# Patient Record
Sex: MALE | Race: OTHER | HISPANIC OR LATINO | URBAN - METROPOLITAN AREA
[De-identification: names, ages, dates, MRNs, and addresses within clinical notes are randomized per-mention and may not be internally consistent; named-entity substitution may affect disease eponyms.]

---

## 2018-08-15 ENCOUNTER — INPATIENT (INPATIENT)
Facility: HOSPITAL | Age: 78
LOS: 0 days | Discharge: ANOTHER HOSPITAL | End: 2018-08-16
Attending: INTERNAL MEDICINE | Admitting: INTERNAL MEDICINE
Payer: MEDICARE

## 2018-08-15 VITALS
SYSTOLIC BLOOD PRESSURE: 138 MMHG | HEIGHT: 65 IN | HEART RATE: 97 BPM | OXYGEN SATURATION: 97 % | DIASTOLIC BLOOD PRESSURE: 83 MMHG | TEMPERATURE: 98 F | WEIGHT: 184.09 LBS | RESPIRATION RATE: 19 BRPM

## 2018-08-15 DIAGNOSIS — E11.9 TYPE 2 DIABETES MELLITUS WITHOUT COMPLICATIONS: ICD-10-CM

## 2018-08-15 DIAGNOSIS — J44.9 CHRONIC OBSTRUCTIVE PULMONARY DISEASE, UNSPECIFIED: ICD-10-CM

## 2018-08-15 DIAGNOSIS — I21.4 NON-ST ELEVATION (NSTEMI) MYOCARDIAL INFARCTION: ICD-10-CM

## 2018-08-15 DIAGNOSIS — I10 ESSENTIAL (PRIMARY) HYPERTENSION: ICD-10-CM

## 2018-08-15 DIAGNOSIS — E78.2 MIXED HYPERLIPIDEMIA: ICD-10-CM

## 2018-08-15 LAB
ALBUMIN SERPL ELPH-MCNC: 3.3 G/DL — SIGNIFICANT CHANGE UP (ref 3.3–5)
ALP SERPL-CCNC: 94 U/L — SIGNIFICANT CHANGE UP (ref 40–120)
ALT FLD-CCNC: 19 U/L — SIGNIFICANT CHANGE UP (ref 12–78)
ANION GAP SERPL CALC-SCNC: 9 MMOL/L — SIGNIFICANT CHANGE UP (ref 5–17)
APTT BLD: 120.5 SEC — CRITICAL HIGH (ref 27.5–37.4)
APTT BLD: 30.5 SEC — SIGNIFICANT CHANGE UP (ref 27.5–37.4)
AST SERPL-CCNC: 16 U/L — SIGNIFICANT CHANGE UP (ref 15–37)
BASE EXCESS BLDA CALC-SCNC: 3.6 MMOL/L — HIGH (ref -2–2)
BASOPHILS # BLD AUTO: 0.08 K/UL — SIGNIFICANT CHANGE UP (ref 0–0.2)
BASOPHILS NFR BLD AUTO: 0.6 % — SIGNIFICANT CHANGE UP (ref 0–2)
BILIRUB SERPL-MCNC: 0.7 MG/DL — SIGNIFICANT CHANGE UP (ref 0.2–1.2)
BLOOD GAS COMMENTS: SIGNIFICANT CHANGE UP
BLOOD GAS COMMENTS: SIGNIFICANT CHANGE UP
BLOOD GAS SOURCE: SIGNIFICANT CHANGE UP
BUN SERPL-MCNC: 15 MG/DL — SIGNIFICANT CHANGE UP (ref 7–23)
CALCIUM SERPL-MCNC: 8.3 MG/DL — LOW (ref 8.5–10.1)
CHLORIDE SERPL-SCNC: 104 MMOL/L — SIGNIFICANT CHANGE UP (ref 96–108)
CHOLEST SERPL-MCNC: 156 MG/DL — SIGNIFICANT CHANGE UP (ref 10–199)
CK MB CFR SERPL CALC: 10.4 NG/ML — HIGH (ref 0.5–3.6)
CK MB CFR SERPL CALC: 4.7 NG/ML — HIGH (ref 0.5–3.6)
CO2 SERPL-SCNC: 27 MMOL/L — SIGNIFICANT CHANGE UP (ref 22–31)
CREAT SERPL-MCNC: 0.68 MG/DL — SIGNIFICANT CHANGE UP (ref 0.5–1.3)
EOSINOPHIL # BLD AUTO: 0.19 K/UL — SIGNIFICANT CHANGE UP (ref 0–0.5)
EOSINOPHIL NFR BLD AUTO: 1.3 % — SIGNIFICANT CHANGE UP (ref 0–6)
GLUCOSE SERPL-MCNC: 185 MG/DL — HIGH (ref 70–99)
HBA1C BLD-MCNC: 6.9 % — HIGH (ref 4–5.6)
HCO3 BLDA-SCNC: 28 MMOL/L — SIGNIFICANT CHANGE UP (ref 21–29)
HCT VFR BLD CALC: 47.7 % — SIGNIFICANT CHANGE UP (ref 39–50)
HCT VFR BLD CALC: 50.7 % — HIGH (ref 39–50)
HDLC SERPL-MCNC: 51 MG/DL — SIGNIFICANT CHANGE UP
HGB BLD-MCNC: 15.8 G/DL — SIGNIFICANT CHANGE UP (ref 13–17)
HGB BLD-MCNC: 16.6 G/DL — SIGNIFICANT CHANGE UP (ref 13–17)
HOROWITZ INDEX BLDA+IHG-RTO: 28 — SIGNIFICANT CHANGE UP
IMM GRANULOCYTES NFR BLD AUTO: 0.6 % — SIGNIFICANT CHANGE UP (ref 0–1.5)
INR BLD: 0.97 RATIO — SIGNIFICANT CHANGE UP (ref 0.88–1.16)
LACTATE SERPL-SCNC: 1.9 MMOL/L — SIGNIFICANT CHANGE UP (ref 0.7–2)
LIPID PNL WITH DIRECT LDL SERPL: 93 MG/DL — SIGNIFICANT CHANGE UP
LYMPHOCYTES # BLD AUTO: 1.15 K/UL — SIGNIFICANT CHANGE UP (ref 1–3.3)
LYMPHOCYTES # BLD AUTO: 8 % — LOW (ref 13–44)
MCHC RBC-ENTMCNC: 29 PG — SIGNIFICANT CHANGE UP (ref 27–34)
MCHC RBC-ENTMCNC: 29.1 PG — SIGNIFICANT CHANGE UP (ref 27–34)
MCHC RBC-ENTMCNC: 32.7 GM/DL — SIGNIFICANT CHANGE UP (ref 32–36)
MCHC RBC-ENTMCNC: 33.1 GM/DL — SIGNIFICANT CHANGE UP (ref 32–36)
MCV RBC AUTO: 87.5 FL — SIGNIFICANT CHANGE UP (ref 80–100)
MCV RBC AUTO: 88.8 FL — SIGNIFICANT CHANGE UP (ref 80–100)
MONOCYTES # BLD AUTO: 0.76 K/UL — SIGNIFICANT CHANGE UP (ref 0–0.9)
MONOCYTES NFR BLD AUTO: 5.3 % — SIGNIFICANT CHANGE UP (ref 2–14)
NEUTROPHILS # BLD AUTO: 12.03 K/UL — HIGH (ref 1.8–7.4)
NEUTROPHILS NFR BLD AUTO: 84.2 % — HIGH (ref 43–77)
NRBC # BLD: 0 /100 WBCS — SIGNIFICANT CHANGE UP (ref 0–0)
NT-PROBNP SERPL-SCNC: 365 PG/ML — SIGNIFICANT CHANGE UP (ref 0–450)
NT-PROBNP SERPL-SCNC: 737 PG/ML — HIGH (ref 0–450)
PCO2 BLDA: 45 MMHG — SIGNIFICANT CHANGE UP (ref 32–46)
PH BLD: 7.42 — SIGNIFICANT CHANGE UP (ref 7.35–7.45)
PLATELET # BLD AUTO: 241 K/UL — SIGNIFICANT CHANGE UP (ref 150–400)
PLATELET # BLD AUTO: 251 K/UL — SIGNIFICANT CHANGE UP (ref 150–400)
PO2 BLDA: 124 MMHG — HIGH (ref 74–108)
POTASSIUM SERPL-MCNC: 3.8 MMOL/L — SIGNIFICANT CHANGE UP (ref 3.5–5.3)
POTASSIUM SERPL-SCNC: 3.8 MMOL/L — SIGNIFICANT CHANGE UP (ref 3.5–5.3)
PROCALCITONIN SERPL-MCNC: <0.02 NG/ML — SIGNIFICANT CHANGE UP (ref 0.02–0.1)
PROT SERPL-MCNC: 7.7 GM/DL — SIGNIFICANT CHANGE UP (ref 6–8.3)
PROTHROM AB SERPL-ACNC: 10.6 SEC — SIGNIFICANT CHANGE UP (ref 9.8–12.7)
RBC # BLD: 5.45 M/UL — SIGNIFICANT CHANGE UP (ref 4.2–5.8)
RBC # BLD: 5.71 M/UL — SIGNIFICANT CHANGE UP (ref 4.2–5.8)
RBC # FLD: 14.6 % — HIGH (ref 10.3–14.5)
RBC # FLD: 14.6 % — HIGH (ref 10.3–14.5)
SAO2 % BLDA: 99 % — HIGH (ref 92–96)
SODIUM SERPL-SCNC: 140 MMOL/L — SIGNIFICANT CHANGE UP (ref 135–145)
TOTAL CHOLESTEROL/HDL RATIO MEASUREMENT: 3.1 RATIO — LOW (ref 3.4–9.6)
TRIGL SERPL-MCNC: 61 MG/DL — SIGNIFICANT CHANGE UP (ref 10–149)
TROPONIN I SERPL-MCNC: 0.63 NG/ML — HIGH (ref 0.01–0.04)
TROPONIN I SERPL-MCNC: 3.44 NG/ML — HIGH (ref 0.01–0.04)
WBC # BLD: 13.63 K/UL — HIGH (ref 3.8–10.5)
WBC # BLD: 14.3 K/UL — HIGH (ref 3.8–10.5)
WBC # FLD AUTO: 13.63 K/UL — HIGH (ref 3.8–10.5)
WBC # FLD AUTO: 14.3 K/UL — HIGH (ref 3.8–10.5)

## 2018-08-15 PROCEDURE — 99223 1ST HOSP IP/OBS HIGH 75: CPT

## 2018-08-15 PROCEDURE — 93306 TTE W/DOPPLER COMPLETE: CPT | Mod: 26

## 2018-08-15 PROCEDURE — 99285 EMERGENCY DEPT VISIT HI MDM: CPT

## 2018-08-15 PROCEDURE — 71045 X-RAY EXAM CHEST 1 VIEW: CPT | Mod: 26

## 2018-08-15 RX ORDER — FUROSEMIDE 40 MG
40 TABLET ORAL ONCE
Qty: 0 | Refills: 0 | Status: DISCONTINUED | OUTPATIENT
Start: 2018-08-15 | End: 2018-08-15

## 2018-08-15 RX ORDER — DEXTROSE 50 % IN WATER 50 %
12.5 SYRINGE (ML) INTRAVENOUS ONCE
Qty: 0 | Refills: 0 | Status: DISCONTINUED | OUTPATIENT
Start: 2018-08-15 | End: 2018-08-16

## 2018-08-15 RX ORDER — ATORVASTATIN CALCIUM 80 MG/1
10 TABLET, FILM COATED ORAL AT BEDTIME
Qty: 0 | Refills: 0 | Status: DISCONTINUED | OUTPATIENT
Start: 2018-08-15 | End: 2018-08-15

## 2018-08-15 RX ORDER — LOSARTAN POTASSIUM 100 MG/1
100 TABLET, FILM COATED ORAL DAILY
Qty: 0 | Refills: 0 | Status: DISCONTINUED | OUTPATIENT
Start: 2018-08-15 | End: 2018-08-16

## 2018-08-15 RX ORDER — DEXTROSE 50 % IN WATER 50 %
15 SYRINGE (ML) INTRAVENOUS ONCE
Qty: 0 | Refills: 0 | Status: DISCONTINUED | OUTPATIENT
Start: 2018-08-15 | End: 2018-08-16

## 2018-08-15 RX ORDER — HEPARIN SODIUM 5000 [USP'U]/ML
INJECTION INTRAVENOUS; SUBCUTANEOUS
Qty: 25000 | Refills: 0 | Status: DISCONTINUED | OUTPATIENT
Start: 2018-08-15 | End: 2018-08-16

## 2018-08-15 RX ORDER — GABAPENTIN 400 MG/1
300 CAPSULE ORAL DAILY
Qty: 0 | Refills: 0 | Status: DISCONTINUED | OUTPATIENT
Start: 2018-08-15 | End: 2018-08-16

## 2018-08-15 RX ORDER — TAMSULOSIN HYDROCHLORIDE 0.4 MG/1
0.4 CAPSULE ORAL AT BEDTIME
Qty: 0 | Refills: 0 | Status: DISCONTINUED | OUTPATIENT
Start: 2018-08-15 | End: 2018-08-16

## 2018-08-15 RX ORDER — ATORVASTATIN CALCIUM 80 MG/1
80 TABLET, FILM COATED ORAL AT BEDTIME
Qty: 0 | Refills: 0 | Status: DISCONTINUED | OUTPATIENT
Start: 2018-08-15 | End: 2018-08-16

## 2018-08-15 RX ORDER — IPRATROPIUM/ALBUTEROL SULFATE 18-103MCG
3 AEROSOL WITH ADAPTER (GRAM) INHALATION ONCE
Qty: 0 | Refills: 0 | Status: COMPLETED | OUTPATIENT
Start: 2018-08-15 | End: 2018-08-15

## 2018-08-15 RX ORDER — AMLODIPINE BESYLATE 2.5 MG/1
5 TABLET ORAL DAILY
Qty: 0 | Refills: 0 | Status: DISCONTINUED | OUTPATIENT
Start: 2018-08-15 | End: 2018-08-16

## 2018-08-15 RX ORDER — INSULIN LISPRO 100/ML
VIAL (ML) SUBCUTANEOUS
Qty: 0 | Refills: 0 | Status: DISCONTINUED | OUTPATIENT
Start: 2018-08-15 | End: 2018-08-16

## 2018-08-15 RX ORDER — SODIUM CHLORIDE 9 MG/ML
1000 INJECTION, SOLUTION INTRAVENOUS
Qty: 0 | Refills: 0 | Status: DISCONTINUED | OUTPATIENT
Start: 2018-08-15 | End: 2018-08-16

## 2018-08-15 RX ORDER — METOPROLOL TARTRATE 50 MG
25 TABLET ORAL DAILY
Qty: 0 | Refills: 0 | Status: DISCONTINUED | OUTPATIENT
Start: 2018-08-15 | End: 2018-08-16

## 2018-08-15 RX ORDER — ASPIRIN/CALCIUM CARB/MAGNESIUM 324 MG
81 TABLET ORAL DAILY
Qty: 0 | Refills: 0 | Status: DISCONTINUED | OUTPATIENT
Start: 2018-08-15 | End: 2018-08-16

## 2018-08-15 RX ORDER — ASPIRIN/CALCIUM CARB/MAGNESIUM 324 MG
325 TABLET ORAL ONCE
Qty: 0 | Refills: 0 | Status: COMPLETED | OUTPATIENT
Start: 2018-08-15 | End: 2018-08-15

## 2018-08-15 RX ORDER — DEXTROSE 50 % IN WATER 50 %
25 SYRINGE (ML) INTRAVENOUS ONCE
Qty: 0 | Refills: 0 | Status: DISCONTINUED | OUTPATIENT
Start: 2018-08-15 | End: 2018-08-16

## 2018-08-15 RX ORDER — FUROSEMIDE 40 MG
80 TABLET ORAL ONCE
Qty: 0 | Refills: 0 | Status: COMPLETED | OUTPATIENT
Start: 2018-08-15 | End: 2018-08-15

## 2018-08-15 RX ORDER — HEPARIN SODIUM 5000 [USP'U]/ML
3000 INJECTION INTRAVENOUS; SUBCUTANEOUS EVERY 6 HOURS
Qty: 0 | Refills: 0 | Status: DISCONTINUED | OUTPATIENT
Start: 2018-08-15 | End: 2018-08-16

## 2018-08-15 RX ORDER — BUDESONIDE AND FORMOTEROL FUMARATE DIHYDRATE 160; 4.5 UG/1; UG/1
2 AEROSOL RESPIRATORY (INHALATION)
Qty: 0 | Refills: 0 | Status: DISCONTINUED | OUTPATIENT
Start: 2018-08-15 | End: 2018-08-16

## 2018-08-15 RX ORDER — HYDROCHLOROTHIAZIDE 25 MG
25 TABLET ORAL DAILY
Qty: 0 | Refills: 0 | Status: DISCONTINUED | OUTPATIENT
Start: 2018-08-15 | End: 2018-08-16

## 2018-08-15 RX ORDER — HEPARIN SODIUM 5000 [USP'U]/ML
6500 INJECTION INTRAVENOUS; SUBCUTANEOUS ONCE
Qty: 0 | Refills: 0 | Status: COMPLETED | OUTPATIENT
Start: 2018-08-15 | End: 2018-08-15

## 2018-08-15 RX ORDER — HEPARIN SODIUM 5000 [USP'U]/ML
6500 INJECTION INTRAVENOUS; SUBCUTANEOUS EVERY 6 HOURS
Qty: 0 | Refills: 0 | Status: DISCONTINUED | OUTPATIENT
Start: 2018-08-15 | End: 2018-08-16

## 2018-08-15 RX ORDER — GLUCAGON INJECTION, SOLUTION 0.5 MG/.1ML
1 INJECTION, SOLUTION SUBCUTANEOUS ONCE
Qty: 0 | Refills: 0 | Status: DISCONTINUED | OUTPATIENT
Start: 2018-08-15 | End: 2018-08-16

## 2018-08-15 RX ORDER — FUROSEMIDE 40 MG
40 TABLET ORAL DAILY
Qty: 0 | Refills: 0 | Status: DISCONTINUED | OUTPATIENT
Start: 2018-08-15 | End: 2018-08-16

## 2018-08-15 RX ORDER — METOPROLOL TARTRATE 50 MG
25 TABLET ORAL ONCE
Qty: 0 | Refills: 0 | Status: COMPLETED | OUTPATIENT
Start: 2018-08-15 | End: 2018-08-15

## 2018-08-15 RX ADMIN — Medication 3 MILLILITER(S): at 12:35

## 2018-08-15 RX ADMIN — HEPARIN SODIUM 1500 UNIT(S)/HR: 5000 INJECTION INTRAVENOUS; SUBCUTANEOUS at 14:00

## 2018-08-15 RX ADMIN — TAMSULOSIN HYDROCHLORIDE 0.4 MILLIGRAM(S): 0.4 CAPSULE ORAL at 22:06

## 2018-08-15 RX ADMIN — ATORVASTATIN CALCIUM 80 MILLIGRAM(S): 80 TABLET, FILM COATED ORAL at 22:05

## 2018-08-15 RX ADMIN — HEPARIN SODIUM 1300 UNIT(S)/HR: 5000 INJECTION INTRAVENOUS; SUBCUTANEOUS at 22:40

## 2018-08-15 RX ADMIN — Medication 80 MILLIGRAM(S): at 12:34

## 2018-08-15 RX ADMIN — HEPARIN SODIUM 6500 UNIT(S): 5000 INJECTION INTRAVENOUS; SUBCUTANEOUS at 13:59

## 2018-08-15 RX ADMIN — Medication 325 MILLIGRAM(S): at 12:35

## 2018-08-15 RX ADMIN — Medication 25 MILLIGRAM(S): at 13:58

## 2018-08-15 NOTE — H&P ADULT - HISTORY OF PRESENT ILLNESS
76 y/o male hx htn, dm, hld life long smoker complains of difficulty breathing that began this morning, worse with laying down, with elevated bp at home. Pt called ems himself, on arrival, bp was 205/120, gradually improved en route with only oxygen. Pt appeared anxious and somewhat sweaty, with some improvement of symptoms on arrival.      In ED bp normalized, but was found to have elevated troponin. ED discussed case w cardiology who decided to start heparin drip and treat as nstemi.      No known hx of chf. Denies any chest pain, leg swelling, abd pain, n/v, f/c.

## 2018-08-15 NOTE — PATIENT PROFILE ADULT. - VISION (WITH CORRECTIVE LENSES IF THE PATIENT USUALLY WEARS THEM):
Partially impaired: cannot see medication labels or newsprint, but can see obstacles in path, and the surrounding layout; can count fingers at arm's length/bifocals

## 2018-08-15 NOTE — H&P ADULT - NSHPREVIEWOFSYSTEMS_GEN_ALL_CORE
CONSTITUTIONAL: No fever, weight loss, or fatigue  EYES: No eye pain, visual disturbances, or discharge  ENMT:  No difficulty hearing, tinnitus, vertigo; No sinus or throat pain  NECK: No pain or stiffness  BREASTS: No pain, masses, or nipple discharge  RESPIRATORY: As pe HPI  CARDIOVASCULAR: No chest pain, palpitations, dizziness, or leg swelling  GASTROINTESTINAL: No abdominal or epigastric pain. No nausea, vomiting, or hematemesis; No diarrhea or constipation. No melena or hematochezia.  GENITOURINARY: No dysuria, frequency, hematuria, or incontinence  NEUROLOGICAL: No headaches, memory loss, loss of strength, numbness, or tremors  SKIN: No itching, burning, rashes, or lesions   LYMPH NODES: No enlarged glands  ENDOCRINE: No heat or cold intolerance; No hair loss  MUSCULOSKELETAL: No joint pain or swelling; No muscle, back, or extremity pain  PSYCHIATRIC: No depression, anxiety, mood swings, or difficulty sleeping  HEME/LYMPH: No easy bruising, or bleeding gums  ALLERGY AND IMMUNOLOGIC: No hives or eczema

## 2018-08-15 NOTE — ED PROVIDER NOTE - PROGRESS NOTE DETAILS
Luisito: pt with elevated troponin, fluid overload on xr, ?possible underlying pna with cough and wbc. given aspirin, heparin, betablocker, empiric abx. spoke to Dr. Solano from cardiology who saw ekg, will admit for nstemi/fluid overload. Dr. Ramony aware. pt has no chest pain now or at any point.

## 2018-08-15 NOTE — ED PROVIDER NOTE - CARE PLAN
Principal Discharge DX:	NSTEMI (non-ST elevated myocardial infarction)  Secondary Diagnosis:	Fluid overload

## 2018-08-15 NOTE — ED PROVIDER NOTE - PHYSICAL EXAMINATION
Gen: mildly anxious/sweaty, awake, alert.   HEENT: Mucous membranes moist, pink conjunctivae, EOMI  CV: RRR, nl s1/s2.  Resp: speaking full sentences, breathing ~20-22, no significant retractions though slightly labored appearing. crackles at bases with some rhonchi diffusely.   GI: Abdomen soft, NT, ND. No rebound, no guarding  : No CVAT  Neuro: A&O x 3, moving all 4 extremities  MSK: No spine or joint tenderness to palpation. no edema b/l.   Skin: No rashes. intact and perfused.

## 2018-08-15 NOTE — ED PROVIDER NOTE - MEDICAL DECISION MAKING DETAILS
sob, with some basilar crackles and mild congestive findings on xray. given lasix. duoneb trial given rhonchi with smoking. pt breathing significantly improved as he calmed down, no bipap at this time. ekg nsr without ischemic change. labs, meds, reassess.

## 2018-08-15 NOTE — ED ADULT NURSE NOTE - NSIMPLEMENTINTERV_GEN_ALL_ED
Implemented All Universal Safety Interventions:  [place] to call system. Call bell, personal items and telephone within reach. Instruct patient to call for assistance. Room bathroom lighting operational. Non-slip footwear when patient is off stretcher. Physically safe environment: no spills, clutter or unnecessary equipment. Stretcher in lowest position, wheels locked, appropriate side rails in place.

## 2018-08-15 NOTE — CONSULT NOTE ADULT - ASSESSMENT
78 y/o male hx htn, dm, hld life long smoker complains of difficulty breathing that began this morning, worse with laying down, with elevated BP at home. Pt called ems himself, on arrival, bp was 205/120, gradually improved en route with only oxygen. Pt appeared anxious and somewhat sweaty, with some improvement of symptoms on arrival.    Denied chest pain or prior hx of CHF.  Trop up to 3.4; pt asymptomatic with a normal EKG.    -monitor on tele  -trend oTm  -2D echo  -daily EKG  -cont asa/hep gtt/metoprolol/statin  -cont diuresis  -for ischemic eval likely in AM  -smoking cessation 78 y/o male hx htn, dm, hld life long smoker complains of difficulty breathing that began this morning, worse with laying down, with elevated BP at home. Pt called ems himself, on arrival, bp was 205/120, gradually improved en route with only oxygen. Pt appeared anxious and somewhat sweaty, with some improvement of symptoms on arrival.    Denied chest pain or prior hx of CHF.  Trop up to 3.4; pt asymptomatic with a normal EKG.    -monitor on tele  -trend Tom  -2D echo  -daily EKG  -cont asa/hep gtt/metoprolol/statin  -cont diuresis  -for ischemic eval likely in AM  -smoking cessation  -monitor temps/WBC... ?infxn

## 2018-08-15 NOTE — H&P ADULT - ASSESSMENT
78 y/o male hx htn, dm, hld life long smoker complains of difficulty breathing that began this morning, worse with laying down, with elevated bp at home. Pt called ems himself, on arrival, bp was 205/120, gradually improved en route with only oxygen. Pt appeared anxious and somewhat sweaty, with some improvement of symptoms on arrival.      In ED bp normalized, but was found to have elevated troponin. ED discussed case w cardiology who decided to start heparin drip and treat as nstemi.

## 2018-08-15 NOTE — ED ADULT NURSE NOTE - CHPI ED NUR SYMPTOMS NEG
no headache/no chest pain/no fever/no edema/no diaphoresis/no chills/no body aches/no cough/no hemoptysis

## 2018-08-15 NOTE — ED PROVIDER NOTE - OBJECTIVE STATEMENT
78 y/o male hx htn, dm, smoker c/o some difficulty breathing beginning this morning, worse with laying down, with elevated bp at home. Pt called ems himself, on arrival, bp was 205/120, gradually improved en route with only oxygen. Pt appeared anxious and somewhat sweaty, with some improvement of symptoms on arrival. Pt with cough starting today.  No known hx of chf. Denies any chest pain, leg swelling, abd pain, n/v, f/c.     ROS: No fever/chills. No eye pain/changes in vision, No ear pain/sore throat/dysphagia, No chest pain/palpitations.  No abdominal pain, N/V/D, no black/bloody bm. No dysuria/frequency/discharge, No headache. No Dizziness.    No rashes or breaks in skin. No numbness/tingling/weakness.

## 2018-08-15 NOTE — ED ADULT TRIAGE NOTE - CHIEF COMPLAINT QUOTE
patient c/o of difficulty breathing started today , denied chest pain denied headache , denied N/V denied dizziness , as per EMS /122, the last BP took by EMS was 179/96

## 2018-08-15 NOTE — H&P ADULT - NSHPPHYSICALEXAM_GEN_ALL_CORE
GENERAL: NAD, well-groomed, well-developed  HEAD:  Atraumatic, Normocephalic  EYES: EOMI, PERRLA, conjunctiva and sclera clear  ENMT: No tonsillar erythema, exudates, or enlargement; Moist mucous membranes, Good dentition, No lesions  NECK: Supple, No JVD, Normal thyroid  NERVOUS SYSTEM:  Alert & Oriented X3, Good concentration; Motor Strength 5/5 B/L upper and lower extremities; DTRs 2+ intact and symmetric  CHEST/LUNG: Clear to percussion bilaterally; faint crackles b/l  HEART: Regular rate and rhythm; No murmurs, rubs, or gallops  ABDOMEN: Soft, Nontender, Nondistended; Bowel sounds present  EXTREMITIES:  2+ Peripheral Pulses, No clubbing, cyanosis, or edema  LYMPH: No lymphadenopathy   SKIN: No rashes or lesions

## 2018-08-15 NOTE — H&P ADULT - NSHPLABSRESULTS_GEN_ALL_CORE
16.6   14.30 )-----------( 241      ( 15 Aug 2018 12:45 )             50.7     08-15    140  |  104  |  15  ----------------------------<  185<H>  3.8   |  27  |  0.68    Ca    8.3<L>      15 Aug 2018 12:45    TPro  7.7  /  Alb  3.3  /  TBili  0.7  /  DBili  x   /  AST  16  /  ALT  19  /  AlkPhos  94  08-15    PT/INR - ( 15 Aug 2018 12:45 )   PT: 10.6 sec;   INR: 0.97 ratio         PTT - ( 15 Aug 2018 12:45 )  PTT:30.5 sec

## 2018-08-15 NOTE — CONSULT NOTE ADULT - SUBJECTIVE AND OBJECTIVE BOX
CARDIOLOGY CONSULT NOTE    Patient is a 76y Male with a known history of :  Mixed hyperlipidemia (E78.2)  Chronic obstructive pulmonary disease, unspecified COPD type (J44.9)  Type 2 diabetes mellitus without complication, without long-term current use of insulin (E11.9)  Essential hypertension (I10)  NSTEMI (non-ST elevated myocardial infarction) (I21.4)    HPI:  76 y/o male hx htn, dm, hld life long smoker complains of difficulty breathing that began this morning, worse with laying down, with elevated BP at home. Pt called ems himself, on arrival, bp was 205/120, gradually improved en route with only oxygen. Pt appeared anxious and somewhat sweaty, with some improvement of symptoms on arrival.    Denied chest pain or prior hx of CHF.  Trop up to 3.4; pt asymptomatic with a normal EKG.    REVIEW OF SYSTEMS:  CONSTITUTIONAL: No fever, weight loss, or fatigue  EYES: No eye pain, visual disturbances, or discharge  ENMT:  No difficulty hearing, tinnitus, vertigo; No sinus or throat pain  NECK: No pain or stiffness  BREASTS: No pain, masses, or nipple discharge  RESPIRATORY: No cough, wheezing, chills or hemoptysis; + shortness of breath  CARDIOVASCULAR: No chest pain, palpitations, dizziness, or leg swelling  GASTROINTESTINAL: No abdominal or epigastric pain. No nausea, vomiting, or hematemesis; No diarrhea or constipation. No melena or hematochezia.  GENITOURINARY: No dysuria, frequency, hematuria, or incontinence  NEUROLOGICAL: No headaches, memory loss, loss of strength, numbness, or tremors  SKIN: No itching, burning, rashes, or lesions   LYMPH NODES: No enlarged glands  ENDOCRINE: No heat or cold intolerance; No hair loss  MUSCULOSKELETAL: No joint pain or swelling; No muscle, back, or extremity pain  PSYCHIATRIC: No depression, anxiety, mood swings, or difficulty sleeping  HEME/LYMPH: No easy bruising, or bleeding gums  ALLERGY AND IMMUNOLOGIC: No hives or eczema    MEDICATIONS  (STANDING):  amLODIPine   Tablet 5 milliGRAM(s) Oral daily  aspirin enteric coated 81 milliGRAM(s) Oral daily  atorvastatin 80 milliGRAM(s) Oral at bedtime  buDESOnide  80 MICROgram(s)/formoterol 4.5 MICROgram(s) Inhaler 2 Puff(s) Inhalation two times a day  dextrose 5%. 1000 milliLiter(s) (50 mL/Hr) IV Continuous <Continuous>  gabapentin 300 milliGRAM(s) Oral daily  heparin  Infusion.  Unit(s)/Hr (15 mL/Hr) IV Continuous <Continuous>  hydrochlorothiazide 25 milliGRAM(s) Oral daily  insulin lispro (HumaLOG) corrective regimen sliding scale   SubCutaneous three times a day before meals  losartan 100 milliGRAM(s) Oral daily  metoprolol succinate ER 25 milliGRAM(s) Oral daily  tamsulosin 0.4 milliGRAM(s) Oral at bedtime    MEDICATIONS  (PRN):  dextrose 40% Gel 15 Gram(s) Oral once PRN Blood Glucose LESS THAN 70 milliGRAM(s)/deciliter  glucagon  Injectable 1 milliGRAM(s) IntraMuscular once PRN Glucose LESS THAN 70 milligrams/deciliter  heparin  Injectable 6500 Unit(s) IV Push every 6 hours PRN For aPTT less than 40  heparin  Injectable 3000 Unit(s) IV Push every 6 hours PRN For aPTT between 40 - 57      ALLERGIES: Advil (Hives)  penicillin (Hives)      FAMILY HISTORY:  No pertinent family history in first degree relatives      PHYSICAL EXAMINATION:  -----------------------------  T(C): 36.7 (08-15-18 @ 11:41), Max: 36.7 (08-15-18 @ 11:41)  HR: 69 (08-15-18 @ 15:16) (66 - 97)  BP: 114/70 (08-15-18 @ 15:16) (114/70 - 138/83)  RR: 22 (08-15-18 @ 15:16) (16 - 22)  SpO2: 95% (08-15-18 @ 15:16) (95% - 97%)  Wt(kg): --    08-15 @ 07:01  -  08-15 @ 16:03  --------------------------------------------------------  IN:    heparin  Infusion.: 30 mL    IV PiggyBack: 100 mL  Total IN: 130 mL    OUT:    Voided: 1000 mL  Total OUT: 1000 mL    Total NET: -870 mL        Height (cm): 165.1 (08-15 @ 11:41)  Weight (kg): 83.5 (08-15 @ 11:41)  BMI (kg/m2): 30.6 (08-15 @ 11:41)  BSA (m2): 1.91 (08-15 @ 11:41)    Constitutional: well developed, normal appearance, well groomed, well nourished, no deformities and no acute distress.   Eyes: the conjunctiva exhibited no abnormalities and the eyelids demonstrated no xanthelasmas.   HEENT: normal oral mucosa, no oral pallor and no oral cyanosis.   Neck: normal jugular venous A waves present, normal jugular venous V waves present and no jugular venous steiner A waves.   Pulmonary: no respiratory distress, normal respiratory rhythm and effort, no accessory muscle use and lungs were clear to auscultation bilaterally.   Cardiovascular: heart rate and rhythm were normal, normal S1 and S2 and no murmur, gallop, rub, heave or thrill are present.   Abdomen: soft, non-tender, no hepato-splenomegaly and no abdominal mass palpated.   Musculoskeletal: the gait could not be assessed..   Extremities: no clubbing of the fingernails, no localized cyanosis, no petechial hemorrhages and no ischemic changes.   Skin: normal skin color and pigmentation, no rash, no venous stasis, no skin lesions, no skin ulcer and no xanthoma was observed.   Psychiatric: oriented to person, place, and time, the affect was normal, the mood was normal and not feeling anxious.     LABS:   --------  08-15    140  |  104  |  15  ----------------------------<  185<H>  3.8   |  27  |  0.68    Ca    8.3<L>      15 Aug 2018 12:45    TPro  7.7  /  Alb  3.3  /  TBili  0.7  /  DBili  x   /  AST  16  /  ALT  19  /  AlkPhos  94  08-15                         16.6   14.30 )-----------( 241      ( 15 Aug 2018 12:45 )             50.7     PT/INR - ( 15 Aug 2018 12:45 )   PT: 10.6 sec;   INR: 0.97 ratio         PTT - ( 15 Aug 2018 12:45 )  PTT:30.5 sec  08-15 @ 15:15 BNP: 737 pg/mL  08-15 @ 12:45 BNP: 365 pg/mL    08-15 @ 15:15 CPK total:--, CKMB --, Troponin I - 3.440 ng/mL<H>  08-15 @ 12:45 CPK total:--, CKMB --, Troponin I - .633 ng/mL<H>          RADIOLOGY:  -----------------    ECG: NSR with no ST-T wave changes

## 2018-08-16 ENCOUNTER — INPATIENT (INPATIENT)
Facility: HOSPITAL | Age: 78
LOS: 4 days | Discharge: HOME | DRG: 281 | End: 2018-08-21
Attending: INTERNAL MEDICINE | Admitting: INTERNAL MEDICINE
Payer: COMMERCIAL

## 2018-08-16 VITALS
SYSTOLIC BLOOD PRESSURE: 107 MMHG | HEIGHT: 65 IN | HEART RATE: 53 BPM | RESPIRATION RATE: 20 BRPM | DIASTOLIC BLOOD PRESSURE: 56 MMHG | WEIGHT: 205.91 LBS | OXYGEN SATURATION: 97 %

## 2018-08-16 VITALS
SYSTOLIC BLOOD PRESSURE: 113 MMHG | DIASTOLIC BLOOD PRESSURE: 52 MMHG | RESPIRATION RATE: 18 BRPM | OXYGEN SATURATION: 92 % | HEART RATE: 63 BPM | TEMPERATURE: 97 F

## 2018-08-16 DIAGNOSIS — I21.4 NON-ST ELEVATION (NSTEMI) MYOCARDIAL INFARCTION: ICD-10-CM

## 2018-08-16 DIAGNOSIS — Z98.89 OTHER SPECIFIED POSTPROCEDURAL STATES: Chronic | ICD-10-CM

## 2018-08-16 DIAGNOSIS — D30.3 BENIGN NEOPLASM OF BLADDER: Chronic | ICD-10-CM

## 2018-08-16 DIAGNOSIS — Z98.49 CATARACT EXTRACTION STATUS, UNSPECIFIED EYE: Chronic | ICD-10-CM

## 2018-08-16 LAB
ANION GAP SERPL CALC-SCNC: 10 MMOL/L — SIGNIFICANT CHANGE UP (ref 5–17)
APTT BLD: 82.4 SEC — HIGH (ref 27.5–37.4)
BUN SERPL-MCNC: 19 MG/DL — SIGNIFICANT CHANGE UP (ref 7–23)
CALCIUM SERPL-MCNC: 8.2 MG/DL — LOW (ref 8.5–10.1)
CHLORIDE SERPL-SCNC: 98 MMOL/L — SIGNIFICANT CHANGE UP (ref 96–108)
CK MB CFR SERPL CALC: 17.2 NG/ML — HIGH (ref 0.5–3.6)
CO2 SERPL-SCNC: 31 MMOL/L — SIGNIFICANT CHANGE UP (ref 22–31)
CREAT SERPL-MCNC: 0.82 MG/DL — SIGNIFICANT CHANGE UP (ref 0.5–1.3)
GLUCOSE SERPL-MCNC: 142 MG/DL — HIGH (ref 70–99)
HBA1C BLD-MCNC: 6.9 % — HIGH (ref 4–5.6)
HCT VFR BLD CALC: 43.7 % — SIGNIFICANT CHANGE UP (ref 39–50)
HGB BLD-MCNC: 14.7 G/DL — SIGNIFICANT CHANGE UP (ref 13–17)
MCHC RBC-ENTMCNC: 29.6 PG — SIGNIFICANT CHANGE UP (ref 27–34)
MCHC RBC-ENTMCNC: 33.6 GM/DL — SIGNIFICANT CHANGE UP (ref 32–36)
MCV RBC AUTO: 87.9 FL — SIGNIFICANT CHANGE UP (ref 80–100)
NRBC # BLD: 0 /100 WBCS — SIGNIFICANT CHANGE UP (ref 0–0)
PLATELET # BLD AUTO: 223 K/UL — SIGNIFICANT CHANGE UP (ref 150–400)
POTASSIUM SERPL-MCNC: 3.3 MMOL/L — LOW (ref 3.5–5.3)
POTASSIUM SERPL-SCNC: 3.3 MMOL/L — LOW (ref 3.5–5.3)
RBC # BLD: 4.97 M/UL — SIGNIFICANT CHANGE UP (ref 4.2–5.8)
RBC # FLD: 14.7 % — HIGH (ref 10.3–14.5)
SODIUM SERPL-SCNC: 139 MMOL/L — SIGNIFICANT CHANGE UP (ref 135–145)
TROPONIN I SERPL-MCNC: 7.08 NG/ML — HIGH (ref 0.01–0.04)
TROPONIN I SERPL-MCNC: 9.18 NG/ML — HIGH (ref 0.01–0.04)
WBC # BLD: 12.45 K/UL — HIGH (ref 3.8–10.5)
WBC # FLD AUTO: 12.45 K/UL — HIGH (ref 3.8–10.5)

## 2018-08-16 PROCEDURE — 99232 SBSQ HOSP IP/OBS MODERATE 35: CPT

## 2018-08-16 PROCEDURE — 93010 ELECTROCARDIOGRAM REPORT: CPT

## 2018-08-16 PROCEDURE — 99239 HOSP IP/OBS DSCHRG MGMT >30: CPT

## 2018-08-16 PROCEDURE — 93010 ELECTROCARDIOGRAM REPORT: CPT | Mod: 77

## 2018-08-16 RX ORDER — ATORVASTATIN CALCIUM 80 MG/1
1 TABLET, FILM COATED ORAL
Qty: 0 | Refills: 0 | COMMUNITY
Start: 2018-08-16

## 2018-08-16 RX ORDER — INSULIN LISPRO 100/ML
VIAL (ML) SUBCUTANEOUS AT BEDTIME
Qty: 0 | Refills: 0 | Status: DISCONTINUED | OUTPATIENT
Start: 2018-08-16 | End: 2018-08-21

## 2018-08-16 RX ORDER — INSULIN LISPRO 100/ML
VIAL (ML) SUBCUTANEOUS
Qty: 0 | Refills: 0 | Status: DISCONTINUED | OUTPATIENT
Start: 2018-08-16 | End: 2018-08-21

## 2018-08-16 RX ORDER — DEXTROSE 50 % IN WATER 50 %
15 SYRINGE (ML) INTRAVENOUS ONCE
Qty: 0 | Refills: 0 | Status: DISCONTINUED | OUTPATIENT
Start: 2018-08-16 | End: 2018-08-21

## 2018-08-16 RX ORDER — LUBIPROSTONE 24 UG/1
24 CAPSULE, GELATIN COATED ORAL
Qty: 0 | Refills: 0 | Status: DISCONTINUED | OUTPATIENT
Start: 2018-08-16 | End: 2018-08-21

## 2018-08-16 RX ORDER — POTASSIUM CHLORIDE 20 MEQ
40 PACKET (EA) ORAL ONCE
Qty: 0 | Refills: 0 | Status: COMPLETED | OUTPATIENT
Start: 2018-08-16 | End: 2018-08-16

## 2018-08-16 RX ORDER — TAMSULOSIN HYDROCHLORIDE 0.4 MG/1
1 CAPSULE ORAL
Qty: 0 | Refills: 0 | DISCHARGE
Start: 2018-08-16

## 2018-08-16 RX ORDER — LOSARTAN POTASSIUM 100 MG/1
1 TABLET, FILM COATED ORAL
Qty: 0 | Refills: 0 | COMMUNITY
Start: 2018-08-16

## 2018-08-16 RX ORDER — GABAPENTIN 400 MG/1
300 CAPSULE ORAL DAILY
Qty: 0 | Refills: 0 | Status: DISCONTINUED | OUTPATIENT
Start: 2018-08-16 | End: 2018-08-21

## 2018-08-16 RX ORDER — HEPARIN SODIUM 5000 [USP'U]/ML
1300 INJECTION INTRAVENOUS; SUBCUTANEOUS
Qty: 25000 | Refills: 0 | Status: DISCONTINUED | OUTPATIENT
Start: 2018-08-16 | End: 2018-08-16

## 2018-08-16 RX ORDER — TAMSULOSIN HYDROCHLORIDE 0.4 MG/1
0.4 CAPSULE ORAL AT BEDTIME
Qty: 0 | Refills: 0 | Status: DISCONTINUED | OUTPATIENT
Start: 2018-08-16 | End: 2018-08-21

## 2018-08-16 RX ORDER — FUROSEMIDE 40 MG
40 TABLET ORAL
Qty: 0 | Refills: 0 | COMMUNITY
Start: 2018-08-16

## 2018-08-16 RX ORDER — HEPARIN SODIUM 5000 [USP'U]/ML
7500 INJECTION INTRAVENOUS; SUBCUTANEOUS EVERY 6 HOURS
Qty: 0 | Refills: 0 | Status: DISCONTINUED | OUTPATIENT
Start: 2018-08-16 | End: 2018-08-16

## 2018-08-16 RX ORDER — HEPARIN SODIUM 5000 [USP'U]/ML
4000 INJECTION INTRAVENOUS; SUBCUTANEOUS EVERY 6 HOURS
Qty: 0 | Refills: 0 | Status: DISCONTINUED | OUTPATIENT
Start: 2018-08-16 | End: 2018-08-17

## 2018-08-16 RX ORDER — GABAPENTIN 400 MG/1
1 CAPSULE ORAL
Qty: 0 | Refills: 0 | DISCHARGE
Start: 2018-08-16

## 2018-08-16 RX ORDER — HEPARIN SODIUM 5000 [USP'U]/ML
INJECTION INTRAVENOUS; SUBCUTANEOUS
Qty: 25000 | Refills: 0 | Status: DISCONTINUED | OUTPATIENT
Start: 2018-08-16 | End: 2018-08-16

## 2018-08-16 RX ORDER — AMLODIPINE BESYLATE 2.5 MG/1
1 TABLET ORAL
Qty: 0 | Refills: 0 | COMMUNITY
Start: 2018-08-16

## 2018-08-16 RX ORDER — BUDESONIDE AND FORMOTEROL FUMARATE DIHYDRATE 160; 4.5 UG/1; UG/1
1 AEROSOL RESPIRATORY (INHALATION)
Qty: 0 | Refills: 0 | DISCHARGE
Start: 2018-08-16

## 2018-08-16 RX ORDER — POTASSIUM CHLORIDE 20 MEQ
40 PACKET (EA) ORAL DAILY
Qty: 0 | Refills: 0 | Status: DISCONTINUED | OUTPATIENT
Start: 2018-08-16 | End: 2018-08-16

## 2018-08-16 RX ORDER — HYDROCHLOROTHIAZIDE 25 MG
25 TABLET ORAL DAILY
Qty: 0 | Refills: 0 | Status: DISCONTINUED | OUTPATIENT
Start: 2018-08-16 | End: 2018-08-16

## 2018-08-16 RX ORDER — METOPROLOL TARTRATE 50 MG
25 TABLET ORAL DAILY
Qty: 0 | Refills: 0 | Status: DISCONTINUED | OUTPATIENT
Start: 2018-08-16 | End: 2018-08-16

## 2018-08-16 RX ORDER — AMLODIPINE BESYLATE 2.5 MG/1
5 TABLET ORAL DAILY
Qty: 0 | Refills: 0 | Status: DISCONTINUED | OUTPATIENT
Start: 2018-08-16 | End: 2018-08-16

## 2018-08-16 RX ORDER — LOSARTAN POTASSIUM 100 MG/1
100 TABLET, FILM COATED ORAL DAILY
Qty: 0 | Refills: 0 | Status: DISCONTINUED | OUTPATIENT
Start: 2018-08-16 | End: 2018-08-16

## 2018-08-16 RX ORDER — HEPARIN SODIUM 5000 [USP'U]/ML
INJECTION INTRAVENOUS; SUBCUTANEOUS
Qty: 25000 | Refills: 0 | Status: DISCONTINUED | OUTPATIENT
Start: 2018-08-16 | End: 2018-08-17

## 2018-08-16 RX ORDER — METOPROLOL TARTRATE 50 MG
1 TABLET ORAL
Qty: 0 | Refills: 0 | DISCHARGE
Start: 2018-08-16

## 2018-08-16 RX ORDER — DEXTROSE 50 % IN WATER 50 %
12.5 SYRINGE (ML) INTRAVENOUS ONCE
Qty: 0 | Refills: 0 | Status: DISCONTINUED | OUTPATIENT
Start: 2018-08-16 | End: 2018-08-21

## 2018-08-16 RX ORDER — HEPARIN SODIUM 5000 [USP'U]/ML
13 INJECTION INTRAVENOUS; SUBCUTANEOUS
Qty: 0 | Refills: 0 | COMMUNITY
Start: 2018-08-16

## 2018-08-16 RX ORDER — HEPARIN SODIUM 5000 [USP'U]/ML
3500 INJECTION INTRAVENOUS; SUBCUTANEOUS EVERY 6 HOURS
Qty: 0 | Refills: 0 | Status: DISCONTINUED | OUTPATIENT
Start: 2018-08-16 | End: 2018-08-16

## 2018-08-16 RX ORDER — FUROSEMIDE 40 MG
40 TABLET ORAL DAILY
Qty: 0 | Refills: 0 | Status: DISCONTINUED | OUTPATIENT
Start: 2018-08-16 | End: 2018-08-16

## 2018-08-16 RX ORDER — DEXTROSE 50 % IN WATER 50 %
25 SYRINGE (ML) INTRAVENOUS ONCE
Qty: 0 | Refills: 0 | Status: DISCONTINUED | OUTPATIENT
Start: 2018-08-16 | End: 2018-08-21

## 2018-08-16 RX ORDER — ASPIRIN/CALCIUM CARB/MAGNESIUM 324 MG
1 TABLET ORAL
Qty: 0 | Refills: 0 | DISCHARGE
Start: 2018-08-16

## 2018-08-16 RX ORDER — BUDESONIDE AND FORMOTEROL FUMARATE DIHYDRATE 160; 4.5 UG/1; UG/1
2 AEROSOL RESPIRATORY (INHALATION)
Qty: 0 | Refills: 0 | Status: DISCONTINUED | OUTPATIENT
Start: 2018-08-16 | End: 2018-08-21

## 2018-08-16 RX ORDER — ASPIRIN/CALCIUM CARB/MAGNESIUM 324 MG
81 TABLET ORAL DAILY
Qty: 0 | Refills: 0 | Status: DISCONTINUED | OUTPATIENT
Start: 2018-08-16 | End: 2018-08-21

## 2018-08-16 RX ORDER — ATORVASTATIN CALCIUM 80 MG/1
80 TABLET, FILM COATED ORAL AT BEDTIME
Qty: 0 | Refills: 0 | Status: DISCONTINUED | OUTPATIENT
Start: 2018-08-16 | End: 2018-08-21

## 2018-08-16 RX ORDER — GLUCAGON INJECTION, SOLUTION 0.5 MG/.1ML
1 INJECTION, SOLUTION SUBCUTANEOUS ONCE
Qty: 0 | Refills: 0 | Status: DISCONTINUED | OUTPATIENT
Start: 2018-08-16 | End: 2018-08-21

## 2018-08-16 RX ORDER — SODIUM CHLORIDE 9 MG/ML
1000 INJECTION, SOLUTION INTRAVENOUS
Qty: 0 | Refills: 0 | Status: DISCONTINUED | OUTPATIENT
Start: 2018-08-16 | End: 2018-08-21

## 2018-08-16 RX ADMIN — Medication 81 MILLIGRAM(S): at 11:55

## 2018-08-16 RX ADMIN — Medication 2: at 08:15

## 2018-08-16 RX ADMIN — GABAPENTIN 300 MILLIGRAM(S): 400 CAPSULE ORAL at 21:41

## 2018-08-16 RX ADMIN — HEPARIN SODIUM 1000 UNIT(S)/HR: 5000 INJECTION INTRAVENOUS; SUBCUTANEOUS at 20:09

## 2018-08-16 RX ADMIN — TAMSULOSIN HYDROCHLORIDE 0.4 MILLIGRAM(S): 0.4 CAPSULE ORAL at 21:41

## 2018-08-16 RX ADMIN — Medication 40 MILLIEQUIVALENT(S): at 10:53

## 2018-08-16 RX ADMIN — Medication 25 MILLIGRAM(S): at 10:49

## 2018-08-16 RX ADMIN — LUBIPROSTONE 24 MICROGRAM(S): 24 CAPSULE, GELATIN COATED ORAL at 22:03

## 2018-08-16 RX ADMIN — HEPARIN SODIUM 1300 UNIT(S)/HR: 5000 INJECTION INTRAVENOUS; SUBCUTANEOUS at 08:16

## 2018-08-16 RX ADMIN — Medication 40 MILLIGRAM(S): at 10:49

## 2018-08-16 RX ADMIN — BUDESONIDE AND FORMOTEROL FUMARATE DIHYDRATE 2 PUFF(S): 160; 4.5 AEROSOL RESPIRATORY (INHALATION) at 06:45

## 2018-08-16 RX ADMIN — ATORVASTATIN CALCIUM 80 MILLIGRAM(S): 80 TABLET, FILM COATED ORAL at 21:41

## 2018-08-16 RX ADMIN — Medication 40 MILLIEQUIVALENT(S): at 21:41

## 2018-08-16 NOTE — PATIENT PROFILE ADULT. - VISION (WITH CORRECTIVE LENSES IF THE PATIENT USUALLY WEARS THEM):
bifocals/Partially impaired: cannot see medication labels or newsprint, but can see obstacles in path, and the surrounding layout; can count fingers at arm's length

## 2018-08-16 NOTE — DISCHARGE NOTE ADULT - PATIENT PORTAL LINK FT
You can access the [facility] Patient Portal, offered by [facility], by registering with the following website: [email or web address]

## 2018-08-16 NOTE — H&P CARDIOLOGY - PSH
No significant past surgical history Benign bladder tumor  removed  History of cataract extraction    History of colonoscopy

## 2018-08-16 NOTE — CHART NOTE - NSCHARTNOTEFT_GEN_A_CORE
Medicine Hospitalist PA    Informed of elevated troponin from 00:55 just now. Troponin elevated to from 0.636--3.440, and now 9.180.   Patient seen and examined at bedside.   Patient is a 78 y/o male with PMHx HTN, HLD, DM, admitted for NSTEMI. Patient denied chest pain during admission. Patient currently laying in bed comfortably again denying chest pain or experiencing chest pain earlier. Further denies neck, jaw, shoulder, arm, or epigastric pain. States he feels comfortable. Denies N/V, palpitations, sob, abdominal pain.     Vital Signs Last 24 Hrs  BP:   HR:  RR:  SPO2:    General: A+Ox3, NAD, PERRL, EOM intact.  Cardio: S1S2 regular rate/rhythm  Resp: Good air entry B/L. No rales, rhonchi, wheezes.  Abd: Soft NTND +BS  Ext: No lower extremity edema. 2+ pulses b/l  Neuro: 5/5 strength b/l upper and lower extremity. Good handgrip. No arm or leg drift.    A&P  78 y/o male with PMHx HTN, HLD, DM, admitted for NSTEMI now with increasing troponins  -EKG STAT  -continue heparin drip at current rate and follow nomogram  -repeat troponins with AM labs   -will discuss with cardiology Medicine Hospitalist PA    Informed of elevated troponin from 00:55 just now. Troponin elevated to from 0.636--3.440, and now 9.180.   Patient seen and examined at bedside.   Patient is a 78 y/o male with PMHx HTN, HLD, DM, admitted for NSTEMI. Patient denied chest pain during admission. Patient currently laying in bed comfortably again denying chest pain or experiencing chest pain earlier. Further denies neck, jaw, shoulder, arm, or epigastric pain. States he feels comfortable. Denies N/V, palpitations, sob, abdominal pain.     Vital Signs Last 24 Hrs  BP: 98/53  HR: 55  RR: 18  SPO2: 98% on room air    General: A+Ox3, NAD, PERRL, EOM intact.  Cardio: S1S2 regular rate/rhythm  Resp: Good air entry B/L. No rales, rhonchi, wheezes.  Abd: Soft NTND +BS  Ext: No lower extremity edema. 2+ pulses b/l  Neuro: 5/5 strength b/l upper and lower extremity. Good handgrip. No arm or leg drift.    A&P  78 y/o male with PMHx HTN, HLD, DM, admitted for NSTEMI now with increasing troponins  -EKG STAT  -continue heparin drip at current rate and follow nomogram  -repeat troponins with AM labs   -will discuss with cardiology    Addendum:  -EKG performed and compared to admission EKG  -no acute ST-T wave changes as compared to admission EKG   -sinus collette at 50 bpm Medicine Hospitalist PA    Informed of elevated troponin from 00:55 just now. Troponin elevated to from 0.636--3.440, and now 9.180.   Patient seen and examined at bedside.   Patient is a 76 y/o male with PMHx HTN, HLD, DM, admitted for NSTEMI. Patient denied chest pain during admission. Patient currently laying in bed comfortably again denying chest pain or experiencing chest pain earlier. Further denies neck, jaw, shoulder, arm, or epigastric pain. States he feels comfortable. Denies N/V, palpitations, sob, abdominal pain.     Vital Signs Last 24 Hrs  BP: 98/53  HR: 55  RR: 18  SPO2: 98% on room air    General: A+Ox3, NAD, PERRL, EOM intact.  Cardio: S1S2 regular rate/rhythm  Resp: Good air entry B/L. No rales, rhonchi, wheezes.  Abd: Soft NTND +BS  Ext: No lower extremity edema. 2+ pulses b/l  Neuro: 5/5 strength b/l upper and lower extremity. Good handgrip. No arm or leg drift.    A&P  76 y/o male with PMHx HTN, HLD, DM, admitted for NSTEMI now with increasing troponins  -EKG STAT  -continue heparin drip at current rate and follow nomogram  -repeat troponins with AM labs   -will discuss with cardiology    Addendum:  -EKG performed and compared to admission EKG  -no acute ST-T wave changes as compared to admission EKG   -sinus collette at 50 bpm    Addendum:  -discussed with Dr. Gallegos, will likely go to cardiac cath today  -metoprolol increased to 50mg BID  -continue imdur 30mg QD Medicine Hospitalist PA    Informed of elevated troponin from 00:55 just now. Troponin elevated to from 0.636--3.440, and now 9.180.   Patient seen and examined at bedside.   Patient is a 76 y/o male with PMHx HTN, HLD, DM, admitted for NSTEMI. Patient denied chest pain during admission. Patient currently laying in bed comfortably again denying chest pain or experiencing chest pain earlier. Further denies neck, jaw, shoulder, arm, or epigastric pain. States he feels comfortable. Denies N/V, palpitations, sob, abdominal pain.     Vital Signs Last 24 Hrs  BP: 98/53  HR: 55  RR: 18  SPO2: 98% on room air    General: A+Ox3, NAD, PERRL, EOM intact.  Cardio: S1S2 regular rate/rhythm  Resp: Good air entry B/L. No rales, rhonchi, wheezes.  Abd: Soft NTND +BS  Ext: No lower extremity edema. 2+ pulses b/l  Neuro: 5/5 strength b/l upper and lower extremity. Good handgrip. No arm or leg drift.    A&P  76 y/o male with PMHx HTN, HLD, DM, admitted for NSTEMI now with increasing troponins  -EKG STAT  -continue heparin drip at current rate and follow nomogram  -repeat troponins with AM labs   -will discuss with cardiology    Addendum:  -EKG performed and compared to admission EKG  -no acute ST-T wave changes as compared to admission EKG   -sinus collette at 50 bpm    Addendum:  -discussed with Dr. Gallegos, will likely go to cardiac cath today

## 2018-08-16 NOTE — H&P CARDIOLOGY - HISTORY OF PRESENT ILLNESS
76 y/o male hx htn, dm, hld life long smoker complains of difficulty breathing that began this morning, worse with laying down, with elevated bp at home. Pt called ems himself, on arrival, bp was 205/120, gradually improved en route with only oxygen. Pt appeared anxious and somewhat sweaty, with some improvement of symptoms on arrival.      In ED bp normalized, but was found to have elevated troponin. ED discussed case w cardiology who decided to start heparin drip and treat as nstemi.      No known hx of chf. Denies any chest pain, leg swelling, abd pain, n/v, f/c. 78 y/o male hx htn, dm, hld, BPH, current smoker complains of difficulty breathing that began this morning, worse with laying down, with elevated bp at home. Pt called ems himself, on arrival, bp was 205/120, gradually improved en route with only oxygen. Pt appeared anxious and somewhat sweaty, with some improvement of symptoms on arrival.  In ED bp normalized, but was found to have elevated troponin. peaked upto 9, , seen & evaluated by cardiologist & now transferred to [facility]  for cardiac cath 2/2 NSTEMI.

## 2018-08-16 NOTE — PROGRESS NOTE ADULT - ASSESSMENT
76 y/o male hx htn, dm, hld life long smoker complains of difficulty breathing that began this morning, worse with laying down, with elevated BP at home. Pt called ems himself, on arrival, bp was 205/120, gradually improved en route with only oxygen. Pt appeared anxious and somewhat sweaty, with some improvement of symptoms on arrival.    Denied chest pain or prior hx of CHF.  Trop up to 9.4; pt asymptomatic with a normal EKG.  From PMD:  Echo 1/2018: LVEF 55% with mild LVH; mild TR; moderate AS (AMIE 1.3cm2).  Stress test 2017: normal.    -monitor on tele  -2D echo  -cont asa/hep gtt/metoprolol/statin  -cont diuresis  -smoking cessation  -transfer for cath

## 2018-08-16 NOTE — DISCHARGE NOTE ADULT - MEDICATION SUMMARY - MEDICATIONS TO TAKE
I will START or STAY ON the medications listed below when I get home from the hospital:    aspirin 81 mg oral delayed release tablet  -- 1 tab(s) by mouth once a day  -- Indication: For NSTEMI (non-ST elevated myocardial infarction)    losartan 100 mg oral tablet  -- 1 tab(s) by mouth once a day  -- Indication: For hypertension    tamsulosin 0.4 mg oral capsule  -- 1 cap(s) by mouth once a day (at bedtime)  -- Indication: For BPH    heparin 100 units/mL-D5% intravenous solution  -- 13 milliliter(s) intravenous   -- Indication: For NSTEMI (non-ST elevated myocardial infarction)    gabapentin 300 mg oral capsule  -- 1 cap(s) by mouth once a day  -- Indication: For pain    atorvastatin 80 mg oral tablet  -- 1 tab(s) by mouth once a day (at bedtime)  -- Indication: For Cholesterol    metoprolol succinate 25 mg oral tablet, extended release  -- 1 tab(s) by mouth once a day  -- Indication: For hypertension    budesonide-formoterol 80 mcg-4.5 mcg/inh inhalation aerosol  -- 1 puff(s) inhaled 2 times a day  -- Indication: For Copd    amLODIPine 5 mg oral tablet  -- 1 tab(s) by mouth once a day  -- Indication: For Nhypertension    furosemide 100 mg/100 mL-0.9% intravenous solution  -- 40 milliliter(s) intravenous once a day  -- Indication: For diuretics    hydroCHLOROthiazide 25 mg oral tablet  -- 1 tab(s) by mouth once a day  -- Indication: For hypertension

## 2018-08-16 NOTE — PATIENT PROFILE ADULT. - NUMBER OF YRS
COLONOSCOPY    -Informed consent obtained from patient prior to exam.     INDICATION:  CHANGE IN BOWEL HABITS     ANESTHESIA provided by: DR GHOSH  PREP : POOR     RECTUM: INT HEMM    SIGMOID: poor prep    DESCENDING COLON:  poor prep    TRANSVERSE COLON:  poor prep    ASCENDING COLON: NOT SEEN Procedure halted in right colon     CECUM: NOT SEEN    TERMINAL ILEUM: NOT SEEN  NEOPLASTIC LESION CANNOT BE EXCLUDED  The patient tolerated the procedure well. 50

## 2018-08-16 NOTE — DISCHARGE NOTE ADULT - PLAN OF CARE
no further complications elevated troponin .6--> 3.4-->9.1--->7, Pain free   started on heparin ggt  transfer to [facility] for PCI moniotr blood glucose   sliding scale insulin continue on BP regime to be adjusted post cardiac intervention continue symbicort

## 2018-08-16 NOTE — DISCHARGE NOTE ADULT - SECONDARY DIAGNOSIS.
Type 2 diabetes mellitus without complication, without long-term current use of insulin Essential hypertension Chronic obstructive pulmonary disease, unspecified COPD type

## 2018-08-16 NOTE — DISCHARGE NOTE ADULT - CARE PLAN
Principal Discharge DX:	NSTEMI (non-ST elevated myocardial infarction)  Goal:	no further complications  Assessment and plan of treatment:	elevated troponin .6--> 3.4-->9.1--->7, Pain free   started on heparin ggt  transfer to [facility] for PCI  Secondary Diagnosis:	Type 2 diabetes mellitus without complication, without long-term current use of insulin  Assessment and plan of treatment:	moniotr blood glucose   sliding scale insulin  Secondary Diagnosis:	Essential hypertension  Assessment and plan of treatment:	continue on BP regime to be adjusted post cardiac intervention  Secondary Diagnosis:	Chronic obstructive pulmonary disease, unspecified COPD type  Assessment and plan of treatment:	continue symbicort

## 2018-08-16 NOTE — H&P CARDIOLOGY - PMH
Essential hypertension    Type 2 diabetes mellitus without complication, without long-term current use of insulin BPH (benign prostatic hyperplasia)    Essential hypertension    HLD (hyperlipidemia)    Type 2 diabetes mellitus without complication, without long-term current use of insulin

## 2018-08-16 NOTE — DISCHARGE NOTE ADULT - CARE PROVIDER_API CALL
Ute Carroll), Cardiology; Interventional Cardiology  [address], [place] [id number]  Phone: [phone]  Fax: [phone]

## 2018-08-16 NOTE — PROGRESS NOTE ADULT - SUBJECTIVE AND OBJECTIVE BOX
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 Pt called ems himself, on arrival, bp was 205/120, gradually improved en route with only oxygen. Pt appeared anxious and somewhat sweaty, with some improvement of symptoms on arrival.    Denied chest pain or prior hx of CHF.  Trop up to 9.4; pt asymptomatic with a normal EKG.  From PMD:  Echo 1/2018: LVEF 55% with mild LVH; mild TR; moderate AS (AMIE 1.3cm2).  Stress test 2017: normal.    REVIEW OF SYSTEMS:  CONSTITUTIONAL: No fever, weight loss, or fatigue  EYES: No eye pain, visual disturbances, or discharge  ENMT:  No difficulty hearing, tinnitus, vertigo; No sinus or throat pain  NECK: No pain or stiffness  BREASTS: No pain, masses, or nipple discharge  RESPIRATORY: No cough, wheezing, chills or hemoptysis; + shortness of breath  CARDIOVASCULAR: No chest pain, palpitations, dizziness, or leg swelling  GASTROINTESTINAL: No abdominal or epigastric pain. No nausea, vomiting, or hematemesis; No diarrhea or constipation. No melena or hematochezia.  GENITOURINARY: No dysuria, frequency, hematuria, or incontinence  NEUROLOGICAL: No headaches, memory loss, loss of strength, numbness, or tremors  SKIN: No itching, burning, rashes, or lesions   LYMPH NODES: No enlarged glands  ENDOCRINE: No heat or cold intolerance; No hair loss  MUSCULOSKELETAL: No joint pain or swelling; No muscle, back, or extremity pain  PSYCHIATRIC: No depression, anxiety, mood swings, or difficulty sleeping  HEME/LYMPH: No easy bruising, or bleeding gums  ALLERGY AND IMMUNOLOGIC: No hives or eczema    MEDICATIONS  (STANDING):  amLODIPine   Tablet 5 milliGRAM(s) Oral daily  aspirin enteric coated 81 milliGRAM(s) Oral daily  atorvastatin 80 milliGRAM(s) Oral at bedtime  buDESOnide  80 MICROgram(s)/formoterol 4.5 MICROgram(s) Inhaler 2 Puff(s) Inhalation two times a day  dextrose 5%. 1000 milliLiter(s) (50 mL/Hr) IV Continuous <Continuous>  furosemide   Injectable 40 milliGRAM(s) IV Push daily  gabapentin 300 milliGRAM(s) Oral daily  heparin  Infusion. 1300 Unit(s)/Hr (13 mL/Hr) IV Continuous <Continuous>  hydrochlorothiazide 25 milliGRAM(s) Oral daily  insulin lispro (HumaLOG) corrective regimen sliding scale   SubCutaneous three times a day before meals  losartan 100 milliGRAM(s) Oral daily  metoprolol succinate ER 25 milliGRAM(s) Oral daily  potassium chloride    Tablet ER 40 milliEquivalent(s) Oral once  tamsulosin 0.4 milliGRAM(s) Oral at bedtime    MEDICATIONS  (PRN):  dextrose 40% Gel 15 Gram(s) Oral once PRN Blood Glucose LESS THAN 70 milliGRAM(s)/deciliter  glucagon  Injectable 1 milliGRAM(s) IntraMuscular once PRN Glucose LESS THAN 70 milligrams/deciliter  heparin  Injectable 7500 Unit(s) IV Push every 6 hours PRN For aPTT less than 40  heparin  Injectable 3500 Unit(s) IV Push every 6 hours PRN For aPTT between 40 - 57      ALLERGIES: Advil (Hives)  penicillin (Hives)      FAMILY HISTORY:  No pertinent family history in first degree relatives      PHYSICAL EXAMINATION:  -----------------------------  Vital Signs Last 24 Hrs  T(C): 36.2 (16 Aug 2018 05:21), Max: 36.7 (15 Aug 2018 11:41)  T(F): 97.2 (16 Aug 2018 05:21), Max: 98 (15 Aug 2018 11:41)  HR: 55 (16 Aug 2018 05:21) (55 - 97)  BP: 98/53 (16 Aug 2018 05:21) (98/53 - 138/83)  RR: 18 (16 Aug 2018 05:21) (16 - 22)  SpO2: 98% (16 Aug 2018 05:21) (94% - 100%)    Constitutional: well developed, normal appearance, well groomed, well nourished, no deformities and no acute distress.   Eyes: the conjunctiva exhibited no abnormalities and the eyelids demonstrated no xanthelasmas.   HEENT: normal oral mucosa, no oral pallor and no oral cyanosis.   Neck: normal jugular venous A waves present, normal jugular venous V waves present and no jugular venous steiner A waves.   Pulmonary: no respiratory distress, normal respiratory rhythm and effort, no accessory muscle use and lungs were clear to auscultation bilaterally.   Cardiovascular: heart rate and rhythm were normal, normal S1 and S2 and no murmur, gallop, rub, heave or thrill are present.   Abdomen: soft, non-tender, no hepato-splenomegaly and no abdominal mass palpated.   Musculoskeletal: the gait could not be assessed; left facial droop (old)  Extremities: no clubbing of the fingernails, no localized cyanosis, no petechial hemorrhages and no ischemic changes.   Skin: normal skin color and pigmentation, no rash, no venous stasis, no skin lesions, no skin ulcer and no xanthoma was observed.   Psychiatric: oriented to person, place, and time, the affect was normal, the mood was normal and not feeling anxious.     LABS:   --------                        14.7   12.45 )-----------( 223      ( 16 Aug 2018 06:22 )             43.7   08-16    139  |  98  |  19  ----------------------------<  142<H>  3.3<L>   |  31  |  0.82    Ca    8.2<L>      16 Aug 2018 06:22    TPro  7.7  /  Alb  3.3  /  TBili  0.7  /  DBili  x   /  AST  16  /  ALT  19  /  AlkPhos  94  08-15    CARDIAC MARKERS ( 16 Aug 2018 06:22 )  7.080 ng/mL / x     / x     / x     / x      CARDIAC MARKERS ( 16 Aug 2018 00:55 )  9.180 ng/mL / x     / x     / x     / 17.2 ng/mL  CARDIAC MARKERS ( 15 Aug 2018 15:15 )  3.440 ng/mL / x     / x     / x     / 10.4 ng/mL  CARDIAC MARKERS ( 15 Aug 2018 12:45 )  .633 ng/mL / x     / x     / x     / 4.7 ng/mL          RADIOLOGY:  -----------------    ECG: NSR with no ST-T wave changes

## 2018-08-17 ENCOUNTER — TRANSCRIPTION ENCOUNTER (OUTPATIENT)
Age: 78
End: 2018-08-17

## 2018-08-17 DIAGNOSIS — E11.9 TYPE 2 DIABETES MELLITUS WITHOUT COMPLICATIONS: ICD-10-CM

## 2018-08-17 DIAGNOSIS — D72.828 OTHER ELEVATED WHITE BLOOD CELL COUNT: ICD-10-CM

## 2018-08-17 DIAGNOSIS — E78.5 HYPERLIPIDEMIA, UNSPECIFIED: ICD-10-CM

## 2018-08-17 DIAGNOSIS — R07.9 CHEST PAIN, UNSPECIFIED: ICD-10-CM

## 2018-08-17 DIAGNOSIS — I10 ESSENTIAL (PRIMARY) HYPERTENSION: ICD-10-CM

## 2018-08-17 LAB
ANION GAP SERPL CALC-SCNC: 8 MMOL/L — SIGNIFICANT CHANGE UP (ref 5–17)
APTT BLD: 45.6 SEC — HIGH (ref 27.5–37.4)
APTT BLD: 52.9 SEC — HIGH (ref 27.5–37.4)
BUN SERPL-MCNC: 25 MG/DL — HIGH (ref 7–23)
CALCIUM SERPL-MCNC: 8.5 MG/DL — SIGNIFICANT CHANGE UP (ref 8.4–10.5)
CHLORIDE SERPL-SCNC: 101 MMOL/L — SIGNIFICANT CHANGE UP (ref 96–108)
CK MB BLD-MCNC: 7.7 % — HIGH (ref 0–3.5)
CK MB CFR SERPL CALC: 5.2 NG/ML — SIGNIFICANT CHANGE UP (ref 0–6.7)
CK MB CFR SERPL CALC: 5.5 NG/ML — SIGNIFICANT CHANGE UP (ref 0–6.7)
CK SERPL-CCNC: 71 U/L — SIGNIFICANT CHANGE UP (ref 30–200)
CK SERPL-CCNC: 80 U/L — SIGNIFICANT CHANGE UP (ref 30–200)
CO2 SERPL-SCNC: 30 MMOL/L — SIGNIFICANT CHANGE UP (ref 22–31)
CREAT SERPL-MCNC: 0.97 MG/DL — SIGNIFICANT CHANGE UP (ref 0.5–1.3)
GLUCOSE BLDC GLUCOMTR-MCNC: 114 MG/DL — HIGH (ref 70–99)
GLUCOSE BLDC GLUCOMTR-MCNC: 172 MG/DL — HIGH (ref 70–99)
GLUCOSE SERPL-MCNC: 120 MG/DL — HIGH (ref 70–99)
HCT VFR BLD CALC: 44.9 % — SIGNIFICANT CHANGE UP (ref 39–50)
HGB BLD-MCNC: 14.9 G/DL — SIGNIFICANT CHANGE UP (ref 13–17)
INR BLD: 1.04 RATIO — SIGNIFICANT CHANGE UP (ref 0.88–1.16)
MCHC RBC-ENTMCNC: 30 PG — SIGNIFICANT CHANGE UP (ref 27–34)
MCHC RBC-ENTMCNC: 33.1 GM/DL — SIGNIFICANT CHANGE UP (ref 32–36)
MCV RBC AUTO: 90.8 FL — SIGNIFICANT CHANGE UP (ref 80–100)
PLATELET # BLD AUTO: 212 K/UL — SIGNIFICANT CHANGE UP (ref 150–400)
POTASSIUM SERPL-MCNC: 4.3 MMOL/L — SIGNIFICANT CHANGE UP (ref 3.5–5.3)
POTASSIUM SERPL-SCNC: 4.3 MMOL/L — SIGNIFICANT CHANGE UP (ref 3.5–5.3)
PROTHROM AB SERPL-ACNC: 11.4 SEC — SIGNIFICANT CHANGE UP (ref 9.8–12.7)
RBC # BLD: 4.95 M/UL — SIGNIFICANT CHANGE UP (ref 4.2–5.8)
RBC # FLD: 14 % — SIGNIFICANT CHANGE UP (ref 10.3–14.5)
SODIUM SERPL-SCNC: 139 MMOL/L — SIGNIFICANT CHANGE UP (ref 135–145)
TROPONIN T, HIGH SENSITIVITY RESULT: 114 NG/L — HIGH (ref 0–51)
TROPONIN T, HIGH SENSITIVITY RESULT: 125 NG/L — HIGH (ref 0–51)
WBC # BLD: 11.2 K/UL — HIGH (ref 3.8–10.5)
WBC # FLD AUTO: 11.2 K/UL — HIGH (ref 3.8–10.5)

## 2018-08-17 PROCEDURE — 93010 ELECTROCARDIOGRAM REPORT: CPT

## 2018-08-17 PROCEDURE — 93454 CORONARY ARTERY ANGIO S&I: CPT | Mod: 26

## 2018-08-17 PROCEDURE — 93306 TTE W/DOPPLER COMPLETE: CPT | Mod: 26

## 2018-08-17 PROCEDURE — 75574 CT ANGIO HRT W/3D IMAGE: CPT | Mod: 26

## 2018-08-17 RX ORDER — HEPARIN SODIUM 5000 [USP'U]/ML
5000 INJECTION INTRAVENOUS; SUBCUTANEOUS EVERY 12 HOURS
Qty: 0 | Refills: 0 | Status: DISCONTINUED | OUTPATIENT
Start: 2018-08-17 | End: 2018-08-21

## 2018-08-17 RX ADMIN — LUBIPROSTONE 24 MICROGRAM(S): 24 CAPSULE, GELATIN COATED ORAL at 05:53

## 2018-08-17 RX ADMIN — HEPARIN SODIUM 1200 UNIT(S)/HR: 5000 INJECTION INTRAVENOUS; SUBCUTANEOUS at 11:16

## 2018-08-17 RX ADMIN — Medication 81 MILLIGRAM(S): at 09:45

## 2018-08-17 RX ADMIN — GABAPENTIN 300 MILLIGRAM(S): 400 CAPSULE ORAL at 09:45

## 2018-08-17 RX ADMIN — Medication 2: at 11:58

## 2018-08-17 RX ADMIN — HEPARIN SODIUM 1200 UNIT(S)/HR: 5000 INJECTION INTRAVENOUS; SUBCUTANEOUS at 03:00

## 2018-08-17 RX ADMIN — Medication 2: at 19:01

## 2018-08-17 RX ADMIN — LUBIPROSTONE 24 MICROGRAM(S): 24 CAPSULE, GELATIN COATED ORAL at 19:01

## 2018-08-17 RX ADMIN — BUDESONIDE AND FORMOTEROL FUMARATE DIHYDRATE 2 PUFF(S): 160; 4.5 AEROSOL RESPIRATORY (INHALATION) at 09:45

## 2018-08-17 NOTE — PROGRESS NOTE ADULT - PROBLEM SELECTOR PLAN 5
although maybe reactive to NSTEMI, will check differentials  currently afebrile although maybe reactive to NSTEMI, will trend with differentials  currently afebrile, with chronic cough

## 2018-08-17 NOTE — CONSULT NOTE ADULT - SUBJECTIVE AND OBJECTIVE BOX
78 y/o male hx htn, dm, hld, BPH, current smoker complains of difficulty breathing , worse with laying down, with elevated bp at home  Pt called ems , ,elevated bp   diaphoretic .   found to have elevated troponin. peaked upto 9, , seen & evaluated by cardiologist &  transferred to [facility]  for cardiac cath 2/2 NSTEMI.  cath done without obstruction        INTERVAL HPI/OVERNIGHT EVENTS:    Medications:MEDICATIONS  (STANDING):  aspirin enteric coated 81 milliGRAM(s) Oral daily  atorvastatin 80 milliGRAM(s) Oral at bedtime  buDESOnide  80 MICROgram(s)/formoterol 4.5 MICROgram(s) Inhaler 2 Puff(s) Inhalation two times a day  dextrose 5%. 1000 milliLiter(s) (50 mL/Hr) IV Continuous <Continuous>  dextrose 50% Injectable 12.5 Gram(s) IV Push once  dextrose 50% Injectable 25 Gram(s) IV Push once  dextrose 50% Injectable 25 Gram(s) IV Push once  gabapentin 300 milliGRAM(s) Oral daily  insulin lispro (HumaLOG) corrective regimen sliding scale   SubCutaneous three times a day before meals  insulin lispro (HumaLOG) corrective regimen sliding scale   SubCutaneous at bedtime  lubiprostone 24 MICROGram(s) Oral two times a day  tamsulosin 0.4 milliGRAM(s) Oral at bedtime    MEDICATIONS  (PRN):  dextrose 40% Gel 15 Gram(s) Oral once PRN Blood Glucose LESS THAN 70 milliGRAM(s)/deciliter  glucagon  Injectable 1 milliGRAM(s) IntraMuscular once PRN Glucose LESS THAN 70 milligrams/deciliter      Allergies: Allergies    Advil (Hives)  penicillin (Hives)    Intolerances          FAMILY HISTORY:  No pertinent family history in first degree relatives        PAST MEDICAL & SURGICAL HISTORY:  BPH (benign prostatic hyperplasia)  HLD (hyperlipidemia)  Type 2 diabetes mellitus without complication, without long-term current use of insulin  Essential hypertension  History of cataract extraction  History of colonoscopy  Benign bladder tumor: removed      REVIEW OF SYSTEMS:  CONSTITUTIONAL: No fever, weight loss, or fatigue  EYES: No eye pain, visual disturbances, or discharge  ENMT:  No difficulty hearing, tinnitus, vertigo; No sinus or throat pain  NECK: No pain or stiffness  BREASTS: No pain, masses, or nipple discharge  RESPIRATORY:as above   CARDIOVASCULAR: as above  GASTROINTESTINAL: No abdominal or epigastric pain. No nausea, vomiting, or hematemesis; No diarrhea or constipation. No melena or hematochezia.  GENITOURINARY: No dysuria, frequency, hematuria, or incontinence  NEUROLOGICAL: No headaches, memory loss, loss of strength, numbness, or tremors  SKIN: No itching, burning, rashes, or lesions   LYMPH NODES: No enlarged glands  ENDOCRINE: No heat or cold intolerance; No hair loss  MUSCULOSKELETAL: No joint pain or swelling; No muscle, back, or extremity pain  PSYCHIATRIC: No depression, anxiety, mood swings, or difficulty sleeping  HEME/LYMPH: No easy bruising, or bleeding gums  ALLERY AND IMMUNOLOGIC: No hives or eczema    T(C): 36.5 (08-17-18 @ 15:00), Max: 36.6 (08-17-18 @ 04:00)  HR: 65 (08-17-18 @ 18:30) (54 - 68)  BP: 131/61 (08-17-18 @ 18:30) (115/49 - 146/59)  RR: 17 (08-17-18 @ 18:30) (16 - 17)  SpO2: 97% (08-17-18 @ 18:30) (96% - 100%)  Wt(kg): --Vital Signs Last 24 Hrs  T(C): 36.5 (17 Aug 2018 15:00), Max: 36.6 (17 Aug 2018 04:00)  T(F): 97.7 (17 Aug 2018 15:00), Max: 97.9 (17 Aug 2018 04:00)  HR: 65 (17 Aug 2018 18:30) (54 - 68)  BP: 131/61 (17 Aug 2018 18:30) (115/49 - 146/59)  BP(mean): --  RR: 17 (17 Aug 2018 18:30) (16 - 17)  SpO2: 97% (17 Aug 2018 18:30) (96% - 100%)  I&O's Summary    16 Aug 2018 07:01  -  17 Aug 2018 07:00  --------------------------------------------------------  IN: 420 mL / OUT: 0 mL / NET: 420 mL        PHYSICAL EXAM:  GENERAL: NAD, well-groomed, well-developed  HEAD:  Atraumatic, Normocephalic  EYES: EOMI, PERRLA, conjunctiva and sclera clear  ENMT: No tonsillar erythema, exudates, or enlargement; Moist mucous membranes, Good dentition, No lesions  NECK: Supple, No JVD, Normal thyroid  NERVOUS SYSTEM:  Alert & Oriented X3, Good concentration; Motor Strength 5/5 B/L upper and lower extremities; DTRs 2+ intact and symmetric  CHEST/LUNG: Clear to percussion bilaterally; No rales, rhonchi, wheezing, or rubs  HEART: Regular rate and rhythm; +m  ABDOMEN: Soft, Nontender, Nondistended; Bowel sounds present  EXTREMITIES:  2+ Peripheral Pulses, No clubbing, cyanosis, or edema  LYMPH: No lymphadenopathy noted  SKIN: No rashes or lesions    Consultant(s) Notes Reviewed:  [x ] YES  [ ] NO  Care Discussed with Consultants/Other Providerscpk [ x] YES  [ ] NO    LABS:      CPK Mass Assay %: 7.7 % (08-17 @ 02:29)                CBC Full  -  ( 17 Aug 2018 02:29 )  WBC Count : 11.2 K/uL  Hemoglobin : 14.9 g/dL  Hematocrit : 44.9 %  Platelet Count - Automated : 212 K/uL  Mean Cell Volume : 90.8 fl  Mean Cell Hemoglobin : 30.0 pg  Mean Cell Hemoglobin Concentration : 33.1 gm/dL  Auto Neutrophil # : x  Auto Lymphocyte # : x  Auto Monocyte # : x  Auto Eosinophil # : x  Auto Basophil # : x  Auto Neutrophil % : x  Auto Lymphocyte % : x  Auto Monocyte % : x  Auto Eosinophil % : x  Auto Basophil % : x      08-17    139  |  101  |  25<H>  ----------------------------<  120<H>  4.3   |  30  |  0.97    Ca    8.5      17 Aug 2018 02:29            PT/INR - ( 17 Aug 2018 02:29 )   PT: 11.4 sec;   INR: 1.04 ratio         PTT - ( 17 Aug 2018 10:07 )  PTT:52.9 sec  RADIOLOGY & ADDITIONAL TESTS:    Imaging Personally Reviewed:  [ ] YES  [ ] NO

## 2018-08-17 NOTE — DISCHARGE NOTE ADULT - PATIENT PORTAL LINK FT
You can access the [facility] Patient Portal, offered by [facility], by registering with the following website: [email or web address]

## 2018-08-17 NOTE — DISCHARGE NOTE ADULT - CARE PLAN
Principal Discharge DX:	Chest pain at rest  Goal:	Remain without chest pain  Assessment and plan of treatment:	HOME CARE INSTRUCTIONS  For the next few days, avoid physical activities that bring on chest pain. Continue physical activities as directed.  Do not smoke.  Avoid drinking alcohol.   Only take over-the-counter or prescription medicine for pain, discomfort, or fever as directed by your caregiver.  Follow your caregiver's suggestions for further testing if your chest pain does not go away.  Keep any follow-up appointments you made. If you do not go to an appointment, you could develop lasting (chronic) problems with pain. If there is any problem keeping an appointment, you must call to reschedule.   SEEK MEDICAL CARE IF:  You think you are having problems from the medicine you are taking. Read your medicine instructions carefully.  Your chest pain does not go away, even after treatment.  You develop a rash with blisters on your chest.  SEEK IMMEDIATE MEDICAL CARE IF:  You have increased chest pain or pain that spreads to your arm, neck, jaw, back, or abdomen.   You develop shortness of breath, an increasing cough, or you are coughing up blood.  You have severe back or abdominal pain, feel nauseous, or vomit.  You develop severe weakness, fainting, or chills.  You have a fever.  THIS IS AN EMERGENCY. Do not wait to see if the pain will go away. Get medical help at once. Call your local emergency services. Do not drive yourself to the hospital.  Secondary Diagnosis:	Essential hypertension  Assessment and plan of treatment:	Low salt diet  Activity as tolerated.  Take all medication as prescribed.  Follow up with your medical doctor for routine blood pressure monitoring at your next visit.  Notify your doctor if you have any of the following symptoms:   Dizziness, Lightheadedness, Blurry vision, Headache, Chest pain, Shortness of breath  Secondary Diagnosis:	Hyperlipidemia, unspecified hyperlipidemia type  Assessment and plan of treatment:	Low salt, low fat, low cholesterol, diabetic diet if appropriate  Continue medication as prescribed  Exercise, increase your activity level  Pt. verbalized an understanding of all instructions.

## 2018-08-17 NOTE — DISCHARGE NOTE ADULT - HOSPITAL COURSE
76 y/o male hx htn, dm, hld, BPH, current smoker complains of difficulty breathing that began this morning, worse with laying down, with elevated bp at home. Pt called ems himself, on arrival, bp was 205/120, gradually improved en route with only oxygen. Pt appeared anxious and somewhat sweaty, with some improvement of symptoms on arrival.  In ED bp normalized, but was found to have elevated troponin. peaked upto 9, , seen & evaluated by cardiologist & now transferred to [facility]  for cardiac cath 2/2 NSTEMI.  Pt s/p diagnostic cath via R radial. Pt tolerated procedure well and overnight remained uneventful. No c/o chest pain or SOB. Insertion/incision site benign, no bleeding or hematoma, and cath site dressing changed. 76 y/o male hx htn, dm, hld, BPH, current smoker complains of difficulty breathing that began this morning, worse with laying down, with elevated bp at home. Pt called ems himself, on arrival, bp was 205/120, gradually improved en route with only oxygen. Pt appeared anxious and somewhat sweaty, with some improvement of symptoms on arrival.  In ED bp normalized, but was found to have elevated troponin. peaked upto 9, , seen & evaluated by cardiologist & now transferred to [facility]  for cardiac cath 2/2 NSTEMI.  Pt s/p diagnostic cath via R radial. Pt tolerated procedure well and overnight remained uneventful. No c/o chest pain or SOB. Insertion/incision site benign, no bleeding or hematoma, and cath site dressing changed.  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  No RWM, no further work up, outpatient cardiology follow up

## 2018-08-17 NOTE — CONSULT NOTE ADULT - ASSESSMENT
pt w/ hx htn/dm/hld/.bph/with elevated trop/s/p cath  pt will need echo to reeval aortic valve  cta of coronaries  cont current meds  dvt proph  dm  fsg riss

## 2018-08-17 NOTE — DISCHARGE NOTE ADULT - MEDICATION SUMMARY - MEDICATIONS TO TAKE
I will START or STAY ON the medications listed below when I get home from the hospital:    aspirin 81 mg oral delayed release tablet  -- 1 tab(s) by mouth once a day home/ hospital  -- Indication: For Non-ST elevation (NSTEMI) myocardial infarction    tamsulosin 0.4 mg oral capsule  -- 1 cap(s) by mouth once a day (at bedtime)  home /hospital  -- Indication: For BPH (benign prostatic hyperplasia)    gabapentin 300 mg oral capsule  -- 1 cap(s) by mouth once a day home /hospital  -- Indication: For pain    Prandin 2 mg oral tablet  -- 1 tab(s) by mouth 2 times a day home  -- Indication: For Type 2 diabetes mellitus without complication, without long-term current use of insulin    lovastatin 20 mg oral tablet  -- 1 tab(s) by mouth once a day home  -- Indication: For Type 2 diabetes mellitus without complication, without long-term current use of insulin    atorvastatin 80 mg oral tablet  -- 1 tab(s) by mouth once a day (at bedtime)  -- Indication: For HLD (hyperlipidemia)    losartan-hydroCHLOROthiazide 100 mg-25 mg oral tablet  -- 1 tab(s) by mouth once a day home  -- Indication: For Essential hypertension    metoprolol succinate 25 mg oral tablet, extended release  -- 1 tab(s) by mouth once a day hosp  -- Indication: For Essential hypertension    budesonide-formoterol 80 mcg-4.5 mcg/inh inhalation aerosol  -- 1 puff(s) inhaled 2 times a day home /hospital  -- Indication: For COPD    amLODIPine 10 mg oral tablet  -- 1 tab(s) by mouth once a day  -- Indication: For Essential hypertension    Amitiza 24 mcg oral capsule  -- 1 cap(s) by mouth 2 times a day home  -- Indication: For Type 2 diabetes mellitus without complication, without long-term current use of insulin

## 2018-08-17 NOTE — PROGRESS NOTE ADULT - SUBJECTIVE AND OBJECTIVE BOX
Patient is a 77y old  Male who presents with a chief complaint of chest pain for cath (16 Aug 2018 19:32)    Follow up: seen and examined at bedside, pt with midsternal CP, non radiating, no acute distress.  Awaiting cath today.       Allergies     Advil (Hives)  penicillin (Hives)    Intolerances        Medications:  aspirin enteric coated 81 milliGRAM(s) Oral daily  atorvastatin 80 milliGRAM(s) Oral at bedtime  buDESOnide  80 MICROgram(s)/formoterol 4.5 MICROgram(s) Inhaler 2 Puff(s) Inhalation two times a day  dextrose 40% Gel 15 Gram(s) Oral once PRN  dextrose 5%. 1000 milliLiter(s) IV Continuous <Continuous>  dextrose 50% Injectable 12.5 Gram(s) IV Push once  dextrose 50% Injectable 25 Gram(s) IV Push once  dextrose 50% Injectable 25 Gram(s) IV Push once  gabapentin 300 milliGRAM(s) Oral daily  glucagon  Injectable 1 milliGRAM(s) IntraMuscular once PRN  heparin  Infusion.  Unit(s)/Hr IV Continuous <Continuous>  heparin  Injectable 4000 Unit(s) IV Push every 6 hours PRN  insulin lispro (HumaLOG) corrective regimen sliding scale   SubCutaneous three times a day before meals  insulin lispro (HumaLOG) corrective regimen sliding scale   SubCutaneous at bedtime  lubiprostone 24 MICROGram(s) Oral two times a day  tamsulosin 0.4 milliGRAM(s) Oral at bedtime      Vitals:  T(C): 36.6 (18 @ 04:00), Max: 36.6 (18 @ 04:00)  HR: 68 (18 @ 08:37) (47 - 68)  BP: 115/49 (18 @ 08:37) (103/44 - 128/64)  BP(mean): 73 (18 @ 13:07) (73 - 73)  RR: 16 (18 @ 08:37) (16 - 20)  SpO2: 99% (18 @ 08:37) (92% - 99%)  Wt(kg): --  Daily Height in cm: 165.1 (16 Aug 2018 13:07)    Daily Weight in k.4 (16 Aug 2018 13:07)  I&O's Summary    16 Aug 2018 07:01  -  17 Aug 2018 07:00  --------------------------------------------------------  IN: 420 mL / OUT: 0 mL / NET: 420 mL          Physical Exam:  Appearance: Normal  Eyes: PERRL, EOMI  HENT: Normal oral muscosa, NC/AT  Cardiovascular: S1S2, RRR, No M/R/G, no JVD, No Lower extremity edema  Procedural Access Site: No hematoma, Non-tender to palpation, 2+ pulse, No bruit, No Ecchymosis  Respiratory: Clear to auscultation bilaterally  Gastrointestinal: Soft, Non tender, Normal Bowel Sounds  Musculoskeletal: No clubbing, No joint deformity   Neurologic: Non-focal  Lymphatic: No lymphadenopathy  Psychiatry: AAOx3, Mood & affect appropriate  Skin: No rashes, No ecchymoses, No cyanosis        139  |  101  |  25<H>  ----------------------------<  120<H>  4.3   |  30  |  0.97    Ca    8.5      17 Aug 2018 02:29    TPro  7.7  /  Alb  3.3  /  TBili  0.7  /  DBili  x   /  AST  16  /  ALT  19  /  AlkPhos  94  08-15    PT/INR - ( 17 Aug 2018 02:29 )   PT: 11.4 sec;   INR: 1.04 ratio         PTT - ( 17 Aug 2018 02:29 )  PTT:45.6 sec  CARDIAC MARKERS ( 17 Aug 2018 02:29 )  x     / x     / 71 U/L / x     / 5.5 ng/mL  CARDIAC MARKERS ( 16 Aug 2018 06:22 )  7.080 ng/mL / x     / x     / x     / x      CARDIAC MARKERS ( 16 Aug 2018 00:55 )  9.180 ng/mL / x     / x     / x     / 17.2 ng/mL  CARDIAC MARKERS ( 15 Aug 2018 15:15 )  3.440 ng/mL / x     / x     / x     / 10.4 ng/mL  CARDIAC MARKERS ( 15 Aug 2018 12:45 )  .633 ng/mL / x     / x     / x     / 4.7 ng/mL      Serum Pro-Brain Natriuretic Peptide: 737 pg/mL (08-15 @ 15:15)  Serum Pro-Brain Natriuretic Peptide: 365 pg/mL (08-15 @ 12:45)    Lipid panel   Hgb A1c         ECG:     Echo: < from: TTE Echo Doppler w/o Cont (08.15.18 @ 16:21) >     EXAM:  TTE WO CON COMPLETE W DOPPL         PROCEDURE DATE:  08/15/2018        INTERPRETATION:  REPORT:    TRANSTHORACIC ECHOCARDIOGRAM REPORT         Patient Name:   SAWYER BELCHER Patient Location: [facility] Rec #:  [id number]       Accession #:      [id number]  Account #:                       Height:           65.0 in 165.0 cm  YOB: 1940        Weight:           184.1 lb 83.50 kg  Patient Age:    77 years         BSA:              1.91 m²  Patient Gender: M                BP:               122/74 mmHg       Date of Exam: 8/15/2018 4:21:38 PM  Sonographer:  CIPRIANO    Procedure:   2D Echo/Doppler/Color Doppler Complete.  Indications: Cardiac murmur, unspecified - R01.1  Diagnosis:   Dyspnea, unspecified - R06.00         2D AND M-MODE MEASUREMENTS (normal ranges within parentheses):  Left Ventricle:                  Normal   Aorta/Left Atrium:            Normal  IVSd (2D):              0.99 cm (0.7-1.1) Aortic Root (2D):  3.14 cm   (2.4-3.7)  LVPWd (2D):             0.91 cm (0.7-1.1) Left Atrium (2D):  4.30 cm   (1.9-4.0)  LVIDd (2D):             5.08 cm (3.4-5.7)  LVIDs (2D):             3.63 cm  LV FS (2D):             28.6 %   (>25%)  Relative Wall Thickness  0.36    (<0.42)    LV DIASTOLIC FUNCTION:  MV Peak E: 0.76 m/s Decel Time: 186 msec  MV Peak A: 0.80 m/s  E/A Ratio: 0.95    SPECTRAL DOPPLER ANALYSIS (where applicable):  Mitral Valve:  MV P1/2 Time: 54.08 msec  MV Area, PHT: 4.07 cm²    Aortic Valve: AoV Max Nicola: 2.85 m/s AoV Peak P.6 mmHg AoV Mean P.7 mmHg    LVOT Vmax: 0.84 m/s LVOT VTI: 0.207 m LVOT Diameter: 2.00 cm    AoV Area, Vmax: 0.93 cm² AoV Area, VTI: 1.18 cm² AoV Area, Vmn: 0.97 cm²    Tricuspid Valve and PA/RV Systolic Pressure: TR Max Velocity: 2.68 m/s RA   Pressure: 5 mmHg RVSP/PASP: 33.7 mmHg       PHYSICIAN INTERPRETATION:  Left Ventricle:  Global LV systolic function was moderately decreased. Left ventricular   ejection fraction, by visual estimation, is 40 to 45%. Spectral Doppler   shows impaired relaxation patternof left ventricular myocardial filling   (Grade I diastolic dysfunction).  Right Ventricle: Normal right ventricular size and function.  Left Atrium: The left atrium is normal in size.  Right Atrium: The right atrium is normal in size.  Mitral Valve:Thickening and calcification of the anterior and posterior   mitral valve leaflets. There is mild mitral annular calcification. Mild   mitral valve regurgitation is seen.  Tricuspid Valve: The tricuspid valve is degenerative in appearance. Mild   tricuspid regurgitation is visualized.  Aortic Valve: Sclerotic aortic valve with decreased opening. Peak Av   velocity is 2.85 m/s, mean transaortic gradient equals 12.7 mmHg, the   calculated aortic valve area equals 1.18 cm² by the continuity equation  consistent with moderate aortic stenosis. Trivial aortic valve   regurgitation is seen.  Pulmonic Valve: The pulmonic valve was not well visualized. Trace   pulmonic valve regurgitation.       Summary:   1. Left ventricular ejection fraction, by visual estimation, is 40 to   45%.   2. Moderately decreased global left ventricular systolic function.   3. Spectral Doppler shows impaired relaxation pattern of left   ventricular myocardial filling (Grade I diastolic dysfunction).   4. Mild mitral annular calcification.   5. Mild mitral valve regurgitation.   6. Thickening and calcification of the anterior and posterior mitral   valve leaflets.   7. Degenerative tricuspid valve.   8. Mild tricuspid regurgitation.   9. Sclerotic aortic valve with decreased opening; mod-severe AS with AMIE   1.1cm2).  10. Trace pulmonic valve regurgitation.    [id number] Simone Carroll MDMD  Electronically signed on 2018 at 1:07:48 PM              *** Final ***         SIMONE CARROLL   This document has been electronically signed. Aug 15 2018  4:21PM        < end of copied text >       Stress Testing: none     Cath: none    Imaging: < from: Xray Chest 1 View- PORTABLE-Urgent (08.15.18 @ 12:10) >    EXAM:  XR CHEST PORTABLE URGENT 1V                            PROCEDURE DATE:  08/15/2018      INTERPRETATION:  AP semierect chest on August 15, 2018 at 12 noon.   Patient has shortness of breath since this morning.    COMPARISON: None available.    Heart is magnified by technique.    There are mild roughly symmetric mid lower lung field infiltrates   suggesting congestion is etiology. Pneumonia is difficult to entirely   exclude.    IMPRESSION: Basilar infiltrates as above.    REYMUNDO GARVIN M.D., ATTENDING RADIOLOGIST  This document has been electronically signed. Aug 15 2018 12:30PM              < end of copied text >      Interpretation of Telemetry: Patient is a 77y old  Male who presents with a chief complaint of chest pain for cath (16 Aug 2018 19:32)    Follow up: seen and examined at bedside, pt with midsternal CP, non radiating, no acute distress.  Awaiting cath today.       Allergies     Advil (Hives)  penicillin (Hives)    Intolerances    Medications:  aspirin enteric coated 81 milliGRAM(s) Oral daily  atorvastatin 80 milliGRAM(s) Oral at bedtime  buDESOnide  80 MICROgram(s)/formoterol 4.5 MICROgram(s) Inhaler 2 Puff(s) Inhalation two times a day  dextrose 40% Gel 15 Gram(s) Oral once PRN  dextrose 5%. 1000 milliLiter(s) IV Continuous <Continuous>  dextrose 50% Injectable 12.5 Gram(s) IV Push once  dextrose 50% Injectable 25 Gram(s) IV Push once  dextrose 50% Injectable 25 Gram(s) IV Push once  gabapentin 300 milliGRAM(s) Oral daily  glucagon  Injectable 1 milliGRAM(s) IntraMuscular once PRN  heparin  Infusion.  Unit(s)/Hr IV Continuous <Continuous>  heparin  Injectable 4000 Unit(s) IV Push every 6 hours PRN  insulin lispro (HumaLOG) corrective regimen sliding scale   SubCutaneous three times a day before meals  insulin lispro (HumaLOG) corrective regimen sliding scale   SubCutaneous at bedtime  lubiprostone 24 MICROGram(s) Oral two times a day  tamsulosin 0.4 milliGRAM(s) Oral at bedtime      Vitals:  T(C): 36.6 (18 @ 04:00), Max: 36.6 (18 @ 04:00)  HR: 68 (18 @ 08:37) (47 - 68)  BP: 115/49 (18 @ 08:37) (103/44 - 128/64)  BP(mean): 73 (18 @ 13:07) (73 - 73)  RR: 16 (18 @ 08:37) (16 - 20)  SpO2: 99% (18 @ 08:37) (92% - 99%)  Wt(kg): --  Daily Height in cm: 165.1 (16 Aug 2018 13:07)    Daily Weight in k.4 (16 Aug 2018 13:07)  I&O's Summary    16 Aug 2018 07:01  -  17 Aug 2018 07:00  --------------------------------------------------------  IN: 420 mL / OUT: 0 mL / NET: 420 mL          Physical Exam:  Appearance: Normal  Eyes: PERRL, EOMI  HENT: Normal oral muscosa, NC/AT  Cardiovascular: S1S2, RRR, No M/R/G, no JVD, No Lower extremity edema  Procedural Access Site: No hematoma, Non-tender to palpation, 2+ pulse, No bruit, No Ecchymosis  Respiratory: LLL diminished,   Gastrointestinal: Soft, Non tender, Normal Bowel Sounds  Musculoskeletal: No clubbing, No joint deformity   Neurologic: Non-focal  Lymphatic: No lymphadenopathy  Psychiatry: AAOx3, Mood & affect appropriate  Skin: No rashes, No ecchymoses, No cyanosis        139  |  101  |  25<H>  ----------------------------<  120<H>  4.3   |  30  |  0.97    Ca    8.5      17 Aug 2018 02:29    TPro  7.7  /  Alb  3.3  /  TBili  0.7  /  DBili  x   /  AST  16  /  ALT  19  /  AlkPhos  94  0815    PT/INR - ( 17 Aug 2018 02:29 )   PT: 11.4 sec;   INR: 1.04 ratio         PTT - ( 17 Aug 2018 02:29 )  PTT:45.6 sec  CARDIAC MARKERS ( 17 Aug 2018 02:29 )  x     / x     / 71 U/L / x     / 5.5 ng/mL  CARDIAC MARKERS ( 16 Aug 2018 06:22 )  7.080 ng/mL / x     / x     / x     / x      CARDIAC MARKERS ( 16 Aug 2018 00:55 )  9.180 ng/mL / x     / x     / x     / 17.2 ng/mL  CARDIAC MARKERS ( 15 Aug 2018 15:15 )  3.440 ng/mL / x     / x     / x     / 10.4 ng/mL  CARDIAC MARKERS ( 15 Aug 2018 12:45 )  .633 ng/mL / x     / x     / x     / 4.7 ng/mL      Serum Pro-Brain Natriuretic Peptide: 737 pg/mL (08-15 @ 15:15)  Serum Pro-Brain Natriuretic Peptide: 365 pg/mL (08-15 @ 12:45)    Lipid panel   Hgb A1c     ECG:     Echo: < from: TTE Echo Doppler w/o Cont (08.15.18 @ 16:21) >     EXAM:  TTE WO CON COMPLETE W DOPPL         PROCEDURE DATE:  08/15/2018        INTERPRETATION:  REPORT:    TRANSTHORACIC ECHOCARDIOGRAM REPORT      Patient Name:   SAWYER BELCHER Patient Location: [facility] Rec #:  [id number]       Accession #:      [id number]  Account #:                       Height:           65.0 in 165.0 cm  YOB: 1940        Weight:           184.1 lb 83.50 kg  Patient Age:    77 years         BSA:              1.91 m²  Patient Gender: M                BP:               122/74 mmHg       Date of Exam: 8/15/2018 4:21:38 PM  Sonographer:  CIPRIANO    Procedure:   2D Echo/Doppler/Color Doppler Complete.  Indications: Cardiac murmur, unspecified - R01.1  Diagnosis:   Dyspnea, unspecified - R06.00         2D AND M-MODE MEASUREMENTS (normal ranges within parentheses):  Left Ventricle:                  Normal   Aorta/Left Atrium:            Normal  IVSd (2D):              0.99 cm (0.7-1.1) Aortic Root (2D):  3.14 cm   (2.4-3.7)  LVPWd (2D):             0.91 cm (0.7-1.1) Left Atrium (2D):  4.30 cm   (1.9-4.0)  LVIDd (2D):             5.08 cm (3.4-5.7)  LVIDs (2D):             3.63 cm  LV FS (2D):             28.6 %   (>25%)  Relative Wall Thickness  0.36    (<0.42)    LV DIASTOLIC FUNCTION:  MV Peak E: 0.76 m/s Decel Time: 186 msec  MV Peak A: 0.80 m/s  E/A Ratio: 0.95    SPECTRAL DOPPLER ANALYSIS (where applicable):  Mitral Valve:  MV P1/2 Time: 54.08 msec  MV Area, PHT: 4.07 cm²    Aortic Valve: AoV Max Nicola: 2.85 m/s AoV Peak P.6 mmHg AoV Mean P.7 mmHg    LVOT Vmax: 0.84 m/s LVOT VTI: 0.207 m LVOT Diameter: 2.00 cm    AoV Area, Vmax: 0.93 cm² AoV Area, VTI: 1.18 cm² AoV Area, Vmn: 0.97 cm²    Tricuspid Valve and PA/RV Systolic Pressure: TR Max Velocity: 2.68 m/s RA   Pressure: 5 mmHg RVSP/PASP: 33.7 mmHg       PHYSICIAN INTERPRETATION:  Left Ventricle:  Global LV systolic function was moderately decreased. Left ventricular   ejection fraction, by visual estimation, is 40 to 45%. Spectral Doppler   shows impaired relaxation pattern of left ventricular myocardial filling   (Grade I diastolic dysfunction).  Right Ventricle: Normal right ventricular size and function.  Left Atrium: The left atrium is normal in size.  Right Atrium: The right atrium is normal in size.  Mitral Valve:Thickening and calcification of the anterior and posterior   mitral valve leaflets. There is mild mitral annular calcification. Mild   mitral valve regurgitation is seen.  Tricuspid Valve: The tricuspid valve is degenerative in appearance. Mild   tricuspid regurgitation is visualized.  Aortic Valve: Sclerotic aortic valve with decreased opening. Peak Av   velocity is 2.85 m/s, mean transaortic gradient equals 12.7 mmHg, the   calculated aortic valve area equals 1.18 cm² by the continuity equation  consistent with moderate aortic stenosis. Trivial aortic valve   regurgitation is seen.  Pulmonic Valve: The pulmonic valve was not well visualized. Trace   pulmonic valve regurgitation.       Summary:   1. Left ventricular ejection fraction, by visual estimation, is 40 to   45%.   2. Moderately decreased global left ventricular systolic function.   3. Spectral Doppler shows impaired relaxation pattern of left   ventricular myocardial filling (Grade I diastolic dysfunction).   4. Mild mitral annular calcification.   5. Mild mitral valve regurgitation.   6. Thickening and calcification of the anterior and posterior mitral   valve leaflets.   7. Degenerative tricuspid valve.   8. Mild tricuspid regurgitation.   9. Sclerotic aortic valve with decreased opening; mod-severe AS with AMIE   1.1cm2).  10. Trace pulmonic valve regurgitation.    [id number] Simone Carroll MDMD  Electronically signed on 2018 at 1:07:48 PM         *** Final ***         SIMONE CARROLL   This document has been electronically signed. Aug 15 2018  4:21PM        < end of copied text >       Stress Testing: none     Cath: none    Imaging: < from: Xray Chest 1 View- PORTABLE-Urgent (08.15.18 @ 12:10) >    EXAM:  XR CHEST PORTABLE URGENT 1V                            PROCEDURE DATE:  08/15/2018      INTERPRETATION:  AP semierect chest on August 15, 2018 at 12 noon.   Patient has shortness of breath since this morning.    COMPARISON: None available.    Heart is magnified by technique.    There are mild roughly symmetric mid lower lung field infiltrates   suggesting congestion is etiology. Pneumonia is difficult to entirely   exclude.    IMPRESSION: Basilar infiltrates as above.    REYMUNDO GARVIN M.D., ATTENDING RADIOLOGIST  This document has been electronically signed. Aug 15 2018 12:30PM              < end of copied text >      Interpretation of Telemetry: SB

## 2018-08-17 NOTE — PROGRESS NOTE ADULT - PROBLEM SELECTOR PLAN 1
Cath today with Dr Carroll, if CP returns will discuss with Dr Tam and Prudencio for immediate LHC  Continue Heparin gtt for ACS  Check CE, sent  Continue Statin, will hold BB for bradycardia and antianginals due to soft BP  check TTE, pt with heart murmur on exam III/VI  monitor on tele Cath today with Dr Carroll, if CP returns will discuss with Dr Tam and Prudencio for immediate LHC  Continue Heparin gtt for ACS  Check CE, sent  Continue Statin, will hold BB for bradycardia and antianginals due to soft BP  monitor on tele

## 2018-08-17 NOTE — DISCHARGE NOTE ADULT - CARE PROVIDER_API CALL
Ute Carroll), Cardiology; Interventional Cardiology  [address], [place] [id number]  Phone: [phone]  Fax: [phone]

## 2018-08-17 NOTE — DISCHARGE NOTE ADULT - PLAN OF CARE
Remain without chest pain HOME CARE INSTRUCTIONS  For the next few days, avoid physical activities that bring on chest pain. Continue physical activities as directed.  Do not smoke.  Avoid drinking alcohol.   Only take over-the-counter or prescription medicine for pain, discomfort, or fever as directed by your caregiver.  Follow your caregiver's suggestions for further testing if your chest pain does not go away.  Keep any follow-up appointments you made. If you do not go to an appointment, you could develop lasting (chronic) problems with pain. If there is any problem keeping an appointment, you must call to reschedule.   SEEK MEDICAL CARE IF:  You think you are having problems from the medicine you are taking. Read your medicine instructions carefully.  Your chest pain does not go away, even after treatment.  You develop a rash with blisters on your chest.  SEEK IMMEDIATE MEDICAL CARE IF:  You have increased chest pain or pain that spreads to your arm, neck, jaw, back, or abdomen.   You develop shortness of breath, an increasing cough, or you are coughing up blood.  You have severe back or abdominal pain, feel nauseous, or vomit.  You develop severe weakness, fainting, or chills.  You have a fever.  THIS IS AN EMERGENCY. Do not wait to see if the pain will go away. Get medical help at once. Call your local emergency services. Do not drive yourself to the hospital. Low salt diet  Activity as tolerated.  Take all medication as prescribed.  Follow up with your medical doctor for routine blood pressure monitoring at your next visit.  Notify your doctor if you have any of the following symptoms:   Dizziness, Lightheadedness, Blurry vision, Headache, Chest pain, Shortness of breath Low salt, low fat, low cholesterol, diabetic diet if appropriate  Continue medication as prescribed  Exercise, increase your activity level  Pt. verbalized an understanding of all instructions.

## 2018-08-17 NOTE — DISCHARGE NOTE ADULT - MEDICATION SUMMARY - MEDICATIONS TO CHANGE
I will SWITCH the dose or number of times a day I take the medications listed below when I get home from the hospital:    amLODIPine 5 mg oral tablet  -- 1 tab(s) by mouth once a day home

## 2018-08-18 LAB
ANION GAP SERPL CALC-SCNC: 9 MMOL/L — SIGNIFICANT CHANGE UP (ref 5–17)
BUN SERPL-MCNC: 22 MG/DL — SIGNIFICANT CHANGE UP (ref 7–23)
CALCIUM SERPL-MCNC: 8.9 MG/DL — SIGNIFICANT CHANGE UP (ref 8.4–10.5)
CHLORIDE SERPL-SCNC: 102 MMOL/L — SIGNIFICANT CHANGE UP (ref 96–108)
CO2 SERPL-SCNC: 26 MMOL/L — SIGNIFICANT CHANGE UP (ref 22–31)
CREAT SERPL-MCNC: 0.66 MG/DL — SIGNIFICANT CHANGE UP (ref 0.5–1.3)
GLUCOSE BLDC GLUCOMTR-MCNC: 103 MG/DL — HIGH (ref 70–99)
GLUCOSE BLDC GLUCOMTR-MCNC: 103 MG/DL — HIGH (ref 70–99)
GLUCOSE BLDC GLUCOMTR-MCNC: 153 MG/DL — HIGH (ref 70–99)
GLUCOSE BLDC GLUCOMTR-MCNC: 166 MG/DL — HIGH (ref 70–99)
GLUCOSE SERPL-MCNC: 178 MG/DL — HIGH (ref 70–99)
HAV IGM SER-ACNC: SIGNIFICANT CHANGE UP
HBV CORE IGM SER-ACNC: SIGNIFICANT CHANGE UP
HBV SURFACE AG SER-ACNC: SIGNIFICANT CHANGE UP
HCT VFR BLD CALC: 45.1 % — SIGNIFICANT CHANGE UP (ref 39–50)
HCV AB S/CO SERPL IA: 0.32 S/CO — SIGNIFICANT CHANGE UP
HCV AB SERPL-IMP: SIGNIFICANT CHANGE UP
HGB BLD-MCNC: 14.7 G/DL — SIGNIFICANT CHANGE UP (ref 13–17)
MCHC RBC-ENTMCNC: 29.6 PG — SIGNIFICANT CHANGE UP (ref 27–34)
MCHC RBC-ENTMCNC: 32.6 GM/DL — SIGNIFICANT CHANGE UP (ref 32–36)
MCV RBC AUTO: 90.8 FL — SIGNIFICANT CHANGE UP (ref 80–100)
PLATELET # BLD AUTO: 211 K/UL — SIGNIFICANT CHANGE UP (ref 150–400)
POTASSIUM SERPL-MCNC: 4.2 MMOL/L — SIGNIFICANT CHANGE UP (ref 3.5–5.3)
POTASSIUM SERPL-SCNC: 4.2 MMOL/L — SIGNIFICANT CHANGE UP (ref 3.5–5.3)
RBC # BLD: 4.96 M/UL — SIGNIFICANT CHANGE UP (ref 4.2–5.8)
RBC # FLD: 13.7 % — SIGNIFICANT CHANGE UP (ref 10.3–14.5)
SODIUM SERPL-SCNC: 137 MMOL/L — SIGNIFICANT CHANGE UP (ref 135–145)
WBC # BLD: 12.7 K/UL — HIGH (ref 3.8–10.5)
WBC # FLD AUTO: 12.7 K/UL — HIGH (ref 3.8–10.5)

## 2018-08-18 RX ORDER — SODIUM CHLORIDE 9 MG/ML
1000 INJECTION INTRAMUSCULAR; INTRAVENOUS; SUBCUTANEOUS
Qty: 0 | Refills: 0 | Status: DISCONTINUED | OUTPATIENT
Start: 2018-08-18 | End: 2018-08-21

## 2018-08-18 RX ADMIN — Medication 81 MILLIGRAM(S): at 05:55

## 2018-08-18 RX ADMIN — LUBIPROSTONE 24 MICROGRAM(S): 24 CAPSULE, GELATIN COATED ORAL at 05:55

## 2018-08-18 RX ADMIN — LUBIPROSTONE 24 MICROGRAM(S): 24 CAPSULE, GELATIN COATED ORAL at 21:57

## 2018-08-18 RX ADMIN — BUDESONIDE AND FORMOTEROL FUMARATE DIHYDRATE 2 PUFF(S): 160; 4.5 AEROSOL RESPIRATORY (INHALATION) at 21:56

## 2018-08-18 RX ADMIN — ATORVASTATIN CALCIUM 80 MILLIGRAM(S): 80 TABLET, FILM COATED ORAL at 21:57

## 2018-08-18 RX ADMIN — BUDESONIDE AND FORMOTEROL FUMARATE DIHYDRATE 2 PUFF(S): 160; 4.5 AEROSOL RESPIRATORY (INHALATION) at 11:45

## 2018-08-18 RX ADMIN — Medication 2: at 08:06

## 2018-08-18 RX ADMIN — GABAPENTIN 300 MILLIGRAM(S): 400 CAPSULE ORAL at 17:45

## 2018-08-18 RX ADMIN — HEPARIN SODIUM 5000 UNIT(S): 5000 INJECTION INTRAVENOUS; SUBCUTANEOUS at 17:44

## 2018-08-18 RX ADMIN — TAMSULOSIN HYDROCHLORIDE 0.4 MILLIGRAM(S): 0.4 CAPSULE ORAL at 21:57

## 2018-08-18 RX ADMIN — SODIUM CHLORIDE 50 MILLILITER(S): 9 INJECTION INTRAMUSCULAR; INTRAVENOUS; SUBCUTANEOUS at 08:06

## 2018-08-18 NOTE — PROGRESS NOTE ADULT - SUBJECTIVE AND OBJECTIVE BOX
CHIEF COMPLAINT:Patient is a 77y old  Male who presents with a chief complaint of chest pain for cath (17 Aug 2018 20:34)    	        PAST MEDICAL & SURGICAL HISTORY:  BPH (benign prostatic hyperplasia)  HLD (hyperlipidemia)  Type 2 diabetes mellitus without complication, without long-term current use of insulin  Essential hypertension  History of cataract extraction  History of colonoscopy  Benign bladder tumor: removed          REVIEW OF SYSTEMS:  CONSTITUTIONAL: No fever, weight loss, or fatigue  EYES: No eye pain, visual disturbances, or discharge  NECK: No pain or stiffness  RESPIRATORY: No cough, wheezing, chills or hemoptysis; No Shortness of Breath  CARDIOVASCULAR: No chest pain, this am   GASTROINTESTINAL: No abdominal or epigastric pain. No nausea, vomiting, or hematemesis; No diarrhea or constipation. No melena or hematochezia.  GENITOURINARY: No dysuria, frequency, hematuria, or incontinence  NEUROLOGICAL: No headaches, memory loss, loss of strength, numbness, or tremors  MUSCULOSKELETAL: No joint pain or swelling; No muscle, back, or extremity pain    Medications:  MEDICATIONS  (STANDING):  aspirin enteric coated 81 milliGRAM(s) Oral daily  atorvastatin 80 milliGRAM(s) Oral at bedtime  buDESOnide  80 MICROgram(s)/formoterol 4.5 MICROgram(s) Inhaler 2 Puff(s) Inhalation two times a day  dextrose 5%. 1000 milliLiter(s) (50 mL/Hr) IV Continuous <Continuous>  dextrose 50% Injectable 12.5 Gram(s) IV Push once  dextrose 50% Injectable 25 Gram(s) IV Push once  dextrose 50% Injectable 25 Gram(s) IV Push once  gabapentin 300 milliGRAM(s) Oral daily  heparin  Injectable 5000 Unit(s) SubCutaneous every 12 hours  insulin lispro (HumaLOG) corrective regimen sliding scale   SubCutaneous three times a day before meals  insulin lispro (HumaLOG) corrective regimen sliding scale   SubCutaneous at bedtime  lubiprostone 24 MICROGram(s) Oral two times a day  sodium chloride 0.9%. 1000 milliLiter(s) (50 mL/Hr) IV Continuous <Continuous>  tamsulosin 0.4 milliGRAM(s) Oral at bedtime    MEDICATIONS  (PRN):  dextrose 40% Gel 15 Gram(s) Oral once PRN Blood Glucose LESS THAN 70 milliGRAM(s)/deciliter  glucagon  Injectable 1 milliGRAM(s) IntraMuscular once PRN Glucose LESS THAN 70 milligrams/deciliter    	    PHYSICAL EXAM:  T(C): 36.6 (08-18-18 @ 04:35), Max: 36.9 (08-17-18 @ 19:30)  HR: 53 (08-18-18 @ 04:35) (53 - 68)  BP: 131/67 (08-18-18 @ 04:35) (115/49 - 146/59)  RR: 17 (08-18-18 @ 04:35) (16 - 18)  SpO2: 97% (08-18-18 @ 04:35) (96% - 100%)  Wt(kg): --  I&O's Summary    17 Aug 2018 07:01  -  18 Aug 2018 07:00  --------------------------------------------------------  IN: 180 mL / OUT: 0 mL / NET: 180 mL        Appearance: Normal	  HEENT:   Normal oral mucosa, PERRL, EOMI	  Lymphatic: No lymphadenopathy  Cardiovascular: Normal S1 S2, No JVD, No murmurs, No edema  Respiratory: Lungs clear to auscultation	  Psychiatry: A & O x 3, Mood & affect appropriate  Gastrointestinal:  Soft, Non-tender, + BS	  Skin: No rashes, No ecchymoses, No cyanosis	  Neurologic: Non-focal  Extremities: Normal range of motion, No clubbing, cyanosis or edema  Vascular: Peripheral pulses palpable 2+ bilaterally    TELEMETRY: 	    ECG:  	  RADIOLOGY:  OTHER: 	  	  LABS:	 	    CARDIAC MARKERS:  CARDIAC MARKERS ( 17 Aug 2018 10:34 )  x     / x     / 80 U/L / x     / 5.2 ng/mL  CARDIAC MARKERS ( 17 Aug 2018 02:29 )  x     / x     / 71 U/L / x     / 5.5 ng/mL                                14.7   12.7  )-----------( 211      ( 18 Aug 2018 05:22 )             45.1     08-18    137  |  102  |  22  ----------------------------<  178<H>  4.2   |  26  |  0.66    Ca    8.9      18 Aug 2018 05:22      proBNP:   Lipid Profile:   HgA1c:   TSH:

## 2018-08-18 NOTE — PROGRESS NOTE ADULT - SUBJECTIVE AND OBJECTIVE BOX
Subjective/Objective:  Patient is a 77y old  Male who presents with a chief complaint of chest pain for cath (17 Aug 2018 20:34)    Allergies    Advil (Hives)  penicillin (Hives)    Intolerances      Medications:  aspirin enteric coated 81 milliGRAM(s) Oral daily  atorvastatin 80 milliGRAM(s) Oral at bedtime  buDESOnide  80 MICROgram(s)/formoterol 4.5 MICROgram(s) Inhaler 2 Puff(s) Inhalation two times a day  dextrose 40% Gel 15 Gram(s) Oral once PRN  dextrose 5%. 1000 milliLiter(s) IV Continuous <Continuous>  dextrose 50% Injectable 12.5 Gram(s) IV Push once  dextrose 50% Injectable 25 Gram(s) IV Push once  dextrose 50% Injectable 25 Gram(s) IV Push once  gabapentin 300 milliGRAM(s) Oral daily  glucagon  Injectable 1 milliGRAM(s) IntraMuscular once PRN  heparin  Injectable 5000 Unit(s) SubCutaneous every 12 hours  insulin lispro (HumaLOG) corrective regimen sliding scale   SubCutaneous three times a day before meals  insulin lispro (HumaLOG) corrective regimen sliding scale   SubCutaneous at bedtime  lubiprostone 24 MICROGram(s) Oral two times a day  tamsulosin 0.4 milliGRAM(s) Oral at bedtime      Review of Systems:   No c/o chest pain or SOB, and all others negative.    Vitals:  T(C): 36.6 (08-18-18 @ 04:35), Max: 36.9 (08-17-18 @ 19:30)  HR: 53 (08-18-18 @ 04:35) (53 - 68)  BP: 131/67 (08-18-18 @ 04:35) (115/49 - 146/59)  BP(mean): --  RR: 17 (08-18-18 @ 04:35) (16 - 18)  SpO2: 97% (08-18-18 @ 04:35) (96% - 100%)  Wt(kg): --  Daily     Daily   I&O's Summary    16 Aug 2018 07:01  -  17 Aug 2018 07:00  --------------------------------------------------------  IN: 420 mL / OUT: 0 mL / NET: 420 mL    17 Aug 2018 07:01  -  18 Aug 2018 05:59  --------------------------------------------------------  IN: 180 mL / OUT: 0 mL / NET: 180 mL      Physical Exam:  Appearance: Pt in NAD, non-toxic  Cardiovascular: S1 S2  Cath Site: No evidence of bleeding or hematoma, Non-tender to palpation, 2+ distal pulses  Respiratory: Clear to auscultation bilaterally  Gastrointestinal: Soft, NT/ND, Bowel Sounds +  Neurologic: Non-focal                          14.7   12.7  )-----------( 211      ( 18 Aug 2018 05:22 )             45.1     08-18    137  |  102  |  22  ----------------------------<  178<H>  4.2   |  26  |  0.66    Ca    8.9      18 Aug 2018 05:22      PT/INR - ( 17 Aug 2018 02:29 )   PT: 11.4 sec;   INR: 1.04 ratio         PTT - ( 17 Aug 2018 10:07 )  PTT:52.9 sec  CARDIAC MARKERS ( 17 Aug 2018 10:34 )  x     / x     / 80 U/L / x     / 5.2 ng/mL  CARDIAC MARKERS ( 17 Aug 2018 02:29 )  x     / x     / 71 U/L / x     / 5.5 ng/mL  CARDIAC MARKERS ( 16 Aug 2018 06:22 )  7.080 ng/mL / x     / x     / x     / x          Serum Pro-Brain Natriuretic Peptide: 737 pg/mL (08-15 @ 15:15)  Serum Pro-Brain Natriuretic Peptide: 365 pg/mL (08-15 @ 12:45)    Lipid panel   Hgb A1c       Interpretation of Telemetry: SB/SR at HR 50-70's.  No special event over night.    Assessment/Plan:   S/P diagnostic cath via R radial. Pt tolerated procedure well and overnight remained uneventful. No c/o chest pain or SOB. EKG and all lab results reviewed, unremarkable. Noted WBC up to 12.7 from 11 this morning, but no c/o fever or chills. Insertion/incision site benign, no bleeding or hematoma, and cath site dressing changed.   Plan to have CTA of heart today.   cont assess and monitor.

## 2018-08-19 LAB
ANION GAP SERPL CALC-SCNC: 11 MMOL/L — SIGNIFICANT CHANGE UP (ref 5–17)
BUN SERPL-MCNC: 14 MG/DL — SIGNIFICANT CHANGE UP (ref 7–23)
CALCIUM SERPL-MCNC: 8.9 MG/DL — SIGNIFICANT CHANGE UP (ref 8.4–10.5)
CHLORIDE SERPL-SCNC: 104 MMOL/L — SIGNIFICANT CHANGE UP (ref 96–108)
CO2 SERPL-SCNC: 26 MMOL/L — SIGNIFICANT CHANGE UP (ref 22–31)
CREAT SERPL-MCNC: 0.64 MG/DL — SIGNIFICANT CHANGE UP (ref 0.5–1.3)
GLUCOSE BLDC GLUCOMTR-MCNC: 121 MG/DL — HIGH (ref 70–99)
GLUCOSE BLDC GLUCOMTR-MCNC: 138 MG/DL — HIGH (ref 70–99)
GLUCOSE BLDC GLUCOMTR-MCNC: 140 MG/DL — HIGH (ref 70–99)
GLUCOSE BLDC GLUCOMTR-MCNC: 155 MG/DL — HIGH (ref 70–99)
GLUCOSE SERPL-MCNC: 127 MG/DL — HIGH (ref 70–99)
HCT VFR BLD CALC: 45.6 % — SIGNIFICANT CHANGE UP (ref 39–50)
HGB BLD-MCNC: 14.8 G/DL — SIGNIFICANT CHANGE UP (ref 13–17)
MCHC RBC-ENTMCNC: 29.4 PG — SIGNIFICANT CHANGE UP (ref 27–34)
MCHC RBC-ENTMCNC: 32.5 GM/DL — SIGNIFICANT CHANGE UP (ref 32–36)
MCV RBC AUTO: 90.5 FL — SIGNIFICANT CHANGE UP (ref 80–100)
PLATELET # BLD AUTO: 236 K/UL — SIGNIFICANT CHANGE UP (ref 150–400)
POTASSIUM SERPL-MCNC: 4.6 MMOL/L — SIGNIFICANT CHANGE UP (ref 3.5–5.3)
POTASSIUM SERPL-SCNC: 4.6 MMOL/L — SIGNIFICANT CHANGE UP (ref 3.5–5.3)
RBC # BLD: 5.04 M/UL — SIGNIFICANT CHANGE UP (ref 4.2–5.8)
RBC # FLD: 15 % — HIGH (ref 10.3–14.5)
SODIUM SERPL-SCNC: 141 MMOL/L — SIGNIFICANT CHANGE UP (ref 135–145)
WBC # BLD: 10.43 K/UL — SIGNIFICANT CHANGE UP (ref 3.8–10.5)
WBC # FLD AUTO: 10.43 K/UL — SIGNIFICANT CHANGE UP (ref 3.8–10.5)

## 2018-08-19 RX ORDER — AMLODIPINE BESYLATE 2.5 MG/1
5 TABLET ORAL DAILY
Qty: 0 | Refills: 0 | Status: DISCONTINUED | OUTPATIENT
Start: 2018-08-19 | End: 2018-08-21

## 2018-08-19 RX ADMIN — GABAPENTIN 300 MILLIGRAM(S): 400 CAPSULE ORAL at 11:39

## 2018-08-19 RX ADMIN — LUBIPROSTONE 24 MICROGRAM(S): 24 CAPSULE, GELATIN COATED ORAL at 17:25

## 2018-08-19 RX ADMIN — HEPARIN SODIUM 5000 UNIT(S): 5000 INJECTION INTRAVENOUS; SUBCUTANEOUS at 07:35

## 2018-08-19 RX ADMIN — ATORVASTATIN CALCIUM 80 MILLIGRAM(S): 80 TABLET, FILM COATED ORAL at 21:22

## 2018-08-19 RX ADMIN — AMLODIPINE BESYLATE 5 MILLIGRAM(S): 2.5 TABLET ORAL at 21:22

## 2018-08-19 RX ADMIN — TAMSULOSIN HYDROCHLORIDE 0.4 MILLIGRAM(S): 0.4 CAPSULE ORAL at 21:22

## 2018-08-19 RX ADMIN — BUDESONIDE AND FORMOTEROL FUMARATE DIHYDRATE 2 PUFF(S): 160; 4.5 AEROSOL RESPIRATORY (INHALATION) at 11:39

## 2018-08-19 RX ADMIN — LUBIPROSTONE 24 MICROGRAM(S): 24 CAPSULE, GELATIN COATED ORAL at 07:35

## 2018-08-19 RX ADMIN — BUDESONIDE AND FORMOTEROL FUMARATE DIHYDRATE 2 PUFF(S): 160; 4.5 AEROSOL RESPIRATORY (INHALATION) at 21:22

## 2018-08-19 RX ADMIN — HEPARIN SODIUM 5000 UNIT(S): 5000 INJECTION INTRAVENOUS; SUBCUTANEOUS at 17:25

## 2018-08-19 RX ADMIN — Medication 2: at 11:39

## 2018-08-19 RX ADMIN — Medication 81 MILLIGRAM(S): at 11:39

## 2018-08-19 NOTE — CHART NOTE - NSCHARTNOTEFT_GEN_A_CORE
Interval events    patient with /78 mm of hg    76 y/o male hx htn, dm, hld, BPH, current smoker complains of difficulty breathing that began this morning, worse with laying down, with elevated bp at home. Pt called ems himself, on arrival, bp was 205/120, gradually improved en route with only oxygen. Pt appeared anxious and somewhat sweaty, with some improvement of symptoms on arrival.  In ED bp normalized, but was found to have elevated troponin. 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    Patient has hx of HTN, was on amlodipine, metoprolol, and Losartan, d/cd on admission by cardiology likely due to Soft BP and bradycardia.  Patient now with Bp trending up, HR in 50's to 60's.  Started on Amlodipine  Consider adding Losartan if Bp remains elevated  Discussed with Attending  Will update with primary team    Scotty Engel [facility]  [id number]

## 2018-08-19 NOTE — PROGRESS NOTE ADULT - SUBJECTIVE AND OBJECTIVE BOX
CHIEF COMPLAINT:Patient is a 77y old  Male who presents with a chief complaint of chest pain for cath (17 Aug 2018 20:34)    	        PAST MEDICAL & SURGICAL HISTORY:  BPH (benign prostatic hyperplasia)  HLD (hyperlipidemia)  Type 2 diabetes mellitus without complication, without long-term current use of insulin  Essential hypertension  History of cataract extraction  History of colonoscopy  Benign bladder tumor: removed          REVIEW OF SYSTEMS:  CONSTITUTIONAL: No fever, weight loss, or fatigue  EYES: No eye pain, visual disturbances, or discharge  NECK: No pain or stiffness  RESPIRATORY: No cough, wheezing, chills or hemoptysis; No Shortness of Breath  CARDIOVASCULAR: No chest pain, palpitations, passing out, dizziness, or leg swelling  GASTROINTESTINAL: No abdominal or epigastric pain. No nausea, vomiting, or hematemesis; No diarrhea or constipation. No melena or hematochezia.  GENITOURINARY: No dysuria, frequency, hematuria, or incontinence  NEUROLOGICAL: No headaches, memory loss, loss of strength, numbness, or tremors  SKIN: No itching, burning, rashes, or lesions   LYMPH Nodes: No enlarged glands  ENDOCRINE: No heat or cold intolerance; No hair loss  MUSCULOSKELETAL: No joint pain or swelling; No muscle, back, or extremity pain    Medications:  MEDICATIONS  (STANDING):  aspirin enteric coated 81 milliGRAM(s) Oral daily  atorvastatin 80 milliGRAM(s) Oral at bedtime  buDESOnide  80 MICROgram(s)/formoterol 4.5 MICROgram(s) Inhaler 2 Puff(s) Inhalation two times a day  dextrose 5%. 1000 milliLiter(s) (50 mL/Hr) IV Continuous <Continuous>  dextrose 50% Injectable 12.5 Gram(s) IV Push once  dextrose 50% Injectable 25 Gram(s) IV Push once  dextrose 50% Injectable 25 Gram(s) IV Push once  gabapentin 300 milliGRAM(s) Oral daily  heparin  Injectable 5000 Unit(s) SubCutaneous every 12 hours  insulin lispro (HumaLOG) corrective regimen sliding scale   SubCutaneous three times a day before meals  insulin lispro (HumaLOG) corrective regimen sliding scale   SubCutaneous at bedtime  lubiprostone 24 MICROGram(s) Oral two times a day  sodium chloride 0.9%. 1000 milliLiter(s) (50 mL/Hr) IV Continuous <Continuous>  tamsulosin 0.4 milliGRAM(s) Oral at bedtime    MEDICATIONS  (PRN):  dextrose 40% Gel 15 Gram(s) Oral once PRN Blood Glucose LESS THAN 70 milliGRAM(s)/deciliter  glucagon  Injectable 1 milliGRAM(s) IntraMuscular once PRN Glucose LESS THAN 70 milligrams/deciliter    	    PHYSICAL EXAM:  T(C): 36.4 (08-19-18 @ 05:24), Max: 36.9 (08-18-18 @ 20:55)  HR: 54 (08-19-18 @ 05:24) (54 - 66)  BP: 160/67 (08-19-18 @ 05:24) (129/48 - 160/67)  RR: 18 (08-19-18 @ 05:24) (18 - 18)  SpO2: 97% (08-19-18 @ 05:24) (95% - 97%)  Wt(kg): --  I&O's Summary    18 Aug 2018 07:01  -  19 Aug 2018 07:00  --------------------------------------------------------  IN: 1320 mL / OUT: 0 mL / NET: 1320 mL        Appearance: Normal	  HEENT:   Normal oral mucosa, PERRL, EOMI	  Lymphatic: No lymphadenopathy  Cardiovascular: Normal S1 S2, No JVD, No murmurs, No edema  Respiratory: Lungs clear to auscultation	  Psychiatry: A & O x 3, Mood & affect appropriate  Gastrointestinal:  Soft, Non-tender, + BS	  Skin: No rashes, No ecchymoses, No cyanosis	  Neurologic: Non-focal  Extremities: Normal range of motion, No clubbing, cyanosis or edema  Vascular: Peripheral pulses palpable 2+ bilaterally    TELEMETRY: 	    ECG:  	  RADIOLOGY:  OTHER: 	  	  LABS:	 	    CARDIAC MARKERS:  CARDIAC MARKERS ( 17 Aug 2018 10:34 )  x     / x     / 80 U/L / x     / 5.2 ng/mL                                14.7   12.7  )-----------( 211      ( 18 Aug 2018 05:22 )             45.1     08-18    137  |  102  |  22  ----------------------------<  178<H>  4.2   |  26  |  0.66    Ca    8.9      18 Aug 2018 05:22      proBNP:   Lipid Profile:   HgA1c:   TSH:

## 2018-08-20 DIAGNOSIS — E11.9 TYPE 2 DIABETES MELLITUS WITHOUT COMPLICATIONS: ICD-10-CM

## 2018-08-20 DIAGNOSIS — Z88.0 ALLERGY STATUS TO PENICILLIN: ICD-10-CM

## 2018-08-20 DIAGNOSIS — J44.9 CHRONIC OBSTRUCTIVE PULMONARY DISEASE, UNSPECIFIED: ICD-10-CM

## 2018-08-20 DIAGNOSIS — N40.0 BENIGN PROSTATIC HYPERPLASIA WITHOUT LOWER URINARY TRACT SYMPTOMS: ICD-10-CM

## 2018-08-20 DIAGNOSIS — Z88.6 ALLERGY STATUS TO ANALGESIC AGENT: ICD-10-CM

## 2018-08-20 DIAGNOSIS — E78.2 MIXED HYPERLIPIDEMIA: ICD-10-CM

## 2018-08-20 DIAGNOSIS — R06.02 SHORTNESS OF BREATH: ICD-10-CM

## 2018-08-20 DIAGNOSIS — I10 ESSENTIAL (PRIMARY) HYPERTENSION: ICD-10-CM

## 2018-08-20 DIAGNOSIS — I21.4 NON-ST ELEVATION (NSTEMI) MYOCARDIAL INFARCTION: ICD-10-CM

## 2018-08-20 DIAGNOSIS — F17.210 NICOTINE DEPENDENCE, CIGARETTES, UNCOMPLICATED: ICD-10-CM

## 2018-08-20 LAB
ANION GAP SERPL CALC-SCNC: 9 MMOL/L — SIGNIFICANT CHANGE UP (ref 5–17)
BUN SERPL-MCNC: 11 MG/DL — SIGNIFICANT CHANGE UP (ref 7–23)
CALCIUM SERPL-MCNC: 9.1 MG/DL — SIGNIFICANT CHANGE UP (ref 8.4–10.5)
CHLORIDE SERPL-SCNC: 102 MMOL/L — SIGNIFICANT CHANGE UP (ref 96–108)
CO2 SERPL-SCNC: 26 MMOL/L — SIGNIFICANT CHANGE UP (ref 22–31)
CREAT SERPL-MCNC: 0.63 MG/DL — SIGNIFICANT CHANGE UP (ref 0.5–1.3)
CULTURE RESULTS: SIGNIFICANT CHANGE UP
CULTURE RESULTS: SIGNIFICANT CHANGE UP
GLUCOSE BLDC GLUCOMTR-MCNC: 109 MG/DL — HIGH (ref 70–99)
GLUCOSE BLDC GLUCOMTR-MCNC: 133 MG/DL — HIGH (ref 70–99)
GLUCOSE BLDC GLUCOMTR-MCNC: 144 MG/DL — HIGH (ref 70–99)
GLUCOSE BLDC GLUCOMTR-MCNC: 145 MG/DL — HIGH (ref 70–99)
GLUCOSE SERPL-MCNC: 136 MG/DL — HIGH (ref 70–99)
HCT VFR BLD CALC: 44.1 % — SIGNIFICANT CHANGE UP (ref 39–50)
HGB BLD-MCNC: 14.4 G/DL — SIGNIFICANT CHANGE UP (ref 13–17)
MCHC RBC-ENTMCNC: 29.2 PG — SIGNIFICANT CHANGE UP (ref 27–34)
MCHC RBC-ENTMCNC: 32.7 GM/DL — SIGNIFICANT CHANGE UP (ref 32–36)
MCV RBC AUTO: 89.5 FL — SIGNIFICANT CHANGE UP (ref 80–100)
PLATELET # BLD AUTO: 239 K/UL — SIGNIFICANT CHANGE UP (ref 150–400)
POTASSIUM SERPL-MCNC: 4 MMOL/L — SIGNIFICANT CHANGE UP (ref 3.5–5.3)
POTASSIUM SERPL-SCNC: 4 MMOL/L — SIGNIFICANT CHANGE UP (ref 3.5–5.3)
RBC # BLD: 4.93 M/UL — SIGNIFICANT CHANGE UP (ref 4.2–5.8)
RBC # FLD: 14.8 % — HIGH (ref 10.3–14.5)
SODIUM SERPL-SCNC: 137 MMOL/L — SIGNIFICANT CHANGE UP (ref 135–145)
SPECIMEN SOURCE: SIGNIFICANT CHANGE UP
SPECIMEN SOURCE: SIGNIFICANT CHANGE UP
WBC # BLD: 11.51 K/UL — HIGH (ref 3.8–10.5)
WBC # FLD AUTO: 11.51 K/UL — HIGH (ref 3.8–10.5)

## 2018-08-20 PROCEDURE — 75574 CT ANGIO HRT W/3D IMAGE: CPT | Mod: 26

## 2018-08-20 PROCEDURE — 93306 TTE W/DOPPLER COMPLETE: CPT | Mod: 26

## 2018-08-20 RX ADMIN — GABAPENTIN 300 MILLIGRAM(S): 400 CAPSULE ORAL at 11:07

## 2018-08-20 RX ADMIN — HEPARIN SODIUM 5000 UNIT(S): 5000 INJECTION INTRAVENOUS; SUBCUTANEOUS at 18:00

## 2018-08-20 RX ADMIN — Medication 81 MILLIGRAM(S): at 11:07

## 2018-08-20 RX ADMIN — HEPARIN SODIUM 5000 UNIT(S): 5000 INJECTION INTRAVENOUS; SUBCUTANEOUS at 05:36

## 2018-08-20 RX ADMIN — BUDESONIDE AND FORMOTEROL FUMARATE DIHYDRATE 2 PUFF(S): 160; 4.5 AEROSOL RESPIRATORY (INHALATION) at 21:35

## 2018-08-20 RX ADMIN — BUDESONIDE AND FORMOTEROL FUMARATE DIHYDRATE 2 PUFF(S): 160; 4.5 AEROSOL RESPIRATORY (INHALATION) at 09:20

## 2018-08-20 RX ADMIN — LUBIPROSTONE 24 MICROGRAM(S): 24 CAPSULE, GELATIN COATED ORAL at 05:37

## 2018-08-20 RX ADMIN — ATORVASTATIN CALCIUM 80 MILLIGRAM(S): 80 TABLET, FILM COATED ORAL at 21:35

## 2018-08-20 RX ADMIN — LUBIPROSTONE 24 MICROGRAM(S): 24 CAPSULE, GELATIN COATED ORAL at 18:00

## 2018-08-20 RX ADMIN — TAMSULOSIN HYDROCHLORIDE 0.4 MILLIGRAM(S): 0.4 CAPSULE ORAL at 21:35

## 2018-08-20 NOTE — PROGRESS NOTE ADULT - SUBJECTIVE AND OBJECTIVE BOX
CHIEF COMPLAINT:Patient is a 77y old  Male who presents with a chief complaint of chest pain for cath (17 Aug 2018 20:34)  no acute events    PAST MEDICAL & SURGICAL HISTORY:  BPH (benign prostatic hyperplasia)  HLD (hyperlipidemia)  Type 2 diabetes mellitus without complication, without long-term current use of insulin  Essential hypertension  History of cataract extraction  History of colonoscopy  Benign bladder tumor: removed          REVIEW OF SYSTEMS:  CONSTITUTIONAL: No fever, weight loss, or fatigue  EYES: No eye pain, visual disturbances, or discharge  NECK: No pain or stiffness  RESPIRATORY: No cough, wheezing, chills or hemoptysis; No Shortness of Breath  CARDIOVASCULAR: No chest pain, palpitations, passing out, dizziness, or leg swelling  GASTROINTESTINAL: No abdominal or epigastric pain. No nausea, vomiting, or hematemesis; No diarrhea or constipation. No melena or hematochezia.  GENITOURINARY: No dysuria, frequency, hematuria, or incontinence  NEUROLOGICAL: No headaches, memory loss, loss of strength, numbness, or tremors  SKIN: No itching, burning, rashes, or lesions   LYMPH Nodes: No enlarged glands  ENDOCRINE: No heat or cold intolerance; No hair loss  MUSCULOSKELETAL: No joint pain or swelling; No muscle, back, or extremity pain    Medications:  MEDICATIONS  (STANDING):  aspirin enteric coated 81 milliGRAM(s) Oral daily  atorvastatin 80 milliGRAM(s) Oral at bedtime  buDESOnide  80 MICROgram(s)/formoterol 4.5 MICROgram(s) Inhaler 2 Puff(s) Inhalation two times a day  dextrose 5%. 1000 milliLiter(s) (50 mL/Hr) IV Continuous <Continuous>  dextrose 50% Injectable 12.5 Gram(s) IV Push once  dextrose 50% Injectable 25 Gram(s) IV Push once  dextrose 50% Injectable 25 Gram(s) IV Push once  gabapentin 300 milliGRAM(s) Oral daily  heparin  Injectable 5000 Unit(s) SubCutaneous every 12 hours  insulin lispro (HumaLOG) corrective regimen sliding scale   SubCutaneous three times a day before meals  insulin lispro (HumaLOG) corrective regimen sliding scale   SubCutaneous at bedtime  lubiprostone 24 MICROGram(s) Oral two times a day  sodium chloride 0.9%. 1000 milliLiter(s) (50 mL/Hr) IV Continuous <Continuous>  tamsulosin 0.4 milliGRAM(s) Oral at bedtime    MEDICATIONS  (PRN):  dextrose 40% Gel 15 Gram(s) Oral once PRN Blood Glucose LESS THAN 70 milliGRAM(s)/deciliter  glucagon  Injectable 1 milliGRAM(s) IntraMuscular once PRN Glucose LESS THAN 70 milligrams/deciliter    	    PHYSICAL EXAM:  T(C): 36.4 (08-19-18 @ 05:24), Max: 36.9 (08-18-18 @ 20:55)  HR: 54 (08-19-18 @ 05:24) (54 - 66)  BP: 160/67 (08-19-18 @ 05:24) (129/48 - 160/67)  RR: 18 (08-19-18 @ 05:24) (18 - 18)  SpO2: 97% (08-19-18 @ 05:24) (95% - 97%)  Wt(kg): --  I&O's Summary    18 Aug 2018 07:01  -  19 Aug 2018 07:00  --------------------------------------------------------  IN: 1320 mL / OUT: 0 mL / NET: 1320 mL        Appearance: Normal	  HEENT:   Normal oral mucosa, PERRL, EOMI	  Lymphatic: No lymphadenopathy  Cardiovascular: Normal S1 S2, No JVD, No murmurs, No edema  Respiratory: Lungs clear to auscultation	  Psychiatry: A & O x 3, Mood & affect appropriate  Gastrointestinal:  Soft, Non-tender, + BS	  Skin: No rashes, No ecchymoses, No cyanosis	  Neurologic: Non-focal  Extremities: Normal range of motion, No clubbing, cyanosis or edema  Vascular: Peripheral pulses palpable 2+ bilaterally    TELEMETRY: 	    ECG:  	  RADIOLOGY:  OTHER: 	  	  LABS:	 	    CARDIAC MARKERS:  CARDIAC MARKERS ( 17 Aug 2018 10:34 )  x     / x     / 80 U/L / x     / 5.2 ng/mL                                14.7   12.7  )-----------( 211      ( 18 Aug 2018 05:22 )             45.1     08-18    137  |  102  |  22  ----------------------------<  178<H>  4.2   |  26  |  0.66    Ca    8.9      18 Aug 2018 05:22      proBNP:   Lipid Profile:   HgA1c:   TSH:

## 2018-08-21 VITALS
TEMPERATURE: 98 F | DIASTOLIC BLOOD PRESSURE: 70 MMHG | OXYGEN SATURATION: 98 % | RESPIRATION RATE: 18 BRPM | SYSTOLIC BLOOD PRESSURE: 120 MMHG | HEART RATE: 56 BPM

## 2018-08-21 LAB
ANION GAP SERPL CALC-SCNC: 11 MMOL/L — SIGNIFICANT CHANGE UP (ref 5–17)
BUN SERPL-MCNC: 15 MG/DL — SIGNIFICANT CHANGE UP (ref 7–23)
CALCIUM SERPL-MCNC: 8.9 MG/DL — SIGNIFICANT CHANGE UP (ref 8.4–10.5)
CHLORIDE SERPL-SCNC: 102 MMOL/L — SIGNIFICANT CHANGE UP (ref 96–108)
CO2 SERPL-SCNC: 25 MMOL/L — SIGNIFICANT CHANGE UP (ref 22–31)
CREAT SERPL-MCNC: 0.65 MG/DL — SIGNIFICANT CHANGE UP (ref 0.5–1.3)
GLUCOSE BLDC GLUCOMTR-MCNC: 136 MG/DL — HIGH (ref 70–99)
GLUCOSE BLDC GLUCOMTR-MCNC: 143 MG/DL — HIGH (ref 70–99)
GLUCOSE SERPL-MCNC: 112 MG/DL — HIGH (ref 70–99)
HCT VFR BLD CALC: 44.3 % — SIGNIFICANT CHANGE UP (ref 39–50)
HGB BLD-MCNC: 14.6 G/DL — SIGNIFICANT CHANGE UP (ref 13–17)
INR BLD: 1.04 RATIO — SIGNIFICANT CHANGE UP (ref 0.88–1.16)
MCHC RBC-ENTMCNC: 29.7 PG — SIGNIFICANT CHANGE UP (ref 27–34)
MCHC RBC-ENTMCNC: 33 GM/DL — SIGNIFICANT CHANGE UP (ref 32–36)
MCV RBC AUTO: 90.2 FL — SIGNIFICANT CHANGE UP (ref 80–100)
PLATELET # BLD AUTO: 235 K/UL — SIGNIFICANT CHANGE UP (ref 150–400)
POTASSIUM SERPL-MCNC: 4 MMOL/L — SIGNIFICANT CHANGE UP (ref 3.5–5.3)
POTASSIUM SERPL-SCNC: 4 MMOL/L — SIGNIFICANT CHANGE UP (ref 3.5–5.3)
PROTHROM AB SERPL-ACNC: 11.8 SEC — SIGNIFICANT CHANGE UP (ref 10–13.1)
RBC # BLD: 4.91 M/UL — SIGNIFICANT CHANGE UP (ref 4.2–5.8)
RBC # FLD: 14.9 % — HIGH (ref 10.3–14.5)
SODIUM SERPL-SCNC: 138 MMOL/L — SIGNIFICANT CHANGE UP (ref 135–145)
WBC # BLD: 10.89 K/UL — HIGH (ref 3.8–10.5)
WBC # FLD AUTO: 10.89 K/UL — HIGH (ref 3.8–10.5)

## 2018-08-21 PROCEDURE — 80048 BASIC METABOLIC PNL TOTAL CA: CPT

## 2018-08-21 PROCEDURE — 82553 CREATINE MB FRACTION: CPT

## 2018-08-21 PROCEDURE — 80074 ACUTE HEPATITIS PANEL: CPT

## 2018-08-21 PROCEDURE — 93454 CORONARY ARTERY ANGIO S&I: CPT

## 2018-08-21 PROCEDURE — 84484 ASSAY OF TROPONIN QUANT: CPT

## 2018-08-21 PROCEDURE — 82962 GLUCOSE BLOOD TEST: CPT

## 2018-08-21 PROCEDURE — C1894: CPT

## 2018-08-21 PROCEDURE — 85730 THROMBOPLASTIN TIME PARTIAL: CPT

## 2018-08-21 PROCEDURE — 99239 HOSP IP/OBS DSCHRG MGMT >30: CPT

## 2018-08-21 PROCEDURE — C1769: CPT

## 2018-08-21 PROCEDURE — 94640 AIRWAY INHALATION TREATMENT: CPT

## 2018-08-21 PROCEDURE — 93005 ELECTROCARDIOGRAM TRACING: CPT

## 2018-08-21 PROCEDURE — 85027 COMPLETE CBC AUTOMATED: CPT

## 2018-08-21 PROCEDURE — C8929: CPT

## 2018-08-21 PROCEDURE — C1887: CPT

## 2018-08-21 PROCEDURE — 75574 CT ANGIO HRT W/3D IMAGE: CPT

## 2018-08-21 PROCEDURE — 85610 PROTHROMBIN TIME: CPT

## 2018-08-21 PROCEDURE — 82550 ASSAY OF CK (CPK): CPT

## 2018-08-21 RX ORDER — AMLODIPINE BESYLATE 2.5 MG/1
5 TABLET ORAL ONCE
Qty: 0 | Refills: 0 | Status: COMPLETED | OUTPATIENT
Start: 2018-08-21 | End: 2018-08-21

## 2018-08-21 RX ORDER — AMLODIPINE BESYLATE 2.5 MG/1
10 TABLET ORAL DAILY
Qty: 0 | Refills: 0 | Status: DISCONTINUED | OUTPATIENT
Start: 2018-08-22 | End: 2018-08-21

## 2018-08-21 RX ORDER — METOPROLOL TARTRATE 50 MG
25 TABLET ORAL ONCE
Qty: 0 | Refills: 0 | Status: COMPLETED | OUTPATIENT
Start: 2018-08-21 | End: 2018-08-21

## 2018-08-21 RX ORDER — AMLODIPINE BESYLATE 2.5 MG/1
1 TABLET ORAL
Qty: 30 | Refills: 0
Start: 2018-08-21 | End: 2018-09-19

## 2018-08-21 RX ORDER — ATORVASTATIN CALCIUM 80 MG/1
1 TABLET, FILM COATED ORAL
Qty: 0 | Refills: 0 | DISCHARGE
Start: 2018-08-21

## 2018-08-21 RX ORDER — METOPROLOL TARTRATE 50 MG
25 TABLET ORAL DAILY
Qty: 0 | Refills: 0 | Status: DISCONTINUED | OUTPATIENT
Start: 2018-08-22 | End: 2018-08-21

## 2018-08-21 RX ORDER — METOPROLOL TARTRATE 50 MG
TABLET ORAL
Qty: 0 | Refills: 0 | Status: DISCONTINUED | OUTPATIENT
Start: 2018-08-21 | End: 2018-08-21

## 2018-08-21 RX ADMIN — GABAPENTIN 300 MILLIGRAM(S): 400 CAPSULE ORAL at 11:03

## 2018-08-21 RX ADMIN — Medication 25 MILLIGRAM(S): at 12:20

## 2018-08-21 RX ADMIN — AMLODIPINE BESYLATE 5 MILLIGRAM(S): 2.5 TABLET ORAL at 11:43

## 2018-08-21 RX ADMIN — HEPARIN SODIUM 5000 UNIT(S): 5000 INJECTION INTRAVENOUS; SUBCUTANEOUS at 05:41

## 2018-08-21 RX ADMIN — AMLODIPINE BESYLATE 5 MILLIGRAM(S): 2.5 TABLET ORAL at 05:41

## 2018-08-21 RX ADMIN — LUBIPROSTONE 24 MICROGRAM(S): 24 CAPSULE, GELATIN COATED ORAL at 05:41

## 2018-08-21 RX ADMIN — Medication 81 MILLIGRAM(S): at 11:03

## 2018-08-21 RX ADMIN — BUDESONIDE AND FORMOTEROL FUMARATE DIHYDRATE 2 PUFF(S): 160; 4.5 AEROSOL RESPIRATORY (INHALATION) at 09:55

## 2018-08-21 NOTE — PROGRESS NOTE ADULT - SUBJECTIVE AND OBJECTIVE BOX
CHIEF COMPLAINT:Patient is a 77y old  Male who presents with a chief complaint of chest pain for cath (17 Aug 2018 20:34)    	        PAST MEDICAL & SURGICAL HISTORY:  BPH (benign prostatic hyperplasia)  HLD (hyperlipidemia)  Type 2 diabetes mellitus without complication, without long-term current use of insulin  Essential hypertension  History of cataract extraction  History of colonoscopy  Benign bladder tumor: removed          REVIEW OF SYSTEMS:  CONSTITUTIONAL: No fever, weight loss, or fatigue  EYES: No eye pain, visual disturbances, or discharge  NECK: No pain or stiffness  RESPIRATORY: No cough, wheezing, chills or hemoptysis; No Shortness of Breath  CARDIOVASCULAR: No chest pain, palpitations, passing out, dizziness, or leg swelling  GASTROINTESTINAL: No abdominal or epigastric pain. No nausea, vomiting, or hematemesis; No diarrhea or constipation. No melena or hematochezia.  GENITOURINARY: No dysuria, frequency, hematuria, or incontinence  NEUROLOGICAL: No headaches, memory loss, loss of strength, numbness, or tremors  SKIN: No itching, burning, rashes, or lesions   LYMPH Nodes: No enlarged glands  ENDOCRINE: No heat or cold intolerance; No hair loss  MUSCULOSKELETAL: No joint pain or swelling; No muscle, back, or extremity pain    Medications:  MEDICATIONS  (STANDING):  amLODIPine   Tablet 5 milliGRAM(s) Oral daily  aspirin enteric coated 81 milliGRAM(s) Oral daily  atorvastatin 80 milliGRAM(s) Oral at bedtime  buDESOnide  80 MICROgram(s)/formoterol 4.5 MICROgram(s) Inhaler 2 Puff(s) Inhalation two times a day  dextrose 5%. 1000 milliLiter(s) (50 mL/Hr) IV Continuous <Continuous>  dextrose 50% Injectable 12.5 Gram(s) IV Push once  dextrose 50% Injectable 25 Gram(s) IV Push once  dextrose 50% Injectable 25 Gram(s) IV Push once  gabapentin 300 milliGRAM(s) Oral daily  heparin  Injectable 5000 Unit(s) SubCutaneous every 12 hours  insulin lispro (HumaLOG) corrective regimen sliding scale   SubCutaneous three times a day before meals  insulin lispro (HumaLOG) corrective regimen sliding scale   SubCutaneous at bedtime  lubiprostone 24 MICROGram(s) Oral two times a day  sodium chloride 0.9%. 1000 milliLiter(s) (50 mL/Hr) IV Continuous <Continuous>  tamsulosin 0.4 milliGRAM(s) Oral at bedtime    MEDICATIONS  (PRN):  dextrose 40% Gel 15 Gram(s) Oral once PRN Blood Glucose LESS THAN 70 milliGRAM(s)/deciliter  glucagon  Injectable 1 milliGRAM(s) IntraMuscular once PRN Glucose LESS THAN 70 milligrams/deciliter    	    PHYSICAL EXAM:  T(C): 36.8 (08-21-18 @ 04:50), Max: 37.1 (08-20-18 @ 20:47)  HR: 60 (08-21-18 @ 04:50) (53 - 64)  BP: 133/67 (08-21-18 @ 04:50) (119/66 - 171/77)  RR: 18 (08-21-18 @ 04:50) (18 - 18)  SpO2: 95% (08-21-18 @ 04:50) (95% - 96%)  Wt(kg): --  I&O's Summary    20 Aug 2018 07:01  -  21 Aug 2018 07:00  --------------------------------------------------------  IN: 960 mL / OUT: 0 mL / NET: 960 mL    21 Aug 2018 07:01  -  21 Aug 2018 10:10  --------------------------------------------------------  IN: 280 mL / OUT: 0 mL / NET: 280 mL        Appearance: Normal	  HEENT:   Normal oral mucosa, PERRL, EOMI	  Lymphatic: No lymphadenopathy  Cardiovascular: Normal S1 S2, No JVD, No murmurs, No edema  Respiratory: Lungs clear to auscultation	  Psychiatry: A & O x 3, Mood & affect appropriate  Gastrointestinal:  Soft, Non-tender, + BS	  Skin: No rashes, No ecchymoses, No cyanosis	  Neurologic: Non-focal  Extremities: Normal range of motion, No clubbing, cyanosis or edema  Vascular: Peripheral pulses palpable 2+ bilaterally    TELEMETRY: 	    ECG:  	  RADIOLOGY:  OTHER: 	  	  LABS:	 	    CARDIAC MARKERS:                                14.4   11.51 )-----------( 239      ( 20 Aug 2018 06:34 )             44.1     08-21    138  |  102  |  15  ----------------------------<  112<H>  4.0   |  25  |  0.65    Ca    8.9      21 Aug 2018 06:17      proBNP:   Lipid Profile:   HgA1c:   TSH:

## 2018-08-21 NOTE — CONSULT NOTE ADULT - SUBJECTIVE AND OBJECTIVE BOX
Patient seen and evaluated @ 10:00 AM  Chief Complaint: SOB    HPI:  76 y/o male with htn, dm, hld, BPH, current smoker complains of difficulty breathing that began morning of presentation, worse with laying down in setting of elevated bp at home. Pt called ems himself, on arrival, bp was 205/120, gradually improved en route with only oxygen. Pt appeared anxious and somewhat sweaty, with some improvement of symptoms on arrival.  In ED bp normalized, but was found to have elevated troponin I peaked up to 9 (hs trop 125 peak), , transferred to [facility]  for cardiac cath 2/2 NSTEMI.    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    Patient currently chest pain free. No SOB. Episode of HTN overnight to SBP 170s.    PMH:   BPH (benign prostatic hyperplasia)  HLD (hyperlipidemia)  Type 2 diabetes mellitus without complication, without long-term current use of insulin  Essential hypertension    PSH:   History of cataract extraction  History of colonoscopy  Benign bladder tumor  No significant past surgical history    Medications:   amLODIPine   Tablet 5 milliGRAM(s) Oral daily  aspirin enteric coated 81 milliGRAM(s) Oral daily  atorvastatin 80 milliGRAM(s) Oral at bedtime  buDESOnide  80 MICROgram(s)/formoterol 4.5 MICROgram(s) Inhaler 2 Puff(s) Inhalation two times a day  dextrose 40% Gel 15 Gram(s) Oral once PRN  dextrose 5%. 1000 milliLiter(s) IV Continuous <Continuous>  dextrose 50% Injectable 12.5 Gram(s) IV Push once  dextrose 50% Injectable 25 Gram(s) IV Push once  dextrose 50% Injectable 25 Gram(s) IV Push once  gabapentin 300 milliGRAM(s) Oral daily  glucagon  Injectable 1 milliGRAM(s) IntraMuscular once PRN  heparin  Injectable 5000 Unit(s) SubCutaneous every 12 hours  insulin lispro (HumaLOG) corrective regimen sliding scale   SubCutaneous three times a day before meals  insulin lispro (HumaLOG) corrective regimen sliding scale   SubCutaneous at bedtime  lubiprostone 24 MICROGram(s) Oral two times a day  sodium chloride 0.9%. 1000 milliLiter(s) IV Continuous <Continuous>  tamsulosin 0.4 milliGRAM(s) Oral at bedtime    Allergies:  Advil (Hives)  penicillin (Hives)    FAMILY HISTORY:  No pertinent family history in first degree relatives    Social History:  + tobacco    Review of Systems:  Constitutional: [ ] Fever [ ] Chills [ ] Fatigue [ ] Weight change   HEENT: [ ] Blurred vision [ ] Eye Pain [ ] Headache [ ] Runny nose [ ] Sore Throat   Respiratory: [ ] Cough [ ] Wheezing [ ] Shortness of breath  Cardiovascular: [ ] Chest Pain [ ] Palpitations [ ] VALERA [ ] PND [ ] Orthopnea  Gastrointestinal: [ ] Abdominal Pain [ ] Diarrhea [ ] Constipation [ ] Hemorrhoids [ ] Nausea [ ] Vomiting  Genitourinary: [ ] Nocturia [ ] Dysuria [ ] Incontinence  Extremities: [ ] Swelling [ ] Joint Pain  Neurologic: [ ] Focal deficit [ ] Paresthesias [ ] Syncope  Lymphatic: [ ] Swelling [ ] Lymphadenopathy   Skin: [ ] Rash [ ] Ecchymoses [ ] Wounds [ ] Lesions  Psychiatry: [ ] Depression [ ] Suicidal/Homicidal Ideation [ ] Anxiety [ ] Sleep Disturbances  [x] 10 point review of systems is otherwise negative except as mentioned above            [ ]Unable to obtain    Physical Exam:  T(C): 36.8 (08-21-18 @ 04:50), Max: 37.1 (08-20-18 @ 20:47)  HR: 60 (08-21-18 @ 04:50) (53 - 64)  BP: 133/67 (08-21-18 @ 04:50) (119/66 - 171/77)  RR: 18 (08-21-18 @ 04:50) (18 - 18)  SpO2: 95% (08-21-18 @ 04:50) (95% - 96%)  Wt(kg): --    08-20 @ 07:01  -  08-21 @ 07:00  --------------------------------------------------------  IN: 960 mL / OUT: 0 mL / NET: 960 mL    08-21 @ 07:01  -  08-21 @ 10:11  --------------------------------------------------------  IN: 280 mL / OUT: 0 mL / NET: 280 mL      Daily     Daily     Appearance: NAD  Eyes: PERRL, EOMI  HENT: Normal oral muscosa, NC/AT  Cardiovascular: normal S1 and S2, RRR, crescendo decrescendo murmur at base, no edema, normal JVP  Respiratory: Clear to auscultation bilaterally  Gastrointestinal: Soft, non-tender, non-distended, BS+  Musculoskeletal: No clubbing, no joint deformity   Neurologic: Non-focal  Lymphatic: No lymphadenopathy  Psychiatry: AAOx3, mood & affect appropriate  Skin: No rashes, no ecchymoses, no cyanosis    Cardiovascular Diagnostic Testing:  ECG: sinus bradycardia 55 bpm    Echo:  TTE 8/17  Conclusions:  1. Calcified trileaflet aortic valve with decreased  opening. Peak transaortic valve gradient equals 38 mm Hg,  mean transaortic valve gradient equals 19 mm Hg, estimated  aortic valve area equals1 sqcm (by continuity equation),  aortic valve velocity time integral equals 70 cm,  consistent with moderate aortic stenosis. DVI=0.32. Mild  aortic regurgitation.  2. Mildly dilated left atrium.  LA volume index = 38 cc/m2.  3. Moderate concentric left ventricular hypertrophy.  4. Endocardial visualization enhanced with intravenous  injection of Ultrasonic Enhancing Agent (Definity).  Left ventricular ejection fraction, by visual estimation,  is 55-60%.  No segmental wall motion abnormality.  5. Moderate diastolic dysfunction (Stage II).  6. Normal right ventricular size and function.    Stress Testing:  none    Cath:  Cath 8/17/18  CORONARY VESSELS: The coronary circulation is right dominant.  LM:   --  LM: Normal.  LAD:   --  Proximal LAD: There was a discrete 20 % stenosis.  --  Mid LAD: Therewas a discrete 30 % stenosis.  --  Distal LAD: Angiography showed minor luminal irregularities with no  flow limiting lesions.  CX:   --  Circumflex: Angiography showed minor luminal irregularities with  no flow limiting lesions.  RCA:   --  RCA: Angiography showed minor luminal irregularities with no  flow limiting lesions. The vessel arose anomalously from the left sinus of  Valsalva.  COMPLICATIONS: There were no complications.  DIAGNOSTIC RECOMMENDATIONS: -Continue aggressive medical therapy and risk  factor modifications.  -2D echo to evaluate aortic valve; appears heavily calcified with AMIE  approximately1.1-1.2 on prior echo.  -CTA to evaluate coronary artery anomaly; RCA off left cusp. Consider  compression of RCA as source of NSTEMI.    Imaging:  CT Heart  IMPRESSION:    1.  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  2.  Coronary artery disease.  Refer to invasive coronary angiography   (8.17.18) for assessment of luminal stenosis severity.  Severe coronary   artery calcium (Agatston score 663) as delineated above.    3.  Moderately calcified aortic valve.  4.  Severe atherosclerotic plaque in the visualized thoracic aorta.    Atherosclerotic plaque protrudes into the lumen of the descending   thoracic aorta.  5.  Dilated main pulmonary artery measuring approximately 32 mm, a   finding compatible with pulmonary hypertension.  6.  Left ventricular hypertrophy.  7.  Leftatrial dilation.    Labs:                        14.4   11.51 )-----------( 239      ( 20 Aug 2018 06:34 )             44.1     08-21    138  |  102  |  15  ----------------------------<  112<H>  4.0   |  25  |  0.65    Ca    8.9      21 Aug 2018 06:17      PT/INR - ( 21 Aug 2018 07:49 )   PT: 11.8 sec;   INR: 1.04 ratio               Serum Pro-Brain Natriuretic Peptide: 737 pg/mL (08-15 @ 15:15)  Serum Pro-Brain Natriuretic Peptide: 365 pg/mL (08-15 @ 12:45)      Hemoglobin A1C, Whole Blood: 6.9 % (08-16 @ 08:25)  Hemoglobin A1C, Whole Blood: 6.9 % (08-15 @ 18:16) Patient seen and evaluated @ 10:00 AM  Chief Complaint: SOB    HPI:  76 y/o male with htn, dm, hld, BPH, current smoker complains of difficulty breathing that began morning of presentation, worse with laying down in setting of elevated bp at home. Pt called ems himself, on arrival, bp was 205/120, gradually improved en route with only oxygen. Pt appeared anxious and somewhat sweaty, with some improvement of symptoms on arrival.  In ED bp normalized, but was found to have elevated troponin I peaked up to 9 (hs trop 125 peak), , transferred to [facility]  for cardiac cath 2/2 NSTEMI.    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    Patient currently chest pain free. No SOB. Episode of HTN overnight to SBP 170s.    PMH:   BPH (benign prostatic hyperplasia)  HLD (hyperlipidemia)  Type 2 diabetes mellitus without complication, without long-term current use of insulin  Essential hypertension    PSH:   History of cataract extraction  History of colonoscopy  Benign bladder tumor  No significant past surgical history    Medications:   amLODIPine   Tablet 5 milliGRAM(s) Oral daily  aspirin enteric coated 81 milliGRAM(s) Oral daily  atorvastatin 80 milliGRAM(s) Oral at bedtime  buDESOnide  80 MICROgram(s)/formoterol 4.5 MICROgram(s) Inhaler 2 Puff(s) Inhalation two times a day  dextrose 40% Gel 15 Gram(s) Oral once PRN  dextrose 5%. 1000 milliLiter(s) IV Continuous <Continuous>  dextrose 50% Injectable 12.5 Gram(s) IV Push once  dextrose 50% Injectable 25 Gram(s) IV Push once  dextrose 50% Injectable 25 Gram(s) IV Push once  gabapentin 300 milliGRAM(s) Oral daily  glucagon  Injectable 1 milliGRAM(s) IntraMuscular once PRN  heparin  Injectable 5000 Unit(s) SubCutaneous every 12 hours  insulin lispro (HumaLOG) corrective regimen sliding scale   SubCutaneous three times a day before meals  insulin lispro (HumaLOG) corrective regimen sliding scale   SubCutaneous at bedtime  lubiprostone 24 MICROGram(s) Oral two times a day  sodium chloride 0.9%. 1000 milliLiter(s) IV Continuous <Continuous>  tamsulosin 0.4 milliGRAM(s) Oral at bedtime    Allergies:  Advil (Hives)  penicillin (Hives)    FAMILY HISTORY:  No pertinent family history in first degree relatives    Social History:  + tobacco    Review of Systems:  Constitutional: [ ] Fever [ ] Chills [ ] Fatigue [ ] Weight change   HEENT: [ ] Blurred vision [ ] Eye Pain [ ] Headache [ ] Runny nose [ ] Sore Throat   Respiratory: [ ] Cough [ ] Wheezing [ ] Shortness of breath  Cardiovascular: [ ] Chest Pain [ ] Palpitations [ ] VALERA [ ] PND [ ] Orthopnea  Gastrointestinal: [ ] Abdominal Pain [ ] Diarrhea [ ] Constipation [ ] Hemorrhoids [ ] Nausea [ ] Vomiting  Genitourinary: [ ] Nocturia [ ] Dysuria [ ] Incontinence  Extremities: [ ] Swelling [ ] Joint Pain  Neurologic: [ ] Focal deficit [ ] Paresthesias [ ] Syncope  Lymphatic: [ ] Swelling [ ] Lymphadenopathy   Skin: [ ] Rash [ ] Ecchymoses [ ] Wounds [ ] Lesions  Psychiatry: [ ] Depression [ ] Suicidal/Homicidal Ideation [ ] Anxiety [ ] Sleep Disturbances  [x] 10 point review of systems is otherwise negative except as mentioned above            [ ]Unable to obtain    Physical Exam:  T(C): 36.8 (08-21-18 @ 04:50), Max: 37.1 (08-20-18 @ 20:47)  HR: 60 (08-21-18 @ 04:50) (53 - 64)  BP: 133/67 (08-21-18 @ 04:50) (119/66 - 171/77)  RR: 18 (08-21-18 @ 04:50) (18 - 18)  SpO2: 95% (08-21-18 @ 04:50) (95% - 96%)  Wt(kg): --    08-20 @ 07:01  -  08-21 @ 07:00  --------------------------------------------------------  IN: 960 mL / OUT: 0 mL / NET: 960 mL    08-21 @ 07:01  -  08-21 @ 10:11  --------------------------------------------------------  IN: 280 mL / OUT: 0 mL / NET: 280 mL      Daily     Daily     Appearance: NAD  Eyes: PERRL, EOMI  HENT: Normal oral muscosa, NC/AT  Cardiovascular: normal S1. S2 slightly diminished.  , RRR, II/VI crescendo decrescendo murmur at base, II/VI harsh apical decrescendo murmur. no edema, normal JVP  Respiratory: Clear to auscultation bilaterally  Gastrointestinal: Soft, non-tender, non-distended, BS+  Musculoskeletal: No clubbing, no joint deformity   Neurologic: Non-focal  Lymphatic: No lymphadenopathy  Psychiatry: AAOx3, mood & affect appropriate  Skin: No rashes, no ecchymoses, no cyanosis    Cardiovascular Diagnostic Testing:  ECG: sinus bradycardia 55 bpm    Echo:  TTE 8/17  Conclusions:  1. Calcified trileaflet aortic valve with decreased  opening. Peak transaortic valve gradient equals 38 mm Hg,  mean transaortic valve gradient equals 19 mm Hg, estimated  aortic valve area equals1 sqcm (by continuity equation),  aortic valve velocity time integral equals 70 cm,  consistent with moderate aortic stenosis. DVI=0.32. Mild  aortic regurgitation.  2. Mildly dilated left atrium.  LA volume index = 38 cc/m2.  3. Moderate concentric left ventricular hypertrophy.  4. Endocardial visualization enhanced with intravenous  injection of Ultrasonic Enhancing Agent (Definity).  Left ventricular ejection fraction, by visual estimation,  is 55-60%.  No segmental wall motion abnormality.  5. Moderate diastolic dysfunction (Stage II).  6. Normal right ventricular size and function.    Stress Testing:  none    Cath:  Cath 8/17/18  CORONARY VESSELS: The coronary circulation is right dominant.  LM:   --  LM: Normal.  LAD:   --  Proximal LAD: There was a discrete 20 % stenosis.  --  Mid LAD: Therewas a discrete 30 % stenosis.  --  Distal LAD: Angiography showed minor luminal irregularities with no  flow limiting lesions.  CX:   --  Circumflex: Angiography showed minor luminal irregularities with  no flow limiting lesions.  RCA:   --  RCA: Angiography showed minor luminal irregularities with no  flow limiting lesions. The vessel arose anomalously from the left sinus of  Valsalva.  COMPLICATIONS: There were no complications.  DIAGNOSTIC RECOMMENDATIONS: -Continue aggressive medical therapy and risk  factor modifications.  -2D echo to evaluate aortic valve; appears heavily calcified with AMIE  approximately1.1-1.2 on prior echo.  -CTA to evaluate coronary artery anomaly; RCA off left cusp. Consider  compression of RCA as source of NSTEMI.    Imaging:  CT Heart  IMPRESSION:    1.  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  2.  Coronary artery disease.  Refer to invasive coronary angiography   (8.17.18) for assessment of luminal stenosis severity.  Severe coronary   artery calcium (Agatston score 663) as delineated above.    3.  Moderately calcified aortic valve.  4.  Severe atherosclerotic plaque in the visualized thoracic aorta.    Atherosclerotic plaque protrudes into the lumen of the descending   thoracic aorta.  5.  Dilated main pulmonary artery measuring approximately 32 mm, a   finding compatible with pulmonary hypertension.  6.  Left ventricular hypertrophy.  7.  Leftatrial dilation.    Labs:                        14.4   11.51 )-----------( 239      ( 20 Aug 2018 06:34 )             44.1     08-21    138  |  102  |  15  ----------------------------<  112<H>  4.0   |  25  |  0.65    Ca    8.9      21 Aug 2018 06:17      PT/INR - ( 21 Aug 2018 07:49 )   PT: 11.8 sec;   INR: 1.04 ratio               Serum Pro-Brain Natriuretic Peptide: 737 pg/mL (08-15 @ 15:15)  Serum Pro-Brain Natriuretic Peptide: 365 pg/mL (08-15 @ 12:45)      Hemoglobin A1C, Whole Blood: 6.9 % (08-16 @ 08:25)  Hemoglobin A1C, Whole Blood: 6.9 % (08-15 @ 18:16)

## 2018-08-21 NOTE — CONSULT NOTE ADULT - ATTENDING COMMENTS
Patient interviewed and examined.  Chart reviewed and note edited where appropriate.  Case discussed with fellow.  Agree w/ Assessment and Plan as outlined.    Waylon Yoo MD [facility]  Spectra:  [phone]  Office: [phone]

## 2018-08-21 NOTE — CONSULT NOTE ADULT - ASSESSMENT
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  Likely Hypertensive emergency in the context of mod AS.    #HTN  -- uptitrate amlodipine  -- resume losartan-hctz at home dose  -- resume home BP monitoring as reported BPs often WNL and Diabetic target is more liberal(130/80)    #Anomalous RCA - unlikely cause of presentation in a man his age.  No RWM  -- no further work up  -- outpatient cardiology follow up    #Non-Obs CAD  -- continue asa  -- continue statin  -- Toprol 25 mg, resume    #AS - moderate    Andrea Solis MD  [id number]

## 2018-08-21 NOTE — PROGRESS NOTE ADULT - ASSESSMENT
pt w/ hx htn/dm/hld/.bph/with elevated trop/s/p cath  echo to reeval aortic valve  cta of coronaries as per cards  cont current meds  dvt proph  dm  fsg riss  f/u labs   oob  iv fluids prn
HPI:  76 y/o male hx htn, dm, hld, BPH, current smoker complains of difficulty breathing that began this morning, worse with laying down, with elevated bp at home. Pt called ems himself, on arrival, bp was 205/120, gradually improved en route with only oxygen. Pt appeared anxious and somewhat sweaty, with some improvement of symptoms on arrival.  In ED bp normalized, but was found to have elevated troponin. peaked upto 9, , seen & evaluated by cardiologist & now transferred to [facility]  for cardiac cath 2/2 NSTEMI. (16 Aug 2018 13:07)  Patient became bradycardic and hypotensive with sedation at start of LHC, cath cancelled last night, monitored on CSSu with overnight.  This am pt complained of CP, midsternal with EKG changes V2 and V3 biphasic elevations, reviewed EKG with Dr Carroll.  Patient currently CP free, on heparin gtt.
pt w/ hx htn/dm/hld/.bph/with elevated trop/s/p cath  echo to reeval aortic valve  cta of coronaries as per cards  cont current meds  dvt proph  dm  fsg riss  f/u labs   oob
pt w/ hx htn/dm/hld/.bph/with elevated trop/s/p cath  echo to reeval aortic valve  cta of coronaries as per cards  cont current meds  dvt proph  dm  fsg riss  monitor vitals / wbc   oob  mild iv fluids today
pt w/ hx htn/dm/hld/.bph/with elevated trop/s/p cath  echo to reeval aortic valve noted  cta of coronaries  noted  cont current meds  dvt proph  dm  fsg riss  f/u labs stable  d/c if cleared by cards  oob

## 2018-08-22 ENCOUNTER — INBOUND DOCUMENT (OUTPATIENT)
Age: 78
End: 2018-08-22

## 2020-06-08 NOTE — PATIENT PROFILE ADULT. - FUNCTIONAL SCREEN CURRENT LEVEL: TOILETING, MLM
What Type Of Note Output Would You Prefer (Optional)?: Standard Output Hpi Title: Evaluation of Skin Lesions How Severe Are Your Spot(S)?: moderate Have Your Spot(S) Been Treated In The Past?: has not been treated Family Member: Aunt,great uncle, uncle and cousins (2) assistive person

## 2022-01-01 ENCOUNTER — RESULT REVIEW (OUTPATIENT)
Age: 82
End: 2022-01-01

## 2022-01-01 ENCOUNTER — INPATIENT (INPATIENT)
Facility: HOSPITAL | Age: 82
LOS: 10 days | DRG: 871 | End: 2022-08-19
Attending: STUDENT IN AN ORGANIZED HEALTH CARE EDUCATION/TRAINING PROGRAM | Admitting: EMERGENCY MEDICINE
Payer: MEDICARE

## 2022-01-01 ENCOUNTER — INPATIENT (INPATIENT)
Facility: HOSPITAL | Age: 82
LOS: 12 days | Discharge: NURSING, REHAB OR LONG-TERM CARE | DRG: 871 | End: 2022-08-04
Attending: INTERNAL MEDICINE | Admitting: INTERNAL MEDICINE
Payer: MEDICARE

## 2022-01-01 ENCOUNTER — TRANSCRIPTION ENCOUNTER (OUTPATIENT)
Age: 82
End: 2022-01-01

## 2022-01-01 VITALS
RESPIRATION RATE: 14 BRPM | DIASTOLIC BLOOD PRESSURE: 83 MMHG | HEART RATE: 79 BPM | SYSTOLIC BLOOD PRESSURE: 105 MMHG | OXYGEN SATURATION: 96 % | HEIGHT: 65 IN

## 2022-01-01 VITALS
WEIGHT: 125 LBS | DIASTOLIC BLOOD PRESSURE: 61 MMHG | TEMPERATURE: 97 F | OXYGEN SATURATION: 99 % | RESPIRATION RATE: 18 BRPM | HEIGHT: 65 IN | HEART RATE: 95 BPM | SYSTOLIC BLOOD PRESSURE: 101 MMHG

## 2022-01-01 VITALS
TEMPERATURE: 98 F | SYSTOLIC BLOOD PRESSURE: 120 MMHG | HEART RATE: 99 BPM | DIASTOLIC BLOOD PRESSURE: 59 MMHG | RESPIRATION RATE: 18 BRPM | OXYGEN SATURATION: 97 %

## 2022-01-01 VITALS
TEMPERATURE: 98 F | HEART RATE: 107 BPM | SYSTOLIC BLOOD PRESSURE: 56 MMHG | OXYGEN SATURATION: 95 % | RESPIRATION RATE: 18 BRPM | DIASTOLIC BLOOD PRESSURE: 41 MMHG

## 2022-01-01 DIAGNOSIS — N40.0 BENIGN PROSTATIC HYPERPLASIA WITHOUT LOWER URINARY TRACT SYMPTOMS: ICD-10-CM

## 2022-01-01 DIAGNOSIS — K59.00 CONSTIPATION, UNSPECIFIED: ICD-10-CM

## 2022-01-01 DIAGNOSIS — R06.00 DYSPNEA, UNSPECIFIED: ICD-10-CM

## 2022-01-01 DIAGNOSIS — D72.829 ELEVATED WHITE BLOOD CELL COUNT, UNSPECIFIED: ICD-10-CM

## 2022-01-01 DIAGNOSIS — Z98.89 OTHER SPECIFIED POSTPROCEDURAL STATES: Chronic | ICD-10-CM

## 2022-01-01 DIAGNOSIS — J69.0 PNEUMONITIS DUE TO INHALATION OF FOOD AND VOMIT: ICD-10-CM

## 2022-01-01 DIAGNOSIS — I95.9 HYPOTENSION, UNSPECIFIED: ICD-10-CM

## 2022-01-01 DIAGNOSIS — I81 PORTAL VEIN THROMBOSIS: ICD-10-CM

## 2022-01-01 DIAGNOSIS — Z71.89 OTHER SPECIFIED COUNSELING: ICD-10-CM

## 2022-01-01 DIAGNOSIS — E87.1 HYPO-OSMOLALITY AND HYPONATREMIA: ICD-10-CM

## 2022-01-01 DIAGNOSIS — E11.9 TYPE 2 DIABETES MELLITUS WITHOUT COMPLICATIONS: ICD-10-CM

## 2022-01-01 DIAGNOSIS — A41.9 SEPSIS, UNSPECIFIED ORGANISM: ICD-10-CM

## 2022-01-01 DIAGNOSIS — D30.3 BENIGN NEOPLASM OF BLADDER: Chronic | ICD-10-CM

## 2022-01-01 DIAGNOSIS — R40.1 STUPOR: ICD-10-CM

## 2022-01-01 DIAGNOSIS — Z51.5 ENCOUNTER FOR PALLIATIVE CARE: ICD-10-CM

## 2022-01-01 DIAGNOSIS — Z98.49 CATARACT EXTRACTION STATUS, UNSPECIFIED EYE: Chronic | ICD-10-CM

## 2022-01-01 DIAGNOSIS — I50.32 CHRONIC DIASTOLIC (CONGESTIVE) HEART FAILURE: ICD-10-CM

## 2022-01-01 DIAGNOSIS — R52 PAIN, UNSPECIFIED: ICD-10-CM

## 2022-01-01 DIAGNOSIS — N17.9 ACUTE KIDNEY FAILURE, UNSPECIFIED: ICD-10-CM

## 2022-01-01 DIAGNOSIS — R53.1 WEAKNESS: ICD-10-CM

## 2022-01-01 DIAGNOSIS — N39.0 URINARY TRACT INFECTION, SITE NOT SPECIFIED: ICD-10-CM

## 2022-01-01 DIAGNOSIS — I10 ESSENTIAL (PRIMARY) HYPERTENSION: ICD-10-CM

## 2022-01-01 DIAGNOSIS — I25.10 ATHEROSCLEROTIC HEART DISEASE OF NATIVE CORONARY ARTERY WITHOUT ANGINA PECTORIS: ICD-10-CM

## 2022-01-01 DIAGNOSIS — F17.200 NICOTINE DEPENDENCE, UNSPECIFIED, UNCOMPLICATED: ICD-10-CM

## 2022-01-01 DIAGNOSIS — R09.89 OTHER SPECIFIED SYMPTOMS AND SIGNS INVOLVING THE CIRCULATORY AND RESPIRATORY SYSTEMS: ICD-10-CM

## 2022-01-01 DIAGNOSIS — C22.1 INTRAHEPATIC BILE DUCT CARCINOMA: ICD-10-CM

## 2022-01-01 DIAGNOSIS — R53.2 FUNCTIONAL QUADRIPLEGIA: ICD-10-CM

## 2022-01-01 DIAGNOSIS — F32.9 MAJOR DEPRESSIVE DISORDER, SINGLE EPISODE, UNSPECIFIED: ICD-10-CM

## 2022-01-01 DIAGNOSIS — C79.9 SECONDARY MALIGNANT NEOPLASM OF UNSPECIFIED SITE: ICD-10-CM

## 2022-01-01 LAB
A1C WITH ESTIMATED AVERAGE GLUCOSE RESULT: 6 % — HIGH (ref 4–5.6)
ALBUMIN SERPL ELPH-MCNC: 1.5 G/DL — LOW (ref 3.3–5)
ALBUMIN SERPL ELPH-MCNC: 1.6 G/DL — LOW (ref 3.3–5)
ALBUMIN SERPL ELPH-MCNC: 1.7 G/DL — LOW (ref 3.3–5)
ALBUMIN SERPL ELPH-MCNC: 1.8 G/DL — LOW (ref 3.3–5)
ALBUMIN SERPL ELPH-MCNC: 1.8 G/DL — LOW (ref 3.3–5)
ALBUMIN SERPL ELPH-MCNC: 1.9 G/DL — LOW (ref 3.3–5)
ALBUMIN SERPL ELPH-MCNC: 2 G/DL — LOW (ref 3.3–5)
ALBUMIN SERPL ELPH-MCNC: 2.2 G/DL — LOW (ref 3.3–5)
ALBUMIN SERPL ELPH-MCNC: 2.6 G/DL — LOW (ref 3.3–5)
ALBUMIN SERPL ELPH-MCNC: 2.7 G/DL — LOW (ref 3.3–5)
ALP SERPL-CCNC: 126 U/L — HIGH (ref 40–120)
ALP SERPL-CCNC: 129 U/L — HIGH (ref 40–120)
ALP SERPL-CCNC: 171 U/L — HIGH (ref 40–120)
ALP SERPL-CCNC: 185 U/L — HIGH (ref 40–120)
ALP SERPL-CCNC: 214 U/L — HIGH (ref 40–120)
ALP SERPL-CCNC: 221 U/L — HIGH (ref 40–120)
ALP SERPL-CCNC: 221 U/L — HIGH (ref 40–120)
ALP SERPL-CCNC: 225 U/L — HIGH (ref 40–120)
ALP SERPL-CCNC: 236 U/L — HIGH (ref 40–120)
ALP SERPL-CCNC: 238 U/L — HIGH (ref 40–120)
ALT FLD-CCNC: 10 U/L — SIGNIFICANT CHANGE UP (ref 10–45)
ALT FLD-CCNC: 11 U/L — SIGNIFICANT CHANGE UP (ref 10–45)
ALT FLD-CCNC: 11 U/L — SIGNIFICANT CHANGE UP (ref 10–45)
ALT FLD-CCNC: 12 U/L — SIGNIFICANT CHANGE UP (ref 10–45)
ALT FLD-CCNC: 12 U/L — SIGNIFICANT CHANGE UP (ref 10–45)
ALT FLD-CCNC: 13 U/L — SIGNIFICANT CHANGE UP (ref 10–45)
ALT FLD-CCNC: 13 U/L — SIGNIFICANT CHANGE UP (ref 10–45)
ALT FLD-CCNC: 15 U/L — SIGNIFICANT CHANGE UP (ref 10–45)
ALT FLD-CCNC: 17 U/L — SIGNIFICANT CHANGE UP (ref 10–45)
ALT FLD-CCNC: 18 U/L — SIGNIFICANT CHANGE UP (ref 10–45)
AMMONIA BLD-MCNC: 41 UMOL/L — SIGNIFICANT CHANGE UP (ref 11–55)
ANION GAP SERPL CALC-SCNC: 10 MMOL/L — SIGNIFICANT CHANGE UP (ref 5–17)
ANION GAP SERPL CALC-SCNC: 11 MMOL/L — SIGNIFICANT CHANGE UP (ref 5–17)
ANION GAP SERPL CALC-SCNC: 12 MMOL/L — SIGNIFICANT CHANGE UP (ref 5–17)
ANION GAP SERPL CALC-SCNC: 13 MMOL/L — SIGNIFICANT CHANGE UP (ref 5–17)
ANION GAP SERPL CALC-SCNC: 16 MMOL/L — SIGNIFICANT CHANGE UP (ref 5–17)
ANION GAP SERPL CALC-SCNC: 9 MMOL/L — SIGNIFICANT CHANGE UP (ref 5–17)
ANISOCYTOSIS BLD QL: SLIGHT — SIGNIFICANT CHANGE UP
APCR PPP: 2.39 RATIO — LOW
APPEARANCE UR: ABNORMAL
APPEARANCE UR: ABNORMAL
APTT BLD: 103.2 SEC — HIGH (ref 27.5–35.5)
APTT BLD: 28.3 SEC — SIGNIFICANT CHANGE UP (ref 27.5–35.5)
APTT BLD: 29.1 SEC — SIGNIFICANT CHANGE UP (ref 27.5–35.5)
APTT BLD: 29.3 SEC — SIGNIFICANT CHANGE UP (ref 27.5–35.5)
APTT BLD: 52 SEC — HIGH (ref 27.5–35.5)
APTT BLD: 55.3 SEC — HIGH (ref 27.5–35.5)
APTT BLD: 58.3 SEC — HIGH (ref 27.5–35.5)
APTT BLD: 61.9 SEC — HIGH (ref 27.5–35.5)
APTT BLD: 67.2 SEC — HIGH (ref 27.5–35.5)
APTT BLD: 68.7 SEC — HIGH (ref 27.5–35.5)
APTT BLD: 72.6 SEC — HIGH (ref 27.5–35.5)
APTT BLD: 74.4 SEC — HIGH (ref 27.5–35.5)
APTT BLD: 79.1 SEC — HIGH (ref 27.5–35.5)
AST SERPL-CCNC: 22 U/L — SIGNIFICANT CHANGE UP (ref 10–40)
AST SERPL-CCNC: 23 U/L — SIGNIFICANT CHANGE UP (ref 10–40)
AST SERPL-CCNC: 23 U/L — SIGNIFICANT CHANGE UP (ref 10–40)
AST SERPL-CCNC: 24 U/L — SIGNIFICANT CHANGE UP (ref 10–40)
AST SERPL-CCNC: 29 U/L — SIGNIFICANT CHANGE UP (ref 10–40)
AST SERPL-CCNC: 35 U/L — SIGNIFICANT CHANGE UP (ref 10–40)
AST SERPL-CCNC: 39 U/L — SIGNIFICANT CHANGE UP (ref 10–40)
AST SERPL-CCNC: 41 U/L — HIGH (ref 10–40)
AST SERPL-CCNC: 46 U/L — HIGH (ref 10–40)
AST SERPL-CCNC: 58 U/L — HIGH (ref 10–40)
AT III ACT/NOR PPP CHRO: 43 % — LOW (ref 85–135)
AT III AG PPP IA-MCNC: 12 MG/DL — LOW (ref 19–31)
B2 GLYCOPROT1 AB SER QL: POSITIVE
B2 GLYCOPROT1 IGA SER QL: 46.2 SAU — HIGH
B2 GLYCOPROT1 IGG SER-ACNC: <5 SGU — SIGNIFICANT CHANGE UP
B2 GLYCOPROT1 IGM SER-ACNC: 15.6 SMU — SIGNIFICANT CHANGE UP
BACTERIA # UR AUTO: NEGATIVE — SIGNIFICANT CHANGE UP
BACTERIA # UR AUTO: NEGATIVE — SIGNIFICANT CHANGE UP
BASE EXCESS BLDA CALC-SCNC: 7.8 MMOL/L — HIGH (ref -2–3)
BASE EXCESS BLDV CALC-SCNC: -1.3 MMOL/L — SIGNIFICANT CHANGE UP (ref -2–2)
BASE EXCESS BLDV CALC-SCNC: -6.6 MMOL/L — LOW (ref -2–2)
BASE EXCESS BLDV CALC-SCNC: -6.6 MMOL/L — LOW (ref -2–2)
BASE EXCESS BLDV CALC-SCNC: -7.9 MMOL/L — LOW (ref -2–2)
BASE EXCESS BLDV CALC-SCNC: -8.7 MMOL/L — LOW (ref -2–2)
BASE EXCESS BLDV CALC-SCNC: 7.3 MMOL/L — HIGH (ref -2–2)
BASOPHILS # BLD AUTO: 0 K/UL — SIGNIFICANT CHANGE UP (ref 0–0.2)
BASOPHILS # BLD AUTO: 0.02 K/UL — SIGNIFICANT CHANGE UP (ref 0–0.2)
BASOPHILS # BLD AUTO: 0.05 K/UL — SIGNIFICANT CHANGE UP (ref 0–0.2)
BASOPHILS # BLD AUTO: 0.07 K/UL — SIGNIFICANT CHANGE UP (ref 0–0.2)
BASOPHILS # BLD AUTO: 0.08 K/UL — SIGNIFICANT CHANGE UP (ref 0–0.2)
BASOPHILS NFR BLD AUTO: 0 % — SIGNIFICANT CHANGE UP (ref 0–2)
BASOPHILS NFR BLD AUTO: 0.1 % — SIGNIFICANT CHANGE UP (ref 0–2)
BASOPHILS NFR BLD AUTO: 0.2 % — SIGNIFICANT CHANGE UP (ref 0–2)
BASOPHILS NFR BLD AUTO: 0.3 % — SIGNIFICANT CHANGE UP (ref 0–2)
BASOPHILS NFR BLD AUTO: 0.3 % — SIGNIFICANT CHANGE UP (ref 0–2)
BILIRUB SERPL-MCNC: 0.7 MG/DL — SIGNIFICANT CHANGE UP (ref 0.2–1.2)
BILIRUB SERPL-MCNC: 0.9 MG/DL — SIGNIFICANT CHANGE UP (ref 0.2–1.2)
BILIRUB SERPL-MCNC: 1 MG/DL — SIGNIFICANT CHANGE UP (ref 0.2–1.2)
BILIRUB SERPL-MCNC: 1.2 MG/DL — SIGNIFICANT CHANGE UP (ref 0.2–1.2)
BILIRUB UR-MCNC: ABNORMAL
BILIRUB UR-MCNC: NEGATIVE — SIGNIFICANT CHANGE UP
BLD GP AB SCN SERPL QL: NEGATIVE — SIGNIFICANT CHANGE UP
BUN SERPL-MCNC: 17 MG/DL — SIGNIFICANT CHANGE UP (ref 7–23)
BUN SERPL-MCNC: 17 MG/DL — SIGNIFICANT CHANGE UP (ref 7–23)
BUN SERPL-MCNC: 20 MG/DL — SIGNIFICANT CHANGE UP (ref 7–23)
BUN SERPL-MCNC: 20 MG/DL — SIGNIFICANT CHANGE UP (ref 7–23)
BUN SERPL-MCNC: 22 MG/DL — SIGNIFICANT CHANGE UP (ref 7–23)
BUN SERPL-MCNC: 22 MG/DL — SIGNIFICANT CHANGE UP (ref 7–23)
BUN SERPL-MCNC: 24 MG/DL — HIGH (ref 7–23)
BUN SERPL-MCNC: 25 MG/DL — HIGH (ref 7–23)
BUN SERPL-MCNC: 26 MG/DL — HIGH (ref 7–23)
BUN SERPL-MCNC: 26 MG/DL — HIGH (ref 7–23)
BUN SERPL-MCNC: 29 MG/DL — HIGH (ref 7–23)
BUN SERPL-MCNC: 31 MG/DL — HIGH (ref 7–23)
BUN SERPL-MCNC: 31 MG/DL — HIGH (ref 7–23)
BUN SERPL-MCNC: 45 MG/DL — HIGH (ref 7–23)
BUN SERPL-MCNC: 49 MG/DL — HIGH (ref 7–23)
BURR CELLS BLD QL SMEAR: PRESENT — SIGNIFICANT CHANGE UP
CA-I SERPL-SCNC: 0.9 MMOL/L — LOW (ref 1.15–1.33)
CA-I SERPL-SCNC: 0.97 MMOL/L — LOW (ref 1.15–1.33)
CA-I SERPL-SCNC: 1.06 MMOL/L — LOW (ref 1.15–1.33)
CA-I SERPL-SCNC: 1.08 MMOL/L — LOW (ref 1.15–1.33)
CA-I SERPL-SCNC: 1.22 MMOL/L — SIGNIFICANT CHANGE UP (ref 1.15–1.33)
CA-I SERPL-SCNC: 1.29 MMOL/L — SIGNIFICANT CHANGE UP (ref 1.15–1.33)
CALCIUM SERPL-MCNC: 7.4 MG/DL — LOW (ref 8.4–10.5)
CALCIUM SERPL-MCNC: 7.4 MG/DL — LOW (ref 8.4–10.5)
CALCIUM SERPL-MCNC: 7.5 MG/DL — LOW (ref 8.4–10.5)
CALCIUM SERPL-MCNC: 7.5 MG/DL — LOW (ref 8.4–10.5)
CALCIUM SERPL-MCNC: 8.1 MG/DL — LOW (ref 8.4–10.5)
CALCIUM SERPL-MCNC: 8.3 MG/DL — LOW (ref 8.4–10.5)
CALCIUM SERPL-MCNC: 8.3 MG/DL — LOW (ref 8.4–10.5)
CALCIUM SERPL-MCNC: 8.4 MG/DL — SIGNIFICANT CHANGE UP (ref 8.4–10.5)
CALCIUM SERPL-MCNC: 8.5 MG/DL — SIGNIFICANT CHANGE UP (ref 8.4–10.5)
CALCIUM SERPL-MCNC: 8.8 MG/DL — SIGNIFICANT CHANGE UP (ref 8.4–10.5)
CALCIUM SERPL-MCNC: 8.9 MG/DL — SIGNIFICANT CHANGE UP (ref 8.4–10.5)
CALCIUM SERPL-MCNC: 9.1 MG/DL — SIGNIFICANT CHANGE UP (ref 8.4–10.5)
CALCIUM SERPL-MCNC: 9.4 MG/DL — SIGNIFICANT CHANGE UP (ref 8.4–10.5)
CARDIOLIPIN AB SER-ACNC: POSITIVE
CARDIOLIPIN IGM SER-MCNC: 21.6 MPL — HIGH (ref 0–12.5)
CARDIOLIPIN IGM SER-MCNC: 8.1 GPL — SIGNIFICANT CHANGE UP (ref 0–12.5)
CHLORIDE BLDV-SCNC: 101 MMOL/L — SIGNIFICANT CHANGE UP (ref 96–108)
CHLORIDE BLDV-SCNC: 105 MMOL/L — SIGNIFICANT CHANGE UP (ref 96–108)
CHLORIDE BLDV-SCNC: 107 MMOL/L — SIGNIFICANT CHANGE UP (ref 96–108)
CHLORIDE BLDV-SCNC: 108 MMOL/L — SIGNIFICANT CHANGE UP (ref 96–108)
CHLORIDE BLDV-SCNC: 113 MMOL/L — HIGH (ref 96–108)
CHLORIDE BLDV-SCNC: 91 MMOL/L — LOW (ref 96–108)
CHLORIDE SERPL-SCNC: 100 MMOL/L — SIGNIFICANT CHANGE UP (ref 96–108)
CHLORIDE SERPL-SCNC: 100 MMOL/L — SIGNIFICANT CHANGE UP (ref 96–108)
CHLORIDE SERPL-SCNC: 105 MMOL/L — SIGNIFICANT CHANGE UP (ref 96–108)
CHLORIDE SERPL-SCNC: 109 MMOL/L — HIGH (ref 96–108)
CHLORIDE SERPL-SCNC: 112 MMOL/L — HIGH (ref 96–108)
CHLORIDE SERPL-SCNC: 112 MMOL/L — HIGH (ref 96–108)
CHLORIDE SERPL-SCNC: 89 MMOL/L — LOW (ref 96–108)
CHLORIDE SERPL-SCNC: 89 MMOL/L — LOW (ref 96–108)
CHLORIDE SERPL-SCNC: 90 MMOL/L — LOW (ref 96–108)
CHLORIDE SERPL-SCNC: 90 MMOL/L — LOW (ref 96–108)
CHLORIDE SERPL-SCNC: 91 MMOL/L — LOW (ref 96–108)
CHLORIDE SERPL-SCNC: 91 MMOL/L — LOW (ref 96–108)
CHLORIDE SERPL-SCNC: 92 MMOL/L — LOW (ref 96–108)
CHLORIDE SERPL-SCNC: 98 MMOL/L — SIGNIFICANT CHANGE UP (ref 96–108)
CHLORIDE SERPL-SCNC: 98 MMOL/L — SIGNIFICANT CHANGE UP (ref 96–108)
CK MB CFR SERPL CALC: 1 NG/ML — SIGNIFICANT CHANGE UP (ref 0–6.7)
CK SERPL-CCNC: 19 U/L — LOW (ref 30–200)
CO2 BLDA-SCNC: 33 MMOL/L — HIGH (ref 19–24)
CO2 BLDV-SCNC: 19 MMOL/L — LOW (ref 22–26)
CO2 BLDV-SCNC: 20 MMOL/L — LOW (ref 22–26)
CO2 BLDV-SCNC: 21 MMOL/L — LOW (ref 22–26)
CO2 BLDV-SCNC: 22 MMOL/L — SIGNIFICANT CHANGE UP (ref 22–26)
CO2 BLDV-SCNC: 26 MMOL/L — SIGNIFICANT CHANGE UP (ref 22–26)
CO2 BLDV-SCNC: 34 MMOL/L — HIGH (ref 22–26)
CO2 SERPL-SCNC: 14 MMOL/L — LOW (ref 22–31)
CO2 SERPL-SCNC: 16 MMOL/L — LOW (ref 22–31)
CO2 SERPL-SCNC: 16 MMOL/L — LOW (ref 22–31)
CO2 SERPL-SCNC: 17 MMOL/L — LOW (ref 22–31)
CO2 SERPL-SCNC: 20 MMOL/L — LOW (ref 22–31)
CO2 SERPL-SCNC: 21 MMOL/L — LOW (ref 22–31)
CO2 SERPL-SCNC: 24 MMOL/L — SIGNIFICANT CHANGE UP (ref 22–31)
CO2 SERPL-SCNC: 25 MMOL/L — SIGNIFICANT CHANGE UP (ref 22–31)
CO2 SERPL-SCNC: 26 MMOL/L — SIGNIFICANT CHANGE UP (ref 22–31)
CO2 SERPL-SCNC: 26 MMOL/L — SIGNIFICANT CHANGE UP (ref 22–31)
CO2 SERPL-SCNC: 28 MMOL/L — SIGNIFICANT CHANGE UP (ref 22–31)
CO2 SERPL-SCNC: 29 MMOL/L — SIGNIFICANT CHANGE UP (ref 22–31)
CO2 SERPL-SCNC: 29 MMOL/L — SIGNIFICANT CHANGE UP (ref 22–31)
CO2 SERPL-SCNC: 30 MMOL/L — SIGNIFICANT CHANGE UP (ref 22–31)
CO2 SERPL-SCNC: 31 MMOL/L — SIGNIFICANT CHANGE UP (ref 22–31)
COLOR SPEC: YELLOW — SIGNIFICANT CHANGE UP
COLOR SPEC: YELLOW — SIGNIFICANT CHANGE UP
CORTIS AM PEAK SERPL-MCNC: 12.3 UG/DL — SIGNIFICANT CHANGE UP (ref 6–18.4)
CREAT ?TM UR-MCNC: 49 MG/DL — SIGNIFICANT CHANGE UP
CREAT SERPL-MCNC: 0.76 MG/DL — SIGNIFICANT CHANGE UP (ref 0.5–1.3)
CREAT SERPL-MCNC: 0.77 MG/DL — SIGNIFICANT CHANGE UP (ref 0.5–1.3)
CREAT SERPL-MCNC: 0.78 MG/DL — SIGNIFICANT CHANGE UP (ref 0.5–1.3)
CREAT SERPL-MCNC: 0.92 MG/DL — SIGNIFICANT CHANGE UP (ref 0.5–1.3)
CREAT SERPL-MCNC: 0.94 MG/DL — SIGNIFICANT CHANGE UP (ref 0.5–1.3)
CREAT SERPL-MCNC: 0.95 MG/DL — SIGNIFICANT CHANGE UP (ref 0.5–1.3)
CREAT SERPL-MCNC: 0.96 MG/DL — SIGNIFICANT CHANGE UP (ref 0.5–1.3)
CREAT SERPL-MCNC: 0.98 MG/DL — SIGNIFICANT CHANGE UP (ref 0.5–1.3)
CREAT SERPL-MCNC: 1.05 MG/DL — SIGNIFICANT CHANGE UP (ref 0.5–1.3)
CREAT SERPL-MCNC: 1.08 MG/DL — SIGNIFICANT CHANGE UP (ref 0.5–1.3)
CREAT SERPL-MCNC: 1.09 MG/DL — SIGNIFICANT CHANGE UP (ref 0.5–1.3)
CREAT SERPL-MCNC: 1.26 MG/DL — SIGNIFICANT CHANGE UP (ref 0.5–1.3)
CREAT SERPL-MCNC: 1.32 MG/DL — HIGH (ref 0.5–1.3)
CREAT SERPL-MCNC: 1.51 MG/DL — HIGH (ref 0.5–1.3)
CREAT SERPL-MCNC: 1.89 MG/DL — HIGH (ref 0.5–1.3)
CULTURE RESULTS: NO GROWTH — SIGNIFICANT CHANGE UP
CULTURE RESULTS: SIGNIFICANT CHANGE UP
DEPRECATED CARDIOLIPIN IGA SER: 7.6 APL — SIGNIFICANT CHANGE UP (ref 0–12.5)
DIFF PNL FLD: ABNORMAL
DIFF PNL FLD: ABNORMAL
DNA PLOIDY SPEC FC-IMP: SIGNIFICANT CHANGE UP
DRVVT SCREEN TO CONFIRM RATIO: SIGNIFICANT CHANGE UP
EGFR: 36 ML/MIN/1.73M2 — LOW
EGFR: 47 ML/MIN/1.73M2 — LOW
EGFR: 55 ML/MIN/1.73M2 — LOW
EGFR: 58 ML/MIN/1.73M2 — LOW
EGFR: 69 ML/MIN/1.73M2 — SIGNIFICANT CHANGE UP
EGFR: 70 ML/MIN/1.73M2 — SIGNIFICANT CHANGE UP
EGFR: 72 ML/MIN/1.73M2 — SIGNIFICANT CHANGE UP
EGFR: 78 ML/MIN/1.73M2 — SIGNIFICANT CHANGE UP
EGFR: 80 ML/MIN/1.73M2 — SIGNIFICANT CHANGE UP
EGFR: 81 ML/MIN/1.73M2 — SIGNIFICANT CHANGE UP
EGFR: 82 ML/MIN/1.73M2 — SIGNIFICANT CHANGE UP
EGFR: 85 ML/MIN/1.73M2 — SIGNIFICANT CHANGE UP
EGFR: 91 ML/MIN/1.73M2 — SIGNIFICANT CHANGE UP
EOSINOPHIL # BLD AUTO: 0 K/UL — SIGNIFICANT CHANGE UP (ref 0–0.5)
EOSINOPHIL # BLD AUTO: 0.01 K/UL — SIGNIFICANT CHANGE UP (ref 0–0.5)
EOSINOPHIL # BLD AUTO: 0.25 K/UL — SIGNIFICANT CHANGE UP (ref 0–0.5)
EOSINOPHIL # BLD AUTO: 0.39 K/UL — SIGNIFICANT CHANGE UP (ref 0–0.5)
EOSINOPHIL # BLD AUTO: 0.6 K/UL — HIGH (ref 0–0.5)
EOSINOPHIL NFR BLD AUTO: 0 % — SIGNIFICANT CHANGE UP (ref 0–6)
EOSINOPHIL NFR BLD AUTO: 0.1 % — SIGNIFICANT CHANGE UP (ref 0–6)
EOSINOPHIL NFR BLD AUTO: 0.9 % — SIGNIFICANT CHANGE UP (ref 0–6)
EOSINOPHIL NFR BLD AUTO: 1.4 % — SIGNIFICANT CHANGE UP (ref 0–6)
EOSINOPHIL NFR BLD AUTO: 2 % — SIGNIFICANT CHANGE UP (ref 0–6)
EPI CELLS # UR: 24 /HPF — HIGH
EPI CELLS # UR: 5 /HPF — SIGNIFICANT CHANGE UP
ESTIMATED AVERAGE GLUCOSE: 126 MG/DL — HIGH (ref 68–114)
FERRITIN SERPL-MCNC: 1190 NG/ML — HIGH (ref 30–400)
FLUAV AG NPH QL: SIGNIFICANT CHANGE UP
FLUBV AG NPH QL: SIGNIFICANT CHANGE UP
FOLATE SERPL-MCNC: <2 NG/ML — LOW
GAS PNL BLDA: SIGNIFICANT CHANGE UP
GAS PNL BLDV: 127 MMOL/L — LOW (ref 136–145)
GAS PNL BLDV: 129 MMOL/L — LOW (ref 136–145)
GAS PNL BLDV: 130 MMOL/L — LOW (ref 136–145)
GAS PNL BLDV: 133 MMOL/L — LOW (ref 136–145)
GAS PNL BLDV: 134 MMOL/L — LOW (ref 136–145)
GAS PNL BLDV: 136 MMOL/L — SIGNIFICANT CHANGE UP (ref 136–145)
GAS PNL BLDV: SIGNIFICANT CHANGE UP
GLUCOSE BLDC GLUCOMTR-MCNC: 102 MG/DL — HIGH (ref 70–99)
GLUCOSE BLDC GLUCOMTR-MCNC: 105 MG/DL — HIGH (ref 70–99)
GLUCOSE BLDC GLUCOMTR-MCNC: 106 MG/DL — HIGH (ref 70–99)
GLUCOSE BLDC GLUCOMTR-MCNC: 108 MG/DL — HIGH (ref 70–99)
GLUCOSE BLDC GLUCOMTR-MCNC: 115 MG/DL — HIGH (ref 70–99)
GLUCOSE BLDC GLUCOMTR-MCNC: 116 MG/DL — HIGH (ref 70–99)
GLUCOSE BLDC GLUCOMTR-MCNC: 117 MG/DL — HIGH (ref 70–99)
GLUCOSE BLDC GLUCOMTR-MCNC: 123 MG/DL — HIGH (ref 70–99)
GLUCOSE BLDC GLUCOMTR-MCNC: 124 MG/DL — HIGH (ref 70–99)
GLUCOSE BLDC GLUCOMTR-MCNC: 125 MG/DL — HIGH (ref 70–99)
GLUCOSE BLDC GLUCOMTR-MCNC: 127 MG/DL — HIGH (ref 70–99)
GLUCOSE BLDC GLUCOMTR-MCNC: 129 MG/DL — HIGH (ref 70–99)
GLUCOSE BLDC GLUCOMTR-MCNC: 132 MG/DL — HIGH (ref 70–99)
GLUCOSE BLDC GLUCOMTR-MCNC: 133 MG/DL — HIGH (ref 70–99)
GLUCOSE BLDC GLUCOMTR-MCNC: 133 MG/DL — HIGH (ref 70–99)
GLUCOSE BLDC GLUCOMTR-MCNC: 136 MG/DL — HIGH (ref 70–99)
GLUCOSE BLDC GLUCOMTR-MCNC: 136 MG/DL — HIGH (ref 70–99)
GLUCOSE BLDC GLUCOMTR-MCNC: 138 MG/DL — HIGH (ref 70–99)
GLUCOSE BLDC GLUCOMTR-MCNC: 139 MG/DL — HIGH (ref 70–99)
GLUCOSE BLDC GLUCOMTR-MCNC: 139 MG/DL — HIGH (ref 70–99)
GLUCOSE BLDC GLUCOMTR-MCNC: 140 MG/DL — HIGH (ref 70–99)
GLUCOSE BLDC GLUCOMTR-MCNC: 140 MG/DL — HIGH (ref 70–99)
GLUCOSE BLDC GLUCOMTR-MCNC: 141 MG/DL — HIGH (ref 70–99)
GLUCOSE BLDC GLUCOMTR-MCNC: 142 MG/DL — HIGH (ref 70–99)
GLUCOSE BLDC GLUCOMTR-MCNC: 143 MG/DL — HIGH (ref 70–99)
GLUCOSE BLDC GLUCOMTR-MCNC: 144 MG/DL — HIGH (ref 70–99)
GLUCOSE BLDC GLUCOMTR-MCNC: 144 MG/DL — HIGH (ref 70–99)
GLUCOSE BLDC GLUCOMTR-MCNC: 147 MG/DL — HIGH (ref 70–99)
GLUCOSE BLDC GLUCOMTR-MCNC: 148 MG/DL — HIGH (ref 70–99)
GLUCOSE BLDC GLUCOMTR-MCNC: 150 MG/DL — HIGH (ref 70–99)
GLUCOSE BLDC GLUCOMTR-MCNC: 151 MG/DL — HIGH (ref 70–99)
GLUCOSE BLDC GLUCOMTR-MCNC: 152 MG/DL — HIGH (ref 70–99)
GLUCOSE BLDC GLUCOMTR-MCNC: 152 MG/DL — HIGH (ref 70–99)
GLUCOSE BLDC GLUCOMTR-MCNC: 155 MG/DL — HIGH (ref 70–99)
GLUCOSE BLDC GLUCOMTR-MCNC: 156 MG/DL — HIGH (ref 70–99)
GLUCOSE BLDC GLUCOMTR-MCNC: 160 MG/DL — HIGH (ref 70–99)
GLUCOSE BLDC GLUCOMTR-MCNC: 164 MG/DL — HIGH (ref 70–99)
GLUCOSE BLDC GLUCOMTR-MCNC: 166 MG/DL — HIGH (ref 70–99)
GLUCOSE BLDC GLUCOMTR-MCNC: 167 MG/DL — HIGH (ref 70–99)
GLUCOSE BLDC GLUCOMTR-MCNC: 168 MG/DL — HIGH (ref 70–99)
GLUCOSE BLDC GLUCOMTR-MCNC: 169 MG/DL — HIGH (ref 70–99)
GLUCOSE BLDC GLUCOMTR-MCNC: 170 MG/DL — HIGH (ref 70–99)
GLUCOSE BLDC GLUCOMTR-MCNC: 172 MG/DL — HIGH (ref 70–99)
GLUCOSE BLDC GLUCOMTR-MCNC: 173 MG/DL — HIGH (ref 70–99)
GLUCOSE BLDC GLUCOMTR-MCNC: 174 MG/DL — HIGH (ref 70–99)
GLUCOSE BLDC GLUCOMTR-MCNC: 177 MG/DL — HIGH (ref 70–99)
GLUCOSE BLDC GLUCOMTR-MCNC: 180 MG/DL — HIGH (ref 70–99)
GLUCOSE BLDC GLUCOMTR-MCNC: 181 MG/DL — HIGH (ref 70–99)
GLUCOSE BLDC GLUCOMTR-MCNC: 182 MG/DL — HIGH (ref 70–99)
GLUCOSE BLDC GLUCOMTR-MCNC: 182 MG/DL — HIGH (ref 70–99)
GLUCOSE BLDC GLUCOMTR-MCNC: 183 MG/DL — HIGH (ref 70–99)
GLUCOSE BLDC GLUCOMTR-MCNC: 204 MG/DL — HIGH (ref 70–99)
GLUCOSE BLDC GLUCOMTR-MCNC: 205 MG/DL — HIGH (ref 70–99)
GLUCOSE BLDC GLUCOMTR-MCNC: 212 MG/DL — HIGH (ref 70–99)
GLUCOSE BLDC GLUCOMTR-MCNC: 228 MG/DL — HIGH (ref 70–99)
GLUCOSE BLDC GLUCOMTR-MCNC: 97 MG/DL — SIGNIFICANT CHANGE UP (ref 70–99)
GLUCOSE BLDV-MCNC: 131 MG/DL — HIGH (ref 70–99)
GLUCOSE BLDV-MCNC: 144 MG/DL — HIGH (ref 70–99)
GLUCOSE BLDV-MCNC: 144 MG/DL — HIGH (ref 70–99)
GLUCOSE BLDV-MCNC: 148 MG/DL — HIGH (ref 70–99)
GLUCOSE BLDV-MCNC: 175 MG/DL — HIGH (ref 70–99)
GLUCOSE BLDV-MCNC: 182 MG/DL — HIGH (ref 70–99)
GLUCOSE SERPL-MCNC: 107 MG/DL — HIGH (ref 70–99)
GLUCOSE SERPL-MCNC: 110 MG/DL — HIGH (ref 70–99)
GLUCOSE SERPL-MCNC: 110 MG/DL — HIGH (ref 70–99)
GLUCOSE SERPL-MCNC: 131 MG/DL — HIGH (ref 70–99)
GLUCOSE SERPL-MCNC: 139 MG/DL — HIGH (ref 70–99)
GLUCOSE SERPL-MCNC: 141 MG/DL — HIGH (ref 70–99)
GLUCOSE SERPL-MCNC: 144 MG/DL — HIGH (ref 70–99)
GLUCOSE SERPL-MCNC: 149 MG/DL — HIGH (ref 70–99)
GLUCOSE SERPL-MCNC: 151 MG/DL — HIGH (ref 70–99)
GLUCOSE SERPL-MCNC: 152 MG/DL — HIGH (ref 70–99)
GLUCOSE SERPL-MCNC: 156 MG/DL — HIGH (ref 70–99)
GLUCOSE SERPL-MCNC: 163 MG/DL — HIGH (ref 70–99)
GLUCOSE SERPL-MCNC: 169 MG/DL — HIGH (ref 70–99)
GLUCOSE SERPL-MCNC: 239 MG/DL — HIGH (ref 70–99)
GLUCOSE SERPL-MCNC: 80 MG/DL — SIGNIFICANT CHANGE UP (ref 70–99)
GLUCOSE UR QL: NEGATIVE — SIGNIFICANT CHANGE UP
GLUCOSE UR QL: NEGATIVE — SIGNIFICANT CHANGE UP
GRAN CASTS # UR COMP ASSIST: 1 /LPF — SIGNIFICANT CHANGE UP
GRAN CASTS # UR COMP ASSIST: 1 /LPF — SIGNIFICANT CHANGE UP
HCO3 BLDA-SCNC: 32 MMOL/L — HIGH (ref 21–28)
HCO3 BLDV-SCNC: 18 MMOL/L — LOW (ref 22–29)
HCO3 BLDV-SCNC: 18 MMOL/L — LOW (ref 22–29)
HCO3 BLDV-SCNC: 20 MMOL/L — LOW (ref 22–29)
HCO3 BLDV-SCNC: 20 MMOL/L — LOW (ref 22–29)
HCO3 BLDV-SCNC: 25 MMOL/L — SIGNIFICANT CHANGE UP (ref 22–29)
HCO3 BLDV-SCNC: 33 MMOL/L — HIGH (ref 22–29)
HCT VFR BLD CALC: 29 % — LOW (ref 39–50)
HCT VFR BLD CALC: 30.2 % — LOW (ref 39–50)
HCT VFR BLD CALC: 30.5 % — LOW (ref 39–50)
HCT VFR BLD CALC: 31.2 % — LOW (ref 39–50)
HCT VFR BLD CALC: 33.4 % — LOW (ref 39–50)
HCT VFR BLD CALC: 34.4 % — LOW (ref 39–50)
HCT VFR BLD CALC: 35.2 % — LOW (ref 39–50)
HCT VFR BLD CALC: 35.9 % — LOW (ref 39–50)
HCT VFR BLD CALC: 37.8 % — LOW (ref 39–50)
HCT VFR BLD CALC: 38.1 % — LOW (ref 39–50)
HCT VFR BLD CALC: 38.5 % — LOW (ref 39–50)
HCT VFR BLD CALC: 38.6 % — LOW (ref 39–50)
HCT VFR BLD CALC: 39.7 % — SIGNIFICANT CHANGE UP (ref 39–50)
HCT VFR BLD CALC: 39.7 % — SIGNIFICANT CHANGE UP (ref 39–50)
HCT VFR BLDA CALC: 28 % — LOW (ref 39–51)
HCT VFR BLDA CALC: 29 % — LOW (ref 39–51)
HCT VFR BLDA CALC: 29 % — LOW (ref 39–51)
HCT VFR BLDA CALC: 30 % — LOW (ref 39–51)
HCT VFR BLDA CALC: 30 % — LOW (ref 39–51)
HCT VFR BLDA CALC: 35 % — LOW (ref 39–51)
HCYS SERPL-MCNC: 17.8 UMOL/L — HIGH
HGB BLD CALC-MCNC: 10.1 G/DL — LOW (ref 12.6–17.4)
HGB BLD CALC-MCNC: 10.1 G/DL — LOW (ref 12.6–17.4)
HGB BLD CALC-MCNC: 11.8 G/DL — LOW (ref 12.6–17.4)
HGB BLD CALC-MCNC: 9.3 G/DL — LOW (ref 12.6–17.4)
HGB BLD CALC-MCNC: 9.5 G/DL — LOW (ref 12.6–17.4)
HGB BLD CALC-MCNC: 9.8 G/DL — LOW (ref 12.6–17.4)
HGB BLD-MCNC: 10 G/DL — LOW (ref 13–17)
HGB BLD-MCNC: 11 G/DL — LOW (ref 13–17)
HGB BLD-MCNC: 11.2 G/DL — LOW (ref 13–17)
HGB BLD-MCNC: 11.5 G/DL — LOW (ref 13–17)
HGB BLD-MCNC: 11.6 G/DL — LOW (ref 13–17)
HGB BLD-MCNC: 12.2 G/DL — LOW (ref 13–17)
HGB BLD-MCNC: 12.2 G/DL — LOW (ref 13–17)
HGB BLD-MCNC: 12.3 G/DL — LOW (ref 13–17)
HGB BLD-MCNC: 12.5 G/DL — LOW (ref 13–17)
HGB BLD-MCNC: 12.5 G/DL — LOW (ref 13–17)
HGB BLD-MCNC: 12.8 G/DL — LOW (ref 13–17)
HGB BLD-MCNC: 9.1 G/DL — LOW (ref 13–17)
HGB BLD-MCNC: 9.4 G/DL — LOW (ref 13–17)
HGB BLD-MCNC: 9.7 G/DL — LOW (ref 13–17)
HOROWITZ INDEX BLDV+IHG-RTO: 30 — SIGNIFICANT CHANGE UP
HYALINE CASTS # UR AUTO: 167 /LPF — HIGH (ref 0–2)
HYALINE CASTS # UR AUTO: 4 /LPF — SIGNIFICANT CHANGE UP (ref 0–7)
IMM GRANULOCYTES NFR BLD AUTO: 1 % — SIGNIFICANT CHANGE UP (ref 0–1.5)
IMM GRANULOCYTES NFR BLD AUTO: 1.4 % — SIGNIFICANT CHANGE UP (ref 0–1.5)
IMM GRANULOCYTES NFR BLD AUTO: 1.5 % — SIGNIFICANT CHANGE UP (ref 0–1.5)
IMM GRANULOCYTES NFR BLD AUTO: 1.6 % — HIGH (ref 0–1.5)
INR BLD: 1.18 RATIO — HIGH (ref 0.88–1.16)
INR BLD: 1.28 RATIO — HIGH (ref 0.88–1.16)
INR BLD: 1.65 RATIO — HIGH (ref 0.88–1.16)
INR BLD: 1.68 RATIO — HIGH (ref 0.88–1.16)
INR BLD: 1.76 RATIO — HIGH (ref 0.88–1.16)
INR BLD: 1.86 RATIO — HIGH (ref 0.88–1.16)
INR BLD: 2.32 RATIO — HIGH (ref 0.88–1.16)
IRON SATN MFR SERPL: 22 % — SIGNIFICANT CHANGE UP (ref 16–55)
IRON SATN MFR SERPL: 39 UG/DL — LOW (ref 45–165)
KETONES UR-MCNC: NEGATIVE — SIGNIFICANT CHANGE UP
KETONES UR-MCNC: SIGNIFICANT CHANGE UP
LA NT DPL PPP QL: 36.7 SEC — SIGNIFICANT CHANGE UP
LACTATE BLDV-MCNC: 1.7 MMOL/L — SIGNIFICANT CHANGE UP (ref 0.7–2)
LACTATE BLDV-MCNC: 2.3 MMOL/L — HIGH (ref 0.7–2)
LACTATE BLDV-MCNC: 2.3 MMOL/L — HIGH (ref 0.7–2)
LACTATE BLDV-MCNC: 2.4 MMOL/L — HIGH (ref 0.7–2)
LACTATE BLDV-MCNC: 2.4 MMOL/L — HIGH (ref 0.7–2)
LACTATE BLDV-MCNC: 2.7 MMOL/L — HIGH (ref 0.7–2)
LEUKOCYTE ESTERASE UR-ACNC: ABNORMAL
LEUKOCYTE ESTERASE UR-ACNC: ABNORMAL
LYMPHOCYTES # BLD AUTO: 0 % — LOW (ref 13–44)
LYMPHOCYTES # BLD AUTO: 0 K/UL — LOW (ref 1–3.3)
LYMPHOCYTES # BLD AUTO: 1.03 K/UL — SIGNIFICANT CHANGE UP (ref 1–3.3)
LYMPHOCYTES # BLD AUTO: 1.28 K/UL — SIGNIFICANT CHANGE UP (ref 1–3.3)
LYMPHOCYTES # BLD AUTO: 1.77 K/UL — SIGNIFICANT CHANGE UP (ref 1–3.3)
LYMPHOCYTES # BLD AUTO: 1.94 K/UL — SIGNIFICANT CHANGE UP (ref 1–3.3)
LYMPHOCYTES # BLD AUTO: 4.8 % — LOW (ref 13–44)
LYMPHOCYTES # BLD AUTO: 5.4 % — LOW (ref 13–44)
LYMPHOCYTES # BLD AUTO: 5.8 % — LOW (ref 13–44)
LYMPHOCYTES # BLD AUTO: 6.9 % — LOW (ref 13–44)
MACROCYTES BLD QL: SLIGHT — SIGNIFICANT CHANGE UP
MAGNESIUM SERPL-MCNC: 1.9 MG/DL — SIGNIFICANT CHANGE UP (ref 1.6–2.6)
MAGNESIUM SERPL-MCNC: 2 MG/DL — SIGNIFICANT CHANGE UP (ref 1.6–2.6)
MAGNESIUM SERPL-MCNC: 2.1 MG/DL — SIGNIFICANT CHANGE UP (ref 1.6–2.6)
MAGNESIUM SERPL-MCNC: 2.2 MG/DL — SIGNIFICANT CHANGE UP (ref 1.6–2.6)
MAGNESIUM SERPL-MCNC: 2.2 MG/DL — SIGNIFICANT CHANGE UP (ref 1.6–2.6)
MAGNESIUM SERPL-MCNC: 2.3 MG/DL — SIGNIFICANT CHANGE UP (ref 1.6–2.6)
MANUAL SMEAR VERIFICATION: SIGNIFICANT CHANGE UP
MCHC RBC-ENTMCNC: 25.9 PG — LOW (ref 27–34)
MCHC RBC-ENTMCNC: 26.3 PG — LOW (ref 27–34)
MCHC RBC-ENTMCNC: 26.3 PG — LOW (ref 27–34)
MCHC RBC-ENTMCNC: 26.4 PG — LOW (ref 27–34)
MCHC RBC-ENTMCNC: 26.5 PG — LOW (ref 27–34)
MCHC RBC-ENTMCNC: 26.5 PG — LOW (ref 27–34)
MCHC RBC-ENTMCNC: 26.6 PG — LOW (ref 27–34)
MCHC RBC-ENTMCNC: 26.6 PG — LOW (ref 27–34)
MCHC RBC-ENTMCNC: 26.7 PG — LOW (ref 27–34)
MCHC RBC-ENTMCNC: 26.8 PG — LOW (ref 27–34)
MCHC RBC-ENTMCNC: 26.8 PG — LOW (ref 27–34)
MCHC RBC-ENTMCNC: 27 PG — SIGNIFICANT CHANGE UP (ref 27–34)
MCHC RBC-ENTMCNC: 30.1 GM/DL — LOW (ref 32–36)
MCHC RBC-ENTMCNC: 31.5 GM/DL — LOW (ref 32–36)
MCHC RBC-ENTMCNC: 31.6 GM/DL — LOW (ref 32–36)
MCHC RBC-ENTMCNC: 31.8 GM/DL — LOW (ref 32–36)
MCHC RBC-ENTMCNC: 32 GM/DL — SIGNIFICANT CHANGE UP (ref 32–36)
MCHC RBC-ENTMCNC: 32.1 GM/DL — SIGNIFICANT CHANGE UP (ref 32–36)
MCHC RBC-ENTMCNC: 32.2 GM/DL — SIGNIFICANT CHANGE UP (ref 32–36)
MCHC RBC-ENTMCNC: 32.3 GM/DL — SIGNIFICANT CHANGE UP (ref 32–36)
MCHC RBC-ENTMCNC: 32.3 GM/DL — SIGNIFICANT CHANGE UP (ref 32–36)
MCHC RBC-ENTMCNC: 32.4 GM/DL — SIGNIFICANT CHANGE UP (ref 32–36)
MCHC RBC-ENTMCNC: 32.5 GM/DL — SIGNIFICANT CHANGE UP (ref 32–36)
MCHC RBC-ENTMCNC: 32.6 GM/DL — SIGNIFICANT CHANGE UP (ref 32–36)
MCHC RBC-ENTMCNC: 32.9 GM/DL — SIGNIFICANT CHANGE UP (ref 32–36)
MCHC RBC-ENTMCNC: 33 GM/DL — SIGNIFICANT CHANGE UP (ref 32–36)
MCV RBC AUTO: 80.9 FL — SIGNIFICANT CHANGE UP (ref 80–100)
MCV RBC AUTO: 81.3 FL — SIGNIFICANT CHANGE UP (ref 80–100)
MCV RBC AUTO: 81.6 FL — SIGNIFICANT CHANGE UP (ref 80–100)
MCV RBC AUTO: 82.1 FL — SIGNIFICANT CHANGE UP (ref 80–100)
MCV RBC AUTO: 82.2 FL — SIGNIFICANT CHANGE UP (ref 80–100)
MCV RBC AUTO: 82.7 FL — SIGNIFICANT CHANGE UP (ref 80–100)
MCV RBC AUTO: 82.9 FL — SIGNIFICANT CHANGE UP (ref 80–100)
MCV RBC AUTO: 83.2 FL — SIGNIFICANT CHANGE UP (ref 80–100)
MCV RBC AUTO: 83.2 FL — SIGNIFICANT CHANGE UP (ref 80–100)
MCV RBC AUTO: 83.4 FL — SIGNIFICANT CHANGE UP (ref 80–100)
MCV RBC AUTO: 83.5 FL — SIGNIFICANT CHANGE UP (ref 80–100)
MCV RBC AUTO: 88 FL — SIGNIFICANT CHANGE UP (ref 80–100)
MONOCYTES # BLD AUTO: 0 K/UL — SIGNIFICANT CHANGE UP (ref 0–0.9)
MONOCYTES # BLD AUTO: 0.91 K/UL — HIGH (ref 0–0.9)
MONOCYTES # BLD AUTO: 1.2 K/UL — HIGH (ref 0–0.9)
MONOCYTES # BLD AUTO: 1.5 K/UL — HIGH (ref 0–0.9)
MONOCYTES # BLD AUTO: 1.6 K/UL — HIGH (ref 0–0.9)
MONOCYTES NFR BLD AUTO: 0 % — LOW (ref 2–14)
MONOCYTES NFR BLD AUTO: 4.5 % — SIGNIFICANT CHANGE UP (ref 2–14)
MONOCYTES NFR BLD AUTO: 4.8 % — SIGNIFICANT CHANGE UP (ref 2–14)
MONOCYTES NFR BLD AUTO: 4.9 % — SIGNIFICANT CHANGE UP (ref 2–14)
MONOCYTES NFR BLD AUTO: 5.7 % — SIGNIFICANT CHANGE UP (ref 2–14)
MRSA PCR RESULT.: SIGNIFICANT CHANGE UP
NEUTROPHILS # BLD AUTO: 16.84 K/UL — HIGH (ref 1.8–7.4)
NEUTROPHILS # BLD AUTO: 23.47 K/UL — HIGH (ref 1.8–7.4)
NEUTROPHILS # BLD AUTO: 23.63 K/UL — HIGH (ref 1.8–7.4)
NEUTROPHILS # BLD AUTO: 26.08 K/UL — HIGH (ref 1.8–7.4)
NEUTROPHILS # BLD AUTO: 30.72 K/UL — HIGH (ref 1.8–7.4)
NEUTROPHILS NFR BLD AUTO: 100 % — HIGH (ref 43–77)
NEUTROPHILS NFR BLD AUTO: 84.2 % — HIGH (ref 43–77)
NEUTROPHILS NFR BLD AUTO: 85.6 % — HIGH (ref 43–77)
NEUTROPHILS NFR BLD AUTO: 88 % — HIGH (ref 43–77)
NEUTROPHILS NFR BLD AUTO: 88.6 % — HIGH (ref 43–77)
NITRITE UR-MCNC: NEGATIVE — SIGNIFICANT CHANGE UP
NITRITE UR-MCNC: NEGATIVE — SIGNIFICANT CHANGE UP
NON-GYNECOLOGICAL CYTOLOGY STUDY: SIGNIFICANT CHANGE UP
NRBC # BLD: 0 /100 WBCS — SIGNIFICANT CHANGE UP (ref 0–0)
NT-PROBNP SERPL-SCNC: 4797 PG/ML — HIGH (ref 0–300)
OSMOLALITY UR: 420 MOS/KG — SIGNIFICANT CHANGE UP (ref 300–900)
OTHER CELLS CSF MANUAL: 6 ML/DL — LOW (ref 18–22)
PCO2 BLDA: 42 MMHG — SIGNIFICANT CHANGE UP (ref 35–48)
PCO2 BLDV: 35 MMHG — LOW (ref 42–55)
PCO2 BLDV: 42 MMHG — SIGNIFICANT CHANGE UP (ref 42–55)
PCO2 BLDV: 44 MMHG — SIGNIFICANT CHANGE UP (ref 42–55)
PCO2 BLDV: 45 MMHG — SIGNIFICANT CHANGE UP (ref 42–55)
PCO2 BLDV: 47 MMHG — SIGNIFICANT CHANGE UP (ref 42–55)
PCO2 BLDV: 48 MMHG — SIGNIFICANT CHANGE UP (ref 42–55)
PH BLDA: 7.49 — HIGH (ref 7.35–7.45)
PH BLDV: 7.23 — LOW (ref 7.32–7.43)
PH BLDV: 7.26 — LOW (ref 7.32–7.43)
PH BLDV: 7.28 — LOW (ref 7.32–7.43)
PH BLDV: 7.31 — LOW (ref 7.32–7.43)
PH BLDV: 7.33 — SIGNIFICANT CHANGE UP (ref 7.32–7.43)
PH BLDV: 7.44 — HIGH (ref 7.32–7.43)
PH UR: 6 — SIGNIFICANT CHANGE UP (ref 5–8)
PH UR: 6 — SIGNIFICANT CHANGE UP (ref 5–8)
PHOSPHATE SERPL-MCNC: 2.7 MG/DL — SIGNIFICANT CHANGE UP (ref 2.5–4.5)
PHOSPHATE SERPL-MCNC: 3.5 MG/DL — SIGNIFICANT CHANGE UP (ref 2.5–4.5)
PHOSPHATE SERPL-MCNC: 3.6 MG/DL — SIGNIFICANT CHANGE UP (ref 2.5–4.5)
PHOSPHATE SERPL-MCNC: 4 MG/DL — SIGNIFICANT CHANGE UP (ref 2.5–4.5)
PHOSPHATE SERPL-MCNC: 5.3 MG/DL — HIGH (ref 2.5–4.5)
PHOSPHATE SERPL-MCNC: 5.5 MG/DL — HIGH (ref 2.5–4.5)
PLAT MORPH BLD: NORMAL — SIGNIFICANT CHANGE UP
PLATELET # BLD AUTO: 148 K/UL — LOW (ref 150–400)
PLATELET # BLD AUTO: 149 K/UL — LOW (ref 150–400)
PLATELET # BLD AUTO: 158 K/UL — SIGNIFICANT CHANGE UP (ref 150–400)
PLATELET # BLD AUTO: 168 K/UL — SIGNIFICANT CHANGE UP (ref 150–400)
PLATELET # BLD AUTO: 174 K/UL — SIGNIFICANT CHANGE UP (ref 150–400)
PLATELET # BLD AUTO: 189 K/UL — SIGNIFICANT CHANGE UP (ref 150–400)
PLATELET # BLD AUTO: 193 K/UL — SIGNIFICANT CHANGE UP (ref 150–400)
PLATELET # BLD AUTO: 201 K/UL — SIGNIFICANT CHANGE UP (ref 150–400)
PLATELET # BLD AUTO: 202 K/UL — SIGNIFICANT CHANGE UP (ref 150–400)
PLATELET # BLD AUTO: 208 K/UL — SIGNIFICANT CHANGE UP (ref 150–400)
PLATELET # BLD AUTO: 214 K/UL — SIGNIFICANT CHANGE UP (ref 150–400)
PLATELET # BLD AUTO: 219 K/UL — SIGNIFICANT CHANGE UP (ref 150–400)
PLATELET # BLD AUTO: 220 K/UL — SIGNIFICANT CHANGE UP (ref 150–400)
PLATELET # BLD AUTO: 228 K/UL — SIGNIFICANT CHANGE UP (ref 150–400)
PO2 BLDA: 101 MMHG — SIGNIFICANT CHANGE UP (ref 83–108)
PO2 BLDV: 32 MMHG — SIGNIFICANT CHANGE UP (ref 25–45)
PO2 BLDV: 35 MMHG — SIGNIFICANT CHANGE UP (ref 25–45)
PO2 BLDV: 41 MMHG — SIGNIFICANT CHANGE UP (ref 25–45)
PO2 BLDV: 42 MMHG — SIGNIFICANT CHANGE UP (ref 25–45)
PO2 BLDV: 53 MMHG — HIGH (ref 25–45)
PO2 BLDV: 57 MMHG — HIGH (ref 25–45)
POIKILOCYTOSIS BLD QL AUTO: SIGNIFICANT CHANGE UP
POTASSIUM BLDV-SCNC: 3.4 MMOL/L — LOW (ref 3.5–5.1)
POTASSIUM BLDV-SCNC: 3.5 MMOL/L — SIGNIFICANT CHANGE UP (ref 3.5–5.1)
POTASSIUM BLDV-SCNC: 3.7 MMOL/L — SIGNIFICANT CHANGE UP (ref 3.5–5.1)
POTASSIUM BLDV-SCNC: 3.8 MMOL/L — SIGNIFICANT CHANGE UP (ref 3.5–5.1)
POTASSIUM BLDV-SCNC: 4.5 MMOL/L — SIGNIFICANT CHANGE UP (ref 3.5–5.1)
POTASSIUM BLDV-SCNC: 4.6 MMOL/L — SIGNIFICANT CHANGE UP (ref 3.5–5.1)
POTASSIUM SERPL-MCNC: 3.1 MMOL/L — LOW (ref 3.5–5.3)
POTASSIUM SERPL-MCNC: 3.2 MMOL/L — LOW (ref 3.5–5.3)
POTASSIUM SERPL-MCNC: 3.3 MMOL/L — LOW (ref 3.5–5.3)
POTASSIUM SERPL-MCNC: 3.4 MMOL/L — LOW (ref 3.5–5.3)
POTASSIUM SERPL-MCNC: 3.5 MMOL/L — SIGNIFICANT CHANGE UP (ref 3.5–5.3)
POTASSIUM SERPL-MCNC: 3.6 MMOL/L — SIGNIFICANT CHANGE UP (ref 3.5–5.3)
POTASSIUM SERPL-MCNC: 3.7 MMOL/L — SIGNIFICANT CHANGE UP (ref 3.5–5.3)
POTASSIUM SERPL-MCNC: 3.9 MMOL/L — SIGNIFICANT CHANGE UP (ref 3.5–5.3)
POTASSIUM SERPL-MCNC: 3.9 MMOL/L — SIGNIFICANT CHANGE UP (ref 3.5–5.3)
POTASSIUM SERPL-MCNC: 4 MMOL/L — SIGNIFICANT CHANGE UP (ref 3.5–5.3)
POTASSIUM SERPL-MCNC: 4 MMOL/L — SIGNIFICANT CHANGE UP (ref 3.5–5.3)
POTASSIUM SERPL-MCNC: 4.1 MMOL/L — SIGNIFICANT CHANGE UP (ref 3.5–5.3)
POTASSIUM SERPL-MCNC: 4.4 MMOL/L — SIGNIFICANT CHANGE UP (ref 3.5–5.3)
POTASSIUM SERPL-MCNC: 4.5 MMOL/L — SIGNIFICANT CHANGE UP (ref 3.5–5.3)
POTASSIUM SERPL-MCNC: 5 MMOL/L — SIGNIFICANT CHANGE UP (ref 3.5–5.3)
POTASSIUM SERPL-SCNC: 3.1 MMOL/L — LOW (ref 3.5–5.3)
POTASSIUM SERPL-SCNC: 3.2 MMOL/L — LOW (ref 3.5–5.3)
POTASSIUM SERPL-SCNC: 3.3 MMOL/L — LOW (ref 3.5–5.3)
POTASSIUM SERPL-SCNC: 3.4 MMOL/L — LOW (ref 3.5–5.3)
POTASSIUM SERPL-SCNC: 3.5 MMOL/L — SIGNIFICANT CHANGE UP (ref 3.5–5.3)
POTASSIUM SERPL-SCNC: 3.6 MMOL/L — SIGNIFICANT CHANGE UP (ref 3.5–5.3)
POTASSIUM SERPL-SCNC: 3.7 MMOL/L — SIGNIFICANT CHANGE UP (ref 3.5–5.3)
POTASSIUM SERPL-SCNC: 3.9 MMOL/L — SIGNIFICANT CHANGE UP (ref 3.5–5.3)
POTASSIUM SERPL-SCNC: 3.9 MMOL/L — SIGNIFICANT CHANGE UP (ref 3.5–5.3)
POTASSIUM SERPL-SCNC: 4 MMOL/L — SIGNIFICANT CHANGE UP (ref 3.5–5.3)
POTASSIUM SERPL-SCNC: 4 MMOL/L — SIGNIFICANT CHANGE UP (ref 3.5–5.3)
POTASSIUM SERPL-SCNC: 4.1 MMOL/L — SIGNIFICANT CHANGE UP (ref 3.5–5.3)
POTASSIUM SERPL-SCNC: 4.4 MMOL/L — SIGNIFICANT CHANGE UP (ref 3.5–5.3)
POTASSIUM SERPL-SCNC: 4.5 MMOL/L — SIGNIFICANT CHANGE UP (ref 3.5–5.3)
POTASSIUM SERPL-SCNC: 5 MMOL/L — SIGNIFICANT CHANGE UP (ref 3.5–5.3)
POTASSIUM UR-SCNC: 30 MMOL/L — SIGNIFICANT CHANGE UP
PROT ?TM UR-MCNC: 65 MG/DL — HIGH (ref 0–12)
PROT C ACT/NOR PPP: 62 % — LOW (ref 74–150)
PROT C AG PPP-MCNC: 41 % — LOW (ref 80–184)
PROT S FREE PPP-ACNC: 57 % — LOW (ref 63–140)
PROT SERPL-MCNC: 5.7 G/DL — LOW (ref 6–8.3)
PROT SERPL-MCNC: 5.7 G/DL — LOW (ref 6–8.3)
PROT SERPL-MCNC: 5.8 G/DL — LOW (ref 6–8.3)
PROT SERPL-MCNC: 6.1 G/DL — SIGNIFICANT CHANGE UP (ref 6–8.3)
PROT SERPL-MCNC: 6.2 G/DL — SIGNIFICANT CHANGE UP (ref 6–8.3)
PROT SERPL-MCNC: 6.6 G/DL — SIGNIFICANT CHANGE UP (ref 6–8.3)
PROT UR-MCNC: ABNORMAL
PROT UR-MCNC: ABNORMAL
PROT/CREAT UR-RTO: 1.3 RATIO — HIGH (ref 0–0.2)
PROTHROM AB SERPL-ACNC: 13.7 SEC — HIGH (ref 10.5–13.4)
PROTHROM AB SERPL-ACNC: 14.8 SEC — HIGH (ref 10.5–13.4)
PROTHROM AB SERPL-ACNC: 19.2 SEC — HIGH (ref 10.5–13.4)
PROTHROM AB SERPL-ACNC: 19.4 SEC — HIGH (ref 10.5–13.4)
PROTHROM AB SERPL-ACNC: 20.3 SEC — HIGH (ref 10.5–13.4)
PROTHROM AB SERPL-ACNC: 21.7 SEC — HIGH (ref 10.5–13.4)
PROTHROM AB SERPL-ACNC: 26.9 SEC — HIGH (ref 10.5–13.4)
PTR INTERPRETATION: SIGNIFICANT CHANGE UP
RBC # BLD: 3.43 M/UL — LOW (ref 4.2–5.8)
RBC # BLD: 3.53 M/UL — LOW (ref 4.2–5.8)
RBC # BLD: 3.69 M/UL — LOW (ref 4.2–5.8)
RBC # BLD: 3.74 M/UL — LOW (ref 4.2–5.8)
RBC # BLD: 4.11 M/UL — LOW (ref 4.2–5.8)
RBC # BLD: 4.19 M/UL — LOW (ref 4.2–5.8)
RBC # BLD: 4.33 M/UL — SIGNIFICANT CHANGE UP (ref 4.2–5.8)
RBC # BLD: 4.35 M/UL — SIGNIFICANT CHANGE UP (ref 4.2–5.8)
RBC # BLD: 4.6 M/UL — SIGNIFICANT CHANGE UP (ref 4.2–5.8)
RBC # BLD: 4.62 M/UL — SIGNIFICANT CHANGE UP (ref 4.2–5.8)
RBC # BLD: 4.63 M/UL — SIGNIFICANT CHANGE UP (ref 4.2–5.8)
RBC # BLD: 4.67 M/UL — SIGNIFICANT CHANGE UP (ref 4.2–5.8)
RBC # BLD: 4.77 M/UL — SIGNIFICANT CHANGE UP (ref 4.2–5.8)
RBC # BLD: 4.83 M/UL — SIGNIFICANT CHANGE UP (ref 4.2–5.8)
RBC # FLD: 15.9 % — HIGH (ref 10.3–14.5)
RBC # FLD: 16.2 % — HIGH (ref 10.3–14.5)
RBC # FLD: 16.3 % — HIGH (ref 10.3–14.5)
RBC # FLD: 16.5 % — HIGH (ref 10.3–14.5)
RBC # FLD: 16.5 % — HIGH (ref 10.3–14.5)
RBC # FLD: 16.6 % — HIGH (ref 10.3–14.5)
RBC # FLD: 16.7 % — HIGH (ref 10.3–14.5)
RBC # FLD: 16.7 % — HIGH (ref 10.3–14.5)
RBC # FLD: 17.2 % — HIGH (ref 10.3–14.5)
RBC # FLD: 17.2 % — HIGH (ref 10.3–14.5)
RBC # FLD: 17.4 % — HIGH (ref 10.3–14.5)
RBC # FLD: 17.5 % — HIGH (ref 10.3–14.5)
RBC # FLD: 17.7 % — HIGH (ref 10.3–14.5)
RBC # FLD: 18.9 % — HIGH (ref 10.3–14.5)
RBC BLD AUTO: ABNORMAL
RBC CASTS # UR COMP ASSIST: 3 /HPF — SIGNIFICANT CHANGE UP (ref 0–4)
RBC CASTS # UR COMP ASSIST: 55 /HPF — HIGH (ref 0–4)
RH IG SCN BLD-IMP: POSITIVE — SIGNIFICANT CHANGE UP
RSV RNA NPH QL NAA+NON-PROBE: SIGNIFICANT CHANGE UP
S AUREUS DNA NOSE QL NAA+PROBE: SIGNIFICANT CHANGE UP
SAO2 % BLDA: 98.7 % — HIGH (ref 94–98)
SAO2 % BLDV: 43.1 % — LOW (ref 67–88)
SAO2 % BLDV: 53.3 % — LOW (ref 67–88)
SAO2 % BLDV: 61.7 % — LOW (ref 67–88)
SAO2 % BLDV: 70.3 % — SIGNIFICANT CHANGE UP (ref 67–88)
SAO2 % BLDV: 81.5 % — SIGNIFICANT CHANGE UP (ref 67–88)
SAO2 % BLDV: 87.4 % — SIGNIFICANT CHANGE UP (ref 67–88)
SARS-COV-2 RNA SPEC QL NAA+PROBE: SIGNIFICANT CHANGE UP
SODIUM SERPL-SCNC: 127 MMOL/L — LOW (ref 135–145)
SODIUM SERPL-SCNC: 130 MMOL/L — LOW (ref 135–145)
SODIUM SERPL-SCNC: 131 MMOL/L — LOW (ref 135–145)
SODIUM SERPL-SCNC: 132 MMOL/L — LOW (ref 135–145)
SODIUM SERPL-SCNC: 132 MMOL/L — LOW (ref 135–145)
SODIUM SERPL-SCNC: 133 MMOL/L — LOW (ref 135–145)
SODIUM SERPL-SCNC: 135 MMOL/L — SIGNIFICANT CHANGE UP (ref 135–145)
SODIUM SERPL-SCNC: 138 MMOL/L — SIGNIFICANT CHANGE UP (ref 135–145)
SODIUM SERPL-SCNC: 139 MMOL/L — SIGNIFICANT CHANGE UP (ref 135–145)
SODIUM SERPL-SCNC: 140 MMOL/L — SIGNIFICANT CHANGE UP (ref 135–145)
SODIUM UR-SCNC: 23 MMOL/L — SIGNIFICANT CHANGE UP
SP GR SPEC: 1.02 — SIGNIFICANT CHANGE UP (ref 1.01–1.02)
SP GR SPEC: 1.02 — SIGNIFICANT CHANGE UP (ref 1.01–1.02)
SPECIMEN SOURCE: SIGNIFICANT CHANGE UP
TIBC SERPL-MCNC: 179 UG/DL — LOW (ref 220–430)
TROPONIN T, HIGH SENSITIVITY RESULT: 25 NG/L — SIGNIFICANT CHANGE UP (ref 0–51)
TROPONIN T, HIGH SENSITIVITY RESULT: 36 NG/L — SIGNIFICANT CHANGE UP (ref 0–51)
TROPONIN T, HIGH SENSITIVITY RESULT: 70 NG/L — HIGH (ref 0–51)
TROPONIN T, HIGH SENSITIVITY RESULT: 75 NG/L — HIGH (ref 0–51)
TSH SERPL-MCNC: 3.01 UIU/ML — SIGNIFICANT CHANGE UP (ref 0.27–4.2)
TSH SERPL-MCNC: 3.23 UIU/ML — SIGNIFICANT CHANGE UP (ref 0.27–4.2)
UIBC SERPL-MCNC: 140 UG/DL — SIGNIFICANT CHANGE UP (ref 110–370)
URATE CRY FLD QL MICRO: ABNORMAL
UROBILINOGEN FLD QL: ABNORMAL
UROBILINOGEN FLD QL: NEGATIVE — SIGNIFICANT CHANGE UP
UUN UR-MCNC: 675 MG/DL — SIGNIFICANT CHANGE UP
VIT B12 SERPL-MCNC: >2000 PG/ML — HIGH (ref 232–1245)
WBC # BLD: 19 K/UL — HIGH (ref 3.8–10.5)
WBC # BLD: 21.92 K/UL — HIGH (ref 3.8–10.5)
WBC # BLD: 22.76 K/UL — HIGH (ref 3.8–10.5)
WBC # BLD: 24.63 K/UL — HIGH (ref 3.8–10.5)
WBC # BLD: 24.71 K/UL — HIGH (ref 3.8–10.5)
WBC # BLD: 26.38 K/UL — HIGH (ref 3.8–10.5)
WBC # BLD: 26.55 K/UL — HIGH (ref 3.8–10.5)
WBC # BLD: 26.68 K/UL — HIGH (ref 3.8–10.5)
WBC # BLD: 26.9 K/UL — HIGH (ref 3.8–10.5)
WBC # BLD: 27.38 K/UL — HIGH (ref 3.8–10.5)
WBC # BLD: 28.06 K/UL — HIGH (ref 3.8–10.5)
WBC # BLD: 30.46 K/UL — HIGH (ref 3.8–10.5)
WBC # BLD: 30.72 K/UL — HIGH (ref 3.8–10.5)
WBC # BLD: 35.2 K/UL — HIGH (ref 3.8–10.5)
WBC # FLD AUTO: 19 K/UL — HIGH (ref 3.8–10.5)
WBC # FLD AUTO: 21.92 K/UL — HIGH (ref 3.8–10.5)
WBC # FLD AUTO: 22.76 K/UL — HIGH (ref 3.8–10.5)
WBC # FLD AUTO: 24.63 K/UL — HIGH (ref 3.8–10.5)
WBC # FLD AUTO: 24.71 K/UL — HIGH (ref 3.8–10.5)
WBC # FLD AUTO: 26.38 K/UL — HIGH (ref 3.8–10.5)
WBC # FLD AUTO: 26.55 K/UL — HIGH (ref 3.8–10.5)
WBC # FLD AUTO: 26.68 K/UL — HIGH (ref 3.8–10.5)
WBC # FLD AUTO: 26.9 K/UL — HIGH (ref 3.8–10.5)
WBC # FLD AUTO: 27.38 K/UL — HIGH (ref 3.8–10.5)
WBC # FLD AUTO: 28.06 K/UL — HIGH (ref 3.8–10.5)
WBC # FLD AUTO: 30.46 K/UL — HIGH (ref 3.8–10.5)
WBC # FLD AUTO: 30.72 K/UL — HIGH (ref 3.8–10.5)
WBC # FLD AUTO: 35.2 K/UL — HIGH (ref 3.8–10.5)
WBC UR QL: 56 /HPF — HIGH (ref 0–5)
WBC UR QL: 9 /HPF — HIGH (ref 0–5)

## 2022-01-01 PROCEDURE — 99291 CRITICAL CARE FIRST HOUR: CPT | Mod: 25

## 2022-01-01 PROCEDURE — 85018 HEMOGLOBIN: CPT

## 2022-01-01 PROCEDURE — 87637 SARSCOV2&INF A&B&RSV AMP PRB: CPT

## 2022-01-01 PROCEDURE — 88341 IMHCHEM/IMCYTCHM EA ADD ANTB: CPT | Mod: 26

## 2022-01-01 PROCEDURE — 70498 CT ANGIOGRAPHY NECK: CPT | Mod: MA

## 2022-01-01 PROCEDURE — 99291 CRITICAL CARE FIRST HOUR: CPT | Mod: GC

## 2022-01-01 PROCEDURE — 84295 ASSAY OF SERUM SODIUM: CPT

## 2022-01-01 PROCEDURE — 84484 ASSAY OF TROPONIN QUANT: CPT

## 2022-01-01 PROCEDURE — 70450 CT HEAD/BRAIN W/O DYE: CPT | Mod: 26,MA

## 2022-01-01 PROCEDURE — 85610 PROTHROMBIN TIME: CPT

## 2022-01-01 PROCEDURE — 84133 ASSAY OF URINE POTASSIUM: CPT

## 2022-01-01 PROCEDURE — 88342 IMHCHEM/IMCYTCHM 1ST ANTB: CPT | Mod: 26

## 2022-01-01 PROCEDURE — 76942 ECHO GUIDE FOR BIOPSY: CPT | Mod: 26

## 2022-01-01 PROCEDURE — 0042T: CPT | Mod: MA

## 2022-01-01 PROCEDURE — 82947 ASSAY GLUCOSE BLOOD QUANT: CPT

## 2022-01-01 PROCEDURE — 47000 NEEDLE BIOPSY OF LIVER PERQ: CPT

## 2022-01-01 PROCEDURE — 70450 CT HEAD/BRAIN W/O DYE: CPT

## 2022-01-01 PROCEDURE — 99285 EMERGENCY DEPT VISIT HI MDM: CPT

## 2022-01-01 PROCEDURE — 36415 COLL VENOUS BLD VENIPUNCTURE: CPT

## 2022-01-01 PROCEDURE — 71045 X-RAY EXAM CHEST 1 VIEW: CPT | Mod: 26

## 2022-01-01 PROCEDURE — 85025 COMPLETE CBC W/AUTO DIFF WBC: CPT

## 2022-01-01 PROCEDURE — 88341 IMHCHEM/IMCYTCHM EA ADD ANTB: CPT

## 2022-01-01 PROCEDURE — 99291 CRITICAL CARE FIRST HOUR: CPT | Mod: GC,25

## 2022-01-01 PROCEDURE — 82435 ASSAY OF BLOOD CHLORIDE: CPT

## 2022-01-01 PROCEDURE — U0003: CPT

## 2022-01-01 PROCEDURE — 83735 ASSAY OF MAGNESIUM: CPT

## 2022-01-01 PROCEDURE — 88173 CYTOPATH EVAL FNA REPORT: CPT | Mod: 26

## 2022-01-01 PROCEDURE — 93005 ELECTROCARDIOGRAM TRACING: CPT

## 2022-01-01 PROCEDURE — 87086 URINE CULTURE/COLONY COUNT: CPT

## 2022-01-01 PROCEDURE — 70498 CT ANGIOGRAPHY NECK: CPT | Mod: 26,MA

## 2022-01-01 PROCEDURE — 83036 HEMOGLOBIN GLYCOSYLATED A1C: CPT

## 2022-01-01 PROCEDURE — 82746 ASSAY OF FOLIC ACID SERUM: CPT

## 2022-01-01 PROCEDURE — 70544 MR ANGIOGRAPHY HEAD W/O DYE: CPT

## 2022-01-01 PROCEDURE — 86901 BLOOD TYPING SEROLOGIC RH(D): CPT

## 2022-01-01 PROCEDURE — G0452: CPT | Mod: 26

## 2022-01-01 PROCEDURE — P9045: CPT

## 2022-01-01 PROCEDURE — U0005: CPT

## 2022-01-01 PROCEDURE — 70496 CT ANGIOGRAPHY HEAD: CPT | Mod: MA

## 2022-01-01 PROCEDURE — 96374 THER/PROPH/DIAG INJ IV PUSH: CPT | Mod: XU

## 2022-01-01 PROCEDURE — 82962 GLUCOSE BLOOD TEST: CPT

## 2022-01-01 PROCEDURE — 82140 ASSAY OF AMMONIA: CPT

## 2022-01-01 PROCEDURE — 99223 1ST HOSP IP/OBS HIGH 75: CPT | Mod: GC

## 2022-01-01 PROCEDURE — 82607 VITAMIN B-12: CPT

## 2022-01-01 PROCEDURE — 86146 BETA-2 GLYCOPROTEIN ANTIBODY: CPT

## 2022-01-01 PROCEDURE — 85307 ASSAY ACTIVATED PROTEIN C: CPT

## 2022-01-01 PROCEDURE — 70450 CT HEAD/BRAIN W/O DYE: CPT | Mod: 26,MA,59

## 2022-01-01 PROCEDURE — 94640 AIRWAY INHALATION TREATMENT: CPT

## 2022-01-01 PROCEDURE — 84540 ASSAY OF URINE/UREA-N: CPT

## 2022-01-01 PROCEDURE — 99285 EMERGENCY DEPT VISIT HI MDM: CPT | Mod: GC

## 2022-01-01 PROCEDURE — 84443 ASSAY THYROID STIM HORMONE: CPT

## 2022-01-01 PROCEDURE — 82533 TOTAL CORTISOL: CPT

## 2022-01-01 PROCEDURE — 85300 ANTITHROMBIN III ACTIVITY: CPT

## 2022-01-01 PROCEDURE — 93306 TTE W/DOPPLER COMPLETE: CPT

## 2022-01-01 PROCEDURE — 82330 ASSAY OF CALCIUM: CPT

## 2022-01-01 PROCEDURE — 85303 CLOT INHIBIT PROT C ACTIVITY: CPT

## 2022-01-01 PROCEDURE — 99231 SBSQ HOSP IP/OBS SF/LOW 25: CPT

## 2022-01-01 PROCEDURE — 71260 CT THORAX DX C+: CPT | Mod: 26,MA

## 2022-01-01 PROCEDURE — 85014 HEMATOCRIT: CPT

## 2022-01-01 PROCEDURE — 85301 ANTITHROMBIN III ANTIGEN: CPT

## 2022-01-01 PROCEDURE — 71260 CT THORAX DX C+: CPT | Mod: MA

## 2022-01-01 PROCEDURE — 84300 ASSAY OF URINE SODIUM: CPT

## 2022-01-01 PROCEDURE — 86850 RBC ANTIBODY SCREEN: CPT

## 2022-01-01 PROCEDURE — 85306 CLOT INHIBIT PROT S FREE: CPT

## 2022-01-01 PROCEDURE — 97110 THERAPEUTIC EXERCISES: CPT

## 2022-01-01 PROCEDURE — 82553 CREATINE MB FRACTION: CPT

## 2022-01-01 PROCEDURE — 99232 SBSQ HOSP IP/OBS MODERATE 35: CPT

## 2022-01-01 PROCEDURE — 96374 THER/PROPH/DIAG INJ IV PUSH: CPT

## 2022-01-01 PROCEDURE — 83090 ASSAY OF HOMOCYSTEINE: CPT

## 2022-01-01 PROCEDURE — 36600 WITHDRAWAL OF ARTERIAL BLOOD: CPT

## 2022-01-01 PROCEDURE — 99292 CRITICAL CARE ADDL 30 MIN: CPT

## 2022-01-01 PROCEDURE — 88307 TISSUE EXAM BY PATHOLOGIST: CPT | Mod: 26

## 2022-01-01 PROCEDURE — 70551 MRI BRAIN STEM W/O DYE: CPT

## 2022-01-01 PROCEDURE — 87640 STAPH A DNA AMP PROBE: CPT

## 2022-01-01 PROCEDURE — 71045 X-RAY EXAM CHEST 1 VIEW: CPT

## 2022-01-01 PROCEDURE — 76942 ECHO GUIDE FOR BIOPSY: CPT

## 2022-01-01 PROCEDURE — 99497 ADVNCD CARE PLAN 30 MIN: CPT | Mod: 25

## 2022-01-01 PROCEDURE — 76604 US EXAM CHEST: CPT | Mod: 26,GC

## 2022-01-01 PROCEDURE — 99232 SBSQ HOSP IP/OBS MODERATE 35: CPT | Mod: GC

## 2022-01-01 PROCEDURE — 85302 CLOT INHIBIT PROT C ANTIGEN: CPT

## 2022-01-01 PROCEDURE — 71250 CT THORAX DX C-: CPT

## 2022-01-01 PROCEDURE — 70547 MR ANGIOGRAPHY NECK W/O DYE: CPT | Mod: 26

## 2022-01-01 PROCEDURE — 83540 ASSAY OF IRON: CPT

## 2022-01-01 PROCEDURE — 85730 THROMBOPLASTIN TIME PARTIAL: CPT

## 2022-01-01 PROCEDURE — 99233 SBSQ HOSP IP/OBS HIGH 50: CPT | Mod: GC

## 2022-01-01 PROCEDURE — 82803 BLOOD GASES ANY COMBINATION: CPT

## 2022-01-01 PROCEDURE — 84100 ASSAY OF PHOSPHORUS: CPT

## 2022-01-01 PROCEDURE — 74177 CT ABD & PELVIS W/CONTRAST: CPT | Mod: MA

## 2022-01-01 PROCEDURE — 87040 BLOOD CULTURE FOR BACTERIA: CPT

## 2022-01-01 PROCEDURE — 82728 ASSAY OF FERRITIN: CPT

## 2022-01-01 PROCEDURE — 94003 VENT MGMT INPAT SUBQ DAY: CPT

## 2022-01-01 PROCEDURE — 70450 CT HEAD/BRAIN W/O DYE: CPT | Mod: 26

## 2022-01-01 PROCEDURE — 70551 MRI BRAIN STEM W/O DYE: CPT | Mod: 26

## 2022-01-01 PROCEDURE — 80048 BASIC METABOLIC PNL TOTAL CA: CPT

## 2022-01-01 PROCEDURE — 84156 ASSAY OF PROTEIN URINE: CPT

## 2022-01-01 PROCEDURE — 80053 COMPREHEN METABOLIC PANEL: CPT

## 2022-01-01 PROCEDURE — 83605 ASSAY OF LACTIC ACID: CPT

## 2022-01-01 PROCEDURE — 70544 MR ANGIOGRAPHY HEAD W/O DYE: CPT | Mod: 26

## 2022-01-01 PROCEDURE — 86900 BLOOD TYPING SEROLOGIC ABO: CPT

## 2022-01-01 PROCEDURE — 70547 MR ANGIOGRAPHY NECK W/O DYE: CPT

## 2022-01-01 PROCEDURE — 83935 ASSAY OF URINE OSMOLALITY: CPT

## 2022-01-01 PROCEDURE — 97116 GAIT TRAINING THERAPY: CPT

## 2022-01-01 PROCEDURE — 70450 CT HEAD/BRAIN W/O DYE: CPT | Mod: MA

## 2022-01-01 PROCEDURE — 86147 CARDIOLIPIN ANTIBODY EA IG: CPT

## 2022-01-01 PROCEDURE — 93306 TTE W/DOPPLER COMPLETE: CPT | Mod: 26

## 2022-01-01 PROCEDURE — 74177 CT ABD & PELVIS W/CONTRAST: CPT

## 2022-01-01 PROCEDURE — 96375 TX/PRO/DX INJ NEW DRUG ADDON: CPT

## 2022-01-01 PROCEDURE — 85613 RUSSELL VIPER VENOM DILUTED: CPT

## 2022-01-01 PROCEDURE — 93308 TTE F-UP OR LMTD: CPT | Mod: 26,GC

## 2022-01-01 PROCEDURE — 93010 ELECTROCARDIOGRAM REPORT: CPT

## 2022-01-01 PROCEDURE — 74177 CT ABD & PELVIS W/CONTRAST: CPT | Mod: 26

## 2022-01-01 PROCEDURE — 88307 TISSUE EXAM BY PATHOLOGIST: CPT

## 2022-01-01 PROCEDURE — 82565 ASSAY OF CREATININE: CPT

## 2022-01-01 PROCEDURE — 83550 IRON BINDING TEST: CPT

## 2022-01-01 PROCEDURE — 71250 CT THORAX DX C-: CPT | Mod: 26

## 2022-01-01 PROCEDURE — 94002 VENT MGMT INPAT INIT DAY: CPT

## 2022-01-01 PROCEDURE — 84132 ASSAY OF SERUM POTASSIUM: CPT

## 2022-01-01 PROCEDURE — 85027 COMPLETE CBC AUTOMATED: CPT

## 2022-01-01 PROCEDURE — 83880 ASSAY OF NATRIURETIC PEPTIDE: CPT

## 2022-01-01 PROCEDURE — 74177 CT ABD & PELVIS W/CONTRAST: CPT | Mod: 26,MA

## 2022-01-01 PROCEDURE — 99233 SBSQ HOSP IP/OBS HIGH 50: CPT

## 2022-01-01 PROCEDURE — 97161 PT EVAL LOW COMPLEX 20 MIN: CPT

## 2022-01-01 PROCEDURE — 88173 CYTOPATH EVAL FNA REPORT: CPT

## 2022-01-01 PROCEDURE — 81001 URINALYSIS AUTO W/SCOPE: CPT

## 2022-01-01 PROCEDURE — 70496 CT ANGIOGRAPHY HEAD: CPT | Mod: 26,MA

## 2022-01-01 PROCEDURE — 99291 CRITICAL CARE FIRST HOUR: CPT

## 2022-01-01 PROCEDURE — 81241 F5 GENE: CPT

## 2022-01-01 PROCEDURE — 97530 THERAPEUTIC ACTIVITIES: CPT

## 2022-01-01 PROCEDURE — 88342 IMHCHEM/IMCYTCHM 1ST ANTB: CPT

## 2022-01-01 PROCEDURE — 87641 MR-STAPH DNA AMP PROBE: CPT

## 2022-01-01 PROCEDURE — 81240 F2 GENE: CPT

## 2022-01-01 PROCEDURE — 99223 1ST HOSP IP/OBS HIGH 75: CPT

## 2022-01-01 PROCEDURE — 82550 ASSAY OF CK (CPK): CPT

## 2022-01-01 PROCEDURE — 82570 ASSAY OF URINE CREATININE: CPT

## 2022-01-01 RX ORDER — DEXTROSE 50 % IN WATER 50 %
25 SYRINGE (ML) INTRAVENOUS ONCE
Refills: 0 | Status: DISCONTINUED | OUTPATIENT
Start: 2022-01-01 | End: 2022-01-01

## 2022-01-01 RX ORDER — POTASSIUM CHLORIDE 20 MEQ
40 PACKET (EA) ORAL ONCE
Refills: 0 | Status: COMPLETED | OUTPATIENT
Start: 2022-01-01 | End: 2022-01-01

## 2022-01-01 RX ORDER — HEPARIN SODIUM 5000 [USP'U]/ML
11 INJECTION INTRAVENOUS; SUBCUTANEOUS
Qty: 25000 | Refills: 0 | Status: DISCONTINUED | OUTPATIENT
Start: 2022-01-01 | End: 2022-01-01

## 2022-01-01 RX ORDER — CEFEPIME 1 G/1
2000 INJECTION, POWDER, FOR SOLUTION INTRAMUSCULAR; INTRAVENOUS ONCE
Refills: 0 | Status: COMPLETED | OUTPATIENT
Start: 2022-01-01 | End: 2022-01-01

## 2022-01-01 RX ORDER — APIXABAN 2.5 MG/1
1 TABLET, FILM COATED ORAL
Qty: 0 | Refills: 0 | DISCHARGE
Start: 2022-01-01

## 2022-01-01 RX ORDER — PIPERACILLIN AND TAZOBACTAM 4; .5 G/20ML; G/20ML
3.38 INJECTION, POWDER, LYOPHILIZED, FOR SOLUTION INTRAVENOUS EVERY 8 HOURS
Refills: 0 | Status: DISCONTINUED | OUTPATIENT
Start: 2022-01-01 | End: 2022-01-01

## 2022-01-01 RX ORDER — LOSARTAN POTASSIUM 100 MG/1
0.5 TABLET, FILM COATED ORAL
Qty: 0 | Refills: 0 | DISCHARGE

## 2022-01-01 RX ORDER — VANCOMYCIN HCL 1 G
1000 VIAL (EA) INTRAVENOUS ONCE
Refills: 0 | Status: COMPLETED | OUTPATIENT
Start: 2022-01-01 | End: 2022-01-01

## 2022-01-01 RX ORDER — SODIUM CHLORIDE 9 MG/ML
1000 INJECTION, SOLUTION INTRAVENOUS
Refills: 0 | Status: DISCONTINUED | OUTPATIENT
Start: 2022-01-01 | End: 2022-01-01

## 2022-01-01 RX ORDER — ACETAMINOPHEN 500 MG
0 TABLET ORAL
Qty: 0 | Refills: 0 | DISCHARGE

## 2022-01-01 RX ORDER — HYDROMORPHONE HYDROCHLORIDE 2 MG/ML
0.5 INJECTION INTRAMUSCULAR; INTRAVENOUS; SUBCUTANEOUS
Refills: 0 | Status: DISCONTINUED | OUTPATIENT
Start: 2022-01-01 | End: 2022-01-01

## 2022-01-01 RX ORDER — ASPIRIN/CALCIUM CARB/MAGNESIUM 324 MG
1 TABLET ORAL
Qty: 0 | Refills: 0 | DISCHARGE
Start: 2022-01-01

## 2022-01-01 RX ORDER — CEFEPIME 1 G/1
2000 INJECTION, POWDER, FOR SOLUTION INTRAMUSCULAR; INTRAVENOUS EVERY 12 HOURS
Refills: 0 | Status: COMPLETED | OUTPATIENT
Start: 2022-01-01 | End: 2022-01-01

## 2022-01-01 RX ORDER — SODIUM CHLORIDE 9 MG/ML
1000 INJECTION INTRAMUSCULAR; INTRAVENOUS; SUBCUTANEOUS
Refills: 0 | Status: DISCONTINUED | OUTPATIENT
Start: 2022-01-01 | End: 2022-01-01

## 2022-01-01 RX ORDER — CEFEPIME 1 G/1
1000 INJECTION, POWDER, FOR SOLUTION INTRAMUSCULAR; INTRAVENOUS EVERY 24 HOURS
Refills: 0 | Status: DISCONTINUED | OUTPATIENT
Start: 2022-01-01 | End: 2022-01-01

## 2022-01-01 RX ORDER — ALBUMIN HUMAN 25 %
250 VIAL (ML) INTRAVENOUS ONCE
Refills: 0 | Status: COMPLETED | OUTPATIENT
Start: 2022-01-01 | End: 2022-01-01

## 2022-01-01 RX ORDER — POTASSIUM PHOSPHATE, MONOBASIC POTASSIUM PHOSPHATE, DIBASIC 236; 224 MG/ML; MG/ML
15 INJECTION, SOLUTION INTRAVENOUS ONCE
Refills: 0 | Status: COMPLETED | OUTPATIENT
Start: 2022-01-01 | End: 2022-01-01

## 2022-01-01 RX ORDER — TAMSULOSIN HYDROCHLORIDE 0.4 MG/1
0.8 CAPSULE ORAL AT BEDTIME
Refills: 0 | Status: DISCONTINUED | OUTPATIENT
Start: 2022-01-01 | End: 2022-01-01

## 2022-01-01 RX ORDER — DEXTROSE MONOHYDRATE, SODIUM CHLORIDE, AND POTASSIUM CHLORIDE 50; .745; 4.5 G/1000ML; G/1000ML; G/1000ML
1000 INJECTION, SOLUTION INTRAVENOUS
Refills: 0 | Status: DISCONTINUED | OUTPATIENT
Start: 2022-01-01 | End: 2022-01-01

## 2022-01-01 RX ORDER — INSULIN LISPRO 100/ML
VIAL (ML) SUBCUTANEOUS EVERY 6 HOURS
Refills: 0 | Status: DISCONTINUED | OUTPATIENT
Start: 2022-01-01 | End: 2022-01-01

## 2022-01-01 RX ORDER — ALBUMIN HUMAN 25 %
250 VIAL (ML) INTRAVENOUS EVERY 6 HOURS
Refills: 0 | Status: DISCONTINUED | OUTPATIENT
Start: 2022-01-01 | End: 2022-01-01

## 2022-01-01 RX ORDER — HEPARIN SODIUM 5000 [USP'U]/ML
5000 INJECTION INTRAVENOUS; SUBCUTANEOUS EVERY 12 HOURS
Refills: 0 | Status: DISCONTINUED | OUTPATIENT
Start: 2022-01-01 | End: 2022-01-01

## 2022-01-01 RX ORDER — ACETAMINOPHEN 500 MG
650 TABLET ORAL EVERY 6 HOURS
Refills: 0 | Status: DISCONTINUED | OUTPATIENT
Start: 2022-01-01 | End: 2022-01-01

## 2022-01-01 RX ORDER — ALBUMIN HUMAN 25 %
250 VIAL (ML) INTRAVENOUS EVERY 8 HOURS
Refills: 0 | Status: COMPLETED | OUTPATIENT
Start: 2022-01-01 | End: 2022-01-01

## 2022-01-01 RX ORDER — INSULIN LISPRO 100/ML
VIAL (ML) SUBCUTANEOUS AT BEDTIME
Refills: 0 | Status: DISCONTINUED | OUTPATIENT
Start: 2022-01-01 | End: 2022-01-01

## 2022-01-01 RX ORDER — POLYETHYLENE GLYCOL 3350 17 G/17G
17 POWDER, FOR SOLUTION ORAL DAILY
Refills: 0 | Status: DISCONTINUED | OUTPATIENT
Start: 2022-01-01 | End: 2022-01-01

## 2022-01-01 RX ORDER — ROBINUL 0.2 MG/ML
0.4 INJECTION INTRAMUSCULAR; INTRAVENOUS EVERY 4 HOURS
Refills: 0 | Status: DISCONTINUED | OUTPATIENT
Start: 2022-01-01 | End: 2022-01-01

## 2022-01-01 RX ORDER — LOSARTAN/HYDROCHLOROTHIAZIDE 100MG-25MG
1 TABLET ORAL
Qty: 0 | Refills: 0 | DISCHARGE

## 2022-01-01 RX ORDER — MIDODRINE HYDROCHLORIDE 2.5 MG/1
10 TABLET ORAL EVERY 8 HOURS
Refills: 0 | Status: DISCONTINUED | OUTPATIENT
Start: 2022-01-01 | End: 2022-01-01

## 2022-01-01 RX ORDER — CHLORHEXIDINE GLUCONATE 213 G/1000ML
1 SOLUTION TOPICAL DAILY
Refills: 0 | Status: DISCONTINUED | OUTPATIENT
Start: 2022-01-01 | End: 2022-01-01

## 2022-01-01 RX ORDER — SODIUM CHLORIDE 9 MG/ML
4 INJECTION INTRAMUSCULAR; INTRAVENOUS; SUBCUTANEOUS EVERY 12 HOURS
Refills: 0 | Status: DISCONTINUED | OUTPATIENT
Start: 2022-01-01 | End: 2022-01-01

## 2022-01-01 RX ORDER — IPRATROPIUM/ALBUTEROL SULFATE 18-103MCG
3 AEROSOL WITH ADAPTER (GRAM) INHALATION EVERY 6 HOURS
Refills: 0 | Status: DISCONTINUED | OUTPATIENT
Start: 2022-01-01 | End: 2022-01-01

## 2022-01-01 RX ORDER — LOSARTAN POTASSIUM 100 MG/1
25 TABLET, FILM COATED ORAL DAILY
Refills: 0 | Status: DISCONTINUED | OUTPATIENT
Start: 2022-01-01 | End: 2022-01-01

## 2022-01-01 RX ORDER — DEXTROSE 50 % IN WATER 50 %
12.5 SYRINGE (ML) INTRAVENOUS ONCE
Refills: 0 | Status: DISCONTINUED | OUTPATIENT
Start: 2022-01-01 | End: 2022-01-01

## 2022-01-01 RX ORDER — MIRTAZAPINE 45 MG/1
7.5 TABLET, ORALLY DISINTEGRATING ORAL AT BEDTIME
Refills: 0 | Status: DISCONTINUED | OUTPATIENT
Start: 2022-01-01 | End: 2022-01-01

## 2022-01-01 RX ORDER — GLUCAGON INJECTION, SOLUTION 0.5 MG/.1ML
1 INJECTION, SOLUTION SUBCUTANEOUS ONCE
Refills: 0 | Status: DISCONTINUED | OUTPATIENT
Start: 2022-01-01 | End: 2022-01-01

## 2022-01-01 RX ORDER — DEXTROSE 50 % IN WATER 50 %
15 SYRINGE (ML) INTRAVENOUS ONCE
Refills: 0 | Status: DISCONTINUED | OUTPATIENT
Start: 2022-01-01 | End: 2022-01-01

## 2022-01-01 RX ORDER — APIXABAN 2.5 MG/1
5 TABLET, FILM COATED ORAL EVERY 12 HOURS
Refills: 0 | Status: DISCONTINUED | OUTPATIENT
Start: 2022-01-01 | End: 2022-01-01

## 2022-01-01 RX ORDER — HEPARIN SODIUM 5000 [USP'U]/ML
1200 INJECTION INTRAVENOUS; SUBCUTANEOUS
Qty: 25000 | Refills: 0 | Status: DISCONTINUED | OUTPATIENT
Start: 2022-01-01 | End: 2022-01-01

## 2022-01-01 RX ORDER — ASPIRIN/CALCIUM CARB/MAGNESIUM 324 MG
81 TABLET ORAL DAILY
Refills: 0 | Status: DISCONTINUED | OUTPATIENT
Start: 2022-01-01 | End: 2022-01-01

## 2022-01-01 RX ORDER — FINERENONE 20 MG/1
1 TABLET, FILM COATED ORAL
Qty: 0 | Refills: 0 | DISCHARGE

## 2022-01-01 RX ORDER — POTASSIUM CHLORIDE 20 MEQ
40 PACKET (EA) ORAL ONCE
Refills: 0 | Status: DISCONTINUED | OUTPATIENT
Start: 2022-01-01 | End: 2022-01-01

## 2022-01-01 RX ORDER — SODIUM CHLORIDE 9 MG/ML
500 INJECTION, SOLUTION INTRAVENOUS ONCE
Refills: 0 | Status: COMPLETED | OUTPATIENT
Start: 2022-01-01 | End: 2022-01-01

## 2022-01-01 RX ORDER — CHLORHEXIDINE GLUCONATE 213 G/1000ML
15 SOLUTION TOPICAL EVERY 12 HOURS
Refills: 0 | Status: DISCONTINUED | OUTPATIENT
Start: 2022-01-01 | End: 2022-01-01

## 2022-01-01 RX ORDER — POTASSIUM CHLORIDE 20 MEQ
10 PACKET (EA) ORAL
Refills: 0 | Status: COMPLETED | OUTPATIENT
Start: 2022-01-01 | End: 2022-01-01

## 2022-01-01 RX ORDER — PREGABALIN 225 MG/1
1 CAPSULE ORAL
Qty: 0 | Refills: 0 | DISCHARGE

## 2022-01-01 RX ORDER — PREGABALIN 225 MG/1
1000 CAPSULE ORAL DAILY
Refills: 0 | Status: DISCONTINUED | OUTPATIENT
Start: 2022-01-01 | End: 2022-01-01

## 2022-01-01 RX ORDER — DEXMEDETOMIDINE HYDROCHLORIDE IN 0.9% SODIUM CHLORIDE 4 UG/ML
0.5 INJECTION INTRAVENOUS
Qty: 200 | Refills: 0 | Status: DISCONTINUED | OUTPATIENT
Start: 2022-01-01 | End: 2022-01-01

## 2022-01-01 RX ORDER — BNT162B2 0.23 MG/2.25ML
0.3 INJECTION, SUSPENSION INTRAMUSCULAR ONCE
Refills: 0 | Status: COMPLETED | OUTPATIENT
Start: 2022-01-01 | End: 2022-01-01

## 2022-01-01 RX ORDER — INSULIN LISPRO 100/ML
VIAL (ML) SUBCUTANEOUS
Refills: 0 | Status: DISCONTINUED | OUTPATIENT
Start: 2022-01-01 | End: 2022-01-01

## 2022-01-01 RX ORDER — MIDAZOLAM HYDROCHLORIDE 1 MG/ML
0.5 INJECTION, SOLUTION INTRAMUSCULAR; INTRAVENOUS
Refills: 0 | Status: DISCONTINUED | OUTPATIENT
Start: 2022-01-01 | End: 2022-01-01

## 2022-01-01 RX ORDER — LUBIPROSTONE 24 UG/1
1 CAPSULE, GELATIN COATED ORAL
Qty: 0 | Refills: 0 | DISCHARGE

## 2022-01-01 RX ORDER — FAMOTIDINE 10 MG/ML
20 INJECTION INTRAVENOUS EVERY 12 HOURS
Refills: 0 | Status: DISCONTINUED | OUTPATIENT
Start: 2022-01-01 | End: 2022-01-01

## 2022-01-01 RX ORDER — POLYETHYLENE GLYCOL 3350 17 G/17G
17 POWDER, FOR SOLUTION ORAL
Refills: 0 | Status: DISCONTINUED | OUTPATIENT
Start: 2022-01-01 | End: 2022-01-01

## 2022-01-01 RX ORDER — NOREPINEPHRINE BITARTRATE/D5W 8 MG/250ML
0.6 PLASTIC BAG, INJECTION (ML) INTRAVENOUS
Qty: 8 | Refills: 0 | Status: DISCONTINUED | OUTPATIENT
Start: 2022-01-01 | End: 2022-01-01

## 2022-01-01 RX ORDER — ATORVASTATIN CALCIUM 80 MG/1
80 TABLET, FILM COATED ORAL AT BEDTIME
Refills: 0 | Status: DISCONTINUED | OUTPATIENT
Start: 2022-01-01 | End: 2022-01-01

## 2022-01-01 RX ORDER — HYDROMORPHONE HYDROCHLORIDE 2 MG/ML
0.2 INJECTION INTRAMUSCULAR; INTRAVENOUS; SUBCUTANEOUS
Refills: 0 | Status: DISCONTINUED | OUTPATIENT
Start: 2022-01-01 | End: 2022-01-01

## 2022-01-01 RX ORDER — HEPARIN SODIUM 5000 [USP'U]/ML
INJECTION INTRAVENOUS; SUBCUTANEOUS
Qty: 25000 | Refills: 0 | Status: DISCONTINUED | OUTPATIENT
Start: 2022-01-01 | End: 2022-01-01

## 2022-01-01 RX ORDER — HYDROMORPHONE HYDROCHLORIDE 2 MG/ML
0.2 INJECTION INTRAMUSCULAR; INTRAVENOUS; SUBCUTANEOUS EVERY 6 HOURS
Refills: 0 | Status: DISCONTINUED | OUTPATIENT
Start: 2022-01-01 | End: 2022-01-01

## 2022-01-01 RX ORDER — SCOPALAMINE 1 MG/3D
1 PATCH, EXTENDED RELEASE TRANSDERMAL
Refills: 0 | Status: DISCONTINUED | OUTPATIENT
Start: 2022-01-01 | End: 2022-01-01

## 2022-01-01 RX ORDER — ROBINUL 0.2 MG/ML
0.4 INJECTION INTRAMUSCULAR; INTRAVENOUS EVERY 6 HOURS
Refills: 0 | Status: DISCONTINUED | OUTPATIENT
Start: 2022-01-01 | End: 2022-01-01

## 2022-01-01 RX ORDER — CIPROFLOXACIN LACTATE 400MG/40ML
400 VIAL (ML) INTRAVENOUS ONCE
Refills: 0 | Status: COMPLETED | OUTPATIENT
Start: 2022-01-01 | End: 2022-01-01

## 2022-01-01 RX ORDER — FENTANYL CITRATE 50 UG/ML
50 INJECTION INTRAVENOUS
Refills: 0 | Status: DISCONTINUED | OUTPATIENT
Start: 2022-01-01 | End: 2022-01-01

## 2022-01-01 RX ORDER — CEFTRIAXONE 500 MG/1
1000 INJECTION, POWDER, FOR SOLUTION INTRAMUSCULAR; INTRAVENOUS EVERY 24 HOURS
Refills: 0 | Status: COMPLETED | OUTPATIENT
Start: 2022-01-01 | End: 2022-01-01

## 2022-01-01 RX ORDER — CHLORHEXIDINE GLUCONATE 213 G/1000ML
1 SOLUTION TOPICAL
Refills: 0 | Status: DISCONTINUED | OUTPATIENT
Start: 2022-01-01 | End: 2022-01-01

## 2022-01-01 RX ORDER — MAGNESIUM SULFATE 500 MG/ML
2 VIAL (ML) INJECTION ONCE
Refills: 0 | Status: COMPLETED | OUTPATIENT
Start: 2022-01-01 | End: 2022-01-01

## 2022-01-01 RX ORDER — FUROSEMIDE 40 MG
20 TABLET ORAL ONCE
Refills: 0 | Status: COMPLETED | OUTPATIENT
Start: 2022-01-01 | End: 2022-01-01

## 2022-01-01 RX ORDER — REPAGLINIDE 1 MG/1
1 TABLET ORAL
Qty: 0 | Refills: 0 | DISCHARGE

## 2022-01-01 RX ORDER — GABAPENTIN 400 MG/1
300 CAPSULE ORAL DAILY
Refills: 0 | Status: DISCONTINUED | OUTPATIENT
Start: 2022-01-01 | End: 2022-01-01

## 2022-01-01 RX ORDER — PROPOFOL 10 MG/ML
20 INJECTION, EMULSION INTRAVENOUS
Qty: 1000 | Refills: 0 | Status: DISCONTINUED | OUTPATIENT
Start: 2022-01-01 | End: 2022-01-01

## 2022-01-01 RX ORDER — LOVASTATIN 20 MG
1 TABLET ORAL
Qty: 0 | Refills: 0 | DISCHARGE

## 2022-01-01 RX ORDER — POTASSIUM CHLORIDE 20 MEQ
40 PACKET (EA) ORAL EVERY 4 HOURS
Refills: 0 | Status: COMPLETED | OUTPATIENT
Start: 2022-01-01 | End: 2022-01-01

## 2022-01-01 RX ORDER — MIRTAZAPINE 45 MG/1
1 TABLET, ORALLY DISINTEGRATING ORAL
Qty: 0 | Refills: 0 | DISCHARGE
Start: 2022-01-01

## 2022-01-01 RX ORDER — BACITRACIN ZINC 500 UNIT/G
1 OINTMENT IN PACKET (EA) TOPICAL
Refills: 0 | Status: DISCONTINUED | OUTPATIENT
Start: 2022-01-01 | End: 2022-01-01

## 2022-01-01 RX ADMIN — TAMSULOSIN HYDROCHLORIDE 0.8 MILLIGRAM(S): 0.4 CAPSULE ORAL at 21:51

## 2022-01-01 RX ADMIN — Medication 81 MILLIGRAM(S): at 12:03

## 2022-01-01 RX ADMIN — CHLORHEXIDINE GLUCONATE 15 MILLILITER(S): 213 SOLUTION TOPICAL at 17:26

## 2022-01-01 RX ADMIN — GABAPENTIN 300 MILLIGRAM(S): 400 CAPSULE ORAL at 11:22

## 2022-01-01 RX ADMIN — CEFTRIAXONE 100 MILLIGRAM(S): 500 INJECTION, POWDER, FOR SOLUTION INTRAMUSCULAR; INTRAVENOUS at 23:07

## 2022-01-01 RX ADMIN — Medication 1: at 17:18

## 2022-01-01 RX ADMIN — SCOPALAMINE 1 PATCH: 1 PATCH, EXTENDED RELEASE TRANSDERMAL at 20:06

## 2022-01-01 RX ADMIN — Medication 1: at 17:26

## 2022-01-01 RX ADMIN — TAMSULOSIN HYDROCHLORIDE 0.8 MILLIGRAM(S): 0.4 CAPSULE ORAL at 21:25

## 2022-01-01 RX ADMIN — CEFEPIME 100 MILLIGRAM(S): 1 INJECTION, POWDER, FOR SOLUTION INTRAMUSCULAR; INTRAVENOUS at 11:38

## 2022-01-01 RX ADMIN — PREGABALIN 1000 MICROGRAM(S): 225 CAPSULE ORAL at 11:23

## 2022-01-01 RX ADMIN — Medication 1: at 12:39

## 2022-01-01 RX ADMIN — HEPARIN SODIUM 14 UNIT(S)/HR: 5000 INJECTION INTRAVENOUS; SUBCUTANEOUS at 01:35

## 2022-01-01 RX ADMIN — CHLORHEXIDINE GLUCONATE 1 APPLICATION(S): 213 SOLUTION TOPICAL at 06:17

## 2022-01-01 RX ADMIN — Medication 125 MILLILITER(S): at 21:43

## 2022-01-01 RX ADMIN — HEPARIN SODIUM 11 UNIT(S)/HR: 5000 INJECTION INTRAVENOUS; SUBCUTANEOUS at 19:59

## 2022-01-01 RX ADMIN — PREGABALIN 1000 MICROGRAM(S): 225 CAPSULE ORAL at 13:15

## 2022-01-01 RX ADMIN — CEFEPIME 100 MILLIGRAM(S): 1 INJECTION, POWDER, FOR SOLUTION INTRAMUSCULAR; INTRAVENOUS at 00:03

## 2022-01-01 RX ADMIN — HYDROMORPHONE HYDROCHLORIDE 0.2 MILLIGRAM(S): 2 INJECTION INTRAMUSCULAR; INTRAVENOUS; SUBCUTANEOUS at 23:16

## 2022-01-01 RX ADMIN — HEPARIN SODIUM 5000 UNIT(S): 5000 INJECTION INTRAVENOUS; SUBCUTANEOUS at 17:18

## 2022-01-01 RX ADMIN — TAMSULOSIN HYDROCHLORIDE 0.8 MILLIGRAM(S): 0.4 CAPSULE ORAL at 21:23

## 2022-01-01 RX ADMIN — ATORVASTATIN CALCIUM 80 MILLIGRAM(S): 80 TABLET, FILM COATED ORAL at 21:37

## 2022-01-01 RX ADMIN — DEXTROSE MONOHYDRATE, SODIUM CHLORIDE, AND POTASSIUM CHLORIDE 50 MILLILITER(S): 50; .745; 4.5 INJECTION, SOLUTION INTRAVENOUS at 05:19

## 2022-01-01 RX ADMIN — Medication 90 MICROGRAM(S)/KG/MIN: at 06:00

## 2022-01-01 RX ADMIN — ATORVASTATIN CALCIUM 80 MILLIGRAM(S): 80 TABLET, FILM COATED ORAL at 22:40

## 2022-01-01 RX ADMIN — SODIUM CHLORIDE 4 MILLILITER(S): 9 INJECTION INTRAMUSCULAR; INTRAVENOUS; SUBCUTANEOUS at 05:22

## 2022-01-01 RX ADMIN — ROBINUL 0.4 MILLIGRAM(S): 0.2 INJECTION INTRAMUSCULAR; INTRAVENOUS at 17:22

## 2022-01-01 RX ADMIN — CHLORHEXIDINE GLUCONATE 15 MILLILITER(S): 213 SOLUTION TOPICAL at 05:05

## 2022-01-01 RX ADMIN — HEPARIN SODIUM 5000 UNIT(S): 5000 INJECTION INTRAVENOUS; SUBCUTANEOUS at 06:04

## 2022-01-01 RX ADMIN — Medication 100 MILLIGRAM(S): at 17:01

## 2022-01-01 RX ADMIN — HEPARIN SODIUM 5000 UNIT(S): 5000 INJECTION INTRAVENOUS; SUBCUTANEOUS at 16:48

## 2022-01-01 RX ADMIN — HEPARIN SODIUM 5000 UNIT(S): 5000 INJECTION INTRAVENOUS; SUBCUTANEOUS at 17:22

## 2022-01-01 RX ADMIN — Medication 81 MILLIGRAM(S): at 11:58

## 2022-01-01 RX ADMIN — CEFEPIME 100 MILLIGRAM(S): 1 INJECTION, POWDER, FOR SOLUTION INTRAMUSCULAR; INTRAVENOUS at 23:05

## 2022-01-01 RX ADMIN — PREGABALIN 1000 MICROGRAM(S): 225 CAPSULE ORAL at 11:11

## 2022-01-01 RX ADMIN — GABAPENTIN 300 MILLIGRAM(S): 400 CAPSULE ORAL at 11:11

## 2022-01-01 RX ADMIN — HYDROMORPHONE HYDROCHLORIDE 0.2 MILLIGRAM(S): 2 INJECTION INTRAMUSCULAR; INTRAVENOUS; SUBCUTANEOUS at 13:10

## 2022-01-01 RX ADMIN — SODIUM CHLORIDE 50 MILLILITER(S): 9 INJECTION INTRAMUSCULAR; INTRAVENOUS; SUBCUTANEOUS at 15:46

## 2022-01-01 RX ADMIN — Medication 3 MILLILITER(S): at 23:02

## 2022-01-01 RX ADMIN — ROBINUL 0.4 MILLIGRAM(S): 0.2 INJECTION INTRAMUSCULAR; INTRAVENOUS at 23:16

## 2022-01-01 RX ADMIN — HYDROMORPHONE HYDROCHLORIDE 0.2 MILLIGRAM(S): 2 INJECTION INTRAMUSCULAR; INTRAVENOUS; SUBCUTANEOUS at 11:37

## 2022-01-01 RX ADMIN — Medication 200 MILLIGRAM(S): at 12:30

## 2022-01-01 RX ADMIN — SCOPALAMINE 1 PATCH: 1 PATCH, EXTENDED RELEASE TRANSDERMAL at 07:35

## 2022-01-01 RX ADMIN — MIDODRINE HYDROCHLORIDE 10 MILLIGRAM(S): 2.5 TABLET ORAL at 21:20

## 2022-01-01 RX ADMIN — HYDROMORPHONE HYDROCHLORIDE 0.2 MILLIGRAM(S): 2 INJECTION INTRAMUSCULAR; INTRAVENOUS; SUBCUTANEOUS at 17:32

## 2022-01-01 RX ADMIN — Medication 1 APPLICATION(S): at 17:34

## 2022-01-01 RX ADMIN — GABAPENTIN 300 MILLIGRAM(S): 400 CAPSULE ORAL at 11:23

## 2022-01-01 RX ADMIN — PREGABALIN 1000 MICROGRAM(S): 225 CAPSULE ORAL at 12:05

## 2022-01-01 RX ADMIN — HEPARIN SODIUM 5000 UNIT(S): 5000 INJECTION INTRAVENOUS; SUBCUTANEOUS at 17:26

## 2022-01-01 RX ADMIN — SODIUM CHLORIDE 500 MILLILITER(S): 9 INJECTION, SOLUTION INTRAVENOUS at 12:23

## 2022-01-01 RX ADMIN — SCOPALAMINE 1 PATCH: 1 PATCH, EXTENDED RELEASE TRANSDERMAL at 08:59

## 2022-01-01 RX ADMIN — CEFEPIME 100 MILLIGRAM(S): 1 INJECTION, POWDER, FOR SOLUTION INTRAMUSCULAR; INTRAVENOUS at 12:18

## 2022-01-01 RX ADMIN — Medication 81 MILLIGRAM(S): at 12:16

## 2022-01-01 RX ADMIN — Medication 250 MILLIGRAM(S): at 06:00

## 2022-01-01 RX ADMIN — Medication 1: at 11:56

## 2022-01-01 RX ADMIN — MIRTAZAPINE 7.5 MILLIGRAM(S): 45 TABLET, ORALLY DISINTEGRATING ORAL at 21:16

## 2022-01-01 RX ADMIN — HYDROMORPHONE HYDROCHLORIDE 0.2 MILLIGRAM(S): 2 INJECTION INTRAMUSCULAR; INTRAVENOUS; SUBCUTANEOUS at 06:16

## 2022-01-01 RX ADMIN — Medication 100 MILLIEQUIVALENT(S): at 17:08

## 2022-01-01 RX ADMIN — Medication 1: at 00:57

## 2022-01-01 RX ADMIN — Medication 90 MICROGRAM(S)/KG/MIN: at 03:45

## 2022-01-01 RX ADMIN — Medication 100 MILLIEQUIVALENT(S): at 18:56

## 2022-01-01 RX ADMIN — Medication 3 MILLILITER(S): at 05:22

## 2022-01-01 RX ADMIN — Medication 25 GRAM(S): at 01:45

## 2022-01-01 RX ADMIN — Medication 3 MILLILITER(S): at 18:06

## 2022-01-01 RX ADMIN — Medication 2: at 17:09

## 2022-01-01 RX ADMIN — ATORVASTATIN CALCIUM 80 MILLIGRAM(S): 80 TABLET, FILM COATED ORAL at 21:24

## 2022-01-01 RX ADMIN — HEPARIN SODIUM 5000 UNIT(S): 5000 INJECTION INTRAVENOUS; SUBCUTANEOUS at 05:04

## 2022-01-01 RX ADMIN — Medication 81 MILLIGRAM(S): at 11:31

## 2022-01-01 RX ADMIN — DEXMEDETOMIDINE HYDROCHLORIDE IN 0.9% SODIUM CHLORIDE 9.41 MICROGRAM(S)/KG/HR: 4 INJECTION INTRAVENOUS at 19:58

## 2022-01-01 RX ADMIN — ROBINUL 0.4 MILLIGRAM(S): 0.2 INJECTION INTRAMUSCULAR; INTRAVENOUS at 17:31

## 2022-01-01 RX ADMIN — ROBINUL 0.4 MILLIGRAM(S): 0.2 INJECTION INTRAMUSCULAR; INTRAVENOUS at 11:23

## 2022-01-01 RX ADMIN — HEPARIN SODIUM 5000 UNIT(S): 5000 INJECTION INTRAVENOUS; SUBCUTANEOUS at 05:29

## 2022-01-01 RX ADMIN — CHLORHEXIDINE GLUCONATE 15 MILLILITER(S): 213 SOLUTION TOPICAL at 05:16

## 2022-01-01 RX ADMIN — Medication 1: at 17:44

## 2022-01-01 RX ADMIN — Medication 1: at 12:26

## 2022-01-01 RX ADMIN — CEFEPIME 100 MILLIGRAM(S): 1 INJECTION, POWDER, FOR SOLUTION INTRAMUSCULAR; INTRAVENOUS at 16:47

## 2022-01-01 RX ADMIN — SODIUM CHLORIDE 100 MILLILITER(S): 9 INJECTION, SOLUTION INTRAVENOUS at 15:07

## 2022-01-01 RX ADMIN — SCOPALAMINE 1 PATCH: 1 PATCH, EXTENDED RELEASE TRANSDERMAL at 19:29

## 2022-01-01 RX ADMIN — Medication 1: at 18:43

## 2022-01-01 RX ADMIN — CEFEPIME 100 MILLIGRAM(S): 1 INJECTION, POWDER, FOR SOLUTION INTRAMUSCULAR; INTRAVENOUS at 00:57

## 2022-01-01 RX ADMIN — Medication 90 MICROGRAM(S)/KG/MIN: at 19:59

## 2022-01-01 RX ADMIN — Medication 1 APPLICATION(S): at 17:30

## 2022-01-01 RX ADMIN — PREGABALIN 1000 MICROGRAM(S): 225 CAPSULE ORAL at 11:22

## 2022-01-01 RX ADMIN — ATORVASTATIN CALCIUM 80 MILLIGRAM(S): 80 TABLET, FILM COATED ORAL at 21:51

## 2022-01-01 RX ADMIN — CHLORHEXIDINE GLUCONATE 1 APPLICATION(S): 213 SOLUTION TOPICAL at 05:16

## 2022-01-01 RX ADMIN — SCOPALAMINE 1 PATCH: 1 PATCH, EXTENDED RELEASE TRANSDERMAL at 08:37

## 2022-01-01 RX ADMIN — Medication 3 MILLILITER(S): at 05:21

## 2022-01-01 RX ADMIN — CEFEPIME 100 MILLIGRAM(S): 1 INJECTION, POWDER, FOR SOLUTION INTRAMUSCULAR; INTRAVENOUS at 12:16

## 2022-01-01 RX ADMIN — HYDROMORPHONE HYDROCHLORIDE 0.2 MILLIGRAM(S): 2 INJECTION INTRAMUSCULAR; INTRAVENOUS; SUBCUTANEOUS at 05:33

## 2022-01-01 RX ADMIN — Medication 1: at 11:50

## 2022-01-01 RX ADMIN — CHLORHEXIDINE GLUCONATE 1 APPLICATION(S): 213 SOLUTION TOPICAL at 05:36

## 2022-01-01 RX ADMIN — CEFEPIME 100 MILLIGRAM(S): 1 INJECTION, POWDER, FOR SOLUTION INTRAMUSCULAR; INTRAVENOUS at 00:00

## 2022-01-01 RX ADMIN — HYDROMORPHONE HYDROCHLORIDE 0.2 MILLIGRAM(S): 2 INJECTION INTRAMUSCULAR; INTRAVENOUS; SUBCUTANEOUS at 17:16

## 2022-01-01 RX ADMIN — Medication 2: at 05:35

## 2022-01-01 RX ADMIN — ATORVASTATIN CALCIUM 80 MILLIGRAM(S): 80 TABLET, FILM COATED ORAL at 21:48

## 2022-01-01 RX ADMIN — DEXTROSE MONOHYDRATE, SODIUM CHLORIDE, AND POTASSIUM CHLORIDE 50 MILLILITER(S): 50; .745; 4.5 INJECTION, SOLUTION INTRAVENOUS at 21:24

## 2022-01-01 RX ADMIN — GABAPENTIN 300 MILLIGRAM(S): 400 CAPSULE ORAL at 12:39

## 2022-01-01 RX ADMIN — ROBINUL 0.4 MILLIGRAM(S): 0.2 INJECTION INTRAMUSCULAR; INTRAVENOUS at 17:16

## 2022-01-01 RX ADMIN — HEPARIN SODIUM 5000 UNIT(S): 5000 INJECTION INTRAVENOUS; SUBCUTANEOUS at 17:04

## 2022-01-01 RX ADMIN — Medication 90 MICROGRAM(S)/KG/MIN: at 19:15

## 2022-01-01 RX ADMIN — HYDROMORPHONE HYDROCHLORIDE 0.2 MILLIGRAM(S): 2 INJECTION INTRAMUSCULAR; INTRAVENOUS; SUBCUTANEOUS at 05:21

## 2022-01-01 RX ADMIN — SODIUM CHLORIDE 10 MILLILITER(S): 9 INJECTION INTRAMUSCULAR; INTRAVENOUS; SUBCUTANEOUS at 17:22

## 2022-01-01 RX ADMIN — HYDROMORPHONE HYDROCHLORIDE 0.2 MILLIGRAM(S): 2 INJECTION INTRAMUSCULAR; INTRAVENOUS; SUBCUTANEOUS at 07:24

## 2022-01-01 RX ADMIN — CEFTRIAXONE 100 MILLIGRAM(S): 500 INJECTION, POWDER, FOR SOLUTION INTRAMUSCULAR; INTRAVENOUS at 22:09

## 2022-01-01 RX ADMIN — HYDROMORPHONE HYDROCHLORIDE 0.2 MILLIGRAM(S): 2 INJECTION INTRAMUSCULAR; INTRAVENOUS; SUBCUTANEOUS at 11:17

## 2022-01-01 RX ADMIN — CHLORHEXIDINE GLUCONATE 1 APPLICATION(S): 213 SOLUTION TOPICAL at 06:16

## 2022-01-01 RX ADMIN — CEFEPIME 100 MILLIGRAM(S): 1 INJECTION, POWDER, FOR SOLUTION INTRAMUSCULAR; INTRAVENOUS at 23:09

## 2022-01-01 RX ADMIN — Medication 1 APPLICATION(S): at 05:40

## 2022-01-01 RX ADMIN — HYDROMORPHONE HYDROCHLORIDE 0.2 MILLIGRAM(S): 2 INJECTION INTRAMUSCULAR; INTRAVENOUS; SUBCUTANEOUS at 17:31

## 2022-01-01 RX ADMIN — CHLORHEXIDINE GLUCONATE 1 APPLICATION(S): 213 SOLUTION TOPICAL at 09:36

## 2022-01-01 RX ADMIN — ATORVASTATIN CALCIUM 80 MILLIGRAM(S): 80 TABLET, FILM COATED ORAL at 21:55

## 2022-01-01 RX ADMIN — Medication 3 MILLILITER(S): at 23:22

## 2022-01-01 RX ADMIN — Medication 1 APPLICATION(S): at 18:10

## 2022-01-01 RX ADMIN — Medication 1: at 06:16

## 2022-01-01 RX ADMIN — SCOPALAMINE 1 PATCH: 1 PATCH, EXTENDED RELEASE TRANSDERMAL at 11:51

## 2022-01-01 RX ADMIN — SODIUM CHLORIDE 50 MILLILITER(S): 9 INJECTION INTRAMUSCULAR; INTRAVENOUS; SUBCUTANEOUS at 21:54

## 2022-01-01 RX ADMIN — Medication 3 MILLILITER(S): at 11:21

## 2022-01-01 RX ADMIN — Medication 90 MICROGRAM(S)/KG/MIN: at 11:59

## 2022-01-01 RX ADMIN — POLYETHYLENE GLYCOL 3350 17 GRAM(S): 17 POWDER, FOR SOLUTION ORAL at 06:15

## 2022-01-01 RX ADMIN — Medication 125 MILLILITER(S): at 05:56

## 2022-01-01 RX ADMIN — Medication 1: at 17:14

## 2022-01-01 RX ADMIN — HEPARIN SODIUM 5000 UNIT(S): 5000 INJECTION INTRAVENOUS; SUBCUTANEOUS at 17:14

## 2022-01-01 RX ADMIN — CHLORHEXIDINE GLUCONATE 1 APPLICATION(S): 213 SOLUTION TOPICAL at 12:16

## 2022-01-01 RX ADMIN — TAMSULOSIN HYDROCHLORIDE 0.8 MILLIGRAM(S): 0.4 CAPSULE ORAL at 21:55

## 2022-01-01 RX ADMIN — CEFEPIME 100 MILLIGRAM(S): 1 INJECTION, POWDER, FOR SOLUTION INTRAMUSCULAR; INTRAVENOUS at 00:17

## 2022-01-01 RX ADMIN — Medication 1: at 12:29

## 2022-01-01 RX ADMIN — CHLORHEXIDINE GLUCONATE 1 APPLICATION(S): 213 SOLUTION TOPICAL at 20:30

## 2022-01-01 RX ADMIN — Medication 90 MICROGRAM(S)/KG/MIN: at 01:28

## 2022-01-01 RX ADMIN — Medication 1: at 18:53

## 2022-01-01 RX ADMIN — HEPARIN SODIUM 5000 UNIT(S): 5000 INJECTION INTRAVENOUS; SUBCUTANEOUS at 05:15

## 2022-01-01 RX ADMIN — HYDROMORPHONE HYDROCHLORIDE 0.2 MILLIGRAM(S): 2 INJECTION INTRAMUSCULAR; INTRAVENOUS; SUBCUTANEOUS at 07:55

## 2022-01-01 RX ADMIN — TAMSULOSIN HYDROCHLORIDE 0.8 MILLIGRAM(S): 0.4 CAPSULE ORAL at 21:16

## 2022-01-01 RX ADMIN — ROBINUL 0.4 MILLIGRAM(S): 0.2 INJECTION INTRAMUSCULAR; INTRAVENOUS at 08:48

## 2022-01-01 RX ADMIN — Medication 1: at 11:31

## 2022-01-01 RX ADMIN — Medication 1: at 17:02

## 2022-01-01 RX ADMIN — POTASSIUM PHOSPHATE, MONOBASIC POTASSIUM PHOSPHATE, DIBASIC 62.5 MILLIMOLE(S): 236; 224 INJECTION, SOLUTION INTRAVENOUS at 11:58

## 2022-01-01 RX ADMIN — ROBINUL 0.4 MILLIGRAM(S): 0.2 INJECTION INTRAMUSCULAR; INTRAVENOUS at 00:17

## 2022-01-01 RX ADMIN — Medication 1: at 16:48

## 2022-01-01 RX ADMIN — Medication 650 MILLIGRAM(S): at 17:09

## 2022-01-01 RX ADMIN — HEPARIN SODIUM 5000 UNIT(S): 5000 INJECTION INTRAVENOUS; SUBCUTANEOUS at 06:42

## 2022-01-01 RX ADMIN — SODIUM CHLORIDE 4 MILLILITER(S): 9 INJECTION INTRAMUSCULAR; INTRAVENOUS; SUBCUTANEOUS at 17:08

## 2022-01-01 RX ADMIN — PREGABALIN 1000 MICROGRAM(S): 225 CAPSULE ORAL at 11:58

## 2022-01-01 RX ADMIN — HEPARIN SODIUM 5000 UNIT(S): 5000 INJECTION INTRAVENOUS; SUBCUTANEOUS at 05:19

## 2022-01-01 RX ADMIN — Medication 250 MILLIGRAM(S): at 18:01

## 2022-01-01 RX ADMIN — Medication 1: at 12:10

## 2022-01-01 RX ADMIN — ATORVASTATIN CALCIUM 80 MILLIGRAM(S): 80 TABLET, FILM COATED ORAL at 21:23

## 2022-01-01 RX ADMIN — ROBINUL 0.4 MILLIGRAM(S): 0.2 INJECTION INTRAMUSCULAR; INTRAVENOUS at 05:48

## 2022-01-01 RX ADMIN — HYDROMORPHONE HYDROCHLORIDE 0.2 MILLIGRAM(S): 2 INJECTION INTRAMUSCULAR; INTRAVENOUS; SUBCUTANEOUS at 11:23

## 2022-01-01 RX ADMIN — Medication 1 APPLICATION(S): at 06:00

## 2022-01-01 RX ADMIN — CHLORHEXIDINE GLUCONATE 1 APPLICATION(S): 213 SOLUTION TOPICAL at 06:22

## 2022-01-01 RX ADMIN — Medication 650 MILLIGRAM(S): at 16:05

## 2022-01-01 RX ADMIN — HYDROMORPHONE HYDROCHLORIDE 0.2 MILLIGRAM(S): 2 INJECTION INTRAMUSCULAR; INTRAVENOUS; SUBCUTANEOUS at 23:27

## 2022-01-01 RX ADMIN — HYDROMORPHONE HYDROCHLORIDE 0.2 MILLIGRAM(S): 2 INJECTION INTRAMUSCULAR; INTRAVENOUS; SUBCUTANEOUS at 08:54

## 2022-01-01 RX ADMIN — PREGABALIN 1000 MICROGRAM(S): 225 CAPSULE ORAL at 12:26

## 2022-01-01 RX ADMIN — HEPARIN SODIUM 12 UNIT(S)/HR: 5000 INJECTION INTRAVENOUS; SUBCUTANEOUS at 03:07

## 2022-01-01 RX ADMIN — HYDROMORPHONE HYDROCHLORIDE 0.2 MILLIGRAM(S): 2 INJECTION INTRAMUSCULAR; INTRAVENOUS; SUBCUTANEOUS at 05:19

## 2022-01-01 RX ADMIN — Medication 1: at 08:10

## 2022-01-01 RX ADMIN — TAMSULOSIN HYDROCHLORIDE 0.8 MILLIGRAM(S): 0.4 CAPSULE ORAL at 21:47

## 2022-01-01 RX ADMIN — Medication 90 MICROGRAM(S)/KG/MIN: at 19:01

## 2022-01-01 RX ADMIN — Medication 90 MICROGRAM(S)/KG/MIN: at 02:00

## 2022-01-01 RX ADMIN — HYDROMORPHONE HYDROCHLORIDE 0.2 MILLIGRAM(S): 2 INJECTION INTRAMUSCULAR; INTRAVENOUS; SUBCUTANEOUS at 17:07

## 2022-01-01 RX ADMIN — DEXTROSE MONOHYDRATE, SODIUM CHLORIDE, AND POTASSIUM CHLORIDE 50 MILLILITER(S): 50; .745; 4.5 INJECTION, SOLUTION INTRAVENOUS at 16:17

## 2022-01-01 RX ADMIN — TAMSULOSIN HYDROCHLORIDE 0.8 MILLIGRAM(S): 0.4 CAPSULE ORAL at 21:37

## 2022-01-01 RX ADMIN — Medication 81 MILLIGRAM(S): at 13:10

## 2022-01-01 RX ADMIN — HYDROMORPHONE HYDROCHLORIDE 0.5 MILLIGRAM(S): 2 INJECTION INTRAMUSCULAR; INTRAVENOUS; SUBCUTANEOUS at 08:26

## 2022-01-01 RX ADMIN — HYDROMORPHONE HYDROCHLORIDE 0.2 MILLIGRAM(S): 2 INJECTION INTRAMUSCULAR; INTRAVENOUS; SUBCUTANEOUS at 21:51

## 2022-01-01 RX ADMIN — Medication 1: at 13:55

## 2022-01-01 RX ADMIN — Medication 81 MILLIGRAM(S): at 17:12

## 2022-01-01 RX ADMIN — FAMOTIDINE 20 MILLIGRAM(S): 10 INJECTION INTRAVENOUS at 22:15

## 2022-01-01 RX ADMIN — Medication 40 MILLIEQUIVALENT(S): at 13:30

## 2022-01-01 RX ADMIN — ROBINUL 0.4 MILLIGRAM(S): 0.2 INJECTION INTRAMUSCULAR; INTRAVENOUS at 12:18

## 2022-01-01 RX ADMIN — HEPARIN SODIUM 5000 UNIT(S): 5000 INJECTION INTRAVENOUS; SUBCUTANEOUS at 17:44

## 2022-01-01 RX ADMIN — ROBINUL 0.4 MILLIGRAM(S): 0.2 INJECTION INTRAMUSCULAR; INTRAVENOUS at 05:20

## 2022-01-01 RX ADMIN — SODIUM CHLORIDE 10 MILLILITER(S): 9 INJECTION INTRAMUSCULAR; INTRAVENOUS; SUBCUTANEOUS at 08:07

## 2022-01-01 RX ADMIN — PREGABALIN 1000 MICROGRAM(S): 225 CAPSULE ORAL at 11:50

## 2022-01-01 RX ADMIN — CEFEPIME 100 MILLIGRAM(S): 1 INJECTION, POWDER, FOR SOLUTION INTRAMUSCULAR; INTRAVENOUS at 23:16

## 2022-01-01 RX ADMIN — Medication 3 MILLILITER(S): at 17:07

## 2022-01-01 RX ADMIN — CEFEPIME 100 MILLIGRAM(S): 1 INJECTION, POWDER, FOR SOLUTION INTRAMUSCULAR; INTRAVENOUS at 11:58

## 2022-01-01 RX ADMIN — DEXTROSE MONOHYDRATE, SODIUM CHLORIDE, AND POTASSIUM CHLORIDE 50 MILLILITER(S): 50; .745; 4.5 INJECTION, SOLUTION INTRAVENOUS at 11:51

## 2022-01-01 RX ADMIN — SODIUM CHLORIDE 500 MILLILITER(S): 9 INJECTION, SOLUTION INTRAVENOUS at 21:00

## 2022-01-01 RX ADMIN — HYDROMORPHONE HYDROCHLORIDE 0.2 MILLIGRAM(S): 2 INJECTION INTRAMUSCULAR; INTRAVENOUS; SUBCUTANEOUS at 00:17

## 2022-01-01 RX ADMIN — PREGABALIN 1000 MICROGRAM(S): 225 CAPSULE ORAL at 13:10

## 2022-01-01 RX ADMIN — ROBINUL 0.4 MILLIGRAM(S): 0.2 INJECTION INTRAMUSCULAR; INTRAVENOUS at 12:15

## 2022-01-01 RX ADMIN — Medication 100 MILLIEQUIVALENT(S): at 13:55

## 2022-01-01 RX ADMIN — Medication 2: at 23:40

## 2022-01-01 RX ADMIN — TAMSULOSIN HYDROCHLORIDE 0.8 MILLIGRAM(S): 0.4 CAPSULE ORAL at 22:40

## 2022-01-01 RX ADMIN — ATORVASTATIN CALCIUM 80 MILLIGRAM(S): 80 TABLET, FILM COATED ORAL at 21:16

## 2022-01-01 RX ADMIN — ATORVASTATIN CALCIUM 80 MILLIGRAM(S): 80 TABLET, FILM COATED ORAL at 21:36

## 2022-01-01 RX ADMIN — PROPOFOL 9.04 MICROGRAM(S)/KG/MIN: 10 INJECTION, EMULSION INTRAVENOUS at 05:15

## 2022-01-01 RX ADMIN — Medication 125 MILLILITER(S): at 02:30

## 2022-01-01 RX ADMIN — ROBINUL 0.4 MILLIGRAM(S): 0.2 INJECTION INTRAMUSCULAR; INTRAVENOUS at 23:36

## 2022-01-01 RX ADMIN — CEFEPIME 100 MILLIGRAM(S): 1 INJECTION, POWDER, FOR SOLUTION INTRAMUSCULAR; INTRAVENOUS at 11:17

## 2022-01-01 RX ADMIN — MIRTAZAPINE 7.5 MILLIGRAM(S): 45 TABLET, ORALLY DISINTEGRATING ORAL at 21:15

## 2022-01-01 RX ADMIN — CHLORHEXIDINE GLUCONATE 1 APPLICATION(S): 213 SOLUTION TOPICAL at 05:05

## 2022-01-01 RX ADMIN — Medication 90 MICROGRAM(S)/KG/MIN: at 17:33

## 2022-01-01 RX ADMIN — ROBINUL 0.4 MILLIGRAM(S): 0.2 INJECTION INTRAMUSCULAR; INTRAVENOUS at 05:21

## 2022-01-01 RX ADMIN — Medication 1: at 00:47

## 2022-01-01 RX ADMIN — HEPARIN SODIUM 5000 UNIT(S): 5000 INJECTION INTRAVENOUS; SUBCUTANEOUS at 04:59

## 2022-01-01 RX ADMIN — BNT162B2 0.3 MILLILITER(S): 0.23 INJECTION, SUSPENSION INTRAMUSCULAR at 17:53

## 2022-01-01 RX ADMIN — TAMSULOSIN HYDROCHLORIDE 0.8 MILLIGRAM(S): 0.4 CAPSULE ORAL at 21:24

## 2022-01-01 RX ADMIN — Medication 40 MILLIEQUIVALENT(S): at 13:15

## 2022-01-01 RX ADMIN — CEFEPIME 100 MILLIGRAM(S): 1 INJECTION, POWDER, FOR SOLUTION INTRAMUSCULAR; INTRAVENOUS at 11:59

## 2022-01-01 RX ADMIN — PROPOFOL 9.04 MICROGRAM(S)/KG/MIN: 10 INJECTION, EMULSION INTRAVENOUS at 20:30

## 2022-01-01 RX ADMIN — HEPARIN SODIUM 5000 UNIT(S): 5000 INJECTION INTRAVENOUS; SUBCUTANEOUS at 05:24

## 2022-01-01 RX ADMIN — Medication 40 MILLIEQUIVALENT(S): at 01:28

## 2022-01-01 RX ADMIN — MIDODRINE HYDROCHLORIDE 10 MILLIGRAM(S): 2.5 TABLET ORAL at 15:06

## 2022-01-01 RX ADMIN — Medication 650 MILLIGRAM(S): at 16:35

## 2022-01-01 RX ADMIN — Medication 81 MILLIGRAM(S): at 12:05

## 2022-01-01 RX ADMIN — HEPARIN SODIUM 1300 UNIT(S)/HR: 5000 INJECTION INTRAVENOUS; SUBCUTANEOUS at 22:30

## 2022-01-01 RX ADMIN — Medication 81 MILLIGRAM(S): at 11:59

## 2022-01-01 RX ADMIN — CEFEPIME 100 MILLIGRAM(S): 1 INJECTION, POWDER, FOR SOLUTION INTRAMUSCULAR; INTRAVENOUS at 17:34

## 2022-01-01 RX ADMIN — Medication 81 MILLIGRAM(S): at 11:23

## 2022-01-01 RX ADMIN — MIRTAZAPINE 7.5 MILLIGRAM(S): 45 TABLET, ORALLY DISINTEGRATING ORAL at 21:25

## 2022-01-01 RX ADMIN — SODIUM CHLORIDE 4 MILLILITER(S): 9 INJECTION INTRAMUSCULAR; INTRAVENOUS; SUBCUTANEOUS at 18:07

## 2022-01-01 RX ADMIN — Medication 90 MICROGRAM(S)/KG/MIN: at 23:00

## 2022-01-01 RX ADMIN — SCOPALAMINE 1 PATCH: 1 PATCH, EXTENDED RELEASE TRANSDERMAL at 19:52

## 2022-01-01 RX ADMIN — ROBINUL 0.4 MILLIGRAM(S): 0.2 INJECTION INTRAMUSCULAR; INTRAVENOUS at 06:16

## 2022-01-01 RX ADMIN — HEPARIN SODIUM 5000 UNIT(S): 5000 INJECTION INTRAVENOUS; SUBCUTANEOUS at 05:10

## 2022-01-01 RX ADMIN — CHLORHEXIDINE GLUCONATE 15 MILLILITER(S): 213 SOLUTION TOPICAL at 05:39

## 2022-01-01 RX ADMIN — HEPARIN SODIUM 14 UNIT(S)/HR: 5000 INJECTION INTRAVENOUS; SUBCUTANEOUS at 11:40

## 2022-01-01 RX ADMIN — GABAPENTIN 300 MILLIGRAM(S): 400 CAPSULE ORAL at 12:05

## 2022-01-01 RX ADMIN — ROBINUL 0.4 MILLIGRAM(S): 0.2 INJECTION INTRAMUSCULAR; INTRAVENOUS at 16:07

## 2022-01-01 RX ADMIN — CHLORHEXIDINE GLUCONATE 1 APPLICATION(S): 213 SOLUTION TOPICAL at 18:38

## 2022-01-01 RX ADMIN — TAMSULOSIN HYDROCHLORIDE 0.8 MILLIGRAM(S): 0.4 CAPSULE ORAL at 21:39

## 2022-01-01 RX ADMIN — HYDROMORPHONE HYDROCHLORIDE 0.2 MILLIGRAM(S): 2 INJECTION INTRAMUSCULAR; INTRAVENOUS; SUBCUTANEOUS at 07:36

## 2022-01-01 RX ADMIN — Medication 125 MILLILITER(S): at 13:42

## 2022-01-01 RX ADMIN — CEFTRIAXONE 100 MILLIGRAM(S): 500 INJECTION, POWDER, FOR SOLUTION INTRAMUSCULAR; INTRAVENOUS at 21:51

## 2022-01-01 RX ADMIN — Medication 40 MILLIEQUIVALENT(S): at 01:45

## 2022-01-01 RX ADMIN — HEPARIN SODIUM 5000 UNIT(S): 5000 INJECTION INTRAVENOUS; SUBCUTANEOUS at 17:08

## 2022-01-01 RX ADMIN — Medication 1: at 07:54

## 2022-01-01 RX ADMIN — HEPARIN SODIUM 5000 UNIT(S): 5000 INJECTION INTRAVENOUS; SUBCUTANEOUS at 05:07

## 2022-01-01 RX ADMIN — MIRTAZAPINE 7.5 MILLIGRAM(S): 45 TABLET, ORALLY DISINTEGRATING ORAL at 22:50

## 2022-01-01 RX ADMIN — Medication 1: at 05:08

## 2022-01-01 RX ADMIN — Medication 1 APPLICATION(S): at 06:25

## 2022-01-01 RX ADMIN — CEFEPIME 100 MILLIGRAM(S): 1 INJECTION, POWDER, FOR SOLUTION INTRAMUSCULAR; INTRAVENOUS at 14:10

## 2022-01-01 RX ADMIN — HYDROMORPHONE HYDROCHLORIDE 0.2 MILLIGRAM(S): 2 INJECTION INTRAMUSCULAR; INTRAVENOUS; SUBCUTANEOUS at 12:17

## 2022-01-01 RX ADMIN — Medication 1: at 12:05

## 2022-01-01 RX ADMIN — MIRTAZAPINE 7.5 MILLIGRAM(S): 45 TABLET, ORALLY DISINTEGRATING ORAL at 21:23

## 2022-01-01 RX ADMIN — Medication 90 MICROGRAM(S)/KG/MIN: at 17:05

## 2022-01-01 RX ADMIN — PREGABALIN 1000 MICROGRAM(S): 225 CAPSULE ORAL at 13:30

## 2022-01-01 RX ADMIN — ROBINUL 0.4 MILLIGRAM(S): 0.2 INJECTION INTRAMUSCULAR; INTRAVENOUS at 17:07

## 2022-01-01 RX ADMIN — Medication 10 MILLIGRAM(S): at 17:32

## 2022-01-01 RX ADMIN — Medication 40 MILLIEQUIVALENT(S): at 18:53

## 2022-01-01 RX ADMIN — CHLORHEXIDINE GLUCONATE 1 APPLICATION(S): 213 SOLUTION TOPICAL at 06:35

## 2022-01-01 RX ADMIN — MIDODRINE HYDROCHLORIDE 10 MILLIGRAM(S): 2.5 TABLET ORAL at 06:07

## 2022-01-01 RX ADMIN — ROBINUL 0.4 MILLIGRAM(S): 0.2 INJECTION INTRAMUSCULAR; INTRAVENOUS at 23:27

## 2022-01-01 RX ADMIN — HYDROMORPHONE HYDROCHLORIDE 0.2 MILLIGRAM(S): 2 INJECTION INTRAMUSCULAR; INTRAVENOUS; SUBCUTANEOUS at 05:49

## 2022-01-01 RX ADMIN — Medication 90 MICROGRAM(S)/KG/MIN: at 00:15

## 2022-01-01 RX ADMIN — Medication 650 MILLIGRAM(S): at 09:45

## 2022-01-01 RX ADMIN — Medication 90 MICROGRAM(S)/KG/MIN: at 20:30

## 2022-01-01 RX ADMIN — CHLORHEXIDINE GLUCONATE 15 MILLILITER(S): 213 SOLUTION TOPICAL at 06:17

## 2022-01-01 RX ADMIN — HEPARIN SODIUM 5000 UNIT(S): 5000 INJECTION INTRAVENOUS; SUBCUTANEOUS at 17:48

## 2022-01-01 RX ADMIN — PREGABALIN 1000 MICROGRAM(S): 225 CAPSULE ORAL at 12:16

## 2022-01-01 RX ADMIN — HYDROMORPHONE HYDROCHLORIDE 0.2 MILLIGRAM(S): 2 INJECTION INTRAMUSCULAR; INTRAVENOUS; SUBCUTANEOUS at 23:35

## 2022-01-01 RX ADMIN — ATORVASTATIN CALCIUM 80 MILLIGRAM(S): 80 TABLET, FILM COATED ORAL at 21:25

## 2022-01-01 RX ADMIN — PREGABALIN 1000 MICROGRAM(S): 225 CAPSULE ORAL at 12:39

## 2022-01-01 RX ADMIN — Medication 2: at 17:22

## 2022-01-01 RX ADMIN — Medication 20 MILLIGRAM(S): at 11:58

## 2022-01-01 RX ADMIN — Medication 3 MILLILITER(S): at 11:10

## 2022-01-01 RX ADMIN — Medication 1: at 06:07

## 2022-01-01 RX ADMIN — HEPARIN SODIUM 5000 UNIT(S): 5000 INJECTION INTRAVENOUS; SUBCUTANEOUS at 06:18

## 2022-01-01 RX ADMIN — CHLORHEXIDINE GLUCONATE 15 MILLILITER(S): 213 SOLUTION TOPICAL at 17:57

## 2022-06-28 NOTE — PROVIDER CONTACT NOTE (CRITICAL VALUE NOTIFICATION) - TEST AND RESULT REPORTED:
disoriented to time/disoriented to place/disoriented to situation trop 0.633 disoriented to time/disoriented to place/disoriented to situation disoriented to time/disoriented to place/disoriented to situation disoriented to time/disoriented to situation

## 2022-07-22 NOTE — ED ADULT TRIAGE NOTE - CHIEF COMPLAINT QUOTE
C/o fatigue, decrease in PO intake and decrease in ability to walk x3 weeks. C/o increase in urinary frequency. Denies dysuria. Endorses hematuria. Denies blood thinners.  Pt A&Ox3 but slow to answer. Denies CP, SOB, abdominal pain, NVD. PMH DM, HTN

## 2022-07-23 PROBLEM — E78.5 HYPERLIPIDEMIA, UNSPECIFIED: Chronic | Status: ACTIVE | Noted: 2018-08-16

## 2022-07-23 PROBLEM — N40.0 BENIGN PROSTATIC HYPERPLASIA WITHOUT LOWER URINARY TRACT SYMPTOMS: Chronic | Status: ACTIVE | Noted: 2018-08-16

## 2022-07-23 PROBLEM — I10 ESSENTIAL (PRIMARY) HYPERTENSION: Chronic | Status: ACTIVE | Noted: 2018-08-15

## 2022-07-23 PROBLEM — E11.9 TYPE 2 DIABETES MELLITUS WITHOUT COMPLICATIONS: Chronic | Status: ACTIVE | Noted: 2018-08-15

## 2022-07-23 NOTE — ED PROVIDER NOTE - OBJECTIVE STATEMENT
80 y/o M w/ pmhx of DM, HTN, HLD p/w 2 months of difficulty to walking. Pt also c/o increased urinary frequency. Pt lives alone and states his PCP Dr. Princess Panda sent him in to be eval'd by neuro. Pt denies recent falls. Denies f/c, n/v, cp, abd pain. Does state he has baseline sob.    used [id number] Elder. 82 y/o M w/ pmhx of DM, HTN, HLD p/w 2 months of difficulty to walking. Pt also c/o increased urinary frequency. Pt lives alone and states his PCP Dr. Princess Panda sent him in to be eval'd by neuro. Pt denies recent falls. Denies f/c, n/v, cp, abd pain. Does state he has baseline sob.    used [id number] Elder.    Attendinyo male presents with difficulty with ambulation for 2 months.  no fever or chills.  weakness in both legs and generalized weakness.  sent by doctor for evaluation.

## 2022-07-23 NOTE — ED PROVIDER NOTE - CHIEF COMPLAINT
29-Nov-2018 10:37 29-Nov-2018 10:40 The patient is a 81y Male complaining of multiple medical complaints.

## 2022-07-23 NOTE — H&P ADULT - NSHPREVIEWOFSYSTEMS_GEN_ALL_CORE
Gen: unclear loss of wt + loss of appetite  ENT: no dizziness no hearing loss  Ophth: no blurring of vision no loss of vision  Resp: No cough no sputum production  CVS: No chest pain no palpitations no orthopnea  GI: no nausea, vomiting or diarrhea   : see above HPI   Endo: polyuria no excessive sweating  Neuro: see above HPI no paresthesias  Heme: No petechiae no easy bruising  Msk: No joint pain no swelling  Skin: No rash no itching

## 2022-07-23 NOTE — ED PROVIDER NOTE - NS ED ROS FT
CONSTITUTIONAL - No fever, No diaphoresis, No weight change  SKIN - No rash  HEMATOLOGIC - No abnormal bleeding or bruising  EYES - No eye pain, No blurred vision  ENT - No change in hearing, No sore throat, No neck pain, No rhinorrhea, No ear pain  RESPIRATORY +baseline shortness of breath, No cough  CARDIAC -No chest pain, No palpitations  GI - No abdominal pain, No nausea, No vomiting, No diarrhea, No constipation  - No dysuria, no frequency, no hematuria.   MUSCULOSKELETAL - No joint pain, No swelling, No back pain  NEUROLOGIC - No numbness, +gen weakness, No headache, No dizziness

## 2022-07-23 NOTE — ED PROVIDER NOTE - PHYSICAL EXAMINATION
CONSTITUTIONAL: Well-developed; well-nourished; in no acute distress.   SKIN: warm, dry  HEAD: Normocephalic; atraumatic.  EYES: no conjunctival injection. PERRL.   ENT: No nasal discharge; airway clear.  NECK: Supple; non tender.  CARD: S1, S2 normal; no murmurs, gallops, or rubs. Regular rate and rhythm.   RESP: No wheezes, rales or rhonchi. Good air movement bilaterally.   ABD: soft ntnd, no guarding, no distention, no rigidity.   EXT: No cyanosis or edema.   NEURO: AOx3   PSYCH: Cooperative, appropriate.

## 2022-07-23 NOTE — CONSULT NOTE ADULT - ASSESSMENT
INCOMPLETE    Impression: chronic R corona radiata infarct; mechanism likely     Recommendations:  [] MRI brain without contrast, can be done inpatient vs. outpatient  [] TTE, can be done inpatient vs. outpatient  [] monitor on telemetry  [] consider ILR to assess for afib per STROKE-AF trial, can be done inpatient vs. outpatient  [] start ASA 81 mg PO daily   [] start atorvastatin 80mg PO daily (titrate to LDL < 70)   [] stroke risk factor modification and counseling   [] check HA1c, lipid panel  [] NPO until bedside dysphagia screen  [] Patient can follow up with Dr. Goyo Batista after discharge. Please instruct the patient to call [phone] to schedule an appointment within the next 2-3 days. Office is located at [address] [facility], [place] [address].    Case to be seen and discussed with Dr. Batista in AM   81M RH Malay speaking w/ PMH of DM2, HTN, HLD CAD s/p MI 2018 presents to the hospital after being sent in by PCP with 2 months of progressive difficulty to walking, found to have UTI and hyponatremia. Neurology consulted for CTH finding R chronic corona radiata infarct. CTH w/     Impression: L hemiparesis likely 2/2 subacute-chronic R corona radiata infarct; mechanism likely .     Recommendations:  [] MRI brain without contrast, can be done inpatient vs. outpatient  [] MRA head and neck OR CTA head and neck for vessel imaging  [] TTE, can be done inpatient vs. outpatient  [] monitor on telemetry  [] consider ILR to assess for afib per STROKE-AF trial, can be done inpatient vs. outpatient  [] start ASA 81 mg PO daily   [] start atorvastatin 80mg PO daily (titrate to LDL < 70)   [] stroke risk factor modification and counseling   [] check HA1c, lipid panel  [] NPO until bedside dysphagia screen  [] Patient can follow up with Dr. Goyo Batista after discharge. Please instruct the patient to call [phone] to schedule an appointment within the next 2-3 days. Office is located at [address] [place] [address].    Case to be seen and discussed with Dr. Batista in AM   81M RH French speaking w/ PMH of DM2, HTN, HLD CAD s/p MI 2018 presents to the hospital after being sent in by PCP with 2 months of progressive difficulty to walking, found to have UTI and hyponatremia. Neurology consulted for CTH finding R chronic corona radiata infarct. Neuro exam 4+/5 L hemiparesis, L facial droop    Impression: L hemiparesis likely 2/2 subacute-chronic R corona radiata infarct; mechanism likely .     Recommendations:  [] MRI brain without contrast, can be done inpatient vs. outpatient  [] MRA head and neck OR CTA head and neck for vessel imaging  [] TTE, can be done inpatient vs. outpatient  [] monitor on telemetry  [] consider ILR to assess for afib per STROKE-AF trial, can be done inpatient vs. outpatient  [] start ASA 81 mg PO daily   [] start atorvastatin 80mg PO daily (titrate to LDL < 70)   [] stroke risk factor modification and counseling   [] check HA1c, lipid panel  [] NPO until bedside dysphagia screen  [] Patient can follow up with Dr. Goyo Batista after discharge. Please instruct the patient to call [phone] to schedule an appointment within the next 2-3 days. Office is located at [address] [facility], [place] [address].    Case to be seen and discussed with Dr. Batista in AM   81M RH Ukrainian speaking w/ PMH of DM2, HTN, HLD CAD s/p MI 2018 presents to the hospital after being sent in by PCP with 2 months of progressive difficulty to walking, found to have UTI and hyponatremia. Neurology consulted for CTH finding R chronic corona radiata infarct. Neuro exam 4+/5 L hemiparesis, L facial droop    Impression: L hemiparesis likely 2/2 subacute-chronic R corona radiata infarct; mechanism ESUS vs.     Recommendations:  [] MRI brain without contrast, can be done inpatient vs. outpatient  [] MRA head and neck OR CTA head and neck for vessel imaging  [] TTE, can be done inpatient vs. outpatient  [] monitor on telemetry  [] consider ILR to assess for afib per STROKE-AF trial, can be done inpatient vs. outpatient  [] start ASA 81 mg PO daily   [] start atorvastatin 80mg PO daily (titrate to LDL < 70)   [] stroke risk factor modification and counseling   [] check HA1c, lipid panel  [] NPO until bedside dysphagia screen  [] Patient can follow up with Dr. Goyo Batista after discharge. Please instruct the patient to call [phone] to schedule an appointment within the next 2-3 days. Office is located at [address] [facility], [place] [address].    Case to be seen and discussed with Dr. Batista in AM

## 2022-07-23 NOTE — H&P ADULT - PROBLEM SELECTOR PLAN 4
patient does have trace edema but lung sounds clear  CXR negative pulmonary edema  does not look overtly fluid overloaded  echocardiogram repeat ordered  echocardiogram 2018 normal LV function

## 2022-07-23 NOTE — H&P ADULT - PROBLEM SELECTOR PLAN 1
HR > 90 WBC 35 source UTI  urine culture testing  blood cultures x 2   s/p IV Cipro  will switch to ceftriaxone IV  unlikely there will be cross allergy  trend WBC  of note WBC has been mildly elevated even in 2018

## 2022-07-23 NOTE — H&P ADULT - NSHPPHYSICALEXAM_GEN_ALL_CORE
PHYSICAL EXAM: vital signs noted on Sunrise  in no apparent distress  disheveled   HEENT: ADAM EOMI  Neck: Supple, no JVD, no thyromegaly  Lungs: no wheeze, no crackles  CVS: S1 S2 ejection systolic murmur +   Abdomen: no tenderness, no organomegaly, BS present  + reducible ventral hernia  Neuro: AO x 3 no focal weakness, no sensory abnormalities  all 4 limbs  left nasolabial fold flattening   Psych: depressed affect  Skin: warm, dry  Ext: no cyanosis or clubbing, no edema  Msk: no joint swelling or deformities  Back: no CVA tenderness, no kyphosis/scoliosis PHYSICAL EXAM: vital signs noted on Sunrise  in no apparent distress  disheveled   HEENT: ADAM EOMI  Neck: Supple, no JVD, no thyromegaly  Lungs: no wheeze, no crackles  CVS: S1 S2 ejection systolic murmur +   Abdomen: no tenderness, no organomegaly, BS present  + reducible ventral hernia  Neuro: AO x 3 no focal weakness, no sensory abnormalities  all 4 limbs  left nasolabial fold flattening   Psych: depressed affect  Skin: warm, dry  Ext: no cyanosis or clubbing, trace edema bilateral   Msk: no joint swelling or deformities  Back: no CVA tenderness, no kyphosis/scoliosis

## 2022-07-23 NOTE — ED ADULT NURSE REASSESSMENT NOTE - NS ED NURSE REASSESS COMMENT FT1
Report received from Jenny CORDOBA.  used, ID [id number] name: Alexandria. [email or web address], answering questions in questions. Unlabored, spontaneous respirations, NAD. Awaiting urine results at this time.

## 2022-07-23 NOTE — ED ADULT NURSE NOTE - NSIMPLEMENTINTERV_GEN_ALL_ED
Implemented All Fall Risk Interventions:  [place] to call system. Call bell, personal items and telephone within reach. Instruct patient to call for assistance. Room bathroom lighting operational. Non-slip footwear when patient is off stretcher. Physically safe environment: no spills, clutter or unnecessary equipment. Stretcher in lowest position, wheels locked, appropriate side rails in place. Provide visual cue, wrist band, yellow gown, etc. Monitor gait and stability. Monitor for mental status changes and reorient to person, place, and time. Review medications for side effects contributing to fall risk. Reinforce activity limits and safety measures with patient and family.

## 2022-07-23 NOTE — CONSULT NOTE ADULT - SUBJECTIVE AND OBJECTIVE BOX
NEPHROLOGY - NSN    Patient seen and examined.    HPI:   80 y/o M w/ PMH of DM2, HTN, HLD CAD s/p MI  presents to the hospital with 2 months of progressive difficulty to walking. He denies any particular insolated limb weakness. States his weakness is generalized and prince pronounced in his LE. Denies any back pain or incontinence or urinary retention. He does have increased urinary frequency but denies any dysuria fever or chills. No headache. He lives alone and usually is able to maintain his ADL. His PCP Dr. Jenny Panda was concerned about his whole health and social situation and sent him to be evaluated to the Emergency Department. The patient does state he has baseline shortness of breath and attributes to lifetime of smoking.       PAST MEDICAL & SURGICAL HISTORY:  Essential hypertension      Type 2 diabetes mellitus without complication, without long-term current use of insulin      HLD (hyperlipidemia)      BPH (benign prostatic hyperplasia)      CAD (coronary artery disease)      Benign bladder tumor  removed      History of colonoscopy      History of cataract extraction          MEDICATIONS  (STANDING):  atorvastatin 80 milliGRAM(s) Oral at bedtime  cyanocobalamin 1000 MICROGram(s) Oral daily  gabapentin 300 milliGRAM(s) Oral daily  losartan 25 milliGRAM(s) Oral daily  tamsulosin 0.8 milliGRAM(s) Oral at bedtime      Allergies    Advil (Hives)  penicillin (Hives)    Intolerances        SOCIAL HISTORY:  Denies alcohol abuse, drug abuse or tobacco usage.     FAMILY HISTORY:  No pertinent family history in first degree relatives        VITALS:  T(C): 36.6 (22 @ 12:34), Max: 36.6 (22 @ 12:34)  HR: 56 (22 @ 12:34) (55 - 95)  BP: 107/66 (22 @ 12:34) (101/61 - 129/47)  RR: 18 (22 @ 12:34) (15 - 18)  SpO2: 100% (22 @ 12:34) (97% - 100%)    REVIEW OF SYSTEMS:  Denies any nausea, vomiting, diarrhea, fever or chills. Denies chest pain, SOB, focal weakness, hematuria or dysuria. Good oral intake and denies fatigue or weakness. All other pertinent systems are reviewed and are negative.    PHYSICAL EXAM:  Constitutional: NAD  HEENT: EOMI  Neck:  No JVD, supple   Respiratory: CTA B/L  Cardiovascular: S1 and S2, RRR  Gastrointestinal: + BS, soft, NT, ND  Extremities: No peripheral edema, + peripheral pulses  Neurological: A/O x 3, CN2-12 intact  Psychiatric: Normal mood, normal affect  : No Villasenor  Skin: No rashes, C/D/I  Access: Not applicable    I and O's:    Height (cm): 165.1 ( @ 21:37)  Weight (kg): 56.7 ( @ 21:37)  BMI (kg/m2): 20.8 ( @ 21:37)  BSA (m2): 1.62 ( @ 21:37)    LABS:                        12.2   30.72 )-----------( 202      ( 2022 04:12 )             38.6         130<L>  |  89<L>  |  31<H>  ----------------------------<  163<H>  4.0   |  28  |  1.32<H>    Ca    9.4      2022 04:12    TPro  6.6  /  Alb  2.6<L>  /  TBili  1.2  /  DBili  x   /  AST  24  /  ALT  11  /  AlkPhos  126<H>        URINE:  Urinalysis Basic - ( 2022 07:05 )    Color: Yellow / Appearance: Slightly Turbid / S.025 / pH: x  Gluc: x / Ketone: Trace  / Bili: Small / Urobili: 4 mg/dL   Blood: x / Protein: 30 mg/dL / Nitrite: Negative   Leuk Esterase: Moderate / RBC: 55 /hpf / WBC 9 /HPF   Sq Epi: x / Non Sq Epi: 5 /hpf / Bacteria: Negative        RADIOLOGY & ADDITIONAL STUDIES:     NEPHROLOGY - NSN    Patient seen and examined.    HPI:   82 y/o M w/ PMH of DM2, HTN, HLD CAD s/p MI  presents to the hospital with 2 months of progressive difficulty to walking. He denies any particular insolated limb weakness. States his weakness is generalized and prince pronounced in his LE. Denies any back pain or incontinence or urinary retention. He does have increased urinary frequency but denies any dysuria fever or chills. No headache. He lives alone and usually is able to maintain his ADL. His PCP Dr. Jenny Panda was concerned about his whole health and social situation and sent him to be evaluated to the Emergency Department. The patient does state he has baseline shortness of breath and attributes to lifetime of smoking.(no longer smokes)  There is no hematuria or bubbles in the urine.  No history of NSAIDS or nephrolithisis.  The patient urinates once or twice in the night and there is no incontinence.  No family hx or renal disease or back pain.    No recent abx use.  No alleviating or aggravating factors with respect to the kidneys.     PAST MEDICAL & SURGICAL HISTORY:  Essential hypertension      Type 2 diabetes mellitus without complication, without long-term current use of insulin      HLD (hyperlipidemia)      BPH (benign prostatic hyperplasia)      CAD (coronary artery disease)      Benign bladder tumor  removed      History of colonoscopy      History of cataract extraction          MEDICATIONS  (STANDING):  atorvastatin 80 milliGRAM(s) Oral at bedtime  cyanocobalamin 1000 MICROGram(s) Oral daily  gabapentin 300 milliGRAM(s) Oral daily  losartan 25 milliGRAM(s) Oral daily  tamsulosin 0.8 milliGRAM(s) Oral at bedtime      Allergies    Advil (Hives)  penicillin (Hives)    Intolerances        SOCIAL HISTORY:  Denies alcohol abuse, drug abuse or tobacco usage.     FAMILY HISTORY:  No pertinent family history in first degree relatives        VITALS:  T(C): 36.6 (22 @ 12:34), Max: 36.6 (22 @ 12:34)  HR: 56 (22 @ 12:34) (55 - 95)  BP: 107/66 (22 @ 12:34) (101/61 - 129/47)  RR: 18 (22 @ 12:34) (15 - 18)  SpO2: 100% (22 @ 12:34) (97% - 100%)    REVIEW OF SYSTEMS:  Denies any nausea, vomiting, diarrhea, fever or chills.+  fatigue or weakness. All other pertinent systems are reviewed and are negative.    PHYSICAL EXAM:  Constitutional: NAD  HEENT: EOMI  Neck:  No JVD, supple   Respiratory: CTA B/L  Cardiovascular: S1 and S2, RRR  Gastrointestinal: + BS, soft, NT, ND  Extremities: No peripheral edema, + peripheral pulses  Neurological: A/O x 3, CN2-12 intact  Psychiatric: Normal mood, normal affect  : No Villasenor  Skin: No rashes, C/D/I  Access: Not applicable    I and O's:    Height (cm): 165.1 ( @ 21:37)  Weight (kg): 56.7 ( @ :37)  BMI (kg/m2): 20.8 ( @ :37)  BSA (m2): 1.62 ( @ 21:37)    LABS:                        12.2   30.72 )-----------(       ( 2022 04:12 )             38.6         130<L>  |  89<L>  |  31<H>  ----------------------------<  163<H>  4.0   |  28  |  1.32<H>    Ca    9.4      2022 04:12    TPro  6.6  /  Alb  2.6<L>  /  TBili  1.2  /  DBili  x   /  AST  24  /  ALT  11  /  AlkPhos  126<H>        URINE:  Urinalysis Basic - ( 2022 07:05 )    Color: Yellow / Appearance: Slightly Turbid / S.025 / pH: x  Gluc: x / Ketone: Trace  / Bili: Small / Urobili: 4 mg/dL   Blood: x / Protein: 30 mg/dL / Nitrite: Negative   Leuk Esterase: Moderate / RBC: 55 /hpf / WBC 9 /HPF   Sq Epi: x / Non Sq Epi: 5 /hpf / Bacteria: Negative        RADIOLOGY & ADDITIONAL STUDIES:    < from: Xray Chest 1 View AP/PA (22 @ 04:50) >    ACC: [id number] EXAM:  XR CHEST AP OR PA 1V                          PROCEDURE DATE:  2022          INTERPRETATION:  INDICATION: Shortness of breath    COMPARISON: Radiograph chest 8/15/2018    FINDINGS:  Heart/Vascular: Heart size is poorly assessed on this projection  Pulmonary: The lungs are clear. No pleural effusion. No pneumothorax.  Bones: No acute osseous abnormalities    IMPRESSION:  Clear lungs.    --- End of Report ---           CY WOOD MD; Resident Radiologist  This document has been electronically signed.  PIA COTTON MD; Attending Radiologist  This document has been electronically signed. Jul 2    < end of copied text >

## 2022-07-23 NOTE — ED PROVIDER NOTE - CLINICAL SUMMARY MEDICAL DECISION MAKING FREE TEXT BOX
[facility]edra DO PGY-3: pt p/w 2 months of difficulty walking. Sent in by pcp for neuro eval. Pt c/o increasing urination. Will obtain CT head non-con as sx are not acute. Will eval for infectious vs electrolyte abnormalities as well. PRobable admission

## 2022-07-23 NOTE — CONSULT NOTE ADULT - ASSESSMENT
82 y/o M w/ PMH of DM2, HTN, HLD CAD s/p MI 2018 presents to the hospital with 2 months of progressive difficulty to walking  Hyponatremia   COPD   Elevated WBC count - Rule out sepsis         80 y/o M w/ PMH of DM2, HTN, HLD CAD s/p MI 2018 presents to the hospital with 2 months of progressive difficulty to walking  Hyponatremia - Euvlemic  COPD   Elevated WBC count - Rule out sepsis;  Cancer?      1 Renal- Only bloods i could find were from 2018 and the creatinine was 0.6.  Is the hyponatremia from poor PO intake or ADH excessive state   CHeck TSH   Check urine lytes   2 -Bladder scan now   3 ID - Pan cx at present but he does not seem to be infected   4 CVS-DC cozaar as the BP is soft   Start IVF NS at 50cc/hr     Sayed [facility]   [phone]

## 2022-07-23 NOTE — H&P ADULT - NSICDXPASTSURGICALHX_GEN_ALL_CORE_FT
PAST SURGICAL HISTORY:  Benign bladder tumor removed    History of cataract extraction     History of colonoscopy

## 2022-07-23 NOTE — H&P ADULT - PROBLEM SELECTOR PLAN 2
unclear etiology  no focal neuro weakness   neuro consultation appreciated  left nasolabial flattening ? chronic  CT positive for right lacunar infarct unclear age  MRI brain ordered  follow neuro recommendations

## 2022-07-23 NOTE — ED ADULT NURSE NOTE - OBJECTIVE STATEMENT
81y Male AOx4 with PMH of DM, HTN, HLD presents to the ED for multiple complaints. Pt states he is having difficulty ambulating for x2 months, f/u with his PCP, who referred pt to ED for neuro evaluation. Also endorses increased urinary frequency. States he has SOB at baseline, has not worsened.  PERRL, strong extremity strength b/l. Denies N/V, fever/chills, chest pain. Spontaneous/unlabored respirations, speaking in full sentences. Side rails up, bed in lowest position, safety maintained.  used - #[id number]

## 2022-07-23 NOTE — CONSULT NOTE ADULT - SUBJECTIVE AND OBJECTIVE BOX
HPI:   80 y/o M w/ PMH of DM2, HTN, HLD CAD s/p MI 2018 presents to the hospital with 2 months of progressive difficulty to walking. He denies any particular insolated limb weakness. States his weakness is generalized and prince pronounced in his LE. Denies any back pain or incontinence or urinary retention. He does have increased urinary frequency but denies any dysuria fever or chills. No headache. He lives alone and usually is able to maintain his ADL. His PCP Dr. Jenny Panda was concerned about his whole health and social situation and sent him to be evaluated to the Emergency Department. The patient does state he has baseline shortness of breath and attributes to lifetime of smoking.  (2022 14:13)    Neurology consulted for CTH finding R chronic corona radiata infarct.    REVIEW OF SYSTEMS      A 10-system ROS was performed and is negative except for those items noted above and/or in the HPI.    PAST MEDICAL & SURGICAL HISTORY:  Essential hypertension      Type 2 diabetes mellitus without complication, without long-term current use of insulin      HLD (hyperlipidemia)      BPH (benign prostatic hyperplasia)      CAD (coronary artery disease)      Benign bladder tumor  removed      History of colonoscopy      History of cataract extraction        FAMILY HISTORY:  No pertinent family history in first degree relatives      SOCIAL HISTORY:   T/E/D:   Occupation:   Lives with:     MEDICATIONS (HOME):  Home Medications:  Amitiza 24 mcg oral capsule: 1 cap(s) orally once a day (2022 13:55)  atorvastatin 80 mg oral tablet: 1 tab(s) orally once a day (at bedtime) (2022 13:55)  gabapentin 300 mg oral capsule: 1 cap(s) orally once a day (2022 13:55)  Kerendia 10 mg oral tablet: 1 tab(s) orally once a day (2022 13:55)  losartan 25 mg oral tablet: 0.5 tab(s) orally 2 times a day  (pharmacy dispensed directions 1 tab once a day) (2022 13:55)  Prandin 2 mg oral tablet: 1 tab(s) orally 2 times a day (2022 13:55)  tamsulosin 0.4 mg oral capsule: 1 cap(s) orally 2 times a day    (pharmacy dispensed directions 1 cap once a day) (2022 13:55)  Tylenol: as needed (2022 13:55)  Vitamin B12: 1 tab(s) orally once a day (2022 13:55)    MEDICATIONS  (STANDING):  atorvastatin 80 milliGRAM(s) Oral at bedtime  cefTRIAXone   IVPB 1000 milliGRAM(s) IV Intermittent every 24 hours  cyanocobalamin 1000 MICROGram(s) Oral daily  dextrose 5%. 1000 milliLiter(s) (100 mL/Hr) IV Continuous <Continuous>  dextrose 5%. 1000 milliLiter(s) (50 mL/Hr) IV Continuous <Continuous>  dextrose 50% Injectable 25 Gram(s) IV Push once  dextrose 50% Injectable 12.5 Gram(s) IV Push once  dextrose 50% Injectable 25 Gram(s) IV Push once  gabapentin 300 milliGRAM(s) Oral daily  glucagon  Injectable 1 milliGRAM(s) IntraMuscular once  heparin   Injectable 5000 Unit(s) SubCutaneous every 12 hours  insulin lispro (ADMELOG) corrective regimen sliding scale   SubCutaneous three times a day before meals  losartan 25 milliGRAM(s) Oral daily  sodium chloride 0.9%. 1000 milliLiter(s) (50 mL/Hr) IV Continuous <Continuous>  tamsulosin 0.8 milliGRAM(s) Oral at bedtime    MEDICATIONS  (PRN):  acetaminophen     Tablet .. 650 milliGRAM(s) Oral every 6 hours PRN Temp greater or equal to 38C (100.4F), Mild Pain (1 - 3)  dextrose Oral Gel 15 Gram(s) Oral once PRN Blood Glucose LESS THAN 70 milliGRAM(s)/deciliter    ALLERGIES/INTOLERANCES:  Allergies  Advil (Hives)  penicillin (Hives)    Intolerances    VITALS & EXAMINATION:  Vital Signs Last 24 Hrs  T(C): 36.6 (2022 12:34), Max: 36.6 (2022 12:34)  T(F): 97.8 (2022 12:34), Max: 97.8 (2022 12:34)  HR: 56 (2022 12:34) (55 - 95)  BP: 107/66 (2022 12:34) (101/61 - 129/47)  BP(mean): 77 (2022 12:34) (70 - 84)  RR: 18 (2022 12:34) (15 - 18)  SpO2: 100% (2022 12:34) (97% - 100%)    Parameters below as of 2022 12:34  Patient On (Oxygen Delivery Method): room air      General:  Constitutional: Obese Male, appears stated age, in no apparent distress including pain  Head: Normocephalic & atraumatic.  ENT: Patent ear canals, intact TM, mucus membranes moist & pink, neck supple, no lymphadenopathy.   Respiratory: Patent airway. All lung fields are clear to auscultation bilaterally.  Extremities: No cyanosis, clubbing, or edema.  Skin: No rashes, bruising, or discoloration.    Cardiovascular (>2): RRR no murmurs. Carotid pulsations symmetric, no bruits. Normal capillary beds refill, 1-2 seconds or less.     Neurological (>12):  MS: Awake, alert, oriented to person, place, situation, time. Normal affect. Follows all commands.    Language: Speech is clear, fluent with good repetition & comprehension (able to name objects___)    CNs: PERRLA (R = 3mm, L = 3mm). VFF. EOMI no nystagmus, no diplopia. V1-3 intact to LT/pinprick, well developed masseter muscles b/l. No facial asymmetry b/l, full eye closure strength b/l. Hearing grossly normal (rubbing fingers) b/l. Symmetric palate elevation in midline. Gag reflex deferred. Head turning & shoulder shrug intact b/l. Tongue midline, normal movements, no atrophy.    Fundoscopic: pale w/ sharp discs margins No vascular changes.      Motor: Normal muscle bulk & tone. No noticeable tremor or seizure. No pronator drift.              Deltoid	Biceps	Triceps	Wrist	Finger ABd	   R	5	5	5	5	5		5 	  L	5	5	5	5	5		5    	H-Flex	H-Ext	H-ABd	H-ADd	K-Flex	K-Ext	D-Flex	P-Flex  R	5	5	5	5	5	5	5	5 	   L	5	5	5	5	5	5	5	5	     Sensation: Intact to LT/PP/Temp/Vibration/Position b/l throughout.     Cortical: Extinction on DSS (neglect): none    Reflexes:              Biceps(C5)       BR(C6)     Triceps(C7)               Patellar(L4)    Achilles(S1)    Plantar Resp  R	2	          2	             2		        2		    2		Down   L	2	          2	             2		        2		    2		Down     Coordination: intact rapid-alt movements. No dysmetria to FTN/HTS    Gait: Normal Romberg. No postural instability. Normal stance and tandem gait.     LABORATORY:  CBC                       12.2   30.72 )-----------( 202      ( 2022 04:12 )             38.6     Chem     130<L>  |  89<L>  |  31<H>  ----------------------------<  163<H>  4.0   |  28  |  1.32<H>    Ca    9.4      2022 04:12    TPro  6.6  /  Alb  2.6<L>  /  TBili  1.2  /  DBili  x   /  AST  24  /  ALT  11  /  AlkPhos  126<H>      LFTs LIVER FUNCTIONS - ( 2022 04:12 )  Alb: 2.6 g/dL / Pro: 6.6 g/dL / ALK PHOS: 126 U/L / ALT: 11 U/L / AST: 24 U/L / GGT: x           U/A Urinalysis Basic - ( 2022 07:05 )    Color: Yellow / Appearance: Slightly Turbid / S.025 / pH: x  Gluc: x / Ketone: Trace  / Bili: Small / Urobili: 4 mg/dL   Blood: x / Protein: 30 mg/dL / Nitrite: Negative   Leuk Esterase: Moderate / RBC: 55 /hpf / WBC 9 /HPF   Sq Epi: x / Non Sq Epi: 5 /hpf / Bacteria: Negative      CSF  Immunological  Other    STUDIES & IMAGING:  Studies (EKG, EEG, EMG, etc):     Radiology (XR, CT, MR, U/S, TTE/ARTEMIO):    < from: CT Head No Cont (22 @ 04:38) >  FINDINGS:  No CT evidence of acute intracranial hemorrhage, subdural collection,   vasogenic edema, mass effect or large acute territorial infarction.    There is a small age indeterminate white matter infarct in the right   corona radiata (2:21-23, 601:35, and 602:16-17).    There is mild to moderate generalized cerebral volume loss and patchy   periventricular white matter hypoattenuation compatible chronic   microvascular ischemic disease.    The paranasal sinuses are grossly clear.    There is nonspecific trace opacification of a few right mastoid air   cells.  The left mastoid is clear.    The patient is status post cataract lens placement surgery bilaterally.    The calvarium and skull base appear within normal limits.    IMPRESSION:  Small age indeterminate white matter infarct in the right corona radiata.    Follow-up MRI may be obtained for further evaluation    < end of copied text >   HPI:  81M RH [place] speaking w/ PMH of DM2, HTN, HLD CAD s/p MI  presents to the hospital with 2 months of progressive difficulty to walking. He denies any particular insolated limb weakness. States his weakness is generalized and prince pronounced in his LE. Denies any back pain or incontinence or urinary retention. He does have increased urinary frequency but denies any dysuria fever or chills. No headache. He lives alone and usually is able to maintain his ADL. His PCP Dr. Jenny Panda was concerned about his whole health and social situation and sent him to be evaluated to the Emergency Department. The patient does state he has baseline shortness of breath and attributes to lifetime of smoking.  (2022 14:13)    Neurology consulted for CTH finding R chronic corona radiata infarct. Pt reports that his difficulty walking is mostly due to generalized weakness and fatigue and he did not notice any lateralizing weakness. Pt denies any known hx of stroke. Not on any AC/AP at home. Pt with extensive smoking hx.     REVIEW OF SYSTEMS      A 10-system ROS was performed and is negative except for those items noted above and/or in the HPI.    PAST MEDICAL & SURGICAL HISTORY:  Essential hypertension      Type 2 diabetes mellitus without complication, without long-term current use of insulin      HLD (hyperlipidemia)      BPH (benign prostatic hyperplasia)      CAD (coronary artery disease)      Benign bladder tumor  removed      History of colonoscopy      History of cataract extraction        FAMILY HISTORY:  No pertinent family history in first degree relatives      SOCIAL HISTORY:   T/E/D:   Occupation:   Lives with:     MEDICATIONS (HOME):  Home Medications:  Amitiza 24 mcg oral capsule: 1 cap(s) orally once a day (2022 13:55)  atorvastatin 80 mg oral tablet: 1 tab(s) orally once a day (at bedtime) (2022 13:55)  gabapentin 300 mg oral capsule: 1 cap(s) orally once a day (2022 13:55)  Kerendia 10 mg oral tablet: 1 tab(s) orally once a day (2022 13:55)  losartan 25 mg oral tablet: 0.5 tab(s) orally 2 times a day  (pharmacy dispensed directions 1 tab once a day) (2022 13:55)  Prandin 2 mg oral tablet: 1 tab(s) orally 2 times a day (2022 13:55)  tamsulosin 0.4 mg oral capsule: 1 cap(s) orally 2 times a day    (pharmacy dispensed directions 1 cap once a day) (2022 13:55)  Tylenol: as needed (2022 13:55)  Vitamin B12: 1 tab(s) orally once a day (2022 13:55)    MEDICATIONS  (STANDING):  atorvastatin 80 milliGRAM(s) Oral at bedtime  cefTRIAXone   IVPB 1000 milliGRAM(s) IV Intermittent every 24 hours  cyanocobalamin 1000 MICROGram(s) Oral daily  dextrose 5%. 1000 milliLiter(s) (100 mL/Hr) IV Continuous <Continuous>  dextrose 5%. 1000 milliLiter(s) (50 mL/Hr) IV Continuous <Continuous>  dextrose 50% Injectable 25 Gram(s) IV Push once  dextrose 50% Injectable 12.5 Gram(s) IV Push once  dextrose 50% Injectable 25 Gram(s) IV Push once  gabapentin 300 milliGRAM(s) Oral daily  glucagon  Injectable 1 milliGRAM(s) IntraMuscular once  heparin   Injectable 5000 Unit(s) SubCutaneous every 12 hours  insulin lispro (ADMELOG) corrective regimen sliding scale   SubCutaneous three times a day before meals  losartan 25 milliGRAM(s) Oral daily  sodium chloride 0.9%. 1000 milliLiter(s) (50 mL/Hr) IV Continuous <Continuous>  tamsulosin 0.8 milliGRAM(s) Oral at bedtime    MEDICATIONS  (PRN):  acetaminophen     Tablet .. 650 milliGRAM(s) Oral every 6 hours PRN Temp greater or equal to 38C (100.4F), Mild Pain (1 - 3)  dextrose Oral Gel 15 Gram(s) Oral once PRN Blood Glucose LESS THAN 70 milliGRAM(s)/deciliter    ALLERGIES/INTOLERANCES:  Allergies  Advil (Hives)  penicillin (Hives)    Intolerances    VITALS & EXAMINATION:  Vital Signs Last 24 Hrs  T(C): 36.6 (2022 12:34), Max: 36.6 (2022 12:34)  T(F): 97.8 (2022 12:34), Max: 97.8 (2022 12:34)  HR: 56 (2022 12:34) (55 - 95)  BP: 107/66 (2022 12:34) (101/61 - 129/47)  BP(mean): 77 (2022 12:34) (70 - 84)  RR: 18 (2022 12:34) (15 - 18)  SpO2: 100% (2022 12:34) (97% - 100%)    Parameters below as of 2022 12:34  Patient On (Oxygen Delivery Method): room air      General:  Constitutional: Male, appears stated age, in no apparent distress including pain  Head: Normocephalic & atraumatic.  Respiratory: Slightly increased work of breathing at rest when speaking  Extremities: No cyanosis, clubbing, or edema.  Skin: No rashes, bruising, or discoloration.    Neurological (>12):  MS: Eyes open to voice, oriented to person, place, situation, time. Normal affect. Follows all commands.    Language: Speech is clear, fluent with good repetition & comprehension (able to name objects mask, thumb)    CNs: VFF. EOMI no nystagmus, no diplopia; slightly L eye exotropia on orthophoric gaze. V1-3 intact to LT, well developed masseter muscles b/l. L facial droop, full eye closure strength b/l. Hearing grossly normal (rubbing fingers) b/l. Symmetric palate elevation in midline. Gag reflex deferred. Head turning & shoulder shrug intact b/l. Tongue midline, normal movements, no atrophy.    Motor: Normal muscle bulk & tone. No noticeable tremor or seizure.                Deltoid	Biceps	Triceps	Wrist	Finger ABd	   R	5	5	5				5 	  L	4	4+	4				5    	H-Flex	K-Flex	K-Ext	D-Flex	P-Flex  R	5					   L	4+					 B/l LE with drift (L>R in 5 seconds)    Sensation: Intact to LT b/l throughout.     Cortical: Extinction on DSS (neglect): none    Reflexes:              Biceps(C5)       BR(C6)     Triceps(C7)               Patellar(L4)    Achilles(S1)    Plantar Resp  R	0	          1	             0		        0		    0		mute  L	0	          1	             0		        0		    0		mute     Coordination: No dysmetria to FTN, although exam limited slightly by pt cooperation    Gait: deferred due to pt refusal    LABORATORY:  CBC                       12.2   30.72 )-----------( 202      ( 2022 04:12 )             38.6     Chem 07-23    130<L>  |  89<L>  |  31<H>  ----------------------------<  163<H>  4.0   |  28  |  1.32<H>    Ca    9.4      2022 04:12    TPro  6.6  /  Alb  2.6<L>  /  TBili  1.2  /  DBili  x   /  AST  24  /  ALT  11  /  AlkPhos  126<H>  07-23    LFTs LIVER FUNCTIONS - ( 2022 04:12 )  Alb: 2.6 g/dL / Pro: 6.6 g/dL / ALK PHOS: 126 U/L / ALT: 11 U/L / AST: 24 U/L / GGT: x           U/A Urinalysis Basic - ( 2022 07:05 )    Color: Yellow / Appearance: Slightly Turbid / S.025 / pH: x  Gluc: x / Ketone: Trace  / Bili: Small / Urobili: 4 mg/dL   Blood: x / Protein: 30 mg/dL / Nitrite: Negative   Leuk Esterase: Moderate / RBC: 55 /hpf / WBC 9 /HPF   Sq Epi: x / Non Sq Epi: 5 /hpf / Bacteria: Negative      STUDIES & IMAGING:  Studies (EKG, EEG, EMG, etc):     Radiology (XR, CT, MR, U/S, TTE/ARTEMIO):    < from: CT Head No Cont (22 @ 04:38) >  FINDINGS:  No CT evidence of acute intracranial hemorrhage, subdural collection,   vasogenic edema, mass effect or large acute territorial infarction.    There is a small age indeterminate white matter infarct in the right   corona radiata (2:21-23, 601:35, and 602:16-17).    There is mild to moderate generalized cerebral volume loss and patchy   periventricular white matter hypoattenuation compatible chronic   microvascular ischemic disease.    The paranasal sinuses are grossly clear.    There is nonspecific trace opacification of a few right mastoid air   cells.  The left mastoid is clear.    The patient is status post cataract lens placement surgery bilaterally.    The calvarium and skull base appear within normal limits.    IMPRESSION:  Small age indeterminate white matter infarct in the right corona radiata.    Follow-up MRI may be obtained for further evaluation    < end of copied text >   HPI:  81M RH Maldivian speaking w/ PMH of DM2, HTN, HLD CAD s/p MI  presents to the hospital with 2 months of progressive difficulty to walking. He denies any particular insolated limb weakness. States his weakness is generalized and prince pronounced in his LE. Denies any back pain or incontinence or urinary retention. He does have increased urinary frequency but denies any dysuria fever or chills. No headache. He lives alone and usually is able to maintain his ADL. His PCP Dr. Jenny Panda was concerned about his whole health and social situation and sent him to be evaluated to the Emergency Department. The patient does state he has baseline shortness of breath and attributes to lifetime of smoking.  (2022 14:13)    Neurology consulted for CTH finding R chronic corona radiata infarct. Pt reports that his difficulty walking is mostly due to generalized weakness and fatigue and he did not notice any lateralizing weakness. Pt denies any known hx of stroke. Not on any AC/AP at home. Pt with extensive smoking hx.     NIHSS 3  preMRS 1    REVIEW OF SYSTEMS      A 10-system ROS was performed and is negative except for those items noted above and/or in the HPI.    PAST MEDICAL & SURGICAL HISTORY:  Essential hypertension      Type 2 diabetes mellitus without complication, without long-term current use of insulin      HLD (hyperlipidemia)      BPH (benign prostatic hyperplasia)      CAD (coronary artery disease)      Benign bladder tumor  removed      History of colonoscopy      History of cataract extraction        FAMILY HISTORY:  No pertinent family history in first degree relatives      SOCIAL HISTORY:   T/E/D:   Occupation:   Lives with:     MEDICATIONS (HOME):  Home Medications:  Amitiza 24 mcg oral capsule: 1 cap(s) orally once a day (2022 13:55)  atorvastatin 80 mg oral tablet: 1 tab(s) orally once a day (at bedtime) (2022 13:55)  gabapentin 300 mg oral capsule: 1 cap(s) orally once a day (2022 13:55)  Kerendia 10 mg oral tablet: 1 tab(s) orally once a day (2022 13:55)  losartan 25 mg oral tablet: 0.5 tab(s) orally 2 times a day  (pharmacy dispensed directions 1 tab once a day) (2022 13:55)  Prandin 2 mg oral tablet: 1 tab(s) orally 2 times a day (2022 13:55)  tamsulosin 0.4 mg oral capsule: 1 cap(s) orally 2 times a day    (pharmacy dispensed directions 1 cap once a day) (2022 13:55)  Tylenol: as needed (2022 13:55)  Vitamin B12: 1 tab(s) orally once a day (2022 13:55)    MEDICATIONS  (STANDING):  atorvastatin 80 milliGRAM(s) Oral at bedtime  cefTRIAXone   IVPB 1000 milliGRAM(s) IV Intermittent every 24 hours  cyanocobalamin 1000 MICROGram(s) Oral daily  dextrose 5%. 1000 milliLiter(s) (100 mL/Hr) IV Continuous <Continuous>  dextrose 5%. 1000 milliLiter(s) (50 mL/Hr) IV Continuous <Continuous>  dextrose 50% Injectable 25 Gram(s) IV Push once  dextrose 50% Injectable 12.5 Gram(s) IV Push once  dextrose 50% Injectable 25 Gram(s) IV Push once  gabapentin 300 milliGRAM(s) Oral daily  glucagon  Injectable 1 milliGRAM(s) IntraMuscular once  heparin   Injectable 5000 Unit(s) SubCutaneous every 12 hours  insulin lispro (ADMELOG) corrective regimen sliding scale   SubCutaneous three times a day before meals  losartan 25 milliGRAM(s) Oral daily  sodium chloride 0.9%. 1000 milliLiter(s) (50 mL/Hr) IV Continuous <Continuous>  tamsulosin 0.8 milliGRAM(s) Oral at bedtime    MEDICATIONS  (PRN):  acetaminophen     Tablet .. 650 milliGRAM(s) Oral every 6 hours PRN Temp greater or equal to 38C (100.4F), Mild Pain (1 - 3)  dextrose Oral Gel 15 Gram(s) Oral once PRN Blood Glucose LESS THAN 70 milliGRAM(s)/deciliter    ALLERGIES/INTOLERANCES:  Allergies  Advil (Hives)  penicillin (Hives)    Intolerances    VITALS & EXAMINATION:  Vital Signs Last 24 Hrs  T(C): 36.6 (2022 12:34), Max: 36.6 (2022 12:34)  T(F): 97.8 (2022 12:34), Max: 97.8 (2022 12:34)  HR: 56 (2022 12:34) (55 - 95)  BP: 107/66 (2022 12:34) (101/61 - 129/47)  BP(mean): 77 (2022 12:34) (70 - 84)  RR: 18 (2022 12:34) (15 - 18)  SpO2: 100% (2022 12:34) (97% - 100%)    Parameters below as of 2022 12:34  Patient On (Oxygen Delivery Method): room air      General:  Constitutional: Male, appears stated age, in no apparent distress including pain  Head: Normocephalic & atraumatic.  Respiratory: Slightly increased work of breathing at rest when speaking  Extremities: No cyanosis, clubbing, or edema.  Skin: No rashes, bruising, or discoloration.    Neurological (>12):  MS: Eyes open to voice, oriented to person, place, situation, time. Normal affect. Follows all commands.    Language: Speech is clear, fluent with good repetition & comprehension (able to name objects mask, thumb)    CNs: VFF. EOMI no nystagmus, no diplopia; slightly L eye exotropia on orthophoric gaze. V1-3 intact to LT, well developed masseter muscles b/l. L facial droop, full eye closure strength b/l. Hearing grossly normal (rubbing fingers) b/l. Symmetric palate elevation in midline. Gag reflex deferred. Head turning & shoulder shrug intact b/l. Tongue midline, normal movements, no atrophy.    Motor: Normal muscle bulk & tone. No noticeable tremor or seizure.                Deltoid	Biceps	Triceps	Wrist	Finger ABd	   R	5	5	5				5 	  L	4	4+	4				5    	H-Flex	K-Flex	K-Ext	D-Flex	P-Flex  R	5					   L	4+					 B/l LE with drift (L>R in 5 seconds)    Sensation: Intact to LT b/l throughout.     Cortical: Extinction on DSS (neglect): none    Reflexes:              Biceps(C5)       BR(C6)     Triceps(C7)               Patellar(L4)    Achilles(S1)    Plantar Resp  R	0	          1	             0		        0		    0		mute  L	0	          1	             0		        0		    0		mute     Coordination: No dysmetria to FTN, although exam limited slightly by pt cooperation    Gait: deferred due to pt refusal    LABORATORY:  CBC                       12.2   30.72 )-----------( 202      ( 2022 04:12 )             38.6     Chem 07-23    130<L>  |  89<L>  |  31<H>  ----------------------------<  163<H>  4.0   |  28  |  1.32<H>    Ca    9.4      2022 04:12    TPro  6.6  /  Alb  2.6<L>  /  TBili  1.2  /  DBili  x   /  AST  24  /  ALT  11  /  AlkPhos  126<H>  07-    LFTs LIVER FUNCTIONS - ( 2022 04:12 )  Alb: 2.6 g/dL / Pro: 6.6 g/dL / ALK PHOS: 126 U/L / ALT: 11 U/L / AST: 24 U/L / GGT: x           U/A Urinalysis Basic - ( 2022 07:05 )    Color: Yellow / Appearance: Slightly Turbid / S.025 / pH: x  Gluc: x / Ketone: Trace  / Bili: Small / Urobili: 4 mg/dL   Blood: x / Protein: 30 mg/dL / Nitrite: Negative   Leuk Esterase: Moderate / RBC: 55 /hpf / WBC 9 /HPF   Sq Epi: x / Non Sq Epi: 5 /hpf / Bacteria: Negative      STUDIES & IMAGING:  Studies (EKG, EEG, EMG, etc):     Radiology (XR, CT, MR, U/S, TTE/ARTEMIO):    < from: CT Head No Cont (22 @ 04:38) >  FINDINGS:  No CT evidence of acute intracranial hemorrhage, subdural collection,   vasogenic edema, mass effect or large acute territorial infarction.    There is a small age indeterminate white matter infarct in the right   corona radiata (2:21-23, 601:35, and 602:16-17).    There is mild to moderate generalized cerebral volume loss and patchy   periventricular white matter hypoattenuation compatible chronic   microvascular ischemic disease.    The paranasal sinuses are grossly clear.    There is nonspecific trace opacification of a few right mastoid air   cells.  The left mastoid is clear.    The patient is status post cataract lens placement surgery bilaterally.    The calvarium and skull base appear within normal limits.    IMPRESSION:  Small age indeterminate white matter infarct in the right corona radiata.    Follow-up MRI may be obtained for further evaluation    < end of copied text >

## 2022-07-23 NOTE — H&P ADULT - HISTORY OF PRESENT ILLNESS
82 y/o M w/ PMH of DM2, HTN, HLD CAD s/p MI 2018 presents to the hospital with 2 months of progressive difficulty to walking. He denies any particular insolated limb weakness. States his weakness is generalized and prince pronounced in his LE. Denies any back pain or incontinence or urinary retention. He does have increased urinary frequency but denies any dysuria fever or chills. No headache. He lives alone and usually is able to maintain his ADL. His PCP Dr. Jenny Panda was concerned about his whole health and social situation and sent him to be evaluated to the Emergency Department. The patient does state he has baseline shortness of breath and attributes to lifetime of smoking.

## 2022-07-23 NOTE — ED PROVIDER NOTE - CADM POA CENTRAL LINE
GENERAL: well appearing in no acute distress, non-toxic appearing  HEAD: normocephalic, atraumatic  HEENT: normal conjunctiva, oral mucosa moist, uvula midline, no tonsilar exudates, neck supple, no JVD  CARDIAC: regular rate and rhythm, normal S1S2, no appreciable murmurs, 2+ pulses in UE/LE b/l  PULM: normal breath sounds, clear to ascultation bilaterally, no rales, rhonchi, wheezing  GI: abdomen nondistended, soft, nontender, no guarding, rebound tenderness  : no CVA tenderness b/l, no suprapubic tenderness  NEURO: no focal motor or sensory deficits, CN2-12 intact, normal speech, PERRLA, EOMI, normal gait, AAOx3  MSK: Point Tenderness right upper back/right chest   SKIN: well-perfused, extremities warm, no visible rashes  PSYCH: appropriate mood and affect No GENERAL: well appearing in no acute distress, non-toxic appearing  HEAD: normocephalic, atraumatic  HEENT: NC/AT, normal conjunctiva, PERRL b/l, EOMI, oral mucosa moist, uvula midline, no tonsilar exudates, neck supple, no JVD  CARDIAC: regular rate and rhythm, normal S1S2, no appreciable murmurs, 2+ pulses in UE/LE b/l  PULM: normal breath sounds, clear to ascultation bilaterally, no rales, rhonchi, wheezing  GI: abdomen nondistended, soft, nontender, no guarding, rebound tenderness  : no CVA tenderness b/l, no suprapubic tenderness  NEURO: no focal motor or sensory deficits, CN2-12 intact, normal speech, PERRLA, EOMI, normal gait, AAOx3  MSK: Point Tenderness right upper back/right chest   SKIN: well-perfused, extremities warm, no visible rashes  PSYCH: appropriate mood and affect

## 2022-07-23 NOTE — H&P ADULT - NSICDXPASTMEDICALHX_GEN_ALL_CORE_FT
PAST MEDICAL HISTORY:  BPH (benign prostatic hyperplasia)     CAD (coronary artery disease)     Essential hypertension     HLD (hyperlipidemia)     Type 2 diabetes mellitus without complication, without long-term current use of insulin

## 2022-07-23 NOTE — H&P ADULT - ASSESSMENT
80 y/o M w/ PMH of DM2, HTN, HLD CAD s/p MI 2018 presents to the hospital with 2 months of progressive difficulty to walking admitted for further evaluation and management, likely has associated sepsis secondary to UTI

## 2022-07-23 NOTE — ED PROVIDER NOTE - PROGRESS NOTE DETAILS
Genevieve Lombardo, PGY-2, EM: attempted to contact the PICC team at [phone], no answer. Genevieve Lombardo, PGY-2, EM: paged Dr. Delgado Genevieve Lombardo, PGY-2, EM: called Dr. Delgado

## 2022-07-24 NOTE — PROGRESS NOTE ADULT - PROBLEM SELECTOR PLAN 5
acceptable  continue to monitor   continue meds with hold parameters repeat echocardiogram ordered  will continue to monitor  euvolemic   discontinue IVF

## 2022-07-24 NOTE — CHART NOTE - NSCHARTNOTEFT_GEN_A_CORE
patient was made npo for stroke workup , had a bedside dysphagia screen and RN reported no s/s of dysphagia and tolerated meds well.  Resume previous diet , if exhibit signs of dysphagia will get s/s eval

## 2022-07-24 NOTE — PROGRESS NOTE ADULT - PROBLEM SELECTOR PLAN 1
blood cultures urine culture negative so far  continue ceftriaxone IV  tolerating diet well no reactions   WBC improved 27  continue to monitor

## 2022-07-24 NOTE — PROGRESS NOTE ADULT - PROBLEM SELECTOR PLAN 2
neuro consultation appreciated  CT positive for right lacunar infarct unclear age  MRI brain ordered  follow neuro recommendations likely secondary to dehydration secondary to poor po  resolved  continue to monitor

## 2022-07-24 NOTE — PATIENT PROFILE ADULT - FALL HARM RISK - HARM RISK INTERVENTIONS

## 2022-07-24 NOTE — CONSULT NOTE ADULT - SUBJECTIVE AND OBJECTIVE BOX
07-24-22 @ 14:21    Patient is a 81y old  Male who presents with a chief complaint of failure to thrive (2022 12:50)      HPI:   80 y/o M w/ PMH of DM2, HTN, HLD CAD s/p MI  presents to the hospital with 2 months of progressive difficulty to walking. He denies any particular insolated limb weakness. States his weakness is generalized and prince pronounced in his LE. Denies any back pain or incontinence or urinary retention. He does have increased urinary frequency but denies any dysuria fever or chills. No headache. He lives alone and usually is able to maintain his ADL. His PCP Dr. Jenny Panda was concerned about his whole health and social situation and sent him to be evaluated to the Emergency Department. The patient does state he has baseline shortness of breath and attributes to lifetime of smoking.  (2022 14:13)     he says he used to smoke extensively upto two pack a day for many years : he recently quit smoking:  he is sob at a baseline and denies having any wheezing:   ?FOLLOWING PRESENT  [x ] Hx of PE/DVT, [ x] Hx COPD, [x ] Hx of Asthma, [x ] Hx of Hospitalization, [ x]  Hx of BiPAP/CPAP use, [ x] Hx of ROMANA    Allergies    Advil (Hives)  penicillin (Hives)    Intolerances        PAST MEDICAL & SURGICAL HISTORY:  Essential hypertension      Type 2 diabetes mellitus without complication, without long-term current use of insulin      HLD (hyperlipidemia)      BPH (benign prostatic hyperplasia)      CAD (coronary artery disease)      Benign bladder tumor  removed      History of colonoscopy      History of cataract extraction          FAMILY HISTORY:  No pertinent family history in first degree relatives        Social History: [ 80 pk years] TOBACCO                  [ x ] ETOH                                 [  ] IVDA/DRUGS    REVIEW OF SYSTEMS      General:	weakness    Skin/Breast:x  	  Ophthalmologic:x  	  ENMT:	x    Respiratory and Thorax: sob   	  Cardiovascular:	x    Gastrointestinal:	x    Genitourinary:	x    Musculoskeletal:	x    Neurological:	x    Psychiatric:	x    Hematology/Lymphatics:	x    Endocrine:	x    Allergic/Immunologic:	x    MEDICATIONS  (STANDING):  atorvastatin 80 milliGRAM(s) Oral at bedtime  cefTRIAXone   IVPB 1000 milliGRAM(s) IV Intermittent every 24 hours  cyanocobalamin 1000 MICROGram(s) Oral daily  dextrose 5%. 1000 milliLiter(s) (50 mL/Hr) IV Continuous <Continuous>  dextrose 5%. 1000 milliLiter(s) (100 mL/Hr) IV Continuous <Continuous>  dextrose 50% Injectable 25 Gram(s) IV Push once  dextrose 50% Injectable 12.5 Gram(s) IV Push once  dextrose 50% Injectable 25 Gram(s) IV Push once  gabapentin 300 milliGRAM(s) Oral daily  glucagon  Injectable 1 milliGRAM(s) IntraMuscular once  heparin   Injectable 5000 Unit(s) SubCutaneous every 12 hours  insulin lispro (ADMELOG) corrective regimen sliding scale   SubCutaneous three times a day before meals  potassium chloride  10 mEq/100 mL IVPB 10 milliEquivalent(s) IV Intermittent every 1 hour  tamsulosin 0.8 milliGRAM(s) Oral at bedtime    MEDICATIONS  (PRN):  acetaminophen     Tablet .. 650 milliGRAM(s) Oral every 6 hours PRN Temp greater or equal to 38C (100.4F), Mild Pain (1 - 3)  dextrose Oral Gel 15 Gram(s) Oral once PRN Blood Glucose LESS THAN 70 milliGRAM(s)/deciliter       Vital Signs Last 24 Hrs  T(C): 36.4 (2022 09:46), Max: 36.4 (2022 21:41)  T(F): 97.5 (2022 09:46), Max: 97.5 (2022 21:41)  HR: 62 (2022 09:46) (51 - 62)  BP: 144/61 (2022 09:46) (104/42 - 144/61)  BP(mean): 60 (2022 17:35) (60 - 60)  RR: 18 (2022 09:46) (17 - 18)  SpO2: 97% (2022 09:46) (96% - 99%)    Parameters below as of 2022 09:46  Patient On (Oxygen Delivery Method): room air    Orthostatic VS          I&O's Summary    2022 07:01  -  2022 07:00  --------------------------------------------------------  IN: 810 mL / OUT: 650 mL / NET: 160 mL    2022 07:01  -  2022 14:21  --------------------------------------------------------  IN: 250 mL / OUT: 0 mL / NET: 250 mL        Physical Exam:   GENERAL: NAD, well-groomed, well-developed  HEENT: ADAM/   Atraumatic, Normocephalic  ENMT: No tonsillar erythema, exudates, or enlargement; Moist mucous membranes, Good dentition, No lesions  NECK: Supple, No JVD, Normal thyroid  CHEST/LUNG: Clear to auscultation bilaterally; No rales, rhonchi, wheezing, or rubs  CVS: Regular rate and rhythm; No murmurs, rubs, or gallops  GI: : Soft, Nontender, Nondistended; Bowel sounds present  NERVOUS SYSTEM:  Alert & awake and response  EXTREMITIES: - edema  LYMPH: No lymphadenopathy noted  SKIN: No rashes or lesions  ENDOCRINOLOGY: No Thyromegaly  PSYCH: Appropriate    Labs:                              12.8   27.38 )-----------( 189      ( 2022 09:53 )             39.7                         12.2   30.72 )-----------( 202      ( 2022 04:12 )             38.6         133<L>  |  90<L>  |  25<H>  ----------------------------<  110<H>  3.2<L>   |  31  |  0.92      130<L>  |  89<L>  |  31<H>  ----------------------------<  163<H>  4.0   |  28  |  1.32<H>    Ca    9.1      2022 09:53  Ca    9.4      2022 04:12    TPro  6.6  /  Alb  2.7<L>  /  TBili  1.0  /  DBili  x   /  AST  23  /  ALT  10  /  AlkPhos  129<H>    TPro  6.6  /  Alb  2.6<L>  /  TBili  1.2  /  DBili  x   /  AST  24  /  ALT  11  /  AlkPhos  126<H>      CAPILLARY BLOOD GLUCOSE      POCT Blood Glucose.: 164 mg/dL (2022 13:54)  POCT Blood Glucose.: 115 mg/dL (2022 07:35)  POCT Blood Glucose.: 102 mg/dL (2022 21:26)  POCT Blood Glucose.: 116 mg/dL (2022 15:43)    LIVER FUNCTIONS - ( 2022 09:53 )  Alb: 2.7 g/dL / Pro: 6.6 g/dL / ALK PHOS: 129 U/L / ALT: 10 U/L / AST: 23 U/L / GGT: x             Urinalysis Basic - ( 2022 07:05 )    Color: Yellow / Appearance: Slightly Turbid / S.025 / pH: x  Gluc: x / Ketone: Trace  / Bili: Small / Urobili: 4 mg/dL   Blood: x / Protein: 30 mg/dL / Nitrite: Negative   Leuk Esterase: Moderate / RBC: 55 /hpf / WBC 9 /HPF   Sq Epi: x / Non Sq Epi: 5 /hpf / Bacteria: Negative      D DImer  Serum Pro-Brain Natriuretic Peptide: 4797 pg/mL ( @ 04:12)      Studies  Chest X-RAY  CT SCAN Chest   CT Abdomen  Venous Dopplers: LE:   Others        rad< from: Xray Chest 1 View AP/PA (22 @ 04:50) >    ACC: [id number] EXAM:  XR CHEST AP OR PA 1V                          PROCEDURE DATE:  2022          INTERPRETATION:  INDICATION: Shortness of breath    COMPARISON: Radiograph chest 8/15/2018    FINDINGS:  Heart/Vascular: Heart size is poorly assessed on this projection  Pulmonary: The lungs are clear. No pleural effusion. No pneumothorax.  Bones: No acute osseous abnormalities    IMPRESSION:  Clear lungs.    --- End of Report ---           CY WOOD MD; Resident Radiologist  This document has been electronically signed.  PIA COTTON MD; Attending Radiologist  This document has been electronically signed. 2022  8:14AM    < end of copied text >  < from: MR Head No Cont (22 @ 12:35) >      Ventricular and sulcal prominence is consistent with age-appropriate   involutional change. Small vessel white matter ischemic changes are   noted. There are scattered punctate foci of diffusion restriction in the   occipital cortex bilaterally as well as the centrum semiovale ovale.   These could represent either cardioembolic infarcts or infarcts due to   hypercoagulable state, or hypotension. Recommend clinical correlation.   There is no hemorrhage. The sellar and parasellar structures are   unremarkable. The paranasal nasal sinuses are clear. There has been   previous bilateral lens replacement surgery.      IMPRESSION: Age-appropriate involutional and ischemic gliotic changes.   Scattered bilateral supratentorial infarcts could be related to   cardioembolic source, hypercoagulability or hypotension.    --- End of Report ---            TATIANA CULLEN MD; Attending Radiologist  This document has been electronically signed. 2022  1:49PM    < end of copied text >  < from: CT Heart with Coronaries (18 @ 17:20) >    IMPRESSION:    1.  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  2.  Coronary artery disease.  Refer to invasive coronary angiography   (18) for assessment of luminal stenosis severity.  Severe coronary   artery calcium (Agatston score 663) as delineated above.    3.  Moderately calcified aortic valve.  4.  Severe atherosclerotic plaque in the visualized thoracic aorta.    Atherosclerotic plaque protrudes into the lumen of the descending   thoracic aorta.  5.  Dilated main pulmonary artery measuring approximately 32 mm, a   finding compatible with pulmonary hypertension.  6.  Left ventricular hypertrophy.  7.  Leftatrial dilation.    < end of copied text >  < from: Xray Chest 1 View- PORTABLE-Urgent (Xray Chest 1 View- PORTABLE-Urgent .) (08.15.18 @ 12:10) >    EXAM:  XR CHEST PORTABLE URGENT 1V                            PROCEDURE DATE:  08/15/2018          INTERPRETATION:  AP semierect chest on August 15, 2018 at 12 noon.   Patient has shortness of breath since this morning.    COMPARISON: None available.    Heart is magnified by technique.    There are mild roughly symmetric mid lower lung field infiltrates   suggesting congestion is etiology. Pneumonia is difficult to entirely   exclude.    IMPRESSION: Basilar infiltrates as above.                REYMUNDO GARVIN M.D., ATTENDING RADIOLOGIST  This document has been electronically signed. Aug 15 2018 12:30PM        < end of copied text >

## 2022-07-24 NOTE — PROGRESS NOTE ADULT - PROBLEM SELECTOR PLAN 8
continue Flomax 0.8 continue finger sticks with short acting insulin sliding scale  no oral meds  HbA1C testing   diabetic diet

## 2022-07-24 NOTE — PHYSICAL THERAPY INITIAL EVALUATION ADULT - PERTINENT HX OF CURRENT PROBLEM, REHAB EVAL
Pt is a 80 y/o M w/ PMH of DM2, HTN, HLD CAD s/p MI 2018 presents to the hospital with 2 months of progressive difficulty to walking admitted for further evaluation and management, likely has associated sepsis secondary to UTI. Continued below.

## 2022-07-24 NOTE — PROGRESS NOTE ADULT - SUBJECTIVE AND OBJECTIVE BOX
Patient is a 81y old  Male who presents with a chief complaint of failure to thrive (2022 17:03)      DATE OF SERVICE: 22 @ 11:07    SUBJECTIVE / OVERNIGHT EVENTS: overnight events noted  "I feel a little better"     ROS:  Resp: No cough no sputum production  CVS: No chest pain no palpitations no orthopnea  GI: no N/V/D  : no dysuria, no hematuria  Neuro: no weakness no paresthesias          MEDICATIONS  (STANDING):  atorvastatin 80 milliGRAM(s) Oral at bedtime  cefTRIAXone   IVPB 1000 milliGRAM(s) IV Intermittent every 24 hours  cyanocobalamin 1000 MICROGram(s) Oral daily  dextrose 5%. 1000 milliLiter(s) (100 mL/Hr) IV Continuous <Continuous>  dextrose 5%. 1000 milliLiter(s) (50 mL/Hr) IV Continuous <Continuous>  dextrose 50% Injectable 25 Gram(s) IV Push once  dextrose 50% Injectable 12.5 Gram(s) IV Push once  dextrose 50% Injectable 25 Gram(s) IV Push once  gabapentin 300 milliGRAM(s) Oral daily  glucagon  Injectable 1 milliGRAM(s) IntraMuscular once  heparin   Injectable 5000 Unit(s) SubCutaneous every 12 hours  insulin lispro (ADMELOG) corrective regimen sliding scale   SubCutaneous three times a day before meals  sodium chloride 0.9%. 1000 milliLiter(s) (50 mL/Hr) IV Continuous <Continuous>  tamsulosin 0.8 milliGRAM(s) Oral at bedtime    MEDICATIONS  (PRN):  acetaminophen     Tablet .. 650 milliGRAM(s) Oral every 6 hours PRN Temp greater or equal to 38C (100.4F), Mild Pain (1 - 3)  dextrose Oral Gel 15 Gram(s) Oral once PRN Blood Glucose LESS THAN 70 milliGRAM(s)/deciliter        CAPILLARY BLOOD GLUCOSE      POCT Blood Glucose.: 115 mg/dL (2022 07:35)  POCT Blood Glucose.: 102 mg/dL (2022 21:26)  POCT Blood Glucose.: 116 mg/dL (2022 15:43)    I&O's Summary    2022 07:01  -  2022 07:00  --------------------------------------------------------  IN: 810 mL / OUT: 650 mL / NET: 160 mL        Vital Signs Last 24 Hrs  T(C): 36.4 (2022 09:46), Max: 36.6 (2022 12:34)  T(F): 97.5 (2022 09:46), Max: 97.8 (2022 12:34)  HR: 62 (2022 09:46) (51 - 62)  BP: 144/61 (2022 09:46) (104/42 - 144/61)  BP(mean): 60 (2022 17:35) (60 - 77)  RR: 18 (2022 09:46) (17 - 18)  SpO2: 97% (2022 09:46) (96% - 100%)      PHYSICAL EXAM:   HEENT: ADAM EOMI  Neck: Supple, no JVD  Lungs: clear  CVS: S1 S2 systolic murmur +   Abdomen: no tenderness  + ventral hernia  Neuro: AO x 3 nonfocal  Psych: depressed affect  Skin: warm, dry  Ext: no edema   Msk: no joint swelling        LABS:                        12.8   27.38 )-----------( 189      ( 2022 09:53 )             39.7     07-24    133<L>  |  90<L>  |  25<H>  ----------------------------<  110<H>  3.2<L>   |  31  |  0.92    Ca    9.1      2022 09:53    TPro  6.6  /  Alb  2.7<L>  /  TBili  1.0  /  DBili  x   /  AST  23  /  ALT  10  /  AlkPhos  129<H>  07-24          Urinalysis Basic - ( 2022 07:05 )    Color: Yellow / Appearance: Slightly Turbid / S.025 / pH: x  Gluc: x / Ketone: Trace  / Bili: Small / Urobili: 4 mg/dL   Blood: x / Protein: 30 mg/dL / Nitrite: Negative   Leuk Esterase: Moderate / RBC: 55 /hpf / WBC 9 /HPF   Sq Epi: x / Non Sq Epi: 5 /hpf / Bacteria: Negative          All consultant(s) notes reviewed and care discussed with other providers        Contact Number, Dr Douglas [phone]

## 2022-07-24 NOTE — PATIENT PROFILE ADULT - FALL HARM RISK - FALL HARM RISK
Patient ID:  Whitney Ramirez  [id number]  1934 86 year old    Admit date:  10/30/2020    Discharge date and time:  11/6/2020     Admitting Physician:  Abdulaziz Felder MD     Discharge Physician:  Carlene Moreno MD    Consultants:   IP Consult Orders (From admission, onward)     Start     Ordered    11/02/20 0808  Inpatient consult to General Surgery  ONE TIME     Provider:  Clinton ESTES DO    11/02/20 0807    10/30/20 1713  Inpatient consult to Infectious Diseases  ONE TIME     Provider:  Geovani Avery MD    10/30/20 1712    10/30/20 1457  Inpatient Consult to Wound Care Medical Provider  (IP consult to Wound Care Medical Provider Panel)  ONE TIME     Provider:  Elizabeth Johnston NP    10/30/20 1456                Discharge Diagnoses:     RLE wound infection  Permanent atrial fibrillation/rate controlled  Chronic anticoagulation with Coumadin  Essential hypertension  Moderate persistent asthma  Acute hyponatremia  Left lower extremity swelling  Hep of anemia  Hypothyroidism  Sinoatrial node dysfunction s/p dual-chamber pacemaker-Medtronic placed in 1998 for sinus pauses.    Chronic Diagnoses:  Patient Active Problem List   Diagnosis   • Permanent atrial fibrillation   • Sinoatrial node dysfunction (CMS/HCC)   • Essential hypertension, benign   • Shortness of breath   • Dual Chamber Pacemaker-Medtronic   • Hypothyroidism   • Uterovaginal prolapse, unspecified   • Anticoagulant long-term use   • Cardiomyopathy (CMS/HCC)   • Chronic back pain   • Severe sepsis with acute organ dysfunction (CMS/HCC)   • Acute kidney injury (CMS/HCC)   • Acute respiratory failure with hypoxia (CMS/HCC)   • Community acquired pneumonia   • Hypotension   • Status post amputation of toe (CMS/HCC)         Hospital Course:     Patient is an 86-year-old female with a past medical history as mentioned above was initially admitted for right lower leg skin infection.  She was prescribed Keflex as an outpatient but did not seem to be  improving.  On admission infectious Disease was consulted and initially broad-spectrum IV antibiotics were started.  General surgery was consulted for possible debridement, however patient is on Coumadin so the opted to just proceed with punch biopsy.  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  Wound culture grew Enterococcus and Pseudomonas.  Id recommended transitioning to oral ampicillin and ciprofloxacin.  Patient also had a lower extremity venous Doppler that was negative for DVT.  Patient was seen and evaluated by wound care while in the hospital.  Patient was also seen and evaluated by physical therapy and occupational therapy.  They initially recommended subacute rehab however the patient was adamantly against this.  The patient's daughter is traveling down from another state will be with her for the next couple weeks.  At this time patient is agreeable to go home with home health care under the care of her daughter, daughter-in-law and son who is a nurse.  Patient medically stable and clear for discharge back to home today.    Discharge Medications:     Summary of your Discharge Medications      ASK your doctor about these medications      Details   cephalexin 500 MG capsule  Commonly known as: KEFLEX  Ask about: Should I take this medication?   Take 1 capsule by mouth 3 times daily for 10 days.            Discharge Labs:  Recent Labs   Lab 11/06/20  0426 11/05/20  0443 11/04/20  0540 11/03/20  0449  10/30/20  1216   SODIUM  --  132* 132* 131*   < > 126*   POTASSIUM  --  4.4 3.9 4.3   < > 5.2*   CHLORIDE  --  100 98 99   < > 92*   CO2  --  26 24 26   < > 29   BUN  --  27* 25* 30*   < > 33*   CREATININE  --  0.81 0.80 0.92   < > 1.12*   GLUCOSE  --  109* 107* 88   < > 106*   WBC  --  10.4 10.8 9.5   < > 12.9*   HGB  --  8.8* 9.5* 8.9*   < > 11.2*   HCT  --  26.1* 27.8* 26.8*   < > 33.1*   PLT  --  321 321 296   < > 323   PT  19.9* 18.9* 14.1* 13.6*   < > 14.0*   INR 1.9 1.8 1.4 1.3   < > 1.3   PTT  --   --   --   --   --  40*    < > = values in this interval not displayed.         Discharge Exam:   Blood pressure 132/72, pulse 83, temperature 98.7 °F (37.1 °C), temperature source Oral, resp. rate 18, height 5' 3\" (1.6 m), weight 50.9 kg, SpO2 99 %.  GEN:  No acute distress.  HEART:  S1, S2 auscultated.  LUNGS:  Clear to auscultation bilaterally.  ABD:  Soft, nontender, active bowel sounds.  EXT:  No edema.    Disposition:  Home with home health care    Patient Instructions:   Activity:  activity as tolerated  Diet:  resume prior diet  Wound Care:  keep wound clean and dry and none needed    Code Status:  Pt in hospital opted for Full Resuscitation    Follow up/Instructions:   Follow-up with PCP in 1 2 weeks  Follow-up with wound care 1-2 weeks    The above plan was discussed in detail with the patient.    Less than 30 minutes spent on the care, counselling and discharge planning of the patient.    Signed:  Carlene Moreno MD  11/6/2020  10:57 AM   Other

## 2022-07-24 NOTE — PHYSICAL THERAPY INITIAL EVALUATION ADULT - ADDITIONAL COMMENTS
Pt lives alone in a basement of a house with 6 steps and 1 step to the patio. Pt states he uses a wheelchair. Pt states his neighbors assist him when needed and his daughter assists him with meals. Pt lives alone in a basement of a house with 6 steps and 1 step to the patio. Pt states he uses a rollator. Pt states his neighbors assist him when needed and his daughter assists him with meals.

## 2022-07-24 NOTE — PROGRESS NOTE ADULT - PROBLEM SELECTOR PLAN 4
repeat echocardiogram ordered  will continue to monitor  euvolemic   discontinue IVF improved   continue to monitor   likely hypovolemic hyponatremia on admission

## 2022-07-24 NOTE — PROGRESS NOTE ADULT - NSPROGADDITIONALINFOA_GEN_ALL_CORE
discussed with patient in detail, expresses understanding of treatment plans. discussed with patient in detail, expresses understanding of treatment plans.  encounter 50 min

## 2022-07-24 NOTE — PROGRESS NOTE ADULT - ASSESSMENT
No pain, no shortness of breath    Review of systems: All 10 points ROS was obtained except as above.     acetaminophen     Tablet .. 650 milliGRAM(s) Oral every 6 hours PRN  atorvastatin 80 milliGRAM(s) Oral at bedtime  cefTRIAXone   IVPB 1000 milliGRAM(s) IV Intermittent every 24 hours  cyanocobalamin 1000 MICROGram(s) Oral daily  dextrose 5%. 1000 milliLiter(s) IV Continuous <Continuous>  dextrose 5%. 1000 milliLiter(s) IV Continuous <Continuous>  dextrose 50% Injectable 25 Gram(s) IV Push once  dextrose 50% Injectable 12.5 Gram(s) IV Push once  dextrose 50% Injectable 25 Gram(s) IV Push once  dextrose Oral Gel 15 Gram(s) Oral once PRN  gabapentin 300 milliGRAM(s) Oral daily  glucagon  Injectable 1 milliGRAM(s) IntraMuscular once  heparin   Injectable 5000 Unit(s) SubCutaneous every 12 hours  insulin lispro (ADMELOG) corrective regimen sliding scale   SubCutaneous three times a day before meals  potassium chloride  10 mEq/100 mL IVPB 10 milliEquivalent(s) IV Intermittent every 1 hour  tamsulosin 0.8 milliGRAM(s) Oral at bedtime      VITAL:  T(C): , Max: 36.4 (22 @ 21:41)  T(F): , Max: 97.5 (22 @ 21:41)  HR: 62 (22 @ 09:46)  BP: 144/61 (22 @ 09:46)  BP(mean): 60 (22 @ 17:35)  RR: 18 (22 @ 09:46)  SpO2: 97% (22 @ 09:46)  Wt(kg): --    22 @ 07:01  -  22 @ 07:00  --------------------------------------------------------  IN: 810 mL / OUT: 650 mL / NET: 160 mL    22 @ 07:01  -  22 @ 12:50  --------------------------------------------------------  IN: 250 mL / OUT: 0 mL / NET: 250 mL        PHYSICAL EXAM:  Constitutional: NAD  HEENT: EOMI  Neck:  No JVD, supple   Respiratory: CTA B/L  Cardiovascular: S1 and S2, RRR  Gastrointestinal: + BS, soft, NT, ND  Extremities: No peripheral edema, + peripheral pulses  Neurological: A/O x 3, CN2-12 intact  Psychiatric: Normal mood, normal affect  : No Villasenor  Skin: No rashes, C/D/I  Access: Not applicable    LABS:                          12.8   27.38 )-----------( 189      ( 2022 09:53 )             39.7     Na(133)/K(3.2)/Cl(90)/HCO3(31)/BUN(25)/Cr(0.92)Glu(110)/Ca(9.1)/Mg(--)/PO4(--)    - @ 09:53  Na(130)/K(4.0)/Cl(89)/HCO3(28)/BUN(31)/Cr(1.32)Glu(163)/Ca(9.4)/Mg(--)/PO4(--)     @ 04:12    Urinalysis Basic - ( 2022 07:05 )    Color: Yellow / Appearance: Slightly Turbid / S.025 / pH: x  Gluc: x / Ketone: Trace  / Bili: Small / Urobili: 4 mg/dL   Blood: x / Protein: 30 mg/dL / Nitrite: Negative   Leuk Esterase: Moderate / RBC: 55 /hpf / WBC 9 /HPF   Sq Epi: x / Non Sq Epi: 5 /hpf / Bacteria: Negative            ASSESSMENT/PLAN   80 y/o M w/ PMH of DM2, HTN, HLD CAD s/p MI 2018 presents to the hospital with 2 months of progressive difficulty to walking  Hyponatremia - Euvlemic; Na+ improving with IVF  COPD   Elevated WBC count - Rule out sepsis;  Cancer?; on IV Rocephin and WBC trending down  now      1 Renal- Only bloods i could find were from 2018 and the creatinine was 0.6.    2 hyponatremia- is it from poor PO intake or ADH excessive state :  the Na+ improving though  CHeck TSH   Check urine lytes   2 -Bladder scan now   3 ID - Pan cx at present but he does not seem to be infected   4 CVS-DC cozaar as the BP is soft   Start IVF NS at 50cc/hr       Simeon Bhandari NP-BC  [employer]  [phone]   on room air  afebrile   no complaints    acetaminophen     Tablet .. 650 milliGRAM(s) Oral every 6 hours PRN  atorvastatin 80 milliGRAM(s) Oral at bedtime  cefTRIAXone   IVPB 1000 milliGRAM(s) IV Intermittent every 24 hours  cyanocobalamin 1000 MICROGram(s) Oral daily  dextrose 5%. 1000 milliLiter(s) IV Continuous <Continuous>  dextrose 5%. 1000 milliLiter(s) IV Continuous <Continuous>  dextrose 50% Injectable 25 Gram(s) IV Push once  dextrose 50% Injectable 12.5 Gram(s) IV Push once  dextrose 50% Injectable 25 Gram(s) IV Push once  dextrose Oral Gel 15 Gram(s) Oral once PRN  gabapentin 300 milliGRAM(s) Oral daily  glucagon  Injectable 1 milliGRAM(s) IntraMuscular once  heparin   Injectable 5000 Unit(s) SubCutaneous every 12 hours  insulin lispro (ADMELOG) corrective regimen sliding scale   SubCutaneous three times a day before meals  potassium chloride  10 mEq/100 mL IVPB 10 milliEquivalent(s) IV Intermittent every 1 hour  tamsulosin 0.8 milliGRAM(s) Oral at bedtime      VITAL:  T(C): , Max: 36.4 (22 @ 21:41)  T(F): , Max: 97.5 (22 @ 21:41)  HR: 62 (22 @ 09:46)  BP: 144/61 (22 @ 09:46)  BP(mean): 60 (22 @ 17:35)  RR: 18 (22 @ 09:46)  SpO2: 97% (22 @ 09:46)  Wt(kg): --    22 @ 07:01  -  22 @ 07:00  --------------------------------------------------------  IN: 810 mL / OUT: 650 mL / NET: 160 mL    22 @ 07:01  -  22 @ 12:50  --------------------------------------------------------  IN: 250 mL / OUT: 0 mL / NET: 250 mL        PHYSICAL EXAM:  Constitutional: NAD  HEENT: EOMI  Neck:  No JVD, supple   Respiratory: CTA B/L  Cardiovascular: S1 and S2, RRR  Gastrointestinal: + BS, soft, NT, ND  Extremities: No peripheral edema, + peripheral pulses  Neurological: A/O x 3, CN2-12 intact  Psychiatric: Normal mood, normal affect  : No Villasenor  Skin: No rashes, C/D/I  Access: Not applicable    LABS:                          12.8   27.38 )-----------( 189      ( 2022 09:53 )             39.7     Na(133)/K(3.2)/Cl(90)/HCO3(31)/BUN(25)/Cr(0.92)Glu(110)/Ca(9.1)/Mg(--)/PO4(--)     @ 09:53  Na(130)/K(4.0)/Cl(89)/HCO3(28)/BUN(31)/Cr(1.32)Glu(163)/Ca(9.4)/Mg(--)/PO4(--)     @ 04:12    Urinalysis Basic - ( 2022 07:05 )    Color: Yellow / Appearance: Slightly Turbid / S.025 / pH: x  Gluc: x / Ketone: Trace  / Bili: Small / Urobili: 4 mg/dL   Blood: x / Protein: 30 mg/dL / Nitrite: Negative   Leuk Esterase: Moderate / RBC: 55 /hpf / WBC 9 /HPF   Sq Epi: x / Non Sq Epi: 5 /hpf / Bacteria: Negative            ASSESSMENT/PLAN   80 y/o M w/ PMH of DM2, HTN, HLD CAD s/p MI 2018 presents to the hospital with 2 months of progressive difficulty to walking  Hyponatremia - Euvlemic; Na+ improving   COPD   Elevated WBC count - Rule out sepsis;  Cancer?; on IV Rocephin and WBC trending down  now      1 Renal- As per Dr. Chavez Only bloods he could find were from 2018 and the creatinine was 0.6.  In view of that, renal fxn resolving at present  2 hyponatremia- is it from poor PO intake or ADH excessive state :  the Na+ improving though         Proteinuria of 30mg/dl  F/U CHeck TSH   Check urine lytes   3 Hypokalemia- repleting 10mEq IV x 1 dose   4 ID- on IV Abx  5 VS- BP acceptable at present        Simeon Bhandari NP-BC  [employer]  [phone]

## 2022-07-24 NOTE — PHYSICAL THERAPY INITIAL EVALUATION ADULT - PRECAUTIONS/LIMITATIONS, REHAB EVAL
Head MRI 7/24/22: Age-appropriate involutional and ischemic gliotic changes. Scattered bilateral supratentorial infarcts could be related to cardioembolic source, hypercoagulability or hypotension. (-) CXR 7/23/22. CTH 7/23/22: Small age indeterminate white matter infarct in the right corona radiata./fall precautions

## 2022-07-24 NOTE — PROGRESS NOTE ADULT - PROBLEM SELECTOR PLAN 6
chest pain free  awaiting echocardiogram acceptable  continue to monitor   continue meds with hold parameters

## 2022-07-24 NOTE — PROGRESS NOTE ADULT - PROBLEM SELECTOR PROBLEM 8
BPH (benign prostatic hyperplasia) Type 2 diabetes mellitus without complication, without long-term current use of insulin

## 2022-07-24 NOTE — PROGRESS NOTE ADULT - PROBLEM SELECTOR PLAN 3
improved   continue to monitor   likely hypovolemic hyponatremia on admission neuro consultation appreciated  CT positive for right lacunar infarct unclear age  MRI brain ordered  follow neuro recommendations

## 2022-07-24 NOTE — CONSULT NOTE ADULT - PROBLEM SELECTOR RECOMMENDATION 3
he has a very heavy smoking history:  he is sob at baseline:  his o2 sao2 is pretty good/  check vbg in am  and do ct scan chesty without contrast:

## 2022-07-25 NOTE — PROGRESS NOTE ADULT - SUBJECTIVE AND OBJECTIVE BOX
NEPHROLOGY-[facility] [phone]        Patient seen and examined in bed.  He was about the same         MEDICATIONS  (STANDING):  aspirin enteric coated 81 milliGRAM(s) Oral daily  atorvastatin 80 milliGRAM(s) Oral at bedtime  cefTRIAXone   IVPB 1000 milliGRAM(s) IV Intermittent every 24 hours  cyanocobalamin 1000 MICROGram(s) Oral daily  dextrose 5%. 1000 milliLiter(s) (100 mL/Hr) IV Continuous <Continuous>  dextrose 5%. 1000 milliLiter(s) (50 mL/Hr) IV Continuous <Continuous>  dextrose 50% Injectable 25 Gram(s) IV Push once  dextrose 50% Injectable 12.5 Gram(s) IV Push once  dextrose 50% Injectable 25 Gram(s) IV Push once  gabapentin 300 milliGRAM(s) Oral daily  glucagon  Injectable 1 milliGRAM(s) IntraMuscular once  heparin   Injectable 5000 Unit(s) SubCutaneous every 12 hours  insulin lispro (ADMELOG) corrective regimen sliding scale   SubCutaneous three times a day before meals  tamsulosin 0.8 milliGRAM(s) Oral at bedtime      VITAL:  T(C): , Max: 36.7 (07-25-22 @ 00:00)  T(F): , Max: 98 (07-25-22 @ 00:00)  HR: 84 (07-25-22 @ 08:30)  BP: 97/58 (07-25-22 @ 08:30)  BP(mean): --  RR: 18 (07-25-22 @ 08:30)  SpO2: 98% (07-25-22 @ 08:30)  Wt(kg): --    I and O's:    07-24 @ 07:01  -  07-25 @ 07:00  --------------------------------------------------------  IN: 450 mL / OUT: 200 mL / NET: 250 mL          PHYSICAL EXAM:    Constitutional: NAD  Neck:  No JVD  Respiratory: CTAB/L  Cardiovascular: S1 and S2  Gastrointestinal: BS+, soft, NT/ND  Extremities: No peripheral edema  Neurological: A/O x 3, no focal deficits  Psychiatric: Normal mood, normal affect  : No Villasenor  Skin: No rashes  Access: Not applicable    LABS:                        12.8   27.38 )-----------( 189      ( 24 Jul 2022 09:53 )             39.7     07-24    133<L>  |  90<L>  |  25<H>  ----------------------------<  110<H>  3.2<L>   |  31  |  0.92    Ca    9.1      24 Jul 2022 09:53    TPro  6.6  /  Alb  2.7<L>  /  TBili  1.0  /  DBili  x   /  AST  23  /  ALT  10  /  AlkPhos  129<H>  07-24          Urine Studies:          RADIOLOGY & ADDITIONAL STUDIES:

## 2022-07-25 NOTE — CONSULT NOTE ADULT - SUBJECTIVE AND OBJECTIVE BOX
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 Cardiology consulted for further evaluation. Patient's daughter translated at bedside. Resting comfortably in no distress. Denies chest pain, SOB, palpitations, dizziness, or syncope.    PAST MEDICAL & SURGICAL HISTORY:  Essential hypertension      Type 2 diabetes mellitus without complication, without long-term current use of insulin      HLD (hyperlipidemia)      BPH (benign prostatic hyperplasia)      CAD (coronary artery disease)      Benign bladder tumor  removed      History of colonoscopy      History of cataract extraction      MEDICATIONS:  MEDICATIONS  (STANDING):  aspirin enteric coated 81 milliGRAM(s) Oral daily  atorvastatin 80 milliGRAM(s) Oral at bedtime  cefTRIAXone   IVPB 1000 milliGRAM(s) IV Intermittent every 24 hours  cyanocobalamin 1000 MICROGram(s) Oral daily  dextrose 5%. 1000 milliLiter(s) (100 mL/Hr) IV Continuous <Continuous>  dextrose 5%. 1000 milliLiter(s) (50 mL/Hr) IV Continuous <Continuous>  dextrose 50% Injectable 25 Gram(s) IV Push once  dextrose 50% Injectable 12.5 Gram(s) IV Push once  dextrose 50% Injectable 25 Gram(s) IV Push once  gabapentin 300 milliGRAM(s) Oral daily  glucagon  Injectable 1 milliGRAM(s) IntraMuscular once  heparin   Injectable 5000 Unit(s) SubCutaneous every 12 hours  insulin lispro (ADMELOG) corrective regimen sliding scale   SubCutaneous three times a day before meals  tamsulosin 0.8 milliGRAM(s) Oral at bedtime      Allergies    Advil (Hives)  penicillin (Hives)    Intolerances        FAMILY HISTORY:  No pertinent family history in first degree relatives      Non-contributary for premature coronary disease or sudden cardiac death    SOCIAL HISTORY:    [ ] Non-smoker  [x ] Smoker  [ ] Alcohol    FLU VACCINE THIS YEAR STARTS IN AUGUST:  [ ] Yes    [ ] No    IF OVER 65 HAVE YOU EVER HAD A PNA VACCINE:  [ ] Yes    [ ] No       [ ] N/A      REVIEW OF SYSTEMS:  [ ]chest pain  [  ]shortness of breath  [  ]palpitations  [  ]syncope  [ ]near syncope [x ]upper extremity weakness   [x ] lower extremity weakness  [  ]diplopia  [  ]altered mental status   [  ]fevers  [ ]chills [ ]nausea  [ ]vomiting  [  ]dysphagia    [ ]abdominal pain  [ ]melena  [ ]BRBPR    [  ]epistaxis  [  ]rash    [ ]lower extremity edema        [X] All others negative	  [ ] Unable to obtain      LABS:	 	    CARDIAC MARKERS:                              11.5   24.63 )-----------( 174      ( 25 Jul 2022 13:11 )             35.9     Hb Trend: 11.5<--    07-25    130<L>  |  91<L>  |  20  ----------------------------<  144<H>  3.6   |  30  |  0.78    Ca    8.5      25 Jul 2022 13:11    TPro  6.6  /  Alb  2.7<L>  /  TBili  1.0  /  DBili  x   /  AST  23  /  ALT  10  /  AlkPhos  129<H>  07-24    Creatinine Trend: 0.78<--, 0.92<--, 1.32<--    Coags:      proBNP:   Lipid Profile:   HgA1c:   TSH:         PHYSICAL EXAM:  T(C): 36.6 (07-25-22 @ 08:30), Max: 36.7 (07-25-22 @ 00:00)  HR: 84 (07-25-22 @ 08:30) (55 - 84)  BP: 97/58 (07-25-22 @ 08:30) (97/58 - 145/64)  RR: 18 (07-25-22 @ 08:30) (18 - 18)  SpO2: 98% (07-25-22 @ 08:30) (96% - 99%)  Wt(kg): --   BMI (kg/m2): 26.8 (07-23-22 @ 21:41)  I&O's Summary    24 Jul 2022 07:01  -  25 Jul 2022 07:00  --------------------------------------------------------  IN: 450 mL / OUT: 200 mL / NET: 250 mL        Gen: Appears well in NAD  HEENT:  (-)icterus (-)pallor  CV: N S1 S2 1/6 JULISSA (+)2 Pulses B/l  Resp:  Clear to auscultation B/L, normal effort  GI: (+) BS Soft, NT, ND  Lymph:  (-)Edema, (-)obvious lymphadenopathy  Skin: Warm to touch, Normal turgor  Psych: Appropriate mood and affect      TELEMETRY: None	      ECG: Sinus Bradycardia, narrow QRS 	    RADIOLOGY:         CXR: < from: Xray Chest 1 View AP/PA (07.23.22 @ 04:50) >    IMPRESSION:  Clear lungs.    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 Cardiology consulted for further evaluation.    - Follow up neurology recs  - Continue ASA/Statin  - Would monitor on telemetry while inpatient to look for Afib  - Check TTE w/bubble study  - Plan for outpatient ILR if inpatient tele unremarkable    Rg Mccloud PA-C  Pager: [phone]

## 2022-07-25 NOTE — PROGRESS NOTE ADULT - PROBLEM SELECTOR PLAN 1
blood cultures urine culture negative so far  continue ceftriaxone IV clinically discharge have UTI  WBC slow improvement   continue to monitor

## 2022-07-25 NOTE — PROGRESS NOTE ADULT - ASSESSMENT
82 y/o M w/ PMH of DM2, HTN, HLD CAD s/p MI 2018 presents to the hospital with 2 months of progressive difficulty to walking  Hyponatremia - Euvlemic; Na+ improving   COPD   Elevated WBC count - Rule out sepsis;  Cancer?; on IV Rocephin        1 Renal-  Improved NELSON;  Serum sodium is still low but stable (tsh was fine)  2 hyponatremia- Encourage PO protein intake;  Minimal Proteinuria of 30mg/dl   Check urine lytes   3 Hypokalemia- But awaiting labs this am    4 ID- on IV Abx  5 CVS- Soft BP     Sayed [employer], [employer]  [phone]

## 2022-07-25 NOTE — PROGRESS NOTE ADULT - SUBJECTIVE AND OBJECTIVE BOX
Patient is a 81y old  Male who presents with a chief complaint of failure to thrive (25 Jul 2022 15:13)      DATE OF SERVICE: 07-25-22 @ 17:45    SUBJECTIVE / OVERNIGHT EVENTS: overnight events noted    ROS:  Resp: No cough no sputum production  CVS: No chest pain no palpitations no orthopnea  GI: no N/V/D  : no dysuria, no hematuria  Neuro: no weakness no paresthesias  lethargic but arousable      MEDICATIONS  (STANDING):  aspirin enteric coated 81 milliGRAM(s) Oral daily  atorvastatin 80 milliGRAM(s) Oral at bedtime  cefTRIAXone   IVPB 1000 milliGRAM(s) IV Intermittent every 24 hours  cyanocobalamin 1000 MICROGram(s) Oral daily  dextrose 5%. 1000 milliLiter(s) (100 mL/Hr) IV Continuous <Continuous>  dextrose 5%. 1000 milliLiter(s) (50 mL/Hr) IV Continuous <Continuous>  dextrose 50% Injectable 25 Gram(s) IV Push once  dextrose 50% Injectable 12.5 Gram(s) IV Push once  dextrose 50% Injectable 25 Gram(s) IV Push once  gabapentin 300 milliGRAM(s) Oral daily  glucagon  Injectable 1 milliGRAM(s) IntraMuscular once  heparin   Injectable 5000 Unit(s) SubCutaneous every 12 hours  insulin lispro (ADMELOG) corrective regimen sliding scale   SubCutaneous three times a day before meals  tamsulosin 0.8 milliGRAM(s) Oral at bedtime    MEDICATIONS  (PRN):  acetaminophen     Tablet .. 650 milliGRAM(s) Oral every 6 hours PRN Temp greater or equal to 38C (100.4F), Mild Pain (1 - 3)  dextrose Oral Gel 15 Gram(s) Oral once PRN Blood Glucose LESS THAN 70 milliGRAM(s)/deciliter        CAPILLARY BLOOD GLUCOSE      POCT Blood Glucose.: 156 mg/dL (25 Jul 2022 16:53)  POCT Blood Glucose.: 151 mg/dL (25 Jul 2022 11:44)  POCT Blood Glucose.: 124 mg/dL (25 Jul 2022 07:36)  POCT Blood Glucose.: 169 mg/dL (24 Jul 2022 21:54)    I&O's Summary    24 Jul 2022 07:01  -  25 Jul 2022 07:00  --------------------------------------------------------  IN: 450 mL / OUT: 200 mL / NET: 250 mL        Vital Signs Last 24 Hrs  T(C): 36.7 (25 Jul 2022 16:00), Max: 36.7 (25 Jul 2022 00:00)  T(F): 98.1 (25 Jul 2022 16:00), Max: 98.1 (25 Jul 2022 16:00)  HR: 96 (25 Jul 2022 16:00) (61 - 96)  BP: 100/63 (25 Jul 2022 16:00) (97/58 - 112/53)  BP(mean): --  RR: 18 (25 Jul 2022 16:00) (18 - 18)  SpO2: 97% (25 Jul 2022 16:00) (97% - 99%)      PHYSICAL EXAM:   HEENT: ADAM EOMI  Neck: Supple, no JVD  Lungs: clear  CVS: S1 S2 systolic murmur +   Abdomen: no tenderness  + ventral hernia  Neuro: AO x 3 nonfocal  Skin: warm, dry  Ext: no edema   Msk: no joint swelling    LABS:                        11.5   24.63 )-----------( 174      ( 25 Jul 2022 13:11 )             35.9     07-25    130<L>  |  91<L>  |  20  ----------------------------<  144<H>  3.6   |  30  |  0.78    Ca    8.5      25 Jul 2022 13:11    TPro  6.6  /  Alb  2.7<L>  /  TBili  1.0  /  DBili  x   /  AST  23  /  ALT  10  /  AlkPhos  129<H>  07-24                All consultant(s) notes reviewed and care discussed with other providers        Contact Number, Dr Douglas [phone]

## 2022-07-25 NOTE — PROGRESS NOTE ADULT - PROBLEM SELECTOR PLAN 4
neuro consultation appreciated  CT positive for right lacunar infarct unclear age  MRI brain noted  possible thromboembolic stroke  monitor on Tele  follow neuro recommendations  cardiology note appreciated

## 2022-07-25 NOTE — PROGRESS NOTE ADULT - PROBLEM SELECTOR PLAN 2
with suspicious nodules in liver  will get CT abdomen/pelvis   possible GI malignancy   GI evaluation

## 2022-07-25 NOTE — CHART NOTE - NSCHARTNOTEFT_GEN_A_CORE
In discussion with Radiology resident, prelim report :  Right portal vein thrombosis.  Numerous hypoattenuating hepatic lesions, many of which are of   indeterminate attenuation concerning for malignant process.  Discussed findings with Dr. Douglas, will f/u with offical read, and GI to f/u in am.   Ying FONG

## 2022-07-25 NOTE — PROGRESS NOTE ADULT - SUBJECTIVE AND OBJECTIVE BOX
Date of Service: 07-25-22 @ 15:14    Patient is a 81y old  Male who presents with a chief complaint of failure to thrive (25 Jul 2022 14:09)      Any change in ROS:  dong same: no sob:       MEDICATIONS  (STANDING):  aspirin enteric coated 81 milliGRAM(s) Oral daily  atorvastatin 80 milliGRAM(s) Oral at bedtime  cefTRIAXone   IVPB 1000 milliGRAM(s) IV Intermittent every 24 hours  cyanocobalamin 1000 MICROGram(s) Oral daily  dextrose 5%. 1000 milliLiter(s) (100 mL/Hr) IV Continuous <Continuous>  dextrose 5%. 1000 milliLiter(s) (50 mL/Hr) IV Continuous <Continuous>  dextrose 50% Injectable 25 Gram(s) IV Push once  dextrose 50% Injectable 12.5 Gram(s) IV Push once  dextrose 50% Injectable 25 Gram(s) IV Push once  gabapentin 300 milliGRAM(s) Oral daily  glucagon  Injectable 1 milliGRAM(s) IntraMuscular once  heparin   Injectable 5000 Unit(s) SubCutaneous every 12 hours  insulin lispro (ADMELOG) corrective regimen sliding scale   SubCutaneous three times a day before meals  tamsulosin 0.8 milliGRAM(s) Oral at bedtime    MEDICATIONS  (PRN):  acetaminophen     Tablet .. 650 milliGRAM(s) Oral every 6 hours PRN Temp greater or equal to 38C (100.4F), Mild Pain (1 - 3)  dextrose Oral Gel 15 Gram(s) Oral once PRN Blood Glucose LESS THAN 70 milliGRAM(s)/deciliter    Vital Signs Last 24 Hrs  T(C): 36.6 (25 Jul 2022 08:30), Max: 36.7 (25 Jul 2022 00:00)  T(F): 97.9 (25 Jul 2022 08:30), Max: 98 (25 Jul 2022 00:00)  HR: 84 (25 Jul 2022 08:30) (55 - 84)  BP: 97/58 (25 Jul 2022 08:30) (97/58 - 112/57)  BP(mean): --  RR: 18 (25 Jul 2022 08:30) (18 - 18)  SpO2: 98% (25 Jul 2022 08:30) (96% - 99%)    Parameters below as of 25 Jul 2022 08:30  Patient On (Oxygen Delivery Method): room air        I&O's Summary    24 Jul 2022 07:01  -  25 Jul 2022 07:00  --------------------------------------------------------  IN: 450 mL / OUT: 200 mL / NET: 250 mL          Physical Exam:   GENERAL: NAD, well-groomed, well-developed  HEENT: ADAM/   Atraumatic, Normocephalic  ENMT: No tonsillar erythema, exudates, or enlargement; Moist mucous membranes, Good dentition, No lesions  NECK: Supple, No JVD, Normal thyroid  CHEST/LUNG: Clear to auscultaion  CVS: Regular rate and rhythm; No murmurs, rubs, or gallops  GI: : Soft, Nontender, Nondistended; Bowel sounds present  NERVOUS SYSTEM:  Alert & Oriented X3  EXTREMITIES: -edema  LYMPH: No lymphadenopathy noted  SKIN: No rashes or lesions  ENDOCRINOLOGY: No Thyromegaly  PSYCH: Appropriate    Labs:  33                            11.5   24.63 )-----------( 174      ( 25 Jul 2022 13:11 )             35.9                         12.8   27.38 )-----------( 189      ( 24 Jul 2022 09:53 )             39.7                         12.2   30.72 )-----------( 202      ( 23 Jul 2022 04:12 )             38.6     07-25    130<L>  |  91<L>  |  20  ----------------------------<  144<H>  3.6   |  30  |  0.78  07-24    133<L>  |  90<L>  |  25<H>  ----------------------------<  110<H>  3.2<L>   |  31  |  0.92  07-23    130<L>  |  89<L>  |  31<H>  ----------------------------<  163<H>  4.0   |  28  |  1.32<H>    Ca    8.5      25 Jul 2022 13:11  Ca    9.1      24 Jul 2022 09:53    TPro  6.6  /  Alb  2.7<L>  /  TBili  1.0  /  DBili  x   /  AST  23  /  ALT  10  /  AlkPhos  129<H>  07-24  TPro  6.6  /  Alb  2.6<L>  /  TBili  1.2  /  DBili  x   /  AST  24  /  ALT  11  /  AlkPhos  126<H>  07-23    CAPILLARY BLOOD GLUCOSE      POCT Blood Glucose.: 151 mg/dL (25 Jul 2022 11:44)  POCT Blood Glucose.: 124 mg/dL (25 Jul 2022 07:36)  POCT Blood Glucose.: 169 mg/dL (24 Jul 2022 21:54)  POCT Blood Glucose.: 133 mg/dL (24 Jul 2022 16:54)      LIVER FUNCTIONS - ( 24 Jul 2022 09:53 )  Alb: 2.7 g/dL / Pro: 6.6 g/dL / ALK PHOS: 129 U/L / ALT: 10 U/L / AST: 23 U/L / GGT: x               Serum Pro-Brain Natriuretic Peptide: 4797 pg/mL (07-23 @ 04:12)        RECENT CULTURES:  07-23 @ 14:40 .Blood Blood-Venous                No growth to date.    07-23 @ 14:35 .Blood Blood-Peripheral     rad< from: MR Angio Head No Cont (07.25.22 @ 10:36) >  using 3D time-of-flight technique.    The common, internal and external carotid arteries are unremarkable   bilaterally. There is no carotid stenosis. The vertebral arteries are   unremarkable as well. The left vertebral artery is dominant.    The distal cervical, petrous, cavernous and supraclinoid internal carotid   arteries, anterior and middle cerebral artery branches are unremarkable.   There is no evidence of aneurysm, stenosis, or vasculitis. The distal   vertebral arteries, and region of the vertebral basilar confluence are   unremarkable. The basilar artery, superior cerebellar arteries and   posterior cerebral arteries are unremarkable.    IMPRESSION: No evidence of carotid or vertebral stenosis in the neck. The   intracranial circulation is unremarkable at the [id number]. There   is no evidence of stenosis or vasculitis.    --- End of Report ---            TATIANA CULLEN MD; Attending Radiologist  This document has been electronically signed. Jul 25 2022 11:45AM    < end of copied text >  < from: CT Chest No Cont (07.24.22 @ 17:54) >  HEART/VASCULATURE: Atherosclerotic disease of the abdominal aorta and   SMA. Heart is normal in size. No pericardial effusion. Coronary artery   calcifications. Aorta and pulmonary artery normal in caliber.    AIRWAYS/LUNGS/PLEURA: Small amount mucus within the trachea otherwise no   endobronchial lesions. No evidence of consolidation. No suspicious lung   nodules. No pleural effusion.    UPPER ABDOMEN: Multiple hypodensities within the liver measuring up to   2.7 cm as well as well-circumscribed partially calcified lesion measuring   2.0 cm.    BONES/SOFT TISSUES: Moderate are degenerative changes of the bone. No   soft tissue lesions.    IMPRESSION:    No evidence of pneumonia.    Multiple hypodensities within the liver measuring up to 2.7 cm as well as   a well-circumscribed partially calcified lesion measuring 2.0 cm. These   findings are concerning for metastatic disease. Appropriate clinical   follow-up suggested.    --- End of Report ---           ADRIANO MCKEON MD; Resident Radiologist  This document has been electronically signed.  BENEDICT ROSAS MD; Attending Radiologist  This document has been electronically signed. Jul 25 2022 10:15AM    < end of copied text >             No growth to date.    07-23 @ 07:05 Clean Catch Clean Catch (Midstream)                <10,000 CFU/mL Normal Urogenital Grace          RESPIRATORY CULTURES:          Studies  Chest X-RAY  CT SCAN Chest   Venous Dopplers: LE:   CT Abdomen  Others

## 2022-07-25 NOTE — PROGRESS NOTE ADULT - PROBLEM SELECTOR PLAN 3
adv strongly to stop smoking';  ct chest reviewed:  likely has metastatic liver disease:  biopsy of liver lesions:

## 2022-07-26 NOTE — CONSULT NOTE ADULT - ATTENDING COMMENTS
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   General surgery consulted for chronic fat containing ventral hernia.  no acute sx intervention at this time  no evidence of obstruction/strangulation.  continue supportive care per med/GI/onc/cv.  f/u palliative eval for GOC.  will sign off, reconsult prn.  d/w pt & med team in detail.
Briefly   81M RH Ukrainian speaking w/  DM2, HTN, HLD CAD s/p MI 2018 presents to the hospital after being sent in by PCP with 2 months of progressive difficulty to walking, found to have UTI and hyponatremia. Neurology consulted for CTH finding R chronic corona radiata infarct. Neuro exam 4+/5 L hemiparesis, L facial droop    Impression: L hemiparesis likely 2/2 subacute-chronic R corona radiata infarct; mechanism likely     Recommendations:  - MRI brain without contrast, can be done inpatient vs. outpatient  - MRA head and neck OR CTA head and neck for vessel imaging  - TTE, can be done inpatient vs. outpatient  - monitor on telemetry  - consider extended cardiac monitoring (30 day event monitor) or ILR to assess for afib per STROKE-AF trial, can be done inpatient vs. outpatient  - start ASA 81 mg PO daily   - start atorvastatin 80mg PO daily (titrate to LDL < 70)   - stroke risk factor modification and counseling   - check HA1c, lipid panel  - NPO until bedside dysphagia screen  - Patient can follow up with Dr. Goyo Batista after discharge. Please instruct the patient to call [phone] to schedule an appointment within the next 2-3 days. Office is located at [address] [place] [address], [place] [address].  Goyo Batista MD  Vascular Neurology

## 2022-07-26 NOTE — CONSULT NOTE ADULT - REASON FOR ADMISSION
failure to thrive

## 2022-07-26 NOTE — CONSULT NOTE ADULT - ASSESSMENT
ASSESSMENT/PLAN: Patient is a 80 y/o Male with PMH of DM2, HTN, HLD, and nonobstructive CAD that was found to have metastatic lesions of the liver and gallbladder with periumbilical hernia visualized on CT.     Plan:  - no surgical intervention at this time  - continue to correlate patients clinical exam with symptoms  - please reach out to surgery if any additional concerns arise.     [id number] Red team       ASSESSMENT/PLAN: Patient is a 80 y/o Male with PMH of DM2, HTN, HLD, and nonobstructive CAD that was found to have metastatic lesions of the liver and gallbladder. General surgery consulted for fat containing umbilical hernia.    Plan:  - Hernia is fat containing and asymptomatic and patient is not interested in repair. Would also be poor candidate for elective hernia repair at this time in setting of likely metastatic cancer.  - Continue supportive care per primary team  - Please call with any questions    Red Team Surgery  Pager [phone]

## 2022-07-26 NOTE — CONSULT NOTE ADULT - SUBJECTIVE AND OBJECTIVE BOX
VASCULAR SURGERY CONSULT NOTE  --------------------------------------------------------------------------------------------    Patient is a 81y old  Male who presents with a chief complaint of failure to thrive (26 Jul 2022 15:16)      HPI: HPI:   82 y/o M w/ PMH of DM2, HTN, HLD CAD s/p MI 2018 presents to the hospital with 2 months of progressive difficulty to walking. He denies any particular insolated limb weakness. States his weakness is generalized and prince pronounced in his LE. Denies any back pain or incontinence or urinary retention. He does have increased urinary frequency but denies any dysuria fever or chills. No headache. He lives alone and usually is able to maintain his ADL. His PCP Dr. Jenny Panda was concerned about his whole health and social situation and sent him to be evaluated to the Emergency Department. The patient does state he has baseline shortness of breath and attributes to lifetime of smoking.  (23 Jul 2022 14:13)      ROS: 10-system review is otherwise negative except HPI above.      PAST MEDICAL & SURGICAL HISTORY:  Essential hypertension      Type 2 diabetes mellitus without complication, without long-term current use of insulin      HLD (hyperlipidemia)      BPH (benign prostatic hyperplasia)      CAD (coronary artery disease)      Benign bladder tumor  removed      History of colonoscopy      History of cataract extraction        FAMILY HISTORY:  No pertinent family history in first degree relatives        SOCIAL HISTORY:      ALLERGIES: Advil (Hives)  penicillin (Hives)      HOME MEDICATIONS:     CURRENT MEDICATIONS  MEDICATIONS (STANDING): aspirin enteric coated 81 milliGRAM(s) Oral daily  atorvastatin 80 milliGRAM(s) Oral at bedtime  cyanocobalamin 1000 MICROGram(s) Oral daily  dextrose 5%. 1000 milliLiter(s) IV Continuous <Continuous>  dextrose 5%. 1000 milliLiter(s) IV Continuous <Continuous>  dextrose 50% Injectable 25 Gram(s) IV Push once  dextrose 50% Injectable 12.5 Gram(s) IV Push once  dextrose 50% Injectable 25 Gram(s) IV Push once  gabapentin 300 milliGRAM(s) Oral daily  glucagon  Injectable 1 milliGRAM(s) IntraMuscular once  heparin   Injectable 5000 Unit(s) SubCutaneous every 12 hours  insulin lispro (ADMELOG) corrective regimen sliding scale   SubCutaneous three times a day before meals  tamsulosin 0.8 milliGRAM(s) Oral at bedtime    MEDICATIONS (PRN):acetaminophen     Tablet .. 650 milliGRAM(s) Oral every 6 hours PRN Temp greater or equal to 38C (100.4F), Mild Pain (1 - 3)  dextrose Oral Gel 15 Gram(s) Oral once PRN Blood Glucose LESS THAN 70 milliGRAM(s)/deciliter    --------------------------------------------------------------------------------------------    Vitals:   T(C): 36.7 (07-26-22 @ 15:23), Max: 36.7 (07-25-22 @ 23:23)  HR: 64 (07-26-22 @ 15:23) (60 - 78)  BP: 108/61 (07-26-22 @ 15:23) (106/69 - 126/64)  RR: 18 (07-26-22 @ 15:23) (18 - 18)  SpO2: 99% (07-26-22 @ 15:23) (94% - 99%)  CAPILLARY BLOOD GLUCOSE      POCT Blood Glucose.: 133 mg/dL (26 Jul 2022 16:33)  POCT Blood Glucose.: 182 mg/dL (26 Jul 2022 11:47)  POCT Blood Glucose.: 105 mg/dL (26 Jul 2022 08:38)  POCT Blood Glucose.: 117 mg/dL (25 Jul 2022 21:17)    CAPILLARY BLOOD GLUCOSE      POCT Blood Glucose.: 133 mg/dL (26 Jul 2022 16:33)  POCT Blood Glucose.: 182 mg/dL (26 Jul 2022 11:47)  POCT Blood Glucose.: 105 mg/dL (26 Jul 2022 08:38)  POCT Blood Glucose.: 117 mg/dL (25 Jul 2022 21:17)      07-25 @ 07:01  -  07-26 @ 07:00  --------------------------------------------------------  IN:  Total IN: 0 mL    OUT:    Voided (mL): 200 mL  Total OUT: 200 mL    Total NET: -200 mL        Height (cm): 165.1 (07-23 @ 21:41)  Weight (kg): 73.1 (07-23 @ 21:41)  BMI (kg/m2): 26.8 (07-23 @ 21:41)  BSA (m2): 1.8 (07-23 @ 21:41)    PHYSICAL EXAM:   General: Lying in bed comfortably  Neuro: exam limited due to patients refusal to be examined  HEENT: NC/AT, EOMI  Neck: Soft, supple  Cardio: RRR, nml S1/S2  Resp: Good effort, CTA b/l  GI/Abd: Soft, NT/ND, mobile periumbilical hernia that is soft and nonpainful to palpation, attempts to reduce are unsuccessful but elicit no pain or discomfort, overlying skin is mobile and normal,   Skin: Intact, no breakdown  Musculoskeletal: All 4 extremities moving spontaneously, no limitations.  --------------------------------------------------------------------------------------------    LABS  CBC (07-26 @ 07:08)                              12.2<L>                         22.76<H>  )----------------(  158        --    % Neutrophils, --    % Lymphocytes, ANC: --                                  37.8<L>  CBC (07-25 @ 13:11)                              11.5<L>                         24.63<H>  )----------------(  174        --    % Neutrophils, --    % Lymphocytes, ANC: --                                  35.9<L>    BMP (07-26 @ 07:02)             131<L>  |  89<L>   |  17    		Ca++ --      Ca 8.9                ---------------------------------( 80    		Mg --                 3.9     |  29      |  0.76  			Ph --      BMP (07-25 @ 13:11)             130<L>  |  91<L>   |  20    		Ca++ --      Ca 8.5                ---------------------------------( 144<H>		Mg --                 3.6     |  30      |  0.78  			Ph --                VBG (07-25 @ 12:51)     7.44<H> / 48 / 42 / 33<H> / 7.3<H> / 70.3%     Lactate: 2.4<H>    --------------------------------------------------------------------------------------------    MICROBIOLOGY    -> .Blood Blood-Venous Culture (07-23 @ 14:40)     NG    NG    No growth to date.    -> .Blood Blood-Peripheral Culture (07-23 @ 14:35)     NG    NG    No growth to date.    -> Clean Catch Clean Catch (Midstream) Culture (07-23 @ 07:05)     NG    NG    <10,000 CFU/mL Normal Urogenital Grace      --------------------------------------------------------------------------------------------    IMAGING

## 2022-07-26 NOTE — PROGRESS NOTE ADULT - SUBJECTIVE AND OBJECTIVE BOX
C A R D I O L O G Y  **********************************     DATE OF SERVICE: 07-26-22    Patient seen earlier with daughter at bedside. Patient with poor appetite and weakness. denies chest pain or shortness of breath.   Review of systems otherwise negative.  	  MEDICATIONS:  MEDICATIONS  (STANDING):  aspirin enteric coated 81 milliGRAM(s) Oral daily  atorvastatin 80 milliGRAM(s) Oral at bedtime  cyanocobalamin 1000 MICROGram(s) Oral daily  dextrose 5%. 1000 milliLiter(s) (100 mL/Hr) IV Continuous <Continuous>  dextrose 5%. 1000 milliLiter(s) (50 mL/Hr) IV Continuous <Continuous>  dextrose 50% Injectable 25 Gram(s) IV Push once  dextrose 50% Injectable 12.5 Gram(s) IV Push once  dextrose 50% Injectable 25 Gram(s) IV Push once  gabapentin 300 milliGRAM(s) Oral daily  glucagon  Injectable 1 milliGRAM(s) IntraMuscular once  heparin   Injectable 5000 Unit(s) SubCutaneous every 12 hours  insulin lispro (ADMELOG) corrective regimen sliding scale   SubCutaneous three times a day before meals  tamsulosin 0.8 milliGRAM(s) Oral at bedtime      LABS:	 	    CARDIAC MARKERS:                                12.2   22.76 )-----------( 158      ( 26 Jul 2022 07:08 )             37.8     Hemoglobin: 12.2 g/dL (07-26 @ 07:08)  Hemoglobin: 11.5 g/dL (07-25 @ 13:11)  Hemoglobin: 12.8 g/dL (07-24 @ 09:53)  Hemoglobin: 12.2 g/dL (07-23 @ 04:12)      07-26    131<L>  |  89<L>  |  17  ----------------------------<  80  3.9   |  29  |  0.76    Ca    8.9      26 Jul 2022 07:02      Creatinine Trend: 0.76<--, 0.78<--, 0.92<--, 1.32<--    COAGS:       proBNP:   Lipid Profile:   HgA1c:   TSH:       PHYSICAL EXAM:  T(C): 36.7 (07-26-22 @ 08:59), Max: 36.7 (07-25-22 @ 16:00)  HR: 60 (07-26-22 @ 08:59) (60 - 96)  BP: 106/69 (07-26-22 @ 08:59) (100/63 - 126/64)  RR: 18 (07-26-22 @ 08:59) (18 - 18)  SpO2: 94% (07-26-22 @ 08:59) (94% - 98%)  Wt(kg): --  I&O's Summary    25 Jul 2022 07:01  -  26 Jul 2022 07:00  --------------------------------------------------------  IN: 0 mL / OUT: 200 mL / NET: -200 mL    Gen: NAD  HEENT:  (-)icterus (-)pallor  CV: N S1 S2 1/6 JULISSA (+)2 Pulses B/l  Resp:  Clear to auscultation B/L, normal effort  GI: (+) BS Soft, NT, ND  Lymph:  (-)Edema, (-)obvious lymphadenopathy  Skin: Warm to touch, Normal turgor  Psych: Appropriate mood and affect      TELEMETRY: NSR  ECG: Sinus Bradycardia, narrow QRS 	  	    < from: Transthoracic Echocardiogram (07.26.22 @ 07:37) >  Conclusions:  1. Calcified trileaflet aortic valve with decreased  opening. Difficult to determine the number of  leaflets.There is LVOT obstruction due to a hyperdynamic  ventricle with a peak velocity of 27 mmHg. Peak transaortic  valve gradient equals 38 mm Hg, mean transaortic valve  gradient equals 24 mm Hg, estimated aortic valve area  equals 1.1 sqcm (by continuity equation), aortic valve  velocity time integral equals 37 cm, consistent with  moderate aortic stenosis. Peak velocity was 3.1 m/s, DVI  was 0.3. Minimal aortic regurgitation.  Please note that peak velocities were obtained only from  apical views and Pedoff was not performed.  2. Aortic Root: 2.9 cm. The proximal ascending aorta and  root are not well visualized.  LVOT diameter: 2 cm.  3. Normal left atrium.  LA volume index = 26 cc/m2.  4. Small left ventricle. Increased relative wall thickness  with normal left ventricular mass index, consistent with  concentric left ventricular remodeling.Normal left  ventricular systolic function. No segmental wall motion  abnormalities. LVEF calculated using biplane Ron's  method was 63%.Normal diastolic function  5. Normal right atrium.Normal right ventricular size and  function.Normal tricuspid valve. No tricuspid  regurgitation.  6. No pericardial effusion seen.  *** Compared with echocardiogram of 8/20/2018, there is now  moderate aortic stenosis.  Please note thatthe patient refused a bubble study. IV was  painful. Hence, would suggest providing an IV line that is  less painful so that we can complete this study. Also, we  would perform additional imaging to assess the severity of  aortic stenosis. Would recommend EpiQ machine for  additional imaging due to poor image quality.    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 Cardiology consulted for further evaluation.    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

## 2022-07-26 NOTE — CONSULT NOTE ADULT - SUBJECTIVE AND OBJECTIVE BOX
[facility]  Wero Stafford PA-C  [address] [place] [address] [id number] [phone]      Chief Complaint:  Patient is a 81y old  Male who presents with a chief complaint of failure to thrive (25 Jul 2022 17:44)      HPI: 82 y/o M w/ PMH of DM2, HTN, HLD CAD s/p MI 2018 presents to the hospital with 2 months of progressive difficulty to walking. He denies any particular insolated limb weakness. States his weakness is generalized and prince pronounced in his LE. Denies any back pain or incontinence or urinary retention. He does have increased urinary frequency but denies any dysuria fever or chills. No headache. He lives alone and usually is able to maintain his ADL. His PCP Dr. Jenny Panda was concerned about his whole health and social situation and sent him to be evaluated to the Emergency Department. The patient does state he has baseline shortness of breath and attributes to lifetime of smoking.      Allergies:  Advil (Hives)  penicillin (Hives)      Medications:  acetaminophen     Tablet .. 650 milliGRAM(s) Oral every 6 hours PRN  aspirin enteric coated 81 milliGRAM(s) Oral daily  atorvastatin 80 milliGRAM(s) Oral at bedtime  cyanocobalamin 1000 MICROGram(s) Oral daily  dextrose 5%. 1000 milliLiter(s) IV Continuous <Continuous>  dextrose 5%. 1000 milliLiter(s) IV Continuous <Continuous>  dextrose 50% Injectable 25 Gram(s) IV Push once  dextrose 50% Injectable 12.5 Gram(s) IV Push once  dextrose 50% Injectable 25 Gram(s) IV Push once  dextrose Oral Gel 15 Gram(s) Oral once PRN  gabapentin 300 milliGRAM(s) Oral daily  glucagon  Injectable 1 milliGRAM(s) IntraMuscular once  heparin   Injectable 5000 Unit(s) SubCutaneous every 12 hours  insulin lispro (ADMELOG) corrective regimen sliding scale   SubCutaneous three times a day before meals  tamsulosin 0.8 milliGRAM(s) Oral at bedtime      PMHX/PSHX:  Essential hypertension    Type 2 diabetes mellitus without complication, without long-term current use of insulin    HLD (hyperlipidemia)    BPH (benign prostatic hyperplasia)    CAD (coronary artery disease)    No significant past surgical history    Benign bladder tumor    History of colonoscopy    History of cataract extraction        Family history:  No pertinent family history in first degree relatives        Social History:     ROS:     General:  No wt loss, fevers, chills, night sweats, fatigue,   Eyes:  Good vision, no reported pain  ENT:  No sore throat, pain, runny nose, dysphagia  CV:  No pain, palpitations, hypo/hypertension  Resp:  No dyspnea, cough, tachypnea, wheezing  GI:  No pain, No nausea, No vomiting, No diarrhea, No constipation, No weight loss, No fever, No pruritis, No rectal bleeding, No tarry stools, No dysphagia,  :  No pain, bleeding, incontinence, nocturia  Muscle:  No pain, weakness  Neuro:  No weakness, tingling, memory problems  Psych:  No fatigue, insomnia, mood problems, depression  Endocrine:  No polyuria, polydipsia, cold/heat intolerance  Heme:  No petechiae, ecchymosis, easy bruisability  Skin:  No rash, tattoos, scars, edema      PHYSICAL EXAM:   Vital Signs:  Vital Signs Last 24 Hrs  T(C): 36.7 (26 Jul 2022 08:59), Max: 36.7 (25 Jul 2022 16:00)  T(F): 98 (26 Jul 2022 08:59), Max: 98.1 (25 Jul 2022 16:00)  HR: 60 (26 Jul 2022 08:59) (60 - 96)  BP: 106/69 (26 Jul 2022 08:59) (100/63 - 126/64)  BP(mean): --  RR: 18 (26 Jul 2022 08:59) (18 - 18)  SpO2: 94% (26 Jul 2022 08:59) (94% - 98%)    Parameters below as of 26 Jul 2022 08:59  Patient On (Oxygen Delivery Method): room air      Daily     Daily     GENERAL:  Appears stated age,   HEENT:  NC/AT,    CHEST:  Full & symmetric excursion,   HEART:  Regular rhythm  ABDOMEN:  Soft, non-tender, non-distended,   EXTEREMITIES:  no cyanosis,clubbing or edema  SKIN:  No rash  NEURO:  Alert,    LABS:                        12.2   22.76 )-----------( 158      ( 26 Jul 2022 07:08 )             37.8     07-26    131<L>  |  89<L>  |  17  ----------------------------<  80  3.9   |  29  |  0.76    Ca    8.9      26 Jul 2022 07:02                Imaging:

## 2022-07-26 NOTE — PROGRESS NOTE ADULT - CONVERSATION DETAILS
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 The daughter is quite clear in patient's stated wishes of not wanting intubation or chest compression    does want other non-heroic measures including IVF, antibiotics etc    MOLST filled and placed in chart    DNR ordered in [place]    30 minutes for advanced care planning discussion separate and in addition to the E&M service provided

## 2022-07-26 NOTE — CONSULT NOTE ADULT - ASSESSMENT
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

## 2022-07-26 NOTE — PROGRESS NOTE ADULT - PROBLEM SELECTOR PLAN 2
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

## 2022-07-26 NOTE — CONSULT NOTE ADULT - SUBJECTIVE AND OBJECTIVE BOX
Interventional Radiology    Evaluate for Procedure: liver biopsy    HPI: 82 y/o M w/ PMH of DM2, HTN, HLD CAD s/p MI 2018 presents to the hospital with 2 months of progressive difficulty to walking. He denies any particular insolated limb weakness. States his weakness is generalized and prince pronounced in his LE. Denies any back pain or incontinence or urinary retention. He does have increased urinary frequency but denies any dysuria fever or chills. No headache. He lives alone and usually is able to maintain his ADL. His PCP Dr. Jenny Panda was concerned about his whole health and social situation and sent him to be evaluated to the Emergency Department. The patient does state he has baseline shortness of breath and attributes to lifetime of smoking.     Allergies: Advil (Hives)  penicillin (Hives)    Medications (Abx/Cardiac/Anticoagulation/Blood Products)  aspirin enteric coated: 81 milliGRAM(s) Oral (07-26 @ 12:05)  cefTRIAXone   IVPB: 100 mL/Hr IV Intermittent (07-25 @ 22:09)  heparin   Injectable: 5000 Unit(s) SubCutaneous (07-26 @ 05:15)  tamsulosin: 0.8 milliGRAM(s) Oral (07-25 @ 21:37)    Data:    T(C): 36.7  HR: 60  BP: 106/69  RR: 18  SpO2: 94%    -WBC 22.76 / HgB 12.2 / Hct 37.8 / Plt 158  -Na 131 / Cl 89 / BUN 17 / Glucose 80  -K 3.9 / CO2 29 / Cr 0.76  -ALT -- / Alk Phos -- / T.Bili --  -INR 1.04 / PTT --    Radiology: reviewed    Assessment/Plan:       - case reviewed and approved for Monday, 8/1  - please place IR procedure order under [facility]  - STAT labs in AM (cbc,coags, bmp, T&S)  - hold ASA / plavix x5days  - NPO on Sunday at 11pm  - COVID PCR results within 5 days of planned procedure  - d/w primary team    MAIKEL Humphrey-BC  Available on Teams

## 2022-07-26 NOTE — PROGRESS NOTE ADULT - PROBLEM SELECTOR PLAN 3
adv strongly to stop smoking';  ct chest reviewed:  likely has metastatic liver disease:  biopsy of liver lesions:    7/26: likely has GB malignancy  with metastatic disease

## 2022-07-26 NOTE — PROGRESS NOTE ADULT - PROBLEM SELECTOR PLAN 1
blood cultures urine culture negative so far  completed ceftriaxone IV  WBC slow improvement   but I suspect patient's leukocytosis is secondary to malignancy   continue to monitor

## 2022-07-26 NOTE — PROGRESS NOTE ADULT - SUBJECTIVE AND OBJECTIVE BOX
Patient is a 81y old  Male who presents with a chief complaint of failure to thrive (26 Jul 2022 11:25)  daughter Shahida at bedside [phone]    DATE OF SERVICE: 07-26-22 @ 13:12    SUBJECTIVE / OVERNIGHT EVENTS: overnight events noted    ROS:  Resp: No cough no sputum production  CVS: No chest pain no palpitations no orthopnea  GI: no N/V/D  : no dysuria, no hematuria  Neuro: no weakness no paresthesias  Heme: No petechiae no easy bruising  Msk: No joint pain no swelling  Skin: No rash no itching        MEDICATIONS  (STANDING):  aspirin enteric coated 81 milliGRAM(s) Oral daily  atorvastatin 80 milliGRAM(s) Oral at bedtime  cyanocobalamin 1000 MICROGram(s) Oral daily  dextrose 5%. 1000 milliLiter(s) (100 mL/Hr) IV Continuous <Continuous>  dextrose 5%. 1000 milliLiter(s) (50 mL/Hr) IV Continuous <Continuous>  dextrose 50% Injectable 25 Gram(s) IV Push once  dextrose 50% Injectable 12.5 Gram(s) IV Push once  dextrose 50% Injectable 25 Gram(s) IV Push once  gabapentin 300 milliGRAM(s) Oral daily  glucagon  Injectable 1 milliGRAM(s) IntraMuscular once  heparin   Injectable 5000 Unit(s) SubCutaneous every 12 hours  insulin lispro (ADMELOG) corrective regimen sliding scale   SubCutaneous three times a day before meals  tamsulosin 0.8 milliGRAM(s) Oral at bedtime    MEDICATIONS  (PRN):  acetaminophen     Tablet .. 650 milliGRAM(s) Oral every 6 hours PRN Temp greater or equal to 38C (100.4F), Mild Pain (1 - 3)  dextrose Oral Gel 15 Gram(s) Oral once PRN Blood Glucose LESS THAN 70 milliGRAM(s)/deciliter        CAPILLARY BLOOD GLUCOSE      POCT Blood Glucose.: 182 mg/dL (26 Jul 2022 11:47)  POCT Blood Glucose.: 105 mg/dL (26 Jul 2022 08:38)  POCT Blood Glucose.: 117 mg/dL (25 Jul 2022 21:17)  POCT Blood Glucose.: 156 mg/dL (25 Jul 2022 16:53)    I&O's Summary    25 Jul 2022 07:01  -  26 Jul 2022 07:00  --------------------------------------------------------  IN: 0 mL / OUT: 200 mL / NET: -200 mL        Vital Signs Last 24 Hrs  T(C): 36.7 (26 Jul 2022 08:59), Max: 36.7 (25 Jul 2022 16:00)  T(F): 98 (26 Jul 2022 08:59), Max: 98.1 (25 Jul 2022 16:00)  HR: 60 (26 Jul 2022 08:59) (60 - 96)  BP: 106/69 (26 Jul 2022 08:59) (100/63 - 126/64)  BP(mean): --  RR: 18 (26 Jul 2022 08:59) (18 - 18)  SpO2: 94% (26 Jul 2022 08:59) (94% - 98%)    PHYSICAL EXAM:  GENERAL: in no apparent distress  HEAD:  Atraumatic, Normocephalic  EYES: EOMI, PERRLA, sclera clear  NECK: Supple, No JVD  CHEST/LUNG: clear b/l, no wheeze  HEART: S1 S2; no murmurs appreciated  ABDOMEN: Soft, Nontender, Bowel sounds present  EXTREMITIES:  No clubbing or cyanosis,  no edema  NEUROLOGY: AO x 3 non-focal  SKIN: No rashes or lesions    LABS:                        12.2   22.76 )-----------( 158      ( 26 Jul 2022 07:08 )             37.8     07-26    131<L>  |  89<L>  |  17  ----------------------------<  80  3.9   |  29  |  0.76    Ca    8.9      26 Jul 2022 07:02                  All consultant(s) notes reviewed and care discussed with other providers        Contact Number, Dr Douglas [phone] Patient is a 81y old  Male who presents with a chief complaint of failure to thrive (26 Jul 2022 11:25)  daughter Shahida at bedside [phone]    DATE OF SERVICE: 07-26-22 @ 13:12    SUBJECTIVE / OVERNIGHT EVENTS: overnight events noted    ROS:  Resp: No cough no sputum production  CVS: No chest pain no palpitations no orthopnea  GI: no N/V/D  : no dysuria, no hematuria  Neuro: no weakness no paresthesias          MEDICATIONS  (STANDING):  aspirin enteric coated 81 milliGRAM(s) Oral daily  atorvastatin 80 milliGRAM(s) Oral at bedtime  cyanocobalamin 1000 MICROGram(s) Oral daily  dextrose 5%. 1000 milliLiter(s) (100 mL/Hr) IV Continuous <Continuous>  dextrose 5%. 1000 milliLiter(s) (50 mL/Hr) IV Continuous <Continuous>  dextrose 50% Injectable 25 Gram(s) IV Push once  dextrose 50% Injectable 12.5 Gram(s) IV Push once  dextrose 50% Injectable 25 Gram(s) IV Push once  gabapentin 300 milliGRAM(s) Oral daily  glucagon  Injectable 1 milliGRAM(s) IntraMuscular once  heparin   Injectable 5000 Unit(s) SubCutaneous every 12 hours  insulin lispro (ADMELOG) corrective regimen sliding scale   SubCutaneous three times a day before meals  tamsulosin 0.8 milliGRAM(s) Oral at bedtime    MEDICATIONS  (PRN):  acetaminophen     Tablet .. 650 milliGRAM(s) Oral every 6 hours PRN Temp greater or equal to 38C (100.4F), Mild Pain (1 - 3)  dextrose Oral Gel 15 Gram(s) Oral once PRN Blood Glucose LESS THAN 70 milliGRAM(s)/deciliter        CAPILLARY BLOOD GLUCOSE      POCT Blood Glucose.: 182 mg/dL (26 Jul 2022 11:47)  POCT Blood Glucose.: 105 mg/dL (26 Jul 2022 08:38)  POCT Blood Glucose.: 117 mg/dL (25 Jul 2022 21:17)  POCT Blood Glucose.: 156 mg/dL (25 Jul 2022 16:53)    I&O's Summary    25 Jul 2022 07:01  -  26 Jul 2022 07:00  --------------------------------------------------------  IN: 0 mL / OUT: 200 mL / NET: -200 mL        Vital Signs Last 24 Hrs  T(C): 36.7 (26 Jul 2022 08:59), Max: 36.7 (25 Jul 2022 16:00)  T(F): 98 (26 Jul 2022 08:59), Max: 98.1 (25 Jul 2022 16:00)  HR: 60 (26 Jul 2022 08:59) (60 - 96)  BP: 106/69 (26 Jul 2022 08:59) (100/63 - 126/64)  BP(mean): --  RR: 18 (26 Jul 2022 08:59) (18 - 18)  SpO2: 94% (26 Jul 2022 08:59) (94% - 98%)    PHYSICAL EXAM:  GENERAL: in no apparent distress  HEAD:  Atraumatic, Normocephalic  EYES: EOMI, PERRLA, sclera clear  NECK: Supple, No JVD  CHEST/LUNG: clear  HEART: S1 S2; no murmurs   ABDOMEN: Soft, Nontender  ventral swelling no change  EXTREMITIES:   no edema  NEUROLOGY: Alert non-focal  SKIN: No rashes or lesions    LABS:                        12.2   22.76 )-----------( 158      ( 26 Jul 2022 07:08 )             37.8     07-26    131<L>  |  89<L>  |  17  ----------------------------<  80  3.9   |  29  |  0.76    Ca    8.9      26 Jul 2022 07:02                  All consultant(s) notes reviewed and care discussed with other providers        Contact Number, Dr Douglas [phone]

## 2022-07-26 NOTE — PROGRESS NOTE ADULT - PROBLEM SELECTOR PLAN 4
neuro consultation appreciated  CT positive for right lacunar infarct unclear age  MRI brain noted  possible thromboembolic stroke  echocardiogram noted  moderate AS  no intervention at this time

## 2022-07-26 NOTE — PROGRESS NOTE ADULT - ASSESSMENT
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

## 2022-07-26 NOTE — CONSULT NOTE ADULT - CONSULT REASON
CVA
Hernia visualized on CT scan
abnormal CT  failure to thrive
chronic R infarct
liver biopsy
Hyponatremia
failure to thrive:

## 2022-07-26 NOTE — CONSULT NOTE ADULT - CONSULT REQUESTED DATE/TIME
26-Jul-2022 11:25
23-Jul-2022 17:04
25-Jul-2022 14:09
26-Jul-2022 15:17
23-Jul-2022
26-Jul-2022 19:12
24-Jul-2022 14:20

## 2022-07-26 NOTE — PROGRESS NOTE ADULT - SUBJECTIVE AND OBJECTIVE BOX
[facility] [phone]        Patient seen and examined poor appetite today per daughter.         MEDICATIONS  (STANDING):  aspirin enteric coated 81 milliGRAM(s) Oral daily  atorvastatin 80 milliGRAM(s) Oral at bedtime  cyanocobalamin 1000 MICROGram(s) Oral daily  dextrose 5%. 1000 milliLiter(s) (100 mL/Hr) IV Continuous <Continuous>  dextrose 5%. 1000 milliLiter(s) (50 mL/Hr) IV Continuous <Continuous>  dextrose 50% Injectable 25 Gram(s) IV Push once  dextrose 50% Injectable 12.5 Gram(s) IV Push once  dextrose 50% Injectable 25 Gram(s) IV Push once  gabapentin 300 milliGRAM(s) Oral daily  glucagon  Injectable 1 milliGRAM(s) IntraMuscular once  heparin   Injectable 5000 Unit(s) SubCutaneous every 12 hours  insulin lispro (ADMELOG) corrective regimen sliding scale   SubCutaneous three times a day before meals  tamsulosin 0.8 milliGRAM(s) Oral at bedtime      VITAL:  T(C): , Max: 36.7 (07-25-22 @ 16:00)  T(F): , Max: 98.1 (07-25-22 @ 16:00)  HR: 60 (07-26-22 @ 08:59)  BP: 106/69 (07-26-22 @ 08:59)  BP(mean): --  RR: 18 (07-26-22 @ 08:59)  SpO2: 94% (07-26-22 @ 08:59)  Wt(kg): --    I and O's:    07-25 @ 07:01  -  07-26 @ 07:00  --------------------------------------------------------  IN: 0 mL / OUT: 200 mL / NET: -200 mL          PHYSICAL EXAM:    Constitutional: NAD  Neck:  No JVD  Respiratory: CTAB/L  Cardiovascular: S1 and S2  Gastrointestinal: BS+, soft, NT/ND  Extremities: No peripheral edema  Neurological: A/O x 3, no focal deficits  Psychiatric: Normal mood, normal affect  : No Villasenor  Skin: No rashes  Access: Not applicable    LABS:                        12.2   22.76 )-----------( 158      ( 26 Jul 2022 07:08 )             37.8     07-26    131<L>  |  89<L>  |  17  ----------------------------<  80  3.9   |  29  |  0.76    Ca    8.9      26 Jul 2022 07:02            Urine Studies:          RADIOLOGY & ADDITIONAL STUDIES:  < from: CT Abdomen and Pelvis w/ Oral Cont and w/ IV Cont (07.25.22 @ 19:24) >  omplications: None reported at time of study completion    PROCEDURE:  CT of the Abdomen and Pelvis was performed.  Sagittal and coronal reformats were performed.    FINDINGS:  LOWER CHEST: Aortic root and coronary artery calcifications. Bilateral   trace pleural effusions.  KIDNEYS/URETERS: Multiple right cysts measuring up to 1.7 cm. Bilateral   subcentimeter hypodensities too small to characterize.      IMPRESSION:  Innumerable hepatic lesions concerning for metastatic disease.    Gallbladder lumen is continuous with hepatic lesions. 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    < end of copied text >

## 2022-07-26 NOTE — PROGRESS NOTE ADULT - SUBJECTIVE AND OBJECTIVE BOX
Date of Service: 07-26-22 @ 14:41    Patient is a 81y old  Male who presents with a chief complaint of failure to thrive (26 Jul 2022 13:46)      Any change in ROS: Seems OK: no sob: suspected to have metastatic disease     MEDICATIONS  (STANDING):  aspirin enteric coated 81 milliGRAM(s) Oral daily  atorvastatin 80 milliGRAM(s) Oral at bedtime  cyanocobalamin 1000 MICROGram(s) Oral daily  dextrose 5%. 1000 milliLiter(s) (100 mL/Hr) IV Continuous <Continuous>  dextrose 5%. 1000 milliLiter(s) (50 mL/Hr) IV Continuous <Continuous>  dextrose 50% Injectable 25 Gram(s) IV Push once  dextrose 50% Injectable 12.5 Gram(s) IV Push once  dextrose 50% Injectable 25 Gram(s) IV Push once  gabapentin 300 milliGRAM(s) Oral daily  glucagon  Injectable 1 milliGRAM(s) IntraMuscular once  heparin   Injectable 5000 Unit(s) SubCutaneous every 12 hours  insulin lispro (ADMELOG) corrective regimen sliding scale   SubCutaneous three times a day before meals  tamsulosin 0.8 milliGRAM(s) Oral at bedtime    MEDICATIONS  (PRN):  acetaminophen     Tablet .. 650 milliGRAM(s) Oral every 6 hours PRN Temp greater or equal to 38C (100.4F), Mild Pain (1 - 3)  dextrose Oral Gel 15 Gram(s) Oral once PRN Blood Glucose LESS THAN 70 milliGRAM(s)/deciliter    Vital Signs Last 24 Hrs  T(C): 36.7 (26 Jul 2022 08:59), Max: 36.7 (25 Jul 2022 16:00)  T(F): 98 (26 Jul 2022 08:59), Max: 98.1 (25 Jul 2022 16:00)  HR: 60 (26 Jul 2022 08:59) (60 - 96)  BP: 106/69 (26 Jul 2022 08:59) (100/63 - 126/64)  BP(mean): --  RR: 18 (26 Jul 2022 08:59) (18 - 18)  SpO2: 94% (26 Jul 2022 08:59) (94% - 98%)    Parameters below as of 26 Jul 2022 08:59  Patient On (Oxygen Delivery Method): room air        I&O's Summary    25 Jul 2022 07:01  -  26 Jul 2022 07:00  --------------------------------------------------------  IN: 0 mL / OUT: 200 mL / NET: -200 mL          Physical Exam:   GENERAL: NAD, well-groomed, well-developed  HEENT: ADAM/   Atraumatic, Normocephalic  ENMT: No tonsillar erythema, exudates, or enlargement; Moist mucous membranes, Good dentition, No lesions  NECK: Supple, No JVD, Normal thyroid  CHEST/LUNG: Clear to auscultaion  CVS: Regular rate and rhythm; No murmurs, rubs, or gallops  GI: : Soft, Nontender, Nondistended; Bowel sounds present  NERVOUS SYSTEM:  Alert & Oriented X3  EXTREMITIES:  - edema  LYMPH: No lymphadenopathy noted  SKIN: No rashes or lesions  ENDOCRINOLOGY: No Thyromegaly  PSYCH: Appropriate    Labs:  33                            12.2   22.76 )-----------( 158      ( 26 Jul 2022 07:08 )             37.8                         11.5   24.63 )-----------( 174      ( 25 Jul 2022 13:11 )             35.9                         12.8   27.38 )-----------( 189      ( 24 Jul 2022 09:53 )             39.7                         12.2   30.72 )-----------( 202      ( 23 Jul 2022 04:12 )             38.6     07-26    131<L>  |  89<L>  |  17  ----------------------------<  80  3.9   |  29  |  0.76  07-25    130<L>  |  91<L>  |  20  ----------------------------<  144<H>  3.6   |  30  |  0.78  07-24    133<L>  |  90<L>  |  25<H>  ----------------------------<  110<H>  3.2<L>   |  31  |  0.92  07-23    130<L>  |  89<L>  |  31<H>  ----------------------------<  163<H>  4.0   |  28  |  1.32<H>    Ca    8.9      26 Jul 2022 07:02  Ca    8.5      25 Jul 2022 13:11    TPro  6.6  /  Alb  2.7<L>  /  TBili  1.0  /  DBili  x   /  AST  23  /  ALT  10  /  AlkPhos  129<H>  07-24  TPro  6.6  /  Alb  2.6<L>  /  TBili  1.2  /  DBili  x   /  AST  24  /  ALT  11  /  AlkPhos  126<H>  07-23    CAPILLARY BLOOD GLUCOSE      POCT Blood Glucose.: 182 mg/dL (26 Jul 2022 11:47)  POCT Blood Glucose.: 105 mg/dL (26 Jul 2022 08:38)  POCT Blood Glucose.: 117 mg/dL (25 Jul 2022 21:17)  POCT Blood Glucose.: 156 mg/dL (25 Jul 2022 16:53)          radr< from: CT Abdomen and Pelvis w/ Oral Cont and w/ IV Cont (07.25.22 @ 19:24) >  REPRODUCTIVE ORGANS: Enlarged with coarse calcifications.    BOWEL: No bowel obstruction. Appendix is normal.  PERITONEUM: Trace free fluid.  VESSELS: Atherosclerotic changes. Thrombus within the anterior and   posterior branches of the right portal vein.  RETROPERITONEUM/LYMPH NODES: No lymphadenopathy.  ABDOMINAL WALL: Large fat-containing umbilical hernia.  BONES: Within normal limits.    IMPRESSION:  Innumerable hepatic lesions concerning for metastatic disease.    Gallbladder lumen is continuous with hepatic lesions. 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    Dr. Olivera discussed these findings with Dr. Mccall on 7/25/2022 7:34   PM with read back.    --- End of Report ---    < end of copied text >  < from: CT Abdomen and Pelvis w/ Oral Cont and w/ IV Cont (07.25.22 @ 19:24) >  REPRODUCTIVE ORGANS: Enlarged with coarse calcifications.    BOWEL: No bowel obstruction. Appendix is normal.  PERITONEUM: Trace free fluid.  VESSELS: Atherosclerotic changes. Thrombus within the anterior and   posterior branches of the right portal vein.  RETROPERITONEUM/LYMPH NODES: No lymphadenopathy.  ABDOMINAL WALL: Large fat-containing umbilical hernia.  BONES: Within normal limits.    IMPRESSION:  Innumerable hepatic lesions concerning for metastatic disease.    Gallbladder lumen is continuous with hepatic lesions. 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    Dr. Olivera discussed these findings with Dr. Mccall on 7/25/2022 7:34   PM with read back.    --- End of Report ---    < end of copied text >          RECENT CULTURES:  07-23 @ 14:40 .Blood Blood-Venous                No growth to date.    07-23 @ 14:35 .Blood Blood-Peripheral                No growth to date.    07-23 @ 07:05 Clean Catch Clean Catch (Midstream)                <10,000 CFU/mL Normal Urogenital Grace          RESPIRATORY CULTURES:          Studies  Chest X-RAY  CT SCAN Chest   Venous Dopplers: LE:   CT Abdomen  Others

## 2022-07-27 NOTE — BH CONSULTATION LIAISON ASSESSMENT NOTE - NSBHCHARTREVIEWVS_PSY_A_CORE FT
Vital Signs Last 24 Hrs  T(C): 36.8 (27 Jul 2022 09:37), Max: 36.8 (27 Jul 2022 09:37)  T(F): 98.2 (27 Jul 2022 09:37), Max: 98.2 (27 Jul 2022 09:37)  HR: 95 (27 Jul 2022 09:37) (64 - 95)  BP: 127/64 (27 Jul 2022 09:37) (108/61 - 127/64)  BP(mean): --  RR: 18 (27 Jul 2022 09:37) (18 - 18)  SpO2: 98% (27 Jul 2022 09:37) (98% - 99%)    Parameters below as of 27 Jul 2022 09:37  Patient On (Oxygen Delivery Method): room air

## 2022-07-27 NOTE — PROGRESS NOTE ADULT - SUBJECTIVE AND OBJECTIVE BOX
[facility]  Wero Stafford PA-C  [address] [place], [place] [id number] [phone]      INTERVAL HPI/OVERNIGHT EVENTS:  pt seen and examined, no new events   pt with poor appetite     MEDICATIONS  (STANDING):  atorvastatin 80 milliGRAM(s) Oral at bedtime  cyanocobalamin 1000 MICROGram(s) Oral daily  dextrose 5%. 1000 milliLiter(s) (100 mL/Hr) IV Continuous <Continuous>  dextrose 5%. 1000 milliLiter(s) (50 mL/Hr) IV Continuous <Continuous>  dextrose 50% Injectable 25 Gram(s) IV Push once  dextrose 50% Injectable 12.5 Gram(s) IV Push once  dextrose 50% Injectable 25 Gram(s) IV Push once  gabapentin 300 milliGRAM(s) Oral daily  glucagon  Injectable 1 milliGRAM(s) IntraMuscular once  heparin   Injectable 5000 Unit(s) SubCutaneous every 12 hours  insulin lispro (ADMELOG) corrective regimen sliding scale   SubCutaneous three times a day before meals  tamsulosin 0.8 milliGRAM(s) Oral at bedtime    MEDICATIONS  (PRN):  acetaminophen     Tablet .. 650 milliGRAM(s) Oral every 6 hours PRN Temp greater or equal to 38C (100.4F), Mild Pain (1 - 3)  dextrose Oral Gel 15 Gram(s) Oral once PRN Blood Glucose LESS THAN 70 milliGRAM(s)/deciliter      Allergies    Advil (Hives)  penicillin (Hives)    Intolerances        ROS:   General:  No wt loss, fevers, chills, night sweats, fatigue,   Eyes:  Good vision, no reported pain  ENT:  No sore throat, pain, runny nose, dysphagia  CV:  No pain, palpitations, hypo/hypertension  Resp:  No dyspnea, cough, tachypnea, wheezing  GI:  No pain, No nausea, No vomiting, No diarrhea, No constipation, No weight loss, No fever, No pruritis, No rectal bleeding, No tarry stools, No dysphagia,  :  No pain, bleeding, incontinence, nocturia  Muscle:  No pain, weakness  Neuro:  No weakness, tingling, memory problems  Psych:  No fatigue, insomnia, mood problems, depression  Endocrine:  No polyuria, polydipsia, cold/heat intolerance  Heme:  No petechiae, ecchymosis, easy bruisability  Skin:  No rash, tattoos, scars, edema      PHYSICAL EXAM:   Vital Signs:  Vital Signs Last 24 Hrs  T(C): 36.8 (27 Jul 2022 09:37), Max: 36.8 (27 Jul 2022 09:37)  T(F): 98.2 (27 Jul 2022 09:37), Max: 98.2 (27 Jul 2022 09:37)  HR: 95 (27 Jul 2022 09:37) (64 - 95)  BP: 127/64 (27 Jul 2022 09:37) (108/61 - 127/64)  BP(mean): --  RR: 18 (27 Jul 2022 09:37) (18 - 18)  SpO2: 98% (27 Jul 2022 09:37) (98% - 99%)    Parameters below as of 27 Jul 2022 09:37  Patient On (Oxygen Delivery Method): room air      Daily     Daily     GENERAL:  Appears stated age,   HEENT:  NC/AT,    CHEST:  Full & symmetric excursion,   HEART:  Regular rhythm,  ABDOMEN:  Soft, non-tender, distended,  EXTEREMITIES:  no cyanosis  SKIN:  No rash  NEURO:  Alert,       LABS:                        11.0   24.71 )-----------( 168      ( 27 Jul 2022 08:56 )             33.4     07-26    131<L>  |  89<L>  |  17  ----------------------------<  80  3.9   |  29  |  0.76    Ca    8.9      26 Jul 2022 07:02            RADIOLOGY & ADDITIONAL TESTS:

## 2022-07-27 NOTE — BH CONSULTATION LIAISON ASSESSMENT NOTE - HPI (INCLUDE ILLNESS QUALITY, SEVERITY, DURATION, TIMING, CONTEXT, MODIFYING FACTORS, ASSOCIATED SIGNS AND SYMPTOMS)
81M retired, domiciled alone, PPHx of depression (not in treatment), PMHx of DM, HTN, HLD, BPH, CAD s/p MI 2018, HFpEF who presented to the ED on 7/22 due to progressive difficulty walking and generalized weakness. Patient admitted to the hospital due to failure to thrive and found to have hyponatremia, NELSON, and sepsis secondary to UTI. Psychiatry consulted for depression and SI.     Patient assessed at bedside. Calm and cooperative. Patient reports a depressed mood due to his declining medical/functional condition. He endorses intermittent passive suicidal ideations (ie: "I feel like I want to die sometimes."); however, he reports he would never go through with this due to his personal and Jainism values. Patient reports decreased appetite and states "food has no taste". He states he has difficulty sleeping and receives approximately 4-5 hours of sleep per night. Patient reports he would like to go home and find a doctor that could help improve his medical condition. Denies hallucinations, delusion, or substance use. Denies SA/plan or HIIP. Patient reports no other psychiatric symptoms. Discussed prescribing a medication for his mood, sleep and appetite. Patient stated that he would like to take a medication to help with his symptoms.     Spoke with daughter, Shahida ([phone]) present at bedside. She states he father has longstanding depression that has not been treated and has gotten progressively worse in the setting of his medical conditions. She reports he was recently diagnosed with liver cancer; however, he is not aware of this diagnosis. She endorses weight loss as well. She expresses no other concerns.

## 2022-07-27 NOTE — PROGRESS NOTE ADULT - ASSESSMENT
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

## 2022-07-27 NOTE — PROGRESS NOTE ADULT - PROBLEM SELECTOR PLAN 8
continue finger sticks with short acting insulin sliding scale  no oral meds  HbA1C 6   diabetic diet

## 2022-07-27 NOTE — BH CONSULTATION LIAISON ASSESSMENT NOTE - CURRENT MEDICATION
MEDICATIONS  (STANDING):  atorvastatin 80 milliGRAM(s) Oral at bedtime  cyanocobalamin 1000 MICROGram(s) Oral daily  dextrose 5%. 1000 milliLiter(s) (100 mL/Hr) IV Continuous <Continuous>  dextrose 5%. 1000 milliLiter(s) (50 mL/Hr) IV Continuous <Continuous>  dextrose 50% Injectable 25 Gram(s) IV Push once  dextrose 50% Injectable 12.5 Gram(s) IV Push once  dextrose 50% Injectable 25 Gram(s) IV Push once  gabapentin 300 milliGRAM(s) Oral daily  glucagon  Injectable 1 milliGRAM(s) IntraMuscular once  heparin   Injectable 5000 Unit(s) SubCutaneous every 12 hours  insulin lispro (ADMELOG) corrective regimen sliding scale   SubCutaneous three times a day before meals  tamsulosin 0.8 milliGRAM(s) Oral at bedtime    MEDICATIONS  (PRN):  acetaminophen     Tablet .. 650 milliGRAM(s) Oral every 6 hours PRN Temp greater or equal to 38C (100.4F), Mild Pain (1 - 3)  dextrose Oral Gel 15 Gram(s) Oral once PRN Blood Glucose LESS THAN 70 milliGRAM(s)/deciliter

## 2022-07-27 NOTE — PROGRESS NOTE ADULT - PROBLEM SELECTOR PLAN 1
on admission now resolved   blood cultures urine culture negative so far  completed ceftriaxone IV  WBC slow improvement but I suspect patient's leukocytosis is secondary to malignancy

## 2022-07-27 NOTE — BH CONSULTATION LIAISON ASSESSMENT NOTE - SUMMARY
81M retired, domiciled alone, PPHx of depression (not in treatment), PMHx of DM, HTN, HLD, BPH, CAD s/p MI 2018, HFpEF who presented to the ED on 7/22 due to progressive difficulty walking and generalized weakness. Patient admitted to the hospital due to failure to thrive and found to have hyponatremia, NELSON, and sepsis secondary to UTI. Psychiatry consulted for depression and SI. Upon examination, patient endorsed depressed mood and passive intermittent SI. He also reports decreased appetite and difficulty sleeping. Denies SA/plan or HIIP. Patient's depressed mood in the setting of multiple medical and psychosocial stressors is likely due to adjustment disorder vs. mood disorder due to medical condition.  81M retired, domiciled alone, PPHx of depression (not in treatment), PMHx of DM, HTN, HLD, BPH, CAD s/p MI 2018, HFpEF who presented to the ED on 7/22 due to progressive difficulty walking and generalized weakness. Patient admitted to the hospital due to failure to thrive and found to have hyponatremia, NELSON, and sepsis secondary to UTI. Psychiatry consulted for depression and SI.     Patient assessed at bedside. Calm and cooperative. Patient reports a depressed mood due to his declining medical/functional condition. He endorses intermittent passive suicidal ideations (ie: "I feel like I want to die sometimes."); however, he reports he would never go through with this due to his personal and Gnosticism values. Patient reports decreased appetite and states "food has no taste". He states he has difficulty sleeping and receives approximately 4-5 hours of sleep per night. Patient reports he would like to go home and find a doctor that could help improve his medical condition. Denies hallucinations, delusion, or substance use. Denies SA/plan or HIIP. Patient reports no other psychiatric symptoms. Discussed prescribing a medication for his mood, sleep and appetite. Patient stated that he would like to take a medication to help with his symptoms.     Spoke with daughter, Shahida ([phone]) present at bedside. She states he father has longstanding depression that has not been treated and has gotten progressively worse in the setting of his medical conditions. She reports he was recently diagnosed with liver cancer; however, he is not aware of this diagnosis. She endorses weight loss as well. She expresses no other concerns.

## 2022-07-27 NOTE — BH CONSULTATION LIAISON ASSESSMENT NOTE - NSBHCHARTREVIEWLAB_PSY_A_CORE FT
11.0   24.71 )-----------( 168      ( 27 Jul 2022 08:56 )             33.4   07-26    131<L>  |  89<L>  |  17  ----------------------------<  80  3.9   |  29  |  0.76    Ca    8.9      26 Jul 2022 07:02    Urinalysis (07.23.22 @ 07:05)   pH Urine: 6.0   Glucose Qualitative, Urine: Negative   Blood, Urine: Moderate   Color: Yellow   Urine Appearance: Slightly Turbid   Bilirubin: Small   Ketone - Urine: Trace   Specific Gravity: 1.025   Protein, Urine: 30 mg/dL   Urobilinogen: 4 mg/dL   Nitrite: Negative   Leukocyte Esterase Concentration: Moderate

## 2022-07-27 NOTE — BH CONSULTATION LIAISON ASSESSMENT NOTE - DESCRIPTION
Patient is currently retired and domiciled alone. He identifies as Congregation and considers himself a Yarsani man.  Patient has a positive relationship with his daughter, Shahida, who came to visit him today. He is a lifetime smoker who reports his last use was ~6 years ago. Additionally, patient states his last alcoholic drink was ~4 years ago.

## 2022-07-27 NOTE — PROGRESS NOTE ADULT - SUBJECTIVE AND OBJECTIVE BOX
Patient is a 81y old  Male who presents with a chief complaint of failure to thrive (27 Jul 2022 12:34)      DATE OF SERVICE: 07-27-22 @ 17:04    SUBJECTIVE / OVERNIGHT EVENTS: overnight events noted    ROS:  Resp: No cough no sputum production  CVS: No chest pain no palpitations no orthopnea  GI: no N/V/D  : no dysuria, no hematuria  more alert and conversational today  daughter at bedside         MEDICATIONS  (STANDING):  atorvastatin 80 milliGRAM(s) Oral at bedtime  cyanocobalamin 1000 MICROGram(s) Oral daily  dextrose 5%. 1000 milliLiter(s) (100 mL/Hr) IV Continuous <Continuous>  dextrose 5%. 1000 milliLiter(s) (50 mL/Hr) IV Continuous <Continuous>  dextrose 50% Injectable 25 Gram(s) IV Push once  dextrose 50% Injectable 12.5 Gram(s) IV Push once  dextrose 50% Injectable 25 Gram(s) IV Push once  gabapentin 300 milliGRAM(s) Oral daily  glucagon  Injectable 1 milliGRAM(s) IntraMuscular once  heparin   Injectable 5000 Unit(s) SubCutaneous every 12 hours  insulin lispro (ADMELOG) corrective regimen sliding scale   SubCutaneous three times a day before meals  mirtazapine 7.5 milliGRAM(s) Oral at bedtime  tamsulosin 0.8 milliGRAM(s) Oral at bedtime    MEDICATIONS  (PRN):  acetaminophen     Tablet .. 650 milliGRAM(s) Oral every 6 hours PRN Temp greater or equal to 38C (100.4F), Mild Pain (1 - 3)  dextrose Oral Gel 15 Gram(s) Oral once PRN Blood Glucose LESS THAN 70 milliGRAM(s)/deciliter        CAPILLARY BLOOD GLUCOSE      POCT Blood Glucose.: 204 mg/dL (27 Jul 2022 16:31)  POCT Blood Glucose.: 108 mg/dL (27 Jul 2022 11:18)  POCT Blood Glucose.: 97 mg/dL (27 Jul 2022 07:33)  POCT Blood Glucose.: 136 mg/dL (26 Jul 2022 21:33)    I&O's Summary      Vital Signs Last 24 Hrs  T(C): 36.7 (27 Jul 2022 16:08), Max: 36.8 (27 Jul 2022 09:37)  T(F): 98.1 (27 Jul 2022 16:08), Max: 98.2 (27 Jul 2022 09:37)  HR: 83 (27 Jul 2022 16:08) (65 - 95)  BP: 105/75 (27 Jul 2022 16:08) (105/75 - 127/64)  BP(mean): --  RR: 18 (27 Jul 2022 16:08) (18 - 18)  SpO2: 95% (27 Jul 2022 16:08) (95% - 98%)    PHYSICAL EXAM:  EYES: EOMI, PERRLA  NECK: Supple, No JVD  CHEST/LUNG: clear  HEART: S1 S2; no murmurs   ABDOMEN: Soft, Nontender  ventral swelling no change  EXTREMITIES:   no edema  NEUROLOGY: Alert non-focal  SKIN: No rashes or lesions    LABS:                        11.0   24.71 )-----------( 168      ( 27 Jul 2022 08:56 )             33.4     07-26    131<L>  |  89<L>  |  17  ----------------------------<  80  3.9   |  29  |  0.76    Ca    8.9      26 Jul 2022 07:02                  All consultant(s) notes reviewed and care discussed with other providers        Contact Number, Dr Douglas [phone]

## 2022-07-27 NOTE — BH CONSULTATION LIAISON ASSESSMENT NOTE - NSBHCONSULTRECOMMENDOTHER_PSY_A_CORE FT
Mirtazapine PO 7.5mg at bedtime -consider low dose Mirtazapine PO 7.5mg at bedtime which can help with depression/anxiety and sleep/appetite  -supportive therapy provided  -joel Pinedo in accord with plan above

## 2022-07-27 NOTE — PROGRESS NOTE ADULT - ASSESSMENT
82 y/o M w/ PMH of DM2, HTN, HLD CAD s/p MI 2018 presents to the hospital with 2 months of progressive difficulty to walking admitted for further evaluation and management, likely has associated sepsis secondary to UTI

## 2022-07-27 NOTE — PROGRESS NOTE ADULT - PROBLEM SELECTOR PLAN 2
CT chest/abdomen/pelvis noted  likely metastatic GB cancer  GI evaluation appreciated  IR biopsy 8/1/22  hold off on anticoagulation   daughter favors no anticoagulation for PV thrombosis

## 2022-07-27 NOTE — BH CONSULTATION LIAISON ASSESSMENT NOTE - NSBHCONSULTFOLLOWAFTERCARE_PSY_A_CORE FT
SW with OP Psychiatry Referral Resources:   [facility] Dept - [phone]  [facility] - [phone]  [facility] - [phone]

## 2022-07-27 NOTE — BH CONSULTATION LIAISON ASSESSMENT NOTE - NSICDXBHSECONDARYDX_PSY_ALL_CORE
Sepsis secondary to UTI   A41.9  Essential hypertension   I10  Type 2 diabetes mellitus without complication, without long-term current use of insulin   E11.9  BPH (benign prostatic hyperplasia)   N40.0  CAD (coronary artery disease)   I25.10  Generalized weakness   R53.1  Hyponatremia   E87.1  Chronic heart failure with preserved ejection fraction (HFpEF)   I50.32  NELSON (acute kidney injury)   N17.9  Heavy smoker   F17.200  Metastatic disease   C79.9

## 2022-07-27 NOTE — BH CONSULTATION LIAISON ASSESSMENT NOTE - RISK ASSESSMENT
Risk factors: acute/chronic medical illness, depressed mood   Protective factors: strong social supports, Spiritism, residential stability, denies active SIIP/HIIP    Patient has few risk factors mitigated by several protective factors and does not meet criteria for psychiatric admission at this time.

## 2022-07-27 NOTE — PROGRESS NOTE ADULT - SUBJECTIVE AND OBJECTIVE BOX
C A R D I O L O G Y  **********************************     DATE OF SERVICE: 07-27-22    Resting comfortably in no distress. Denies chest pain or shortness of breath.   Review of systems otherwise negative.  	    MEDICATIONS  (STANDING):  atorvastatin 80 milliGRAM(s) Oral at bedtime  cyanocobalamin 1000 MICROGram(s) Oral daily  dextrose 5%. 1000 milliLiter(s) (100 mL/Hr) IV Continuous <Continuous>  dextrose 5%. 1000 milliLiter(s) (50 mL/Hr) IV Continuous <Continuous>  dextrose 50% Injectable 25 Gram(s) IV Push once  dextrose 50% Injectable 12.5 Gram(s) IV Push once  dextrose 50% Injectable 25 Gram(s) IV Push once  gabapentin 300 milliGRAM(s) Oral daily  glucagon  Injectable 1 milliGRAM(s) IntraMuscular once  heparin   Injectable 5000 Unit(s) SubCutaneous every 12 hours  insulin lispro (ADMELOG) corrective regimen sliding scale   SubCutaneous three times a day before meals  tamsulosin 0.8 milliGRAM(s) Oral at bedtime    MEDICATIONS  (PRN):  acetaminophen     Tablet .. 650 milliGRAM(s) Oral every 6 hours PRN Temp greater or equal to 38C (100.4F), Mild Pain (1 - 3)  dextrose Oral Gel 15 Gram(s) Oral once PRN Blood Glucose LESS THAN 70 milliGRAM(s)/deciliter      LABS:                          11.0   24.71 )-----------( 168      ( 27 Jul 2022 08:56 )             33.4     Hemoglobin: 11.0 g/dL (07-27 @ 08:56)  Hemoglobin: 12.2 g/dL (07-26 @ 07:08)  Hemoglobin: 11.5 g/dL (07-25 @ 13:11)  Hemoglobin: 12.8 g/dL (07-24 @ 09:53)  Hemoglobin: 12.2 g/dL (07-23 @ 04:12)    07-26    131<L>  |  89<L>  |  17  ----------------------------<  80  3.9   |  29  |  0.76    Ca    8.9      26 Jul 2022 07:02      Creatinine Trend: 0.76<--, 0.78<--, 0.92<--, 1.32<--               PHYSICAL EXAM  Vital Signs Last 24 Hrs  T(C): 36.8 (27 Jul 2022 09:37), Max: 36.8 (27 Jul 2022 09:37)  T(F): 98.2 (27 Jul 2022 09:37), Max: 98.2 (27 Jul 2022 09:37)  HR: 95 (27 Jul 2022 09:37) (64 - 95)  BP: 127/64 (27 Jul 2022 09:37) (108/61 - 127/64)  BP(mean): --  RR: 18 (27 Jul 2022 09:37) (18 - 18)  SpO2: 98% (27 Jul 2022 09:37) (98% - 99%)    Parameters below as of 27 Jul 2022 09:37  Patient On (Oxygen Delivery Method): room air          Gen: NAD  HEENT:  (-)icterus (-)pallor  CV: N S1 S2 1/6 JULISSA (+)2 Pulses B/l  Resp:  Clear to auscultation B/L, normal effort  GI: (+) BS Soft, NT, ND  Lymph:  (-)Edema, (-)obvious lymphadenopathy  Skin: Warm to touch, Normal turgor  Psych: Appropriate mood and affect      TELEMETRY: NSR  ECG: Sinus Bradycardia, narrow QRS 	  	    < from: Transthoracic Echocardiogram (07.26.22 @ 07:37) >  Conclusions:  1. Calcified trileaflet aortic valve with decreased  opening. Difficult to determine the number of  leaflets.There is LVOT obstruction due to a hyperdynamic  ventricle with a peak velocity of 27 mmHg. Peak transaortic  valve gradient equals 38 mm Hg, mean transaortic valve  gradient equals 24 mm Hg, estimated aortic valve area  equals 1.1 sqcm (by continuity equation), aortic valve  velocity time integral equals 37 cm, consistent with  moderate aortic stenosis. Peak velocity was 3.1 m/s, DVI  was 0.3. Minimal aortic regurgitation.  Please note that peak velocities were obtained only from  apical views and Pedoff was not performed.  2. Aortic Root: 2.9 cm. The proximal ascending aorta and  root are not well visualized.  LVOT diameter: 2 cm.  3. Normal left atrium.  LA volume index = 26 cc/m2.  4. Small left ventricle. Increased relative wall thickness  with normal left ventricular mass index, consistent with  concentric left ventricular remodeling.Normal left  ventricular systolic function. No segmental wall motion  abnormalities. LVEF calculated using biplane Ron's  method was 63%.Normal diastolic function  5. Normal right atrium.Normal right ventricular size and  function.Normal tricuspid valve. No tricuspid  regurgitation.  6. No pericardial effusion seen.  *** Compared with echocardiogram of 8/20/2018, there is now  moderate aortic stenosis.  Please note thatthe patient refused a bubble study. IV was  painful. Hence, would suggest providing an IV line that is  less painful so that we can complete this study. Also, we  would perform additional imaging to assess the severity of  aortic stenosis. Would recommend EpiQ machine for  additional imaging due to poor image quality.    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 Cardiology consulted for further evaluation.    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

## 2022-07-27 NOTE — PROGRESS NOTE ADULT - ASSESSMENT
CARDIOLOGY ATTENDING    Patient seen and examined. Agree with above. Tele/echo unremarkable. Recommend outpatient ILR r/o AF. No further inpatient cardiac workup expected as long as tele remains unremarkable.

## 2022-07-28 NOTE — PROGRESS NOTE ADULT - PROBLEM SELECTOR PLAN 2
CT chest/abdomen/pelvis noted  likely metastatic GB cancer  IR biopsy 8/1/22  hold off on anticoagulation   daughter favors no anticoagulation for PV thrombosis

## 2022-07-28 NOTE — PROGRESS NOTE ADULT - ASSESSMENT
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

## 2022-07-28 NOTE — PROGRESS NOTE ADULT - SUBJECTIVE AND OBJECTIVE BOX
[facility]  Wero Stafford PA-C  [address] [place] [address] [phone]      INTERVAL HPI/OVERNIGHT EVENTS:  pt seen and examined, no new events   c/o feeling depressed, does not have an appetite  awaiting biopsy on Monday     MEDICATIONS  (STANDING):  atorvastatin 80 milliGRAM(s) Oral at bedtime  cyanocobalamin 1000 MICROGram(s) Oral daily  dextrose 5%. 1000 milliLiter(s) (100 mL/Hr) IV Continuous <Continuous>  dextrose 5%. 1000 milliLiter(s) (50 mL/Hr) IV Continuous <Continuous>  dextrose 50% Injectable 25 Gram(s) IV Push once  dextrose 50% Injectable 12.5 Gram(s) IV Push once  dextrose 50% Injectable 25 Gram(s) IV Push once  gabapentin 300 milliGRAM(s) Oral daily  glucagon  Injectable 1 milliGRAM(s) IntraMuscular once  heparin   Injectable 5000 Unit(s) SubCutaneous every 12 hours  insulin lispro (ADMELOG) corrective regimen sliding scale   SubCutaneous three times a day before meals  tamsulosin 0.8 milliGRAM(s) Oral at bedtime    MEDICATIONS  (PRN):  acetaminophen     Tablet .. 650 milliGRAM(s) Oral every 6 hours PRN Temp greater or equal to 38C (100.4F), Mild Pain (1 - 3)  dextrose Oral Gel 15 Gram(s) Oral once PRN Blood Glucose LESS THAN 70 milliGRAM(s)/deciliter      Allergies    Advil (Hives)  penicillin (Hives)    Intolerances        ROS:   General:  No wt loss, fevers, chills, night sweats, +fatigue,   Eyes:  Good vision, no reported pain  ENT:  No sore throat, pain, runny nose, dysphagia  CV:  No pain, palpitations, hypo/hypertension  Resp:  No dyspnea, cough, tachypnea, wheezing  GI:  No pain, No nausea, No vomiting, No diarrhea, No constipation, No weight loss, No fever, No pruritis, No rectal bleeding, No tarry stools, No dysphagia,  :  No pain, bleeding, incontinence, nocturia  Muscle:  No pain, weakness  Neuro:  No weakness, tingling, memory problems  Psych:  No fatigue, insomnia, mood problems, +depression  Endocrine:  No polyuria, polydipsia, cold/heat intolerance  Heme:  No petechiae, ecchymosis, easy bruisability  Skin:  No rash, tattoos, scars, edema      PHYSICAL EXAM:   Vital Signs Last 24 Hrs  T(C): 37.4 (28 Jul 2022 08:51), Max: 37.4 (28 Jul 2022 08:51)  T(F): 99.3 (28 Jul 2022 08:51), Max: 99.3 (28 Jul 2022 08:51)  HR: 119 (28 Jul 2022 08:51) (83 - 119)  BP: 117/69 (28 Jul 2022 08:51) (105/75 - 118/68)  BP(mean): --  RR: 16 (28 Jul 2022 08:51) (16 - 18)  SpO2: 95% (28 Jul 2022 08:51) (95% - 95%)    Parameters below as of 28 Jul 2022 08:51  Patient On (Oxygen Delivery Method): room air      Daily     Daily     GENERAL:  Appears stated age,   HEENT:  NC/AT,    CHEST:  Full & symmetric excursion,   HEART:  Regular rhythm,  ABDOMEN:  Soft, non-tender, distended,  EXTEREMITIES:  no cyanosis  SKIN:  No rash  NEURO:  Alert,       LABS:                                   11.0   24.71 )-----------( 168      ( 27 Jul 2022 08:56 )             33.4             RADIOLOGY & ADDITIONAL TESTS:

## 2022-07-28 NOTE — PROGRESS NOTE ADULT - PROBLEM SELECTOR PLAN 1
on admission now resolved   blood cultures urine culture negative so far  completed antibiotics   suspect patient's leukocytosis is secondary to malignancy

## 2022-07-28 NOTE — PATIENT PROFILE ADULT - MONEY FOR FOOD
no MEDICATIONS  (PRN):  dextrose Oral Gel 15 Gram(s) Oral once PRN Blood Glucose LESS THAN 70 milliGRAM(s)/deciliter  LORazepam     Tablet 0.5 milliGRAM(s) Oral every 4 hours PRN agitation due to catatonia  OLANZapine Injectable 2.5 milliGRAM(s) IntraMuscular once PRN agitation

## 2022-07-28 NOTE — PROGRESS NOTE ADULT - SUBJECTIVE AND OBJECTIVE BOX
Date of Service: 07-28-22 @ 11:30    Patient is a 81y old  Male who presents with a chief complaint of failure to thrive (28 Jul 2022 09:37)      Any change in ROS: sleepy : no overnight events     MEDICATIONS  (STANDING):  atorvastatin 80 milliGRAM(s) Oral at bedtime  cyanocobalamin 1000 MICROGram(s) Oral daily  dextrose 5%. 1000 milliLiter(s) (100 mL/Hr) IV Continuous <Continuous>  dextrose 5%. 1000 milliLiter(s) (50 mL/Hr) IV Continuous <Continuous>  dextrose 50% Injectable 25 Gram(s) IV Push once  dextrose 50% Injectable 12.5 Gram(s) IV Push once  dextrose 50% Injectable 25 Gram(s) IV Push once  gabapentin 300 milliGRAM(s) Oral daily  glucagon  Injectable 1 milliGRAM(s) IntraMuscular once  heparin   Injectable 5000 Unit(s) SubCutaneous every 12 hours  insulin lispro (ADMELOG) corrective regimen sliding scale   SubCutaneous three times a day before meals  mirtazapine 7.5 milliGRAM(s) Oral at bedtime  tamsulosin 0.8 milliGRAM(s) Oral at bedtime    MEDICATIONS  (PRN):  acetaminophen     Tablet .. 650 milliGRAM(s) Oral every 6 hours PRN Temp greater or equal to 38C (100.4F), Mild Pain (1 - 3)  dextrose Oral Gel 15 Gram(s) Oral once PRN Blood Glucose LESS THAN 70 milliGRAM(s)/deciliter    Vital Signs Last 24 Hrs  T(C): 37.4 (28 Jul 2022 08:51), Max: 37.4 (28 Jul 2022 08:51)  T(F): 99.3 (28 Jul 2022 08:51), Max: 99.3 (28 Jul 2022 08:51)  HR: 119 (28 Jul 2022 08:51) (83 - 119)  BP: 117/69 (28 Jul 2022 08:51) (105/75 - 118/68)  BP(mean): --  RR: 16 (28 Jul 2022 08:51) (16 - 18)  SpO2: 95% (28 Jul 2022 08:51) (95% - 95%)    Parameters below as of 28 Jul 2022 08:51  Patient On (Oxygen Delivery Method): room air        I&O's Summary    27 Jul 2022 07:01 - 28 Jul 2022 07:00  --------------------------------------------------------  IN: 460 mL / OUT: 0 mL / NET: 460 mL    28 Jul 2022 07:01  -  28 Jul 2022 11:30  --------------------------------------------------------  IN: 180 mL / OUT: 0 mL / NET: 180 mL          Physical Exam:   GENERAL: NAD, well-groomed, well-developed  HEENT: ADAM/   Atraumatic, Normocephalic  ENMT: No tonsillar erythema, exudates, or enlargement; Moist mucous membranes, Good dentition, No lesions  NECK: Supple, No JVD, Normal thyroid  CHEST/LUNG: Clear to auscultaion:  CVS: Regular rate and rhythm; No murmurs, rubs, or gallops  GI: : Soft, Nontender, Nondistended; Bowel sounds present  NERVOUS SYSTEM:  sleepy: tries top respond   EXTREMITIES:  - edema  LYMPH: No lymphadenopathy noted  SKIN: No rashes or lesions  ENDOCRINOLOGY: No Thyromegaly  PSYCH: sleepy"     Labs:  33                            11.0   24.71 )-----------( 168      ( 27 Jul 2022 08:56 )             33.4                         12.2   22.76 )-----------( 158      ( 26 Jul 2022 07:08 )             37.8                         11.5   24.63 )-----------( 174      ( 25 Jul 2022 13:11 )             35.9     07-26    131<L>  |  89<L>  |  17  ----------------------------<  80  3.9   |  29  |  0.76  07-25    130<L>  |  91<L>  |  20  ----------------------------<  144<H>  3.6   |  30  |  0.78        CAPILLARY BLOOD GLUCOSE      POCT Blood Glucose.: 160 mg/dL (28 Jul 2022 07:55)  POCT Blood Glucose.: 151 mg/dL (27 Jul 2022 21:13)  POCT Blood Glucose.: 204 mg/dL (27 Jul 2022 16:31)        rad< from: CT Abdomen and Pelvis w/ Oral Cont and w/ IV Cont (07.25.22 @ 19:24) >  PERITONEUM: Trace free fluid.  VESSELS: Atherosclerotic changes. Thrombus within the anterior and   posterior branches of the right portal vein.  RETROPERITONEUM/LYMPH NODES: No lymphadenopathy.  ABDOMINAL WALL: Large fat-containing umbilical hernia.  BONES: Within normal limits.    IMPRESSION:  Innumerable hepatic lesions concerning for metastatic disease.    Gallbladder lumen is continuous with hepatic lesions. 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    Dr. Olivera discussed these findings with Dr. Mccall on 7/25/2022 7:34   PM with read back.    --- End of Report ---    < end of copied text >            RECENT CULTURES:  07-23 @ 14:40 .Blood Blood-Venous                No growth to date.    07-23 @ 14:35 .Blood Blood-Peripheral                No growth to date.    07-23 @ 07:05 Clean Catch Clean Catch (Midstream)                <10,000 CFU/mL Normal Urogenital Grace          RESPIRATORY CULTURES:          Studies  Chest X-RAY  CT SCAN Chest   Venous Dopplers: LE:   CT Abdomen  Others

## 2022-07-28 NOTE — PROGRESS NOTE ADULT - ASSESSMENT
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

## 2022-07-28 NOTE — PROGRESS NOTE ADULT - PROBLEM SELECTOR PLAN 3
adv strongly to stop smoking';  ct chest reviewed:  likely has metastatic liver disease:  biopsy of liver lesions:    7/26: likely has GB malignancy  with metastatic disease    7/28: for ir guided biopsy on august 1st: ac being held

## 2022-07-28 NOTE — PROGRESS NOTE ADULT - SUBJECTIVE AND OBJECTIVE BOX
[facility] [phone]        Patient seen and examined in bed.    He was in good spirits         MEDICATIONS  (STANDING):  atorvastatin 80 milliGRAM(s) Oral at bedtime  cyanocobalamin 1000 MICROGram(s) Oral daily  dextrose 5%. 1000 milliLiter(s) (100 mL/Hr) IV Continuous <Continuous>  dextrose 5%. 1000 milliLiter(s) (50 mL/Hr) IV Continuous <Continuous>  dextrose 50% Injectable 25 Gram(s) IV Push once  dextrose 50% Injectable 12.5 Gram(s) IV Push once  dextrose 50% Injectable 25 Gram(s) IV Push once  gabapentin 300 milliGRAM(s) Oral daily  glucagon  Injectable 1 milliGRAM(s) IntraMuscular once  heparin   Injectable 5000 Unit(s) SubCutaneous every 12 hours  insulin lispro (ADMELOG) corrective regimen sliding scale   SubCutaneous three times a day before meals  mirtazapine 7.5 milliGRAM(s) Oral at bedtime  tamsulosin 0.8 milliGRAM(s) Oral at bedtime      VITAL:  T(C): , Max: 37.4 (07-28-22 @ 08:51)  T(F): , Max: 99.3 (07-28-22 @ 08:51)  HR: 119 (07-28-22 @ 08:51)  BP: 117/69 (07-28-22 @ 08:51)  BP(mean): --  RR: 16 (07-28-22 @ 08:51)  SpO2: 95% (07-28-22 @ 08:51)  Wt(kg): --    I and O's:    07-27 @ 07:01  -  07-28 @ 07:00  --------------------------------------------------------  IN: 460 mL / OUT: 0 mL / NET: 460 mL          PHYSICAL EXAM:    Constitutional: NAD  Neck:  No JVD  Respiratory: CTAB/L  Cardiovascular: S1 and S2  Gastrointestinal: BS+, soft, NT/ND  Extremities: No peripheral edema  Neurological: A/O x 3, no focal deficits  Psychiatric: Normal mood, normal affect  : No Villasenor  Skin: No rashes  Access: Not applicable    LABS:                        11.0   24.71 )-----------( 575      ( 27 Jul 2022 08:56 )             33.4                 Urine Studies:          RADIOLOGY & ADDITIONAL STUDIES:

## 2022-07-28 NOTE — PROGRESS NOTE ADULT - SUBJECTIVE AND OBJECTIVE BOX
Patient is a 81y old  Male who presents with a chief complaint of failure to thrive (28 Jul 2022 11:30)      DATE OF SERVICE: 07-28-22 @ 16:35    SUBJECTIVE / OVERNIGHT EVENTS: overnight events noted    ROS:  Resp: No cough no sputum production  CVS: No chest pain no palpitations no orthopnea  GI: no N/V/D  : no dysuria, no hematuria          MEDICATIONS  (STANDING):  atorvastatin 80 milliGRAM(s) Oral at bedtime  cyanocobalamin 1000 MICROGram(s) Oral daily  dextrose 5%. 1000 milliLiter(s) (100 mL/Hr) IV Continuous <Continuous>  dextrose 5%. 1000 milliLiter(s) (50 mL/Hr) IV Continuous <Continuous>  dextrose 50% Injectable 25 Gram(s) IV Push once  dextrose 50% Injectable 12.5 Gram(s) IV Push once  dextrose 50% Injectable 25 Gram(s) IV Push once  gabapentin 300 milliGRAM(s) Oral daily  glucagon  Injectable 1 milliGRAM(s) IntraMuscular once  heparin   Injectable 5000 Unit(s) SubCutaneous every 12 hours  insulin lispro (ADMELOG) corrective regimen sliding scale   SubCutaneous three times a day before meals  tamsulosin 0.8 milliGRAM(s) Oral at bedtime    MEDICATIONS  (PRN):  acetaminophen     Tablet .. 650 milliGRAM(s) Oral every 6 hours PRN Temp greater or equal to 38C (100.4F), Mild Pain (1 - 3)  dextrose Oral Gel 15 Gram(s) Oral once PRN Blood Glucose LESS THAN 70 milliGRAM(s)/deciliter        CAPILLARY BLOOD GLUCOSE      POCT Blood Glucose.: 155 mg/dL (28 Jul 2022 12:04)  POCT Blood Glucose.: 160 mg/dL (28 Jul 2022 07:55)  POCT Blood Glucose.: 151 mg/dL (27 Jul 2022 21:13)    I&O's Summary    27 Jul 2022 07:01  -  28 Jul 2022 07:00  --------------------------------------------------------  IN: 460 mL / OUT: 0 mL / NET: 460 mL    28 Jul 2022 07:01  -  28 Jul 2022 16:35  --------------------------------------------------------  IN: 320 mL / OUT: 0 mL / NET: 320 mL        Vital Signs Last 24 Hrs  T(C): 37.4 (28 Jul 2022 08:51), Max: 37.4 (28 Jul 2022 08:51)  T(F): 99.3 (28 Jul 2022 08:51), Max: 99.3 (28 Jul 2022 08:51)  HR: 119 (28 Jul 2022 08:51) (91 - 119)  BP: 117/69 (28 Jul 2022 08:51) (117/69 - 118/68)  BP(mean): --  RR: 16 (28 Jul 2022 08:51) (16 - 18)  SpO2: 95% (28 Jul 2022 08:51) (95% - 95%)    PHYSICAL EXAM:  EYES: EOMI, PERRLA  NECK: Supple, No JVD  CHEST/LUNG: clear  HEART: S1 S2; no murmurs   ABDOMEN: Soft, Nontender  ventral swelling no change  EXTREMITIES:   no edema  NEUROLOGY: Alert non-focal  SKIN: No rashes or lesions    LABS:                        11.0   24.71 )-----------( 168      ( 27 Jul 2022 08:56 )             33.4                       All consultant(s) notes reviewed and care discussed with other providers        Contact Number, Dr Douglas [phone]

## 2022-07-28 NOTE — PROGRESS NOTE ADULT - PROBLEM SELECTOR PROBLEM 5
Chronic heart failure with preserved ejection fraction (HFpEF) Hyperlipidemia    Hypertension    Hypothyroidism    MI (myocardial infarction)  s/p CABG x4 2013

## 2022-07-28 NOTE — PROGRESS NOTE ADULT - SUBJECTIVE AND OBJECTIVE BOX
[facility]  **********************************     DATE OF SERVICE: 07-28-22    Lethargic but in no distress. Denies chest pain or shortness of breath.   Review of systems otherwise negative.  	    MEDICATIONS  (STANDING):  atorvastatin 80 milliGRAM(s) Oral at bedtime  cyanocobalamin 1000 MICROGram(s) Oral daily  dextrose 5%. 1000 milliLiter(s) (100 mL/Hr) IV Continuous <Continuous>  dextrose 5%. 1000 milliLiter(s) (50 mL/Hr) IV Continuous <Continuous>  dextrose 50% Injectable 25 Gram(s) IV Push once  dextrose 50% Injectable 12.5 Gram(s) IV Push once  dextrose 50% Injectable 25 Gram(s) IV Push once  gabapentin 300 milliGRAM(s) Oral daily  glucagon  Injectable 1 milliGRAM(s) IntraMuscular once  heparin   Injectable 5000 Unit(s) SubCutaneous every 12 hours  insulin lispro (ADMELOG) corrective regimen sliding scale   SubCutaneous three times a day before meals  tamsulosin 0.8 milliGRAM(s) Oral at bedtime    MEDICATIONS  (PRN):  acetaminophen     Tablet .. 650 milliGRAM(s) Oral every 6 hours PRN Temp greater or equal to 38C (100.4F), Mild Pain (1 - 3)  dextrose Oral Gel 15 Gram(s) Oral once PRN Blood Glucose LESS THAN 70 milliGRAM(s)/deciliter      LABS:                          11.0   24.71 )-----------( 168      ( 27 Jul 2022 08:56 )             33.4     Hemoglobin: 11.0 g/dL (07-27 @ 08:56)  Hemoglobin: 12.2 g/dL (07-26 @ 07:08)  Hemoglobin: 11.5 g/dL (07-25 @ 13:11)  Hemoglobin: 12.8 g/dL (07-24 @ 09:53)          Creatinine Trend: 0.76<--, 0.78<--, 0.92<--, 1.32<--             07-27-22 @ 07:01  -  07-28-22 @ 07:00  --------------------------------------------------------  IN: 460 mL / OUT: 0 mL / NET: 460 mL    07-28-22 @ 07:01  -  07-28-22 @ 16:12  --------------------------------------------------------  IN: 320 mL / OUT: 0 mL / NET: 320 mL        PHYSICAL EXAM  Vital Signs Last 24 Hrs  T(C): 37.4 (28 Jul 2022 08:51), Max: 37.4 (28 Jul 2022 08:51)  T(F): 99.3 (28 Jul 2022 08:51), Max: 99.3 (28 Jul 2022 08:51)  HR: 119 (28 Jul 2022 08:51) (91 - 119)  BP: 117/69 (28 Jul 2022 08:51) (117/69 - 118/68)  BP(mean): --  RR: 16 (28 Jul 2022 08:51) (16 - 18)  SpO2: 95% (28 Jul 2022 08:51) (95% - 95%)    Parameters below as of 28 Jul 2022 08:51  Patient On (Oxygen Delivery Method): room air            Gen: NAD  HEENT:  (-)icterus (-)pallor  CV: N S1 S2 1/6 JULISSA (+)2 Pulses B/l  Resp:  Clear to auscultation B/L, normal effort  GI: (+) BS Soft, NT, ND  Lymph:  (-)Edema, (-)obvious lymphadenopathy  Skin: Warm to touch, Normal turgor  Psych: Appropriate mood and affect      TELEMETRY: SR/ST   ECG: Sinus Bradycardia, narrow QRS 	  	    < from: Transthoracic Echocardiogram (07.26.22 @ 07:37) >  Conclusions:  1. Calcified trileaflet aortic valve with decreased  opening. Difficult to determine the number of  leaflets.There is LVOT obstruction due to a hyperdynamic  ventricle with a peak velocity of 27 mmHg. Peak transaortic  valve gradient equals 38 mm Hg, mean transaortic valve  gradient equals 24 mm Hg, estimated aortic valve area  equals 1.1 sqcm (by continuity equation), aortic valve  velocity time integral equals 37 cm, consistent with  moderate aortic stenosis. Peak velocity was 3.1 m/s, DVI  was 0.3. Minimal aortic regurgitation.  Please note that peak velocities were obtained only from  apical views and Pedoff was not performed.  2. Aortic Root: 2.9 cm. The proximal ascending aorta and  root are not well visualized.  LVOT diameter: 2 cm.  3. Normal left atrium.  LA volume index = 26 cc/m2.  4. Small left ventricle. Increased relative wall thickness  with normal left ventricular mass index, consistent with  concentric left ventricular remodeling.Normal left  ventricular systolic function. No segmental wall motion  abnormalities. LVEF calculated using biplane Ron's  method was 63%.Normal diastolic function  5. Normal right atrium.Normal right ventricular size and  function.Normal tricuspid valve. No tricuspid  regurgitation.  6. No pericardial effusion seen.  *** Compared with echocardiogram of 8/20/2018, there is now  moderate aortic stenosis.  Please note thatthe patient refused a bubble study. IV was  painful. Hence, would suggest providing an IV line that is  less painful so that we can complete this study. Also, we  would perform additional imaging to assess the severity of  aortic stenosis. Would recommend EpiQ machine for  additional imaging due to poor image quality.    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 Cardiology consulted for further evaluation.    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

## 2022-07-29 NOTE — PROGRESS NOTE ADULT - SUBJECTIVE AND OBJECTIVE BOX
[facility]  Wero Stafford PA-C  [address] [place] [address] [phone]      INTERVAL HPI/OVERNIGHT EVENTS:  pt seen and examined, no new events   awaiting biopsy on Monday     MEDICATIONS  (STANDING):  atorvastatin 80 milliGRAM(s) Oral at bedtime  cyanocobalamin 1000 MICROGram(s) Oral daily  dextrose 5%. 1000 milliLiter(s) (100 mL/Hr) IV Continuous <Continuous>  dextrose 5%. 1000 milliLiter(s) (50 mL/Hr) IV Continuous <Continuous>  dextrose 50% Injectable 25 Gram(s) IV Push once  dextrose 50% Injectable 12.5 Gram(s) IV Push once  dextrose 50% Injectable 25 Gram(s) IV Push once  gabapentin 300 milliGRAM(s) Oral daily  glucagon  Injectable 1 milliGRAM(s) IntraMuscular once  heparin   Injectable 5000 Unit(s) SubCutaneous every 12 hours  insulin lispro (ADMELOG) corrective regimen sliding scale   SubCutaneous three times a day before meals  tamsulosin 0.8 milliGRAM(s) Oral at bedtime    MEDICATIONS  (PRN):  acetaminophen     Tablet .. 650 milliGRAM(s) Oral every 6 hours PRN Temp greater or equal to 38C (100.4F), Mild Pain (1 - 3)  dextrose Oral Gel 15 Gram(s) Oral once PRN Blood Glucose LESS THAN 70 milliGRAM(s)/deciliter      Allergies    Advil (Hives)  penicillin (Hives)    Intolerances        ROS:   General:  No wt loss, fevers, chills, night sweats, +fatigue,   Eyes:  Good vision, no reported pain  ENT:  No sore throat, pain, runny nose, dysphagia  CV:  No pain, palpitations, hypo/hypertension  Resp:  No dyspnea, cough, tachypnea, wheezing  GI:  No pain, No nausea, No vomiting, No diarrhea, No constipation, No weight loss, No fever, No pruritis, No rectal bleeding, No tarry stools, No dysphagia,  :  No pain, bleeding, incontinence, nocturia  Muscle:  No pain, weakness  Neuro:  No weakness, tingling, memory problems  Psych:  No fatigue, insomnia, mood problems, +depression  Endocrine:  No polyuria, polydipsia, cold/heat intolerance  Heme:  No petechiae, ecchymosis, easy bruisability  Skin:  No rash, tattoos, scars, edema      PHYSICAL EXAM:   Vital Signs Last 24 Hrs  T(C): 36.8 (29 Jul 2022 09:11), Max: 37.2 (28 Jul 2022 17:25)  T(F): 98.2 (29 Jul 2022 09:11), Max: 98.9 (28 Jul 2022 17:25)  HR: 90 (29 Jul 2022 09:11) (81 - 91)  BP: 124/59 (29 Jul 2022 09:11) (96/53 - 126/54)  BP(mean): --  RR: 18 (29 Jul 2022 09:11) (17 - 18)  SpO2: 94% (29 Jul 2022 09:11) (94% - 96%)    Parameters below as of 29 Jul 2022 09:11  Patient On (Oxygen Delivery Method): room air        Daily     Daily     GENERAL:  Appears stated age,   HEENT:  NC/AT,    CHEST:  Full & symmetric excursion,   HEART:  Regular rhythm,  ABDOMEN:  Soft, non-tender, distended,  EXTEREMITIES:  no cyanosis  SKIN:  No rash  NEURO:  Alert,       LABS:      RADIOLOGY & ADDITIONAL TESTS:

## 2022-07-29 NOTE — PROGRESS NOTE ADULT - ASSESSMENT
82 y/o M w/ PMH of DM2, HTN, HLD CAD s/p MI 2018 presents to the hospital with 2 months of progressive difficulty to walking  Hyponatremia - Euvlemic; Na+ improving   COPD          1 Renal-No new labs   2 ID- now off IV Abx;  WBC still elevated   3 CVS-BP appropriate   4 GI- Liver bx monday       [employer]  [phone]

## 2022-07-29 NOTE — PROGRESS NOTE ADULT - SUBJECTIVE AND OBJECTIVE BOX
C A R D I O L O G Y  **********************************     DATE OF SERVICE: 07-29-22    Daughter at bedside. More awake. Denies chest pain or shortness of breath.   Review of systems otherwise negative.  	    MEDICATIONS  (STANDING):  atorvastatin 80 milliGRAM(s) Oral at bedtime  cyanocobalamin 1000 MICROGram(s) Oral daily  dextrose 5%. 1000 milliLiter(s) (100 mL/Hr) IV Continuous <Continuous>  dextrose 5%. 1000 milliLiter(s) (50 mL/Hr) IV Continuous <Continuous>  dextrose 50% Injectable 25 Gram(s) IV Push once  dextrose 50% Injectable 12.5 Gram(s) IV Push once  dextrose 50% Injectable 25 Gram(s) IV Push once  glucagon  Injectable 1 milliGRAM(s) IntraMuscular once  heparin   Injectable 5000 Unit(s) SubCutaneous every 12 hours  insulin lispro (ADMELOG) corrective regimen sliding scale   SubCutaneous three times a day before meals  tamsulosin 0.8 milliGRAM(s) Oral at bedtime    MEDICATIONS  (PRN):  acetaminophen     Tablet .. 650 milliGRAM(s) Oral every 6 hours PRN Temp greater or equal to 38C (100.4F), Mild Pain (1 - 3)  dextrose Oral Gel 15 Gram(s) Oral once PRN Blood Glucose LESS THAN 70 milliGRAM(s)/deciliter      LABS:      Hemoglobin: 11.0 g/dL (07-27 @ 08:56)  Hemoglobin: 12.2 g/dL (07-26 @ 07:08)  Hemoglobin: 11.5 g/dL (07-25 @ 13:11)          Creatinine Trend: 0.76<--, 0.78<--, 0.92<--, 1.32<--             07-28-22 @ 07:01  -  07-29-22 @ 07:00  --------------------------------------------------------  IN: 660 mL / OUT: 500 mL / NET: 160 mL    07-29-22 @ 07:01  -  07-29-22 @ 16:43  --------------------------------------------------------  IN: 250 mL / OUT: 0 mL / NET: 250 mL        PHYSICAL EXAM  Vital Signs Last 24 Hrs  T(C): 36.8 (29 Jul 2022 09:11), Max: 37.2 (28 Jul 2022 17:25)  T(F): 98.2 (29 Jul 2022 09:11), Max: 98.9 (28 Jul 2022 17:25)  HR: 90 (29 Jul 2022 09:11) (81 - 91)  BP: 124/59 (29 Jul 2022 09:11) (96/53 - 126/54)  BP(mean): --  RR: 18 (29 Jul 2022 09:11) (17 - 18)  SpO2: 94% (29 Jul 2022 09:11) (94% - 96%)    Parameters below as of 29 Jul 2022 09:11  Patient On (Oxygen Delivery Method): room air        Gen: NAD  HEENT:  (-)icterus (-)pallor  CV: N S1 S2 1/6 JULISSA (+)2 Pulses B/l  Resp:  Clear to auscultation B/L, normal effort  GI: (+) BS Soft, NT, ND  Lymph:  (-)Edema, (-)obvious lymphadenopathy  Skin: Warm to touch, Normal turgor  Psych: Appropriate mood and affect      TELEMETRY: SR/ST  ECG: Sinus Bradycardia, narrow QRS 	  	    < from: Transthoracic Echocardiogram (07.26.22 @ 07:37) >  Conclusions:  1. Calcified trileaflet aortic valve with decreased  opening. Difficult to determine the number of  leaflets.There is LVOT obstruction due to a hyperdynamic  ventricle with a peak velocity of 27 mmHg. Peak transaortic  valve gradient equals 38 mm Hg, mean transaortic valve  gradient equals 24 mm Hg, estimated aortic valve area  equals 1.1 sqcm (by continuity equation), aortic valve  velocity time integral equals 37 cm, consistent with  moderate aortic stenosis. Peak velocity was 3.1 m/s, DVI  was 0.3. Minimal aortic regurgitation.  Please note that peak velocities were obtained only from  apical views and Pedoff was not performed.  2. Aortic Root: 2.9 cm. The proximal ascending aorta and  root are not well visualized.  LVOT diameter: 2 cm.  3. Normal left atrium.  LA volume index = 26 cc/m2.  4. Small left ventricle. Increased relative wall thickness  with normal left ventricular mass index, consistent with  concentric left ventricular remodeling.Normal left  ventricular systolic function. No segmental wall motion  abnormalities. LVEF calculated using biplane Ron's  method was 63%.Normal diastolic function  5. Normal right atrium.Normal right ventricular size and  function.Normal tricuspid valve. No tricuspid  regurgitation.  6. No pericardial effusion seen.  *** Compared with echocardiogram of 8/20/2018, there is now  moderate aortic stenosis.  Please note thatthe patient refused a bubble study. IV was  painful. Hence, would suggest providing an IV line that is  less painful so that we can complete this study. Also, we  would perform additional imaging to assess the severity of  aortic stenosis. Would recommend EpiQ machine for  additional imaging due to poor image quality.    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 Cardiology consulted for further evaluation.    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

## 2022-07-29 NOTE — CHART NOTE - NSCHARTNOTEFT_GEN_A_CORE
Medicine NP Note     SAWYER BELCHER  MRN-[id number]  Allergies    Advil (Hives)  penicillin (Hives)    Intolerances         Called to evaluate patient for     Vital Signs Last 24 Hrs  T(C): 36.5 (07-28-22 @ 23:37), Max: 37.4 (07-28-22 @ 08:51)  T(F): 97.7 (07-28-22 @ 23:37), Max: 99.3 (07-28-22 @ 08:51)  HR: 81 (07-28-22 @ 23:37) (81 - 119)  BP: 126/54 (07-28-22 @ 23:37) (96/53 - 126/54)  BP(mean): --  RR: 17 (07-28-22 @ 23:37) (16 - 18)  SpO2: 96% (07-28-22 @ 23:37) (95% - 96%)  Daily     Daily   I&O's Summary    27 Jul 2022 07:01  -  28 Jul 2022 07:00  --------------------------------------------------------  IN: 460 mL / OUT: 0 mL / NET: 460 mL    28 Jul 2022 07:01  -  29 Jul 2022 04:52  --------------------------------------------------------  IN: 540 mL / OUT: 500 mL / NET: 40 mL      CAPILLARY BLOOD GLUCOSE                          11.0   24.71 )-----------( 168      ( 27 Jul 2022 08:56 )             33.4                     Radiology:    PHYSICAL EXAM:  GENERAL: NAD, well-developed  HEAD:  Atraumatic, Normocephalic  EYES: EOMI, PERRLA, conjunctiva and sclera clear  NECK: Supple, No JVD  CHEST/LUNG: Clear to auscultation bilaterally; No wheeze  HEART: Regular rate and rhythm; No murmurs, rubs, or gallops  ABDOMEN: Soft, Nontender, Nondistended; Bowel sounds present  EXTREMITIES:  2+ Peripheral Pulses, No clubbing, cyanosis, or edema  PSYCH: AAOx3  NEUROLOGY: non-focal  SKIN: No rashes or lesions    Assessment/Plan: HPI:   80 y/o M w/ PMH of DM2, HTN, HLD CAD s/p MI 2018 presents to the hospital with 2 months of progressive difficulty to walking. He denies any particular insolated limb weakness. States his weakness is generalized and prince pronounced in his LE. Denies any back pain or incontinence or urinary retention. He does have increased urinary frequency but denies any dysuria fever or chills. No headache. He lives alone and usually is able to maintain his ADL. His PCP Dr. Jenny Panda was concerned about his whole health and social situation and sent him to be evaluated to the Emergency Department. The patient does state he has baseline shortness of breath and attributes to lifetime of smoking.  (23 Jul 2022 14:13) Medicine PA Heike     SAWYER BELCHER  MRN-[id number]  Allergies    Advil (Hives)  penicillin (Hives)    Intolerances       Called to evaluate patient for lethargy. Patient was sleepier during the day, attributed to newly started remeron, which has now been discontinued. Patient seen and evaluated at bedside. Patient arousable to voice, however does not open eyes and maintain conversation unless being touched or constantly spoken to. Falls back to sleep and talks with eyes closed. Able to follow commands when awake.     Vital Signs Last 24 Hrs  T(C): 36.5 (07-28-22 @ 23:37), Max: 37.4 (07-28-22 @ 08:51)  T(F): 97.7 (07-28-22 @ 23:37), Max: 99.3 (07-28-22 @ 08:51)  HR: 81 (07-28-22 @ 23:37) (81 - 119)  BP: 126/54 (07-28-22 @ 23:37) (96/53 - 126/54)  BP(mean): --  RR: 17 (07-28-22 @ 23:37) (16 - 18)  SpO2: 96% (07-28-22 @ 23:37) (95% - 96%)  Daily     Daily   I&O's Summary    27 Jul 2022 07:01  -  28 Jul 2022 07:00  --------------------------------------------------------  IN: 460 mL / OUT: 0 mL / NET: 460 mL    28 Jul 2022 07:01  -  29 Jul 2022 04:52  --------------------------------------------------------  IN: 540 mL / OUT: 500 mL / NET: 40 mL      CAPILLARY BLOOD GLUCOSE                          11.0   24.71 )-----------( 168      ( 27 Jul 2022 08:56 )             33.4       Radiology:  < from: MR Angio Neck No Cont (07.25.22 @ 10:37) >    IMPRESSION: No evidence of carotid or vertebral stenosis in the neck. The   intracranial circulation is unremarkable at the [id number]. There   is no evidence of stenosis or vasculitis.      < from: CT Head No Cont (07.23.22 @ 04:38) >    IMPRESSION:  Small age indeterminate white matter infarct in the right corona radiata.    Follow-up MRI may be obtained for further evaluation          PHYSICAL EXAM:  GENERAL: lethargic, NAD  EYES: EOMI, PERRLA  CHEST/LUNG: Clear to auscultation bilaterally;   HEART: Regular rate and rhythm;  ABDOMEN: Soft, Nontender, Nondistended; Bowel sounds present  EXTREMITIES:  2+ Peripheral Pulses, No clubbing, cyanosis, or edema  NEUROLOGY: non-focal      Assessment/Plan: HPI:   80 y/o M w/ PMH of DM2, HTN, HLD CAD s/p MI 2018 presents to the hospital with 2 months of progressive difficulty to walking admitted for further evaluation and management, likely has associated sepsis secondary to UTI. Now with continued lethargy.      #AMS- ?secondary to recently discontinued remeron  -CTH urgent to r/o neurologic pathology  -ABG  -Gabapentin discontinued as possible contributing factor  -Neuro checks q 4  -Will continue to closely follow and assess.   -Day team and attending to follow in AM.    -Serena Paz PA-C, [id number], Dept of Medicine

## 2022-07-29 NOTE — PROGRESS NOTE ADULT - PROBLEM SELECTOR PLAN 3
sees better too: on antibiotics    7/28: off antibiotics now: now found to have metastatic malignancy:    7/29: finished antibiotics: WB is still high: secondary to malignancy:

## 2022-07-29 NOTE — PROGRESS NOTE ADULT - ASSESSMENT
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

## 2022-07-29 NOTE — PROGRESS NOTE ADULT - NSPROGADDITIONALINFOA_GEN_ALL_CORE
discussed with patient in detail, expresses understanding of treatment plans.  discussed with daughter at bedside in detail  remains DNR

## 2022-07-29 NOTE — PROGRESS NOTE ADULT - SUBJECTIVE AND OBJECTIVE BOX
NEPHROLOGY-[facility] [phone]        Patient seen and examined in bed.  He was the same         MEDICATIONS  (STANDING):  atorvastatin 80 milliGRAM(s) Oral at bedtime  cyanocobalamin 1000 MICROGram(s) Oral daily  dextrose 5%. 1000 milliLiter(s) (100 mL/Hr) IV Continuous <Continuous>  dextrose 5%. 1000 milliLiter(s) (50 mL/Hr) IV Continuous <Continuous>  dextrose 50% Injectable 25 Gram(s) IV Push once  dextrose 50% Injectable 12.5 Gram(s) IV Push once  dextrose 50% Injectable 25 Gram(s) IV Push once  glucagon  Injectable 1 milliGRAM(s) IntraMuscular once  heparin   Injectable 5000 Unit(s) SubCutaneous every 12 hours  insulin lispro (ADMELOG) corrective regimen sliding scale   SubCutaneous three times a day before meals  tamsulosin 0.8 milliGRAM(s) Oral at bedtime      VITAL:  T(C): , Max: 37.2 (07-28-22 @ 17:25)  T(F): , Max: 98.9 (07-28-22 @ 17:25)  HR: 90 (07-29-22 @ 09:11)  BP: 124/59 (07-29-22 @ 09:11)  BP(mean): --  RR: 18 (07-29-22 @ 09:11)  SpO2: 94% (07-29-22 @ 09:11)  Wt(kg): --    I and O's:    07-28 @ 07:01  -  07-29 @ 07:00  --------------------------------------------------------  IN: 660 mL / OUT: 500 mL / NET: 160 mL          PHYSICAL EXAM:    Constitutional: NAD  Neck:  No JVD  Respiratory: CTAB/L  Cardiovascular: S1 and S2  Gastrointestinal: BS+, soft, NT/ND  Extremities: No peripheral edema  Neurological: A/O x 3, no focal deficits  Psychiatric: Normal mood, normal affect  : No Villasenor  Skin: No rashes  Access: Not applicable    LABS:                Urine Studies:          RADIOLOGY & ADDITIONAL STUDIES:

## 2022-07-29 NOTE — PROGRESS NOTE ADULT - SUBJECTIVE AND OBJECTIVE BOX
Patient is a 81y old  Male who presents with a chief complaint of failure to thrive (29 Jul 2022 12:44)      DATE OF SERVICE: 07-29-22 @ 13:51    SUBJECTIVE / OVERNIGHT EVENTS: overnight events noted  daughter at bedside     ROS:  Resp: No cough no sputum production  CVS: No chest pain no palpitations no orthopnea  GI: no N/V/D  : no dysuria, no hematuria  Neuro: no weakness no paresthesias          MEDICATIONS  (STANDING):  atorvastatin 80 milliGRAM(s) Oral at bedtime  cyanocobalamin 1000 MICROGram(s) Oral daily  dextrose 5%. 1000 milliLiter(s) (100 mL/Hr) IV Continuous <Continuous>  dextrose 5%. 1000 milliLiter(s) (50 mL/Hr) IV Continuous <Continuous>  dextrose 50% Injectable 25 Gram(s) IV Push once  dextrose 50% Injectable 12.5 Gram(s) IV Push once  dextrose 50% Injectable 25 Gram(s) IV Push once  glucagon  Injectable 1 milliGRAM(s) IntraMuscular once  heparin   Injectable 5000 Unit(s) SubCutaneous every 12 hours  insulin lispro (ADMELOG) corrective regimen sliding scale   SubCutaneous three times a day before meals  tamsulosin 0.8 milliGRAM(s) Oral at bedtime    MEDICATIONS  (PRN):  acetaminophen     Tablet .. 650 milliGRAM(s) Oral every 6 hours PRN Temp greater or equal to 38C (100.4F), Mild Pain (1 - 3)  dextrose Oral Gel 15 Gram(s) Oral once PRN Blood Glucose LESS THAN 70 milliGRAM(s)/deciliter        CAPILLARY BLOOD GLUCOSE      POCT Blood Glucose.: 182 mg/dL (29 Jul 2022 12:07)  POCT Blood Glucose.: 125 mg/dL (29 Jul 2022 08:25)  POCT Blood Glucose.: 106 mg/dL (28 Jul 2022 21:25)  POCT Blood Glucose.: 164 mg/dL (28 Jul 2022 16:55)    I&O's Summary    28 Jul 2022 07:01  -  29 Jul 2022 07:00  --------------------------------------------------------  IN: 660 mL / OUT: 500 mL / NET: 160 mL        Vital Signs Last 24 Hrs  T(C): 36.8 (29 Jul 2022 09:11), Max: 37.2 (28 Jul 2022 17:25)  T(F): 98.2 (29 Jul 2022 09:11), Max: 98.9 (28 Jul 2022 17:25)  HR: 90 (29 Jul 2022 09:11) (81 - 91)  BP: 124/59 (29 Jul 2022 09:11) (96/53 - 126/54)  BP(mean): --  RR: 18 (29 Jul 2022 09:11) (17 - 18)  SpO2: 94% (29 Jul 2022 09:11) (94% - 96%)      PHYSICAL EXAM:  EYES: EOMI, PERRLA  NECK: Supple, No JVD  CHEST/LUNG: clear  HEART: S1 S2; no murmurs   ABDOMEN: Soft, Nontender  ventral swelling no change  EXTREMITIES:   no edema  NEUROLOGY: Alert non-focal  SKIN: No rashes or lesions    LABS:                      All consultant(s) notes reviewed and care discussed with other providers        Contact Number, Dr Douglas [phone]

## 2022-07-29 NOTE — PROGRESS NOTE ADULT - SUBJECTIVE AND OBJECTIVE BOX
Date of Service: 07-29-22 @ 15:36    Patient is a 81y old  Male who presents with a chief complaint of failure to thrive (29 Jul 2022 13:51)      Any change in ROS: Daughter at bedside:  no sob:  on room air:  for biopsy on Monday :       MEDICATIONS  (STANDING):  atorvastatin 80 milliGRAM(s) Oral at bedtime  cyanocobalamin 1000 MICROGram(s) Oral daily  dextrose 5%. 1000 milliLiter(s) (100 mL/Hr) IV Continuous <Continuous>  dextrose 5%. 1000 milliLiter(s) (50 mL/Hr) IV Continuous <Continuous>  dextrose 50% Injectable 25 Gram(s) IV Push once  dextrose 50% Injectable 12.5 Gram(s) IV Push once  dextrose 50% Injectable 25 Gram(s) IV Push once  glucagon  Injectable 1 milliGRAM(s) IntraMuscular once  heparin   Injectable 5000 Unit(s) SubCutaneous every 12 hours  insulin lispro (ADMELOG) corrective regimen sliding scale   SubCutaneous three times a day before meals  tamsulosin 0.8 milliGRAM(s) Oral at bedtime    MEDICATIONS  (PRN):  acetaminophen     Tablet .. 650 milliGRAM(s) Oral every 6 hours PRN Temp greater or equal to 38C (100.4F), Mild Pain (1 - 3)  dextrose Oral Gel 15 Gram(s) Oral once PRN Blood Glucose LESS THAN 70 milliGRAM(s)/deciliter    Vital Signs Last 24 Hrs  T(C): 36.8 (29 Jul 2022 09:11), Max: 37.2 (28 Jul 2022 17:25)  T(F): 98.2 (29 Jul 2022 09:11), Max: 98.9 (28 Jul 2022 17:25)  HR: 90 (29 Jul 2022 09:11) (81 - 91)  BP: 124/59 (29 Jul 2022 09:11) (96/53 - 126/54)  BP(mean): --  RR: 18 (29 Jul 2022 09:11) (17 - 18)  SpO2: 94% (29 Jul 2022 09:11) (94% - 96%)    Parameters below as of 29 Jul 2022 09:11  Patient On (Oxygen Delivery Method): room air        I&O's Summary    28 Jul 2022 07:01  -  29 Jul 2022 07:00  --------------------------------------------------------  IN: 660 mL / OUT: 500 mL / NET: 160 mL    29 Jul 2022 07:01  -  29 Jul 2022 15:36  --------------------------------------------------------  IN: 250 mL / OUT: 0 mL / NET: 250 mL          Physical Exam:   GENERAL: NAD, well-groomed, well-developed  HEENT: ADAM/   Atraumatic, Normocephalic  ENMT: No tonsillar erythema, exudates, or enlargement; Moist mucous membranes, Good dentition, No lesions  NECK: Supple, No JVD, Normal thyroid  CHEST/LUNG: Clear to auscultaion  CVS: Regular rate and rhythm; No murmurs, rubs, or gallops  GI: : Soft, Nontender, Nondistended; Bowel sounds present  NERVOUS SYSTEM:  Sleepy but responsive  EXTREMITIES:  - edema  LYMPH: No lymphadenopathy noted  SKIN: No rashes or lesions  ENDOCRINOLOGY: No Thyromegaly  PSYCH: Appropriate    Labs:  ABG - ( 29 Jul 2022 09:12 )  pH, Arterial: 7.49  pH, Blood: x     /  pCO2: 42    /  pO2: 101   / HCO3: 32    / Base Excess: 7.8   /  SaO2: 98.7            33                            11.0   24.71 )-----------( 168      ( 27 Jul 2022 08:56 )             33.4                         12.2   22.76 )-----------( 158      ( 26 Jul 2022 07:08 )             37.8     07-26    131<L>  |  89<L>  |  17  ----------------------------<  80  3.9   |  29  |  0.76        CAPILLARY BLOOD GLUCOSE      POCT Blood Glucose.: 182 mg/dL (29 Jul 2022 12:07)  POCT Blood Glucose.: 125 mg/dL (29 Jul 2022 08:25)  POCT Blood Glucose.: 106 mg/dL (28 Jul 2022 21:25)  POCT Blood Glucose.: 164 mg/dL (28 Jul 2022 16:55)      rad< from: CT Head No Cont (07.29.22 @ 05:59) >  better evaluated on the prior MRI study.    There is no acute intracranial hemorrhage, mass effect, shift of the   midline structures, herniation, or sepsis, or abnormal extra axial fluid   collection.    There is diffuse cerebral volume loss with prominence of the sulci,   fissures, and cisternal spaces which is normal for the patient's age.   There is moderate patchy confluent periventricular white matter   hypoattenuation statistically compatible with microvascular changes given   calcific atherosclerotic disease of the intracranial arteries.    The paranasal sinuses and mastoid air cells are clear. The calvarium   appears intact. There is evidence of bilateral cataract removal.    IMPRESSION: No acute intracranial hemorrhage.    Similar-appearing moderate severity chronic white matter microvascular   type changes.    --- End of Report ---            VLAD GONZALEZ MD; Attending Radiologist  This document has been electronically signed. Jul 29 2022  7:50AM    < end of copied text >              RECENT CULTURES:  07-23 @ 14:40 .Blood Blood-Venous                No Growth Final    07-23 @ 14:35 .Blood Blood-Peripheral                No Growth Final    07-23 @ 07:05 Clean Catch Clean Catch (Midstream)                <10,000 CFU/mL Normal Urogenital Grace          RESPIRATORY CULTURES:          Studies  Chest X-RAY  CT SCAN Chest   Venous Dopplers: LE:   CT Abdomen  Others          ra< from: CT Abdomen and Pelvis w/ Oral Cont and w/ IV Cont (07.25.22 @ 19:24) >  BOWEL: No bowel obstruction. Appendix is normal.  PERITONEUM: Trace free fluid.  VESSELS: Atherosclerotic changes. Thrombus within the anterior and   posterior branches of the right portal vein.  RETROPERITONEUM/LYMPH NODES: No lymphadenopathy.  ABDOMINAL WALL: Large fat-containing umbilical hernia.  BONES: Within normal limits.    IMPRESSION:  Innumerable hepatic lesions concerning for metastatic disease.    Gallbladder lumen is continuous with hepatic lesions. 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    Dr. Olivera discussed these findings with Dr. Mccall on 7/25/2022 7:34   PM with read back.    < end of copied text >

## 2022-07-29 NOTE — PROGRESS NOTE ADULT - PROBLEM SELECTOR PLAN 7
lower blood pressure :seems to be stable: cont to monitor:    7/26: controlled    7/28: controlled    7/29: controlled

## 2022-07-29 NOTE — PROGRESS NOTE ADULT - PROBLEM SELECTOR PLAN 4
neuro consultation appreciated  CT positive for right lacunar infarct unclear age  MRI brain noted  possible thromboembolic stroke  echocardiogram noted

## 2022-07-29 NOTE — PROGRESS NOTE ADULT - PROBLEM SELECTOR PLAN 2
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

## 2022-07-30 NOTE — PROGRESS NOTE ADULT - PROBLEM SELECTOR PLAN 4
adv strongly to stop smoking';  ct chest reviewed:  likely has metastatic liver disease:  biopsy of liver lesions:    7/26: likely has GB malignancy  with metastatic disease    7/28: for ir guided biopsy on august 1st: ac being held    7/30: seems stable: no sob: on vernon shayla:

## 2022-07-30 NOTE — PROGRESS NOTE ADULT - PROBLEM SELECTOR PLAN 3
likely secondary to dehydration secondary to poor po  resolved  continue to monitor  hyponatremia suspect is from poor po   IV NS x 10 hrs

## 2022-07-30 NOTE — PROGRESS NOTE ADULT - SUBJECTIVE AND OBJECTIVE BOX
Date of Service: 07-30-22 @ 11:25    Patient is a 81y old  Male who presents with a chief complaint of failure to thrive (30 Jul 2022 10:25)      Any change in ROS: pt is alert and awake: no sob: no cough;  no chest pain     MEDICATIONS  (STANDING):  atorvastatin 80 milliGRAM(s) Oral at bedtime  cyanocobalamin 1000 MICROGram(s) Oral daily  dextrose 5%. 1000 milliLiter(s) (100 mL/Hr) IV Continuous <Continuous>  dextrose 5%. 1000 milliLiter(s) (50 mL/Hr) IV Continuous <Continuous>  dextrose 50% Injectable 25 Gram(s) IV Push once  dextrose 50% Injectable 12.5 Gram(s) IV Push once  dextrose 50% Injectable 25 Gram(s) IV Push once  glucagon  Injectable 1 milliGRAM(s) IntraMuscular once  heparin   Injectable 5000 Unit(s) SubCutaneous every 12 hours  insulin lispro (ADMELOG) corrective regimen sliding scale   SubCutaneous three times a day before meals  mirtazapine 7.5 milliGRAM(s) Oral at bedtime  tamsulosin 0.8 milliGRAM(s) Oral at bedtime    MEDICATIONS  (PRN):  acetaminophen     Tablet .. 650 milliGRAM(s) Oral every 6 hours PRN Temp greater or equal to 38C (100.4F), Mild Pain (1 - 3)  dextrose Oral Gel 15 Gram(s) Oral once PRN Blood Glucose LESS THAN 70 milliGRAM(s)/deciliter    Vital Signs Last 24 Hrs  T(C): 36.8 (30 Jul 2022 08:31), Max: 37.2 (29 Jul 2022 23:35)  T(F): 98.3 (30 Jul 2022 08:31), Max: 98.9 (29 Jul 2022 23:35)  HR: 114 (30 Jul 2022 08:31) (73 - 114)  BP: 131/71 (30 Jul 2022 08:31) (95/39 - 131/71)  BP(mean): --  RR: 18 (30 Jul 2022 08:31) (17 - 18)  SpO2: 94% (30 Jul 2022 08:31) (94% - 99%)    Parameters below as of 30 Jul 2022 08:31  Patient On (Oxygen Delivery Method): room air        I&O's Summary    29 Jul 2022 07:01 - 30 Jul 2022 07:00  --------------------------------------------------------  IN: 650 mL / OUT: 270 mL / NET: 380 mL    30 Jul 2022 07:01  -  30 Jul 2022 11:25  --------------------------------------------------------  IN: 200 mL / OUT: 0 mL / NET: 200 mL          Physical Exam:   GENERAL: NAD, well-groomed, well-developed  HEENT: ADAM/   Atraumatic, Normocephalic  ENMT: No tonsillar erythema, exudates, or enlargement; Moist mucous membranes, Good dentition, No lesions  NECK: Supple, No JVD, Normal th  CVS: Regular rate and rhythm; No murmurs, rubs, or gallops  GI: : Soft, Nontender, Nondistended; Bowel sounds present  NERVOUS SYSTEM:  alert and awke:   EXTREMITIES:  - edema  LYMPH: No lymphadenopathy noted  SKIN: No rashes or lesions  ENDOCRINOLOGY: No Thyromegaly  PSYCH: Appropriate    Labs:  ABG - ( 29 Jul 2022 09:12 )  pH, Arterial: 7.49  pH, Blood: x     /  pCO2: 42    /  pO2: 101   / HCO3: 32    / Base Excess: 7.8   /  SaO2: 98.7            33                            11.6   26.90 )-----------( 228      ( 30 Jul 2022 10:58 )             35.2                         11.0   24.71 )-----------( 168      ( 27 Jul 2022 08:56 )             33.4     07-30    127<L>  |  90<L>  |  17  ----------------------------<  107<H>  3.7   |  26  |  0.77    Ca    8.4      30 Jul 2022 07:08    TPro  6.1  /  Alb  1.9<L>  /  TBili  0.9  /  DBili  x   /  AST  39  /  ALT  13  /  AlkPhos  238<H>  07-30    CAPILLARY BLOOD GLUCOSE      POCT Blood Glucose.: 138 mg/dL (30 Jul 2022 08:30)  POCT Blood Glucose.: 174 mg/dL (29 Jul 2022 21:32)  POCT Blood Glucose.: 170 mg/dL (29 Jul 2022 17:04)  POCT Blood Glucose.: 182 mg/dL (29 Jul 2022 12:07)      LIVER FUNCTIONS - ( 30 Jul 2022 07:08 )  Alb: 1.9 g/dL / Pro: 6.1 g/dL / ALK PHOS: 238 U/L / ALT: 13 U/L / AST: 39 U/L / GGT: x           PT/INR - ( 30 Jul 2022 10:58 )   PT: 14.8 sec;   INR: 1.28 ratio         PTT - ( 30 Jul 2022 10:58 )  PTT:29.1 sec          RECENT CULTURES:  07-23 @ 14:40 .Blood Blood-Venous                No Growth Final    07-23 @ 14:35 .Blood Blood-Peripheral                No Growth Final        rad< from: CT Head No Cont (07.29.22 @ 05:59) >    There is diffuse cerebral volume loss with prominence of the sulci,   fissures, and cisternal spaces which is normal for the patient's age.   There is moderate patchy confluent periventricular white matter   hypoattenuation statistically compatible with microvascular changes given   calcific atherosclerotic disease of the intracranial arteries.    The paranasal sinuses and mastoid air cells are clear. The calvarium   appears intact. There is evidence of bilateral cataract removal.    IMPRESSION: No acute intracranial hemorrhage.    Similar-appearing moderate severity chronic white matter microvascular   type changes.    --- End of Report ---            VLAD GONZALEZ MD; Attending Radiologist  This document has been electronically signed. Jul 29 2022  7:50AM    < end of copied text >  < from: CT Abdomen and Pelvis w/ Oral Cont and w/ IV Cont (07.25.22 @ 19:24) >  BLADDER: Within normal limits.  REPRODUCTIVE ORGANS: Enlarged with coarse calcifications.    BOWEL: No bowel obstruction. Appendix is normal.  PERITONEUM: Trace free fluid.  VESSELS: Atherosclerotic changes. Thrombus within the anterior and   posterior branches of the right portal vein.  RETROPERITONEUM/LYMPH NODES: No lymphadenopathy.  ABDOMINAL WALL: Large fat-containing umbilical hernia.  BONES: Within normal limits.    IMPRESSION:  Innumerable hepatic lesions concerning for metastatic disease.    Gallbladder lumen is continuous with hepatic lesions. 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    Dr. Olivera discussed these findings with Dr. Mccall on 7/25/2022 7:34   PM with read back.    --- End of Report ---    < end of copied text >    RESPIRATORY CULTURES:          Studies  Chest X-RAY  CT SCAN Chest   Venous Dopplers: LE:   CT Abdomen  Others

## 2022-07-30 NOTE — PROGRESS NOTE ADULT - SUBJECTIVE AND OBJECTIVE BOX
Neurology Progress Note    S: Patient seen and examined. No new events overnight.  a bit lethargic     Medication:  acetaminophen     Tablet .. 650 milliGRAM(s) Oral every 6 hours PRN  atorvastatin 80 milliGRAM(s) Oral at bedtime  cyanocobalamin 1000 MICROGram(s) Oral daily  dextrose 5%. 1000 milliLiter(s) IV Continuous <Continuous>  dextrose 5%. 1000 milliLiter(s) IV Continuous <Continuous>  dextrose 50% Injectable 25 Gram(s) IV Push once  dextrose 50% Injectable 12.5 Gram(s) IV Push once  dextrose 50% Injectable 25 Gram(s) IV Push once  dextrose Oral Gel 15 Gram(s) Oral once PRN  glucagon  Injectable 1 milliGRAM(s) IntraMuscular once  heparin   Injectable 5000 Unit(s) SubCutaneous every 12 hours  insulin lispro (ADMELOG) corrective regimen sliding scale   SubCutaneous three times a day before meals  tamsulosin 0.8 milliGRAM(s) Oral at bedtime      Vitals:  Vital Signs Last 24 Hrs  T(C): 36.8 (30 Jul 2022 05:01), Max: 37.2 (29 Jul 2022 23:35)  T(F): 98.2 (30 Jul 2022 05:01), Max: 98.9 (29 Jul 2022 23:35)  HR: 94 (30 Jul 2022 05:01) (73 - 94)  BP: 104/60 (30 Jul 2022 05:01) (95/39 - 124/59)  BP(mean): --  RR: 17 (30 Jul 2022 05:01) (17 - 18)  SpO2: 96% (30 Jul 2022 05:01) (94% - 99%)    Parameters below as of 30 Jul 2022 05:01  Patient On (Oxygen Delivery Method): room air        General:  Constitutional: Male, appears stated age, in no apparent distress including pain  Head: Normocephalic & atraumatic.  Respiratory: Slightly increased work of breathing at rest when speaking  Extremities: No cyanosis, clubbing, or edema.  Skin: No rashes, bruising, or discoloration.    Neurological (>12):  MS: Eyes open to voice, oriented to person, place, situation, time. Normal affect. Follows all commands.    Language: Speech is clear, fluent with good repetition & comprehension (able to name objects mask, thumb)    CNs: VFF. EOMI no nystagmus, no diplopia; slightly L eye exotropia on orthophoric gaze. V1-3 intact to LT, well developed masseter muscles b/l. L facial droop, full eye closure strength b/l. Hearing grossly normal (rubbing fingers) b/l. Symmetric palate elevation in midline. Gag reflex deferred. Head turning & shoulder shrug intact b/l. Tongue midline, normal movements, no atrophy.    Motor: Normal muscle bulk & tone. No noticeable tremor or seizure.                Deltoid	Biceps	Triceps	Wrist	Finger ABd	   R	5	5	5				5 	  L	4	4+	4				5    	H-Flex	K-Flex	K-Ext	D-Flex	P-Flex  R	5					   L	4+					 B/l LE with drift (L>R in 5 seconds)    Sensation: Intact to LT b/l throughout.     Cortical: Extinction on DSS (neglect): none    Reflexes:              Biceps(C5)       BR(C6)     Triceps(C7)               Patellar(L4)    Achilles(S1)    Plantar Resp  R	0	          1	             0		        0		    0		mute  L	0	          1	             0		        0		    0		mute     Coordination: No dysmetria to FTN, although exam limited slightly by pt cooperation    Gait: deferred due to pt refusal      I personally reviewed the below data/images/labs:        07-30    127<L>  |  90<L>  |  17  ----------------------------<  107<H>  3.7   |  26  |  0.77    Ca    8.4      30 Jul 2022 07:08    TPro  6.1  /  Alb  1.9<L>  /  TBili  0.9  /  DBili  x   /  AST  39  /  ALT  13  /  AlkPhos  238<H>  07-30    LIVER FUNCTIONS - ( 30 Jul 2022 07:08 )  Alb: 1.9 g/dL / Pro: 6.1 g/dL / ALK PHOS: 238 U/L / ALT: 13 U/L / AST: 39 U/L / GGT: x               < from: CT Head No Cont (07.23.22 @ 04:38) >  FINDINGS:  No CT evidence of acute intracranial hemorrhage, subdural collection,   vasogenic edema, mass effect or large acute territorial infarction.    There is a small age indeterminate white matter infarct in the right   corona radiata (2:21-23, 601:35, and 602:16-17).    There is mild to moderate generalized cerebral volume loss and patchy   periventricular white matter hypoattenuation compatible chronic   microvascular ischemic disease.    The paranasal sinuses are grossly clear.    There is nonspecific trace opacification of a few right mastoid air   cells.  The left mastoid is clear.    The patient is status post cataract lens placement surgery bilaterally.    The calvarium and skull base appear within normal limits.    IMPRESSION:  Small age indeterminate white matter infarct in the right corona radiata.    Follow-up MRI may be obtained for further evaluation    < end of copied text >    < from: MR Head No Cont (07.24.22 @ 12:35) >    ACC: [id number] EXAM:  MR BRAIN                          PROCEDURE DATE:  07/24/2022          INTERPRETATION:  CLINICAL INDICATION: Generalized weakness and failure to   thrive      Magnetic resonance imaging of the brain was carried out with transaxial   SPGR, FLAIR, fast spin echo T2 weighted images, axial susceptibility   weighted series, diffusion weighted series and sagittal T1 weighted   series on a 1.5 Mary magnet.    Comparison is made with the prior brain CT of 7/23/2022.        Ventricular and sulcal prominence is consistent with age-appropriate   involutional change. Small vessel white matter ischemic changes are   noted. There are scattered punctate foci of diffusion restriction in the   occipital cortex bilaterally as well as the centrum semiovale ovale.   These could represent either cardioembolic infarcts or infarcts due to   hypercoagulable state, or hypotension. Recommend clinical correlation.   There is no hemorrhage. The sellar and parasellar structures are   unremarkable. The paranasal nasal sinuses are clear. There has been   previous bilateral lens replacement surgery.      IMPRESSION: Age-appropriate involutional and ischemic gliotic changes.   Scattered bilateral supratentorial infarcts could be related to   cardioembolic source, hypercoagulability or hypotension.    --- End of Report ---            TATIANA CULLEN MD; Attending Radiologist  This document has been electronically signed. Jul 24 2022  1:49PM    < end of copied text >  < from: MR Angio Head No Cont (07.25.22 @ 10:36) >    ACC: [id number] EXAM:  MR ANGIO BRAIN                        ACC: [id number] EXAM:  MR ANGIO NECK                          PROCEDURE DATE:  07/25/2022          INTERPRETATION:  Clinical indications: Multiple scattered infarcts    Magnetic resonance angiography of the carotid and vertebral circulation   the neck was obtained using 2D time-of-flight technique with an   additional 3D time-of-flight acquisition through the carotid bifurcation.   The intracranial circulation centered the [id number] was evaluated   using 3D time-of-flight technique.    The common, internal and external carotid arteries are unremarkable   bilaterally. There is no carotid stenosis. The vertebral arteries are   unremarkable as well. The left vertebral artery is dominant.    The distal cervical, petrous, cavernous and supraclinoid internal carotid   arteries, anterior and middle cerebral artery branches are unremarkable.   There is no evidence of aneurysm, stenosis, or vasculitis. The distal   vertebral arteries, and region of the vertebral basilar confluence are   unremarkable. The basilar artery, superior cerebellar arteries and   posterior cerebral arteries are unremarkable.    IMPRESSION: No evidence of carotid or vertebral stenosis in the neck. The   intracranial circulation is unremarkable at the [id number]. There   is no evidence of stenosis or vasculitis.    --- End of Report ---            TATIANA CULLEN MD; Attending Radiologist  This document has been electronically signed. Jul 25 2022 11:45AM    < end of copied text >  < from: Transthoracic Echocardiogram (07.26.22 @ 07:37) >    Patient name: SAWYER BELCHER  YOB: 1940   Age: 81 (M)   MR#: [id number]  Study Date: 7/26/2022  Location: [address] [place], [place] [id number]: Jason Garcia [facility]  Study quality: Technically difficult  Referring Physician: Elmer Douglas MD  Blood Pressure: 126/64 mmHg  Height: 165 cm  Weight: 73 kg  BSA: 1.8 m2  Heart Rate: 122 mmHg  ------------------------------------------------------------------------  PROCEDURE: Transthoracic echocardiogram with 2-D, M-Mode  and complete spectral and colorflow Doppler.  INDICATION: Cardiac murmur, unspecified (R01.1)  ------------------------------------------------------------------------  Dimensions:    Normal Values:  LA:     3.5    2.0 - 4.0 cm  Ao:     2.9    2.0 - 3.8 cm  SEPTUM:        0.6 - 1.2 cm  PWT:           0.6 - 1.1 cm  LVIDd:         3.0 - 5.6 cm  LVIDs:         1.8 - 4.0 cm  EF (Ron Rule): 63 %Doppler Peak Velocity (m/sec):  AoV=3.1  ------------------------------------------------------------------------  Observations:  Mitral Valve: Normal mitral valve. No mitral regurgitation  seen.  Aortic Valve/Aorta: Calcified trileaflet aortic valve with  decreased opening. Difficult to determine the number of  leaflets.There is LVOT obstruction due to a hyperdynamic  ventricle witha peak velocity of 27 mmHg. Peak transaortic  valve gradient equals 38 mm Hg, mean transaortic valve  gradient equals 24 mm Hg, estimated aortic valve area  equals 1.1 sqcm (by continuity equation), aortic valve  velocity time integral equals 37 cm, consistent with  moderate aortic stenosis. Peak velocity was 3.1 m/s, DVI  was 0.3. Minimal aortic regurgitation.  Peak left  ventricular outflow tract gradient equals 3 mm Hg, mean  gradient is equal to 2 mm Hg, LVOT velocity time integral  equals 13cm.  Aortic Root: 2.9 cm. The proximal ascending aorta and root  are not well visualized.  LVOT diameter: 2 cm.  Left Atrium: Normal left atrium.  LA volume index = 26  cc/m2.  Left Ventricle: Normal left ventricular systolic function.  No segmental wall motion abnormalities. LVEF calculated  using biplane Ron's method was 63%. Small left  ventricle. Increased relative wall thickness with normal  left ventricular mass index, consistent with concentric  left ventricular remodeling. Normal diastolic function  Right Heart: Normal right atrium. Normal right ventricular  size and function. Normal tricuspid valve. No tricuspid  regurgitation. Normal pulmonic valve.  Pericardium/Pleura: No pericardial effusion seen.  Hemodynamic: Estimated rightatrial pressure is 8 mm Hg.  ------------------------------------------------------------------------  Conclusions:  1. Calcified trileaflet aortic valve with decreased  opening. Difficult to determine the number of  leaflets.There is LVOT obstruction due to a hyperdynamic  ventricle with a peak velocity of 27 mmHg. Peak transaortic  valve gradient equals 38 mm Hg, mean transaortic valve  gradient equals 24 mm Hg, estimated aortic valve area  equals 1.1 sqcm (by continuity equation), aortic valve  velocity time integral equals 37 cm, consistent with  moderate aortic stenosis. Peak velocity was 3.1 m/s, DVI  was 0.3. Minimal aortic regurgitation.  Please note that peak velocities were obtained only from  apical views and Pedoff was not performed.  2. Aortic Root: 2.9 cm. The proximal ascending aorta and  root are not well visualized.  LVOT diameter: 2 cm.  3. Normal left atrium.  LA volume index = 26 cc/m2.  4. Small left ventricle. Increased relative wall thickness  with normal left ventricular mass index, consistent with  concentric left ventricular remodeling.Normal left  ventricular systolic function. No segmental wall motion  abnormalities. LVEF calculated using biplane Ron's  method was 63%.Normal diastolic function  5. Normal right atrium.Normal right ventricular size and  function.Normal tricuspid valve. No tricuspid  regurgitation.  6. No pericardial effusion seen.  *** Compared with echocardiogram of 8/20/2018, there is now  moderate aortic stenosis.  Please note thatthe patient refused a bubble study. IV was  painful. Hence, would suggest providing an IV line that is  less painful so that we can complete this study. Also, we  would perform additional imaging to assess the severity of  aortic stenosis. Would recommend EpiQ machine for  additional imaging due to poor image quality.  ------------------------------------------------------------------------  Confirmed on  7/26/2022 - 10:39:21 by Alida Hirsch M.D.  ------------------------------------------------------------------------    < end of copied text >

## 2022-07-30 NOTE — PROGRESS NOTE ADULT - SUBJECTIVE AND OBJECTIVE BOX
Patient is a 81y old  Male who presents with a chief complaint of failure to thrive (30 Jul 2022 11:25)      DATE OF SERVICE: 07-30-22 @ 14:06    SUBJECTIVE / OVERNIGHT EVENTS: overnight events noted    ROS:  Resp: No cough no sputum production  CVS: No chest pain no palpitations no orthopnea  GI: no N/V/D  per RN      MEDICATIONS  (STANDING):  atorvastatin 80 milliGRAM(s) Oral at bedtime  cyanocobalamin 1000 MICROGram(s) Oral daily  dextrose 5%. 1000 milliLiter(s) (100 mL/Hr) IV Continuous <Continuous>  dextrose 5%. 1000 milliLiter(s) (50 mL/Hr) IV Continuous <Continuous>  dextrose 50% Injectable 25 Gram(s) IV Push once  dextrose 50% Injectable 12.5 Gram(s) IV Push once  dextrose 50% Injectable 25 Gram(s) IV Push once  glucagon  Injectable 1 milliGRAM(s) IntraMuscular once  heparin   Injectable 5000 Unit(s) SubCutaneous every 12 hours  insulin lispro (ADMELOG) corrective regimen sliding scale   SubCutaneous three times a day before meals  mirtazapine 7.5 milliGRAM(s) Oral at bedtime  tamsulosin 0.8 milliGRAM(s) Oral at bedtime    MEDICATIONS  (PRN):  acetaminophen     Tablet .. 650 milliGRAM(s) Oral every 6 hours PRN Temp greater or equal to 38C (100.4F), Mild Pain (1 - 3)  dextrose Oral Gel 15 Gram(s) Oral once PRN Blood Glucose LESS THAN 70 milliGRAM(s)/deciliter        CAPILLARY BLOOD GLUCOSE      POCT Blood Glucose.: 181 mg/dL (30 Jul 2022 11:47)  POCT Blood Glucose.: 138 mg/dL (30 Jul 2022 08:30)  POCT Blood Glucose.: 174 mg/dL (29 Jul 2022 21:32)  POCT Blood Glucose.: 170 mg/dL (29 Jul 2022 17:04)    I&O's Summary    29 Jul 2022 07:01  -  30 Jul 2022 07:00  --------------------------------------------------------  IN: 650 mL / OUT: 270 mL / NET: 380 mL    30 Jul 2022 07:01  -  30 Jul 2022 14:06  --------------------------------------------------------  IN: 380 mL / OUT: 0 mL / NET: 380 mL        Vital Signs Last 24 Hrs  T(C): 36.8 (30 Jul 2022 08:31), Max: 37.2 (29 Jul 2022 23:35)  T(F): 98.3 (30 Jul 2022 08:31), Max: 98.9 (29 Jul 2022 23:35)  HR: 114 (30 Jul 2022 08:31) (73 - 114)  BP: 131/71 (30 Jul 2022 08:31) (95/39 - 131/71)  BP(mean): --  RR: 18 (30 Jul 2022 08:31) (17 - 18)  SpO2: 94% (30 Jul 2022 08:31) (94% - 99%)    PHYSICAL EXAM:  EYES: EOMI, PERRLA  NECK: Supple, No JVD  CHEST/LUNG: clear  HEART: S1 S2; no murmurs   ABDOMEN: Soft, Nontender  ventral swelling no change  EXTREMITIES:   no edema  NEUROLOGY: Alert non-focal  SKIN: No rashes or lesions    LABS:                        11.6   26.90 )-----------( 228      ( 30 Jul 2022 10:58 )             35.2     07-30    127<L>  |  90<L>  |  17  ----------------------------<  107<H>  3.7   |  26  |  0.77    Ca    8.4      30 Jul 2022 07:08    TPro  6.1  /  Alb  1.9<L>  /  TBili  0.9  /  DBili  x   /  AST  39  /  ALT  13  /  AlkPhos  238<H>  07-30    PT/INR - ( 30 Jul 2022 10:58 )   PT: 14.8 sec;   INR: 1.28 ratio         PTT - ( 30 Jul 2022 10:58 )  PTT:29.1 sec            All consultant(s) notes reviewed and care discussed with other providers        Contact Number, Dr Douglas [phone]

## 2022-07-30 NOTE — PROGRESS NOTE ADULT - PROBLEM SELECTOR PLAN 4
neuro consultation appreciated  CT positive for right lacunar infarct unclear age  continue to follow recommendations

## 2022-07-30 NOTE — PROGRESS NOTE ADULT - PROBLEM SELECTOR PLAN 1
cont antibiotics::    7/26: cont antibiotics:    7/28:off antibiotics now:    7/30: off antibiotics: WBC is  high : likely secondary to malignancy:

## 2022-07-30 NOTE — PROGRESS NOTE ADULT - SUBJECTIVE AND OBJECTIVE BOX
C A R D I O L O G Y  **********************************     DATE OF SERVICE: 07-30-22      pt seen and examined, no complaints, ROS - .     acetaminophen     Tablet .. 650 milliGRAM(s) Oral every 6 hours PRN  atorvastatin 80 milliGRAM(s) Oral at bedtime  cyanocobalamin 1000 MICROGram(s) Oral daily  dextrose 5%. 1000 milliLiter(s) IV Continuous <Continuous>  dextrose 5%. 1000 milliLiter(s) IV Continuous <Continuous>  dextrose 50% Injectable 25 Gram(s) IV Push once  dextrose 50% Injectable 12.5 Gram(s) IV Push once  dextrose 50% Injectable 25 Gram(s) IV Push once  dextrose Oral Gel 15 Gram(s) Oral once PRN  glucagon  Injectable 1 milliGRAM(s) IntraMuscular once  heparin   Injectable 5000 Unit(s) SubCutaneous every 12 hours  insulin lispro (ADMELOG) corrective regimen sliding scale   SubCutaneous three times a day before meals  mirtazapine 7.5 milliGRAM(s) Oral at bedtime  tamsulosin 0.8 milliGRAM(s) Oral at bedtime          Hemoglobin: 11.0 g/dL (07-27 @ 08:56)  Hemoglobin: 12.2 g/dL (07-26 @ 07:08)  Hemoglobin: 11.5 g/dL (07-25 @ 13:11)      07-30    127<L>  |  90<L>  |  17  ----------------------------<  107<H>  3.7   |  26  |  0.77    Ca    8.4      30 Jul 2022 07:08    TPro  6.1  /  Alb  1.9<L>  /  TBili  0.9  /  DBili  x   /  AST  39  /  ALT  13  /  AlkPhos  238<H>  07-30    Creatinine Trend: 0.77<--, 0.76<--, 0.78<--, 0.92<--, 1.32<--    COAGS:           T(C): 36.8 (07-30-22 @ 08:31), Max: 37.2 (07-29-22 @ 23:35)  HR: 114 (07-30-22 @ 08:31) (73 - 114)  BP: 131/71 (07-30-22 @ 08:31) (95/39 - 131/71)  RR: 18 (07-30-22 @ 08:31) (17 - 18)  SpO2: 94% (07-30-22 @ 08:31) (94% - 99%)  Wt(kg): --    I&O's Summary    29 Jul 2022 07:01  -  30 Jul 2022 07:00  --------------------------------------------------------  IN: 650 mL / OUT: 270 mL / NET: 380 mL        Gen: NAD  HEENT:  (-)icterus (-)pallor  CV: N S1 S2 1/6 JULISSA (+)2 Pulses B/l  Resp:  Clear to auscultation B/L, normal effort  GI: (+) BS Soft, NT, ND  Lymph:  (-)Edema, (-)obvious lymphadenopathy  Skin: Warm to touch, Normal turgor  Psych: Appropriate mood and affect      TELEMETRY: SR/ST  ECG: Sinus Bradycardia, narrow QRS 	  	    < from: Transthoracic Echocardiogram (07.26.22 @ 07:37) >  Conclusions:  1. Calcified trileaflet aortic valve with decreased  opening. Difficult to determine the number of  leaflets.There is LVOT obstruction due to a hyperdynamic  ventricle with a peak velocity of 27 mmHg. Peak transaortic  valve gradient equals 38 mm Hg, mean transaortic valve  gradient equals 24 mm Hg, estimated aortic valve area  equals 1.1 sqcm (by continuity equation), aortic valve  velocity time integral equals 37 cm, consistent with  moderate aortic stenosis. Peak velocity was 3.1 m/s, DVI  was 0.3. Minimal aortic regurgitation.  Please note that peak velocities were obtained only from  apical views and Pedoff was not performed.  2. Aortic Root: 2.9 cm. The proximal ascending aorta and  root are not well visualized.  LVOT diameter: 2 cm.  3. Normal left atrium.  LA volume index = 26 cc/m2.  4. Small left ventricle. Increased relative wall thickness  with normal left ventricular mass index, consistent with  concentric left ventricular remodeling.Normal left  ventricular systolic function. No segmental wall motion  abnormalities. LVEF calculated using biplane Ron's  method was 63%.Normal diastolic function  5. Normal right atrium.Normal right ventricular size and  function.Normal tricuspid valve. No tricuspid  regurgitation.  6. No pericardial effusion seen.  *** Compared with echocardiogram of 8/20/2018, there is now  moderate aortic stenosis.  Please note thatthe patient refused a bubble study. IV was  painful. Hence, would suggest providing an IV line that is  less painful so that we can complete this study. Also, we  would perform additional imaging to assess the severity of  aortic stenosis. Would recommend EpiQ machine for  additional imaging due to poor image quality.    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 Cardiology consulted for further evaluation.    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

## 2022-07-30 NOTE — PROGRESS NOTE ADULT - ASSESSMENT
81M RH Maltese speaking w/  DM2, HTN, HLD CAD s/p MI 2018 presents to the hospital after being sent in by PCP with 2 months of progressive difficulty to walking, found to have UTI and hyponatremia. Neurology consulted for CTH finding R chronic corona radiata infarct. Neuro exam 4+/5 L hemiparesis, L facial droop  CTH R CR infarct   MRI brain 7/24 with scattered embolic appearing infarcts   MRA H/N : unremarkable   CTH 7/29 stable   A1c 6  LDL 93   TTE EF 63% no cardiac source of embolus ; refused bubble study.  normal LA , mod AS   NIHSS~15  premrs2  Impression: Embolic vs watershed type infarcts. current etiology ESUS     Recommendations:   - liver bx monday 8/1.  asa being held.  resume ASA when able   - atorvastatin 80mg PO daily (titrate to LDL < 70)   - stroke risk factor modification and counseling   - extended cardiac monitoring to assessed for occult AF  - hypercoag panel including APLS   - telemetry  - PT/OT/SS/SLP, OOBC  - check FS, glucose control <180  - GI/DVT ppx  - Thank you for allowing me to participate in the care of this patient. Call with questions.    Patient can follow up with Dr. Goyo Batista after discharge. Please instruct the patient to call [phone] to schedule an appointment within the next 2-3 days. Office is located at [address] [place] [address], [place] [address].  Goyo Batista MD  Vascular Neurology.

## 2022-07-30 NOTE — PROGRESS NOTE ADULT - PROBLEM SELECTOR PLAN 7
lower blood pressure :seems to be stable: cont to monitor:    7/26: controlled    7/28: controlled    7/29: controlled    7/30: controlled

## 2022-07-31 NOTE — CHART NOTE - NSCHARTNOTEFT_GEN_A_CORE
Kleber ID# [id number]    Daughter reports that patient had chest pain. Per patient denies chest pain, respiratory distress, sob, HA,     Patient confused in no acute distress, daughter Shahida at bedside    Troponin T, High Sensitivity (07.30.22 @ 22:04)   Troponin T, High Sensitivity Result: 36: Specimen not hemolyzed       Plan  ekg no changes placed in paper chart  cardiac enzymes negative

## 2022-07-31 NOTE — PROGRESS NOTE ADULT - PROBLEM SELECTOR PLAN 4
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

## 2022-07-31 NOTE — PROGRESS NOTE ADULT - SUBJECTIVE AND OBJECTIVE BOX
Date of Service: 07-31-22 @ 10:59    Patient is a 81y old  Male who presents with a chief complaint of failure to thrive (31 Jul 2022 09:49)      Any change in ROS: Very sleepy:   no sob:   on room air: tries to open eyes on tactile stimulation:       MEDICATIONS  (STANDING):  atorvastatin 80 milliGRAM(s) Oral at bedtime  cyanocobalamin 1000 MICROGram(s) Oral daily  dextrose 5%. 1000 milliLiter(s) (100 mL/Hr) IV Continuous <Continuous>  dextrose 5%. 1000 milliLiter(s) (50 mL/Hr) IV Continuous <Continuous>  dextrose 50% Injectable 25 Gram(s) IV Push once  dextrose 50% Injectable 12.5 Gram(s) IV Push once  dextrose 50% Injectable 25 Gram(s) IV Push once  glucagon  Injectable 1 milliGRAM(s) IntraMuscular once  heparin   Injectable 5000 Unit(s) SubCutaneous every 12 hours  insulin lispro (ADMELOG) corrective regimen sliding scale   SubCutaneous three times a day before meals  mirtazapine 7.5 milliGRAM(s) Oral at bedtime  tamsulosin 0.8 milliGRAM(s) Oral at bedtime    MEDICATIONS  (PRN):  acetaminophen     Tablet .. 650 milliGRAM(s) Oral every 6 hours PRN Temp greater or equal to 38C (100.4F), Mild Pain (1 - 3)  dextrose Oral Gel 15 Gram(s) Oral once PRN Blood Glucose LESS THAN 70 milliGRAM(s)/deciliter    Vital Signs Last 24 Hrs  T(C): 37 (31 Jul 2022 08:43), Max: 37 (31 Jul 2022 08:43)  T(F): 98.6 (31 Jul 2022 08:43), Max: 98.6 (31 Jul 2022 08:43)  HR: 99 (31 Jul 2022 08:43) (69 - 99)  BP: 106/50 (31 Jul 2022 08:43) (95/60 - 106/50)  BP(mean): --  RR: 18 (31 Jul 2022 08:43) (18 - 18)  SpO2: 99% (31 Jul 2022 08:43) (93% - 99%)    Parameters below as of 31 Jul 2022 08:43  Patient On (Oxygen Delivery Method): room air        I&O's Summary    30 Jul 2022 07:01 - 31 Jul 2022 07:00  --------------------------------------------------------  IN: 380 mL / OUT: 0 mL / NET: 380 mL    31 Jul 2022 07:01  -  31 Jul 2022 10:59  --------------------------------------------------------  IN: 100 mL / OUT: 0 mL / NET: 100 mL          Physical Exam:   GENERAL: NAD, well-groomed, well-developed  HEENT: ADAM/   Atraumatic, Normocephalic  ENMT: No tonsillar erythema, exudates, or enlargement; Moist mucous membranes, Good dentition, No lesions  NECK: Supple, No JVD, Normal thyroid  CHEST/LUNG: Clear to auscultaion  CVS: Regular rate and rhythm; No murmurs, rubs, or gallops  GI: : Soft, Nontender, Nondistended; Bowel sounds present  NERVOUS SYSTEM: Sleepy: but open eyes: no resp distress:   EXTREMITIES: - edema  LYMPH: No lymphadenopathy noted  SKIN: No rashes or lesions  ENDOCRINOLOGY: No Thyromegaly  PSYCH: Very sleepy:     Labs:  33  CARDIAC MARKERS ( 30 Jul 2022 22:04 )  x     / x     / 19 U/L / x     / 1.0 ng/mL                            12.3   26.38 )-----------( 208      ( 31 Jul 2022 10:03 )             38.1                         11.6   26.90 )-----------( 228      ( 30 Jul 2022 10:58 )             35.2     07-31    130<L>  |  91<L>  |  20  ----------------------------<  141<H>  3.1<L>   |  29  |  0.98  07-30    127<L>  |  90<L>  |  17  ----------------------------<  107<H>  3.7   |  26  |  0.77    Ca    8.4      31 Jul 2022 10:03  Ca    8.4      30 Jul 2022 07:08    TPro  6.1  /  Alb  2.0<L>  /  TBili  0.9  /  DBili  x   /  AST  41<H>  /  ALT  15  /  AlkPhos  221<H>  07-31  TPro  6.1  /  Alb  1.9<L>  /  TBili  0.9  /  DBili  x   /  AST  39  /  ALT  13  /  AlkPhos  238<H>  07-30    CAPILLARY BLOOD GLUCOSE      POCT Blood Glucose.: 148 mg/dL (31 Jul 2022 08:08)  POCT Blood Glucose.: 177 mg/dL (30 Jul 2022 21:21)  POCT Blood Glucose.: 166 mg/dL (30 Jul 2022 16:53)  POCT Blood Glucose.: 181 mg/dL (30 Jul 2022 11:47)      LIVER FUNCTIONS - ( 31 Jul 2022 10:03 )  Alb: 2.0 g/dL / Pro: 6.1 g/dL / ALK PHOS: 221 U/L / ALT: 15 U/L / AST: 41 U/L / GGT: x           PT/INR - ( 30 Jul 2022 10:58 )   PT: 14.8 sec;   INR: 1.28 ratio         PTT - ( 30 Jul 2022 10:58 )  PTT:29.1 sec      rad< from: CT Head No Cont (07.29.22 @ 05:59) >    There is diffuse cerebral volume loss with prominence of the sulci,   fissures, and cisternal spaces which is normal for the patient's age.   There is moderate patchy confluent periventricular white matter   hypoattenuation statistically compatible with microvascular changes given   calcific atherosclerotic disease of the intracranial arteries.    The paranasal sinuses and mastoid air cells are clear. The calvarium   appears intact. There is evidence of bilateral cataract removal.    IMPRESSION: No acute intracranial hemorrhage.    Similar-appearing moderate severity chronic white matter microvascular   type changes.    --- End of Report ---            VLAD GONZALEZ MD; Attending Radiologist  This document has been electronically signed. Jul 29 2022  7:50AM    < end of copied text >  < from: CT Abdomen and Pelvis w/ Oral Cont and w/ IV Cont (07.25.22 @ 19:24) >    BOWEL: No bowel obstruction. Appendix is normal.  PERITONEUM: Trace free fluid.  VESSELS: Atherosclerotic changes. Thrombus within the anterior and   posterior branches of the right portal vein.  RETROPERITONEUM/LYMPH NODES: No lymphadenopathy.  ABDOMINAL WALL: Large fat-containing umbilical hernia.  BONES: Within normal limits.    IMPRESSION:  Innumerable hepatic lesions concerning for metastatic disease.    Gallbladder lumen is continuous with hepatic lesions. 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    Dr. Olivera discussed these findings with Dr. Mccall on 7/25/2022 7:34   PM with read back.    --- End of Report ---    < end of copied text >      RECENT CULTURES:        RESPIRATORY CULTURES:          Studies  Chest X-RAY  CT SCAN Chest   Venous Dopplers: LE:   CT Abdomen  Others

## 2022-07-31 NOTE — PROGRESS NOTE ADULT - PROBLEM SELECTOR PLAN 1
on admission now resolved   completed antibiotics   suspect patient's leukocytosis is secondary to malignancy

## 2022-07-31 NOTE — PROGRESS NOTE ADULT - SUBJECTIVE AND OBJECTIVE BOX
C A R D I O L O G Y  **********************************     DATE OF SERVICE: 07-31-22  Events noted   ECG reviewed, with no acute ischemia  cardiac markers neg          acetaminophen     Tablet .. 650 milliGRAM(s) Oral every 6 hours PRN  atorvastatin 80 milliGRAM(s) Oral at bedtime  cyanocobalamin 1000 MICROGram(s) Oral daily  dextrose 5%. 1000 milliLiter(s) IV Continuous <Continuous>  dextrose 5%. 1000 milliLiter(s) IV Continuous <Continuous>  dextrose 50% Injectable 25 Gram(s) IV Push once  dextrose 50% Injectable 12.5 Gram(s) IV Push once  dextrose 50% Injectable 25 Gram(s) IV Push once  dextrose Oral Gel 15 Gram(s) Oral once PRN  glucagon  Injectable 1 milliGRAM(s) IntraMuscular once  heparin   Injectable 5000 Unit(s) SubCutaneous every 12 hours  insulin lispro (ADMELOG) corrective regimen sliding scale   SubCutaneous three times a day before meals  mirtazapine 7.5 milliGRAM(s) Oral at bedtime  tamsulosin 0.8 milliGRAM(s) Oral at bedtime                            11.6   26.90 )-----------( 228      ( 30 Jul 2022 10:58 )             35.2       Hemoglobin: 11.6 g/dL (07-30 @ 10:58)  Hemoglobin: 11.0 g/dL (07-27 @ 08:56)      07-30    127<L>  |  90<L>  |  17  ----------------------------<  107<H>  3.7   |  26  |  0.77    Ca    8.4      30 Jul 2022 07:08    TPro  6.1  /  Alb  1.9<L>  /  TBili  0.9  /  DBili  x   /  AST  39  /  ALT  13  /  AlkPhos  238<H>  07-30    Creatinine Trend: 0.77<--, 0.76<--, 0.78<--, 0.92<--, 1.32<--    COAGS:     CARDIAC MARKERS ( 30 Jul 2022 22:04 )  x     / x     / 19 U/L / x     / 1.0 ng/mL        T(C): 37 (07-31-22 @ 08:43), Max: 37 (07-31-22 @ 08:43)  HR: 99 (07-31-22 @ 08:43) (69 - 99)  BP: 106/50 (07-31-22 @ 08:43) (95/60 - 106/50)  RR: 18 (07-31-22 @ 08:43) (18 - 18)  SpO2: 99% (07-31-22 @ 08:43) (93% - 99%)  Wt(kg): --    I&O's Summary    30 Jul 2022 07:01  -  31 Jul 2022 07:00  --------------------------------------------------------  IN: 380 mL / OUT: 0 mL / NET: 380 mL    31 Jul 2022 07:01  -  31 Jul 2022 09:50  --------------------------------------------------------  IN: 100 mL / OUT: 0 mL / NET: 100 mL      Gen: NAD  HEENT:  (-)icterus (-)pallor  CV: N S1 S2 1/6 JULISSA (+)2 Pulses B/l  Resp:  Clear to auscultation B/L, normal effort  GI: (+) BS Soft, NT, ND  Lymph:  (-)Edema, (-)obvious lymphadenopathy  Skin: Warm to touch, Normal turgor  Psych: Appropriate mood and affect      TELEMETRY: SR/ST  ECG: Sinus Bradycardia, narrow QRS 	  	    < from: Transthoracic Echocardiogram (07.26.22 @ 07:37) >  Conclusions:  1. Calcified trileaflet aortic valve with decreased  opening. Difficult to determine the number of  leaflets.There is LVOT obstruction due to a hyperdynamic  ventricle with a peak velocity of 27 mmHg. Peak transaortic  valve gradient equals 38 mm Hg, mean transaortic valve  gradient equals 24 mm Hg, estimated aortic valve area  equals 1.1 sqcm (by continuity equation), aortic valve  velocity time integral equals 37 cm, consistent with  moderate aortic stenosis. Peak velocity was 3.1 m/s, DVI  was 0.3. Minimal aortic regurgitation.  Please note that peak velocities were obtained only from  apical views and Pedoff was not performed.  2. Aortic Root: 2.9 cm. The proximal ascending aorta and  root are not well visualized.  LVOT diameter: 2 cm.  3. Normal left atrium.  LA volume index = 26 cc/m2.  4. Small left ventricle. Increased relative wall thickness  with normal left ventricular mass index, consistent with  concentric left ventricular remodeling.Normal left  ventricular systolic function. No segmental wall motion  abnormalities. LVEF calculated using biplane Ron's  method was 63%.Normal diastolic function  5. Normal right atrium.Normal right ventricular size and  function.Normal tricuspid valve. No tricuspid  regurgitation.  6. No pericardial effusion seen.  *** Compared with echocardiogram of 8/20/2018, there is now  moderate aortic stenosis.  Please note thatthe patient refused a bubble study. IV was  painful. Hence, would suggest providing an IV line that is  less painful so that we can complete this study. Also, we  would perform additional imaging to assess the severity of  aortic stenosis. Would recommend EpiQ machine for  additional imaging due to poor image quality.    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 Cardiology consulted for further evaluation.    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 650 milliGRAM(s) Oral every 6 hours PRN  atorvastatin 80 milliGRAM(s) Oral at bedtime  cyanocobalamin 1000 MICROGram(s) Oral daily  dextrose 5%. 1000 milliLiter(s) IV Continuous <Continuous>  dextrose 5%. 1000 milliLiter(s) IV Continuous <Continuous>  dextrose 50% Injectable 25 Gram(s) IV Push once  dextrose 50% Injectable 12.5 Gram(s) IV Push once  dextrose 50% Injectable 25 Gram(s) IV Push once  dextrose Oral Gel 15 Gram(s) Oral once PRN  glucagon  Injectable 1 milliGRAM(s) IntraMuscular once  heparin   Injectable 5000 Unit(s) SubCutaneous every 12 hours  insulin lispro (ADMELOG) corrective regimen sliding scale   SubCutaneous three times a day before meals  mirtazapine 7.5 milliGRAM(s) Oral at bedtime  tamsulosin 0.8 milliGRAM(s) Oral at bedtime                            11.6   26.90 )-----------( 228      ( 30 Jul 2022 10:58 )             35.2       Hemoglobin: 11.6 g/dL (07-30 @ 10:58)  Hemoglobin: 11.0 g/dL (07-27 @ 08:56)      07-30    127<L>  |  90<L>  |  17  ----------------------------<  107<H>  3.7   |  26  |  0.77    Ca    8.4      30 Jul 2022 07:08    TPro  6.1  /  Alb  1.9<L>  /  TBili  0.9  /  DBili  x   /  AST  39  /  ALT  13  /  AlkPhos  238<H>  07-30    Creatinine Trend: 0.77<--, 0.76<--, 0.78<--, 0.92<--, 1.32<--    COAGS:     CARDIAC MARKERS ( 30 Jul 2022 22:04 )  x     / x     / 19 U/L / x     / 1.0 ng/mL        T(C): 37 (07-31-22 @ 08:43), Max: 37 (07-31-22 @ 08:43)  HR: 99 (07-31-22 @ 08:43) (69 - 99)  BP: 106/50 (07-31-22 @ 08:43) (95/60 - 106/50)  RR: 18 (07-31-22 @ 08:43) (18 - 18)  SpO2: 99% (07-31-22 @ 08:43) (93% - 99%)  Wt(kg): --    I&O's Summary    30 Jul 2022 07:01  -  31 Jul 2022 07:00  --------------------------------------------------------  IN: 380 mL / OUT: 0 mL / NET: 380 mL    31 Jul 2022 07:01  -  31 Jul 2022 09:50  --------------------------------------------------------  IN: 100 mL / OUT: 0 mL / NET: 100 mL      Gen: NAD  HEENT:  (-)icterus (-)pallor  CV: N S1 S2 1/6 JULISSA (+)2 Pulses B/l  Resp:  Clear to auscultation B/L, normal effort  GI: (+) BS Soft, NT, ND  Lymph:  (-)Edema, (-)obvious lymphadenopathy  Skin: Warm to touch, Normal turgor  Psych: Appropriate mood and affect      TELEMETRY: SR/ST  ECG: Sinus Bradycardia, narrow QRS 	  	    < from: Transthoracic Echocardiogram (07.26.22 @ 07:37) >  Conclusions:  1. Calcified trileaflet aortic valve with decreased  opening. Difficult to determine the number of  leaflets.There is LVOT obstruction due to a hyperdynamic  ventricle with a peak velocity of 27 mmHg. Peak transaortic  valve gradient equals 38 mm Hg, mean transaortic valve  gradient equals 24 mm Hg, estimated aortic valve area  equals 1.1 sqcm (by continuity equation), aortic valve  velocity time integral equals 37 cm, consistent with  moderate aortic stenosis. Peak velocity was 3.1 m/s, DVI  was 0.3. Minimal aortic regurgitation.  Please note that peak velocities were obtained only from  apical views and Pedoff was not performed.  2. Aortic Root: 2.9 cm. The proximal ascending aorta and  root are not well visualized.  LVOT diameter: 2 cm.  3. Normal left atrium.  LA volume index = 26 cc/m2.  4. Small left ventricle. Increased relative wall thickness  with normal left ventricular mass index, consistent with  concentric left ventricular remodeling.Normal left  ventricular systolic function. No segmental wall motion  abnormalities. LVEF calculated using biplane Ron's  method was 63%.Normal diastolic function  5. Normal right atrium.Normal right ventricular size and  function.Normal tricuspid valve. No tricuspid  regurgitation.  6. No pericardial effusion seen.  *** Compared with echocardiogram of 8/20/2018, there is now  moderate aortic stenosis.  Please note thatthe patient refused a bubble study. IV was  painful. Hence, would suggest providing an IV line that is  less painful so that we can complete this study. Also, we  would perform additional imaging to assess the severity of  aortic stenosis. Would recommend EpiQ machine for  additional imaging due to poor image quality.    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 Cardiology consulted for further evaluation.    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

## 2022-07-31 NOTE — PROGRESS NOTE ADULT - PROBLEM SELECTOR PLAN 2
likely has GB malignancy with metastases: daughter prefers no ac for PV thrombosis:    7/30: no ac    7/31:  no ac: defer to PMD

## 2022-07-31 NOTE — PROVIDER CONTACT NOTE (OTHER) - SITUATION
Patient's daughter said patient has generalized chest pain and left arm pain.  phone used to communicate with patient. Patient denied chest pain

## 2022-07-31 NOTE — PROGRESS NOTE ADULT - PROBLEM SELECTOR PLAN 3
likely secondary to dehydration secondary to poor po  resolved  hyponatremia resolving   likely secondary to poo rpo  continue to monitor

## 2022-07-31 NOTE — PROGRESS NOTE ADULT - SUBJECTIVE AND OBJECTIVE BOX
Patient is a 81y old  Male who presents with a chief complaint of failure to thrive (31 Jul 2022 10:59)      DATE OF SERVICE: 07-31-22 @ 14:27    SUBJECTIVE / OVERNIGHT EVENTS: overnight events noted    ROS:  Resp: No cough no sputum production  CVS: No chest pain no palpitations no orthopnea  GI: no N/V/D  : no dysuria, no hematuria  Neuro: no weakness no paresthesias          MEDICATIONS  (STANDING):  atorvastatin 80 milliGRAM(s) Oral at bedtime  cyanocobalamin 1000 MICROGram(s) Oral daily  dextrose 5%. 1000 milliLiter(s) (100 mL/Hr) IV Continuous <Continuous>  dextrose 5%. 1000 milliLiter(s) (50 mL/Hr) IV Continuous <Continuous>  dextrose 50% Injectable 25 Gram(s) IV Push once  dextrose 50% Injectable 12.5 Gram(s) IV Push once  dextrose 50% Injectable 25 Gram(s) IV Push once  glucagon  Injectable 1 milliGRAM(s) IntraMuscular once  heparin   Injectable 5000 Unit(s) SubCutaneous every 12 hours  insulin lispro (ADMELOG) corrective regimen sliding scale   SubCutaneous three times a day before meals  mirtazapine 7.5 milliGRAM(s) Oral at bedtime  tamsulosin 0.8 milliGRAM(s) Oral at bedtime    MEDICATIONS  (PRN):  acetaminophen     Tablet .. 650 milliGRAM(s) Oral every 6 hours PRN Temp greater or equal to 38C (100.4F), Mild Pain (1 - 3)  dextrose Oral Gel 15 Gram(s) Oral once PRN Blood Glucose LESS THAN 70 milliGRAM(s)/deciliter        CAPILLARY BLOOD GLUCOSE      POCT Blood Glucose.: 164 mg/dL (31 Jul 2022 12:11)  POCT Blood Glucose.: 148 mg/dL (31 Jul 2022 08:08)  POCT Blood Glucose.: 177 mg/dL (30 Jul 2022 21:21)  POCT Blood Glucose.: 166 mg/dL (30 Jul 2022 16:53)    I&O's Summary    30 Jul 2022 07:01  -  31 Jul 2022 07:00  --------------------------------------------------------  IN: 380 mL / OUT: 0 mL / NET: 380 mL    31 Jul 2022 07:01  -  31 Jul 2022 14:27  --------------------------------------------------------  IN: 200 mL / OUT: 0 mL / NET: 200 mL        Vital Signs Last 24 Hrs  T(C): 37 (31 Jul 2022 08:43), Max: 37 (31 Jul 2022 08:43)  T(F): 98.6 (31 Jul 2022 08:43), Max: 98.6 (31 Jul 2022 08:43)  HR: 99 (31 Jul 2022 08:43) (69 - 99)  BP: 106/50 (31 Jul 2022 08:43) (95/60 - 106/50)  BP(mean): --  RR: 18 (31 Jul 2022 08:43) (18 - 18)  SpO2: 99% (31 Jul 2022 08:43) (93% - 99%)    PHYSICAL EXAM:  EYES: EOMI, PERRLA  NECK: Supple, No JVD  CHEST/LUNG: clear  HEART: S1 S2; no murmurs   ABDOMEN: Soft, Nontender  ventral swelling no change  EXTREMITIES:   no edema  NEUROLOGY: drowsy but non-focal  SKIN: No rashes or lesions    LABS:                        12.3   26.38 )-----------( 208      ( 31 Jul 2022 10:03 )             38.1     07-31    130<L>  |  91<L>  |  20  ----------------------------<  141<H>  3.1<L>   |  29  |  0.98    Ca    8.4      31 Jul 2022 10:03    TPro  6.1  /  Alb  2.0<L>  /  TBili  0.9  /  DBili  x   /  AST  41<H>  /  ALT  15  /  AlkPhos  221<H>  07-31    PT/INR - ( 30 Jul 2022 10:58 )   PT: 14.8 sec;   INR: 1.28 ratio         PTT - ( 30 Jul 2022 10:58 )  PTT:29.1 sec  CARDIAC MARKERS ( 30 Jul 2022 22:04 )  x     / x     / 19 U/L / x     / 1.0 ng/mL            All consultant(s) notes reviewed and care discussed with other providers        Contact Number, Dr Douglas [phone]

## 2022-07-31 NOTE — PROGRESS NOTE ADULT - PROBLEM SELECTOR PLAN 3
sees better too: on antibiotics    7/28: off antibiotics now: now found to have metastatic malignancy:    7/29: finished antibiotics: WB is still high: secondary to malignancy:    7/30: for biopsy by ir on Monday 7/31: for biopsy in AM

## 2022-07-31 NOTE — PROGRESS NOTE ADULT - PROBLEM SELECTOR PLAN 7
lower blood pressure :seems to be stable: cont to monitor:    7/26: controlled    7/28: controlled    7/29: controlled    7/30: controlled    7/31: controlled

## 2022-07-31 NOTE — PROGRESS NOTE ADULT - PROBLEM SELECTOR PLAN 1
cont antibiotics::    7/26: cont antibiotics:    7/28:off antibiotics now:    7/30: off antibiotics: WBC is  high : likely secondary to malignancy:    7/31" completed antibtiocs: no wbc today : pending:  no fever:  highs wbc likely due to malignancy:

## 2022-08-01 NOTE — PROGRESS NOTE ADULT - SUBJECTIVE AND OBJECTIVE BOX
C A R D I O L O G Y  **********************************     DATE OF SERVICE: 08-01-22    Resting comfortably in no distress. No chest pain or SOB. Review of systems otherwise negative      MEDICATIONS  (STANDING):  atorvastatin 80 milliGRAM(s) Oral at bedtime  cyanocobalamin 1000 MICROGram(s) Oral daily  dextrose 5%. 1000 milliLiter(s) (50 mL/Hr) IV Continuous <Continuous>  dextrose 5%. 1000 milliLiter(s) (100 mL/Hr) IV Continuous <Continuous>  dextrose 50% Injectable 25 Gram(s) IV Push once  dextrose 50% Injectable 12.5 Gram(s) IV Push once  dextrose 50% Injectable 25 Gram(s) IV Push once  glucagon  Injectable 1 milliGRAM(s) IntraMuscular once  heparin   Injectable 5000 Unit(s) SubCutaneous every 12 hours  insulin lispro (ADMELOG) corrective regimen sliding scale   SubCutaneous three times a day before meals  mirtazapine 7.5 milliGRAM(s) Oral at bedtime  potassium chloride    Tablet ER 40 milliEquivalent(s) Oral every 4 hours  sodium chloride 0.9% with potassium chloride 20 mEq/L 1000 milliLiter(s) (50 mL/Hr) IV Continuous <Continuous>  tamsulosin 0.8 milliGRAM(s) Oral at bedtime    MEDICATIONS  (PRN):  acetaminophen     Tablet .. 650 milliGRAM(s) Oral every 6 hours PRN Temp greater or equal to 38C (100.4F), Mild Pain (1 - 3)  dextrose Oral Gel 15 Gram(s) Oral once PRN Blood Glucose LESS THAN 70 milliGRAM(s)/deciliter      LABS:                          12.5   26.55 )-----------( 193      ( 01 Aug 2022 07:42 )             38.5     Hemoglobin: 12.5 g/dL (08-01 @ 07:42)  Hemoglobin: 12.3 g/dL (07-31 @ 10:03)  Hemoglobin: 11.6 g/dL (07-30 @ 10:58)    08-01    130<L>  |  92<L>  |  22  ----------------------------<  131<H>  4.1   |  26  |  1.05    Ca    8.4      01 Aug 2022 07:42    TPro  6.1  /  Alb  2.0<L>  /  TBili  0.9  /  DBili  x   /  AST  41<H>  /  ALT  15  /  AlkPhos  221<H>  07-31    Creatinine Trend: 1.05<--, 0.98<--, 0.77<--, 0.76<--, 0.78<--, 0.92<--   PT/INR - ( 01 Aug 2022 07:42 )   PT: 13.7 sec;   INR: 1.18 ratio         PTT - ( 01 Aug 2022 07:42 )  PTT:28.3 sec  CARDIAC MARKERS ( 30 Jul 2022 22:04 )  x     / x     / 19 U/L / x     / 1.0 ng/mL          07-31-22 @ 07:01  -  08-01-22 @ 07:00  --------------------------------------------------------  IN: 200 mL / OUT: 0 mL / NET: 200 mL        PHYSICAL EXAM  Vital Signs Last 24 Hrs  T(C): 36.7 (01 Aug 2022 09:48), Max: 37.2 (31 Jul 2022 16:38)  T(F): 98.1 (01 Aug 2022 09:48), Max: 98.9 (31 Jul 2022 16:38)  HR: 94 (01 Aug 2022 09:48) (75 - 94)  BP: 123/60 (01 Aug 2022 09:48) (85/44 - 123/60)  BP(mean): --  RR: 18 (01 Aug 2022 09:48) (18 - 18)  SpO2: 95% (01 Aug 2022 09:48) (95% - 96%)    Parameters below as of 01 Aug 2022 09:48  Patient On (Oxygen Delivery Method): room air          Gen: NAD  HEENT:  (-)icterus (-)pallor  CV: N S1 S2 1/6 JULISSA (+)2 Pulses B/l  Resp:  Clear to auscultation B/L, normal effort  GI: (+) BS Soft, NT, ND  Lymph:  (-)Edema, (-)obvious lymphadenopathy  Skin: Warm to touch, Normal turgor  Psych: Appropriate mood and affect      TELEMETRY: SR 60-70s  ECG: Sinus Bradycardia, narrow QRS 	  	    < from: Transthoracic Echocardiogram (07.26.22 @ 07:37) >  Conclusions:  1. Calcified trileaflet aortic valve with decreased  opening. Difficult to determine the number of  leaflets.There is LVOT obstruction due to a hyperdynamic  ventricle with a peak velocity of 27 mmHg. Peak transaortic  valve gradient equals 38 mm Hg, mean transaortic valve  gradient equals 24 mm Hg, estimated aortic valve area  equals 1.1 sqcm (by continuity equation), aortic valve  velocity time integral equals 37 cm, consistent with  moderate aortic stenosis. Peak velocity was 3.1 m/s, DVI  was 0.3. Minimal aortic regurgitation.  Please note that peak velocities were obtained only from  apical views and Pedoff was not performed.  2. Aortic Root: 2.9 cm. The proximal ascending aorta and  root are not well visualized.  LVOT diameter: 2 cm.  3. Normal left atrium.  LA volume index = 26 cc/m2.  4. Small left ventricle. Increased relative wall thickness  with normal left ventricular mass index, consistent with  concentric left ventricular remodeling.Normal left  ventricular systolic function. No segmental wall motion  abnormalities. LVEF calculated using biplane Ron's  method was 63%.Normal diastolic function  5. Normal right atrium.Normal right ventricular size and  function.Normal tricuspid valve. No tricuspid  regurgitation.  6. No pericardial effusion seen.  *** Compared with echocardiogram of 8/20/2018, there is now  moderate aortic stenosis.  Please note thatthe patient refused a bubble study. IV was  painful. Hence, would suggest providing an IV line that is  less painful so that we can complete this study. Also, we  would perform additional imaging to assess the severity of  aortic stenosis. Would recommend EpiQ machine for  additional imaging due to poor image quality.    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 Cardiology consulted for further evaluation.    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

## 2022-08-01 NOTE — PROGRESS NOTE ADULT - PROBLEM SELECTOR PLAN 1
? if 2nd to underlying malignancy  -CT chest with no PNA  -BC with NGTD  -UC negative  -Monitoring off ABX  -Afebrile.

## 2022-08-01 NOTE — PRE-ANESTHESIA EVALUATION ADULT - NSANTHOSAYNRD_GEN_A_CORE
No. ROMANA screening performed.  STOP BANG Legend: 0-2 = LOW Risk; 3-4 = INTERMEDIATE Risk; 5-8 = HIGH Risk

## 2022-08-01 NOTE — PROGRESS NOTE ADULT - SUBJECTIVE AND OBJECTIVE BOX
[facility]  Wero Stafford PA-C  [address] [place], [place] [id number] [phone]      INTERVAL HPI/OVERNIGHT EVENTS:  pt seen and examined, no new events   awaiting biopsy that is planned for today with IR    MEDICATIONS  (STANDING):  atorvastatin 80 milliGRAM(s) Oral at bedtime  cyanocobalamin 1000 MICROGram(s) Oral daily  dextrose 5%. 1000 milliLiter(s) (100 mL/Hr) IV Continuous <Continuous>  dextrose 5%. 1000 milliLiter(s) (50 mL/Hr) IV Continuous <Continuous>  dextrose 50% Injectable 25 Gram(s) IV Push once  dextrose 50% Injectable 12.5 Gram(s) IV Push once  dextrose 50% Injectable 25 Gram(s) IV Push once  gabapentin 300 milliGRAM(s) Oral daily  glucagon  Injectable 1 milliGRAM(s) IntraMuscular once  heparin   Injectable 5000 Unit(s) SubCutaneous every 12 hours  insulin lispro (ADMELOG) corrective regimen sliding scale   SubCutaneous three times a day before meals  tamsulosin 0.8 milliGRAM(s) Oral at bedtime    MEDICATIONS  (PRN):  acetaminophen     Tablet .. 650 milliGRAM(s) Oral every 6 hours PRN Temp greater or equal to 38C (100.4F), Mild Pain (1 - 3)  dextrose Oral Gel 15 Gram(s) Oral once PRN Blood Glucose LESS THAN 70 milliGRAM(s)/deciliter      Allergies    Advil (Hives)  penicillin (Hives)    Intolerances        ROS:   General:  No wt loss, fevers, chills, night sweats, +fatigue,   Eyes:  Good vision, no reported pain  ENT:  No sore throat, pain, runny nose, dysphagia  CV:  No pain, palpitations, hypo/hypertension  Resp:  No dyspnea, cough, tachypnea, wheezing  GI:  No pain, No nausea, No vomiting, No diarrhea, No constipation, No weight loss, No fever, No pruritis, No rectal bleeding, No tarry stools, No dysphagia,  :  No pain, bleeding, incontinence, nocturia  Muscle:  No pain, weakness  Neuro:  No weakness, tingling, memory problems  Psych:  No fatigue, insomnia, mood problems, +depression  Endocrine:  No polyuria, polydipsia, cold/heat intolerance  Heme:  No petechiae, ecchymosis, easy bruisability  Skin:  No rash, tattoos, scars, edema      PHYSICAL EXAM:   Vital Signs Last 24 Hrs  T(C): 36.7 (01 Aug 2022 09:48), Max: 37.2 (31 Jul 2022 16:38)  T(F): 98.1 (01 Aug 2022 09:48), Max: 98.9 (31 Jul 2022 16:38)  HR: 94 (01 Aug 2022 09:48) (75 - 94)  BP: 123/60 (01 Aug 2022 09:48) (85/44 - 123/60)  BP(mean): --  RR: 18 (01 Aug 2022 09:48) (18 - 18)  SpO2: 95% (01 Aug 2022 09:48) (95% - 96%)    Parameters below as of 01 Aug 2022 09:48  Patient On (Oxygen Delivery Method): room air      Daily     Daily     GENERAL:  Appears stated age,   HEENT:  NC/AT,    CHEST:  Full & symmetric excursion,   HEART:  Regular rhythm,  ABDOMEN:  Soft, non-tender, distended,  EXTEREMITIES:  no cyanosis  SKIN:  No rash  NEURO:  Alert,       LABS:                        12.5   26.55 )-----------( 193      ( 01 Aug 2022 07:42 )             38.5   08-01    130<L>  |  92<L>  |  22  ----------------------------<  131<H>  4.1   |  26  |  1.05    Ca    8.4      01 Aug 2022 07:42    TPro  6.1  /  Alb  2.0<L>  /  TBili  0.9  /  DBili  x   /  AST  41<H>  /  ALT  15  /  AlkPhos  221<H>  07-31      RADIOLOGY & ADDITIONAL TESTS:

## 2022-08-01 NOTE — PROGRESS NOTE ADULT - SUBJECTIVE AND OBJECTIVE BOX
Date of Service: 08-01-22 @ 10:21    Patient is a 81y old  Male who presents with a chief complaint of failure to thrive (01 Aug 2022 10:18)      Any change in ROS:  No new respiratory events overnight  O2 sats 96% RA    MEDICATIONS  (STANDING):  atorvastatin 80 milliGRAM(s) Oral at bedtime  cyanocobalamin 1000 MICROGram(s) Oral daily  dextrose 5%. 1000 milliLiter(s) (50 mL/Hr) IV Continuous <Continuous>  dextrose 5%. 1000 milliLiter(s) (100 mL/Hr) IV Continuous <Continuous>  dextrose 50% Injectable 25 Gram(s) IV Push once  dextrose 50% Injectable 12.5 Gram(s) IV Push once  dextrose 50% Injectable 25 Gram(s) IV Push once  glucagon  Injectable 1 milliGRAM(s) IntraMuscular once  heparin   Injectable 5000 Unit(s) SubCutaneous every 12 hours  insulin lispro (ADMELOG) corrective regimen sliding scale   SubCutaneous three times a day before meals  mirtazapine 7.5 milliGRAM(s) Oral at bedtime  potassium chloride    Tablet ER 40 milliEquivalent(s) Oral every 4 hours  sodium chloride 0.9% with potassium chloride 20 mEq/L 1000 milliLiter(s) (50 mL/Hr) IV Continuous <Continuous>  tamsulosin 0.8 milliGRAM(s) Oral at bedtime    MEDICATIONS  (PRN):  acetaminophen     Tablet .. 650 milliGRAM(s) Oral every 6 hours PRN Temp greater or equal to 38C (100.4F), Mild Pain (1 - 3)  dextrose Oral Gel 15 Gram(s) Oral once PRN Blood Glucose LESS THAN 70 milliGRAM(s)/deciliter    Vital Signs Last 24 Hrs  T(C): 36.7 (01 Aug 2022 09:48), Max: 37.2 (31 Jul 2022 16:38)  T(F): 98.1 (01 Aug 2022 09:48), Max: 98.9 (31 Jul 2022 16:38)  HR: 94 (01 Aug 2022 09:48) (75 - 94)  BP: 123/60 (01 Aug 2022 09:48) (85/44 - 123/60)  BP(mean): --  RR: 18 (01 Aug 2022 09:48) (18 - 18)  SpO2: 95% (01 Aug 2022 09:48) (95% - 96%)    Parameters below as of 01 Aug 2022 09:48  Patient On (Oxygen Delivery Method): room air    I&O's Summary    31 Jul 2022 07:01  -  01 Aug 2022 07:00  --------------------------------------------------------  IN: 200 mL / OUT: 0 mL / NET: 200 mL    Physical Exam:   GENERAL: NAD  HEENT: ADAM  ENMT: No tonsillar erythema, exudates, or enlargement  NECK: Supple, No JVD  CHEST/LUNG: Clear to auscultation b/l   CVS: Regular rate and rhythm  GI: : Soft, Nontender, Nondistended  NERVOUS SYSTEM:  Lethargic  EXTREMITIES:  2+ Peripheral Pulses, No clubbing, cyanosis, or edema  SKIN: No rashes or lesions  PSYCH: Appropriate    Labs:  33  CARDIAC MARKERS ( 30 Jul 2022 22:04 )  x     / x     / 19 U/L / x     / 1.0 ng/mL                            12.5   26.55 )-----------( 193      ( 01 Aug 2022 07:42 )             38.5                         12.3   26.38 )-----------( 208      ( 31 Jul 2022 10:03 )             38.1                         11.6   26.90 )-----------( 228      ( 30 Jul 2022 10:58 )             35.2     08-01    130<L>  |  92<L>  |  22  ----------------------------<  131<H>  4.1   |  26  |  1.05  07-31    130<L>  |  91<L>  |  20  ----------------------------<  141<H>  3.1<L>   |  29  |  0.98  07-30    127<L>  |  90<L>  |  17  ----------------------------<  107<H>  3.7   |  26  |  0.77    Ca    8.4      01 Aug 2022 07:42  Ca    8.4      31 Jul 2022 10:03    TPro  6.1  /  Alb  2.0<L>  /  TBili  0.9  /  DBili  x   /  AST  41<H>  /  ALT  15  /  AlkPhos  221<H>  07-31  TPro  6.1  /  Alb  1.9<L>  /  TBili  0.9  /  DBili  x   /  AST  39  /  ALT  13  /  AlkPhos  238<H>  07-30    CAPILLARY BLOOD GLUCOSE  POCT Blood Glucose.: 156 mg/dL (01 Aug 2022 07:50)  POCT Blood Glucose.: 183 mg/dL (31 Jul 2022 21:24)  POCT Blood Glucose.: 205 mg/dL (31 Jul 2022 16:41)  POCT Blood Glucose.: 164 mg/dL (31 Jul 2022 12:11)      LIVER FUNCTIONS - ( 31 Jul 2022 10:03 )  Alb: 2.0 g/dL / Pro: 6.1 g/dL / ALK PHOS: 221 U/L / ALT: 15 U/L / AST: 41 U/L / GGT: x           PT/INR - ( 01 Aug 2022 07:42 )   PT: 13.7 sec;   INR: 1.18 ratio         PTT - ( 01 Aug 2022 07:42 )  PTT:28.3 sec    Studies  Chest X-RAY< from: Xray Chest 1 View AP/PA (07.23.22 @ 04:50) >  INTERPRETATION:  INDICATION: Shortness of breath    COMPARISON: Radiograph chest 8/15/2018    FINDINGS:  Heart/Vascular: Heart size is poorly assessed on this projection  Pulmonary: The lungs are clear. No pleural effusion. No pneumothorax.  Bones: No acute osseous abnormalities    IMPRESSION:  Clear lungs.    --- End of Report ---    < end of copied text >    CT SCAN Chest < from: CT Chest No Cont (07.24.22 @ 17:54) >  PROCEDURE:  CT scan of the chest was obtained without intravenous contrast.    FINDINGS:    LYMPH NODES: No lymphadenopathy.    HEART/VASCULATURE: Atherosclerotic disease of the abdominal aorta and   SMA. Heart is normal in size. No pericardial effusion. Coronary artery   calcifications. Aorta and pulmonary artery normal in caliber.    AIRWAYS/LUNGS/PLEURA: Small amount mucus within the trachea otherwise no   endobronchial lesions. No evidence of consolidation. No suspicious lung   nodules. No pleural effusion.    UPPER ABDOMEN: Multiple hypodensities within the liver measuring up to   2.7 cm as well as well-circumscribed partially calcified lesion measuring   2.0 cm.    BONES/SOFT TISSUES: Moderate are degenerative changes of the bone. No   soft tissue lesions.    IMPRESSION:    No evidence of pneumonia.    Multiple hypodensities within the liver measuring up to 2.7 cm as well as   a well-circumscribed partially calcified lesion measuring 2.0 cm. These   findings are concerning for metastatic disease. Appropriate clinical   follow-up suggested.    --- End of Report ---    < end of copied text >    < from: CT Abdomen and Pelvis w/ Oral Cont and w/ IV Cont (07.25.22 @ 19:24) >  FINDINGS:  LOWER CHEST: Aortic root and coronary artery calcifications. Bilateral   trace pleural effusions.    LIVER: Innumerable hypodense lesions which nearly replace segments 5 and   6.  BILE DUCTS: Normal caliber.  GALLBLADDER: Continuous with segment 6 hepatic lesion (3:55).   Cholelithiasis.  SPLEEN: Within normal limits.  PANCREAS: Within normal limits.  ADRENALS: Within normal limits.  KIDNEYS/URETERS: Multiple right cysts measuring up to 1.7 cm. Bilateral   subcentimeter hypodensities too small to characterize.    BLADDER: Within normal limits.  REPRODUCTIVE ORGANS: Enlarged with coarse calcifications.    BOWEL: No bowel obstruction. Appendix is normal.  PERITONEUM: Trace free fluid.  VESSELS: Atherosclerotic changes. Thrombus within the anterior and   posterior branches of the right portal vein.  RETROPERITONEUM/LYMPH NODES: No lymphadenopathy.  ABDOMINAL WALL: Large fat-containing umbilical hernia.  BONES: Within normal limits.    IMPRESSION:  Innumerable hepatic lesions concerning for metastatic disease.    Gallbladder lumen is continuous with hepatic lesions. 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    Dr. Olivera discussed these findings with Dr. Mccall on 7/25/2022 7:34   PM with read back.    --- End of Report ---    < end of copied text >  < from: Transthoracic Echocardiogram (07.26.22 @ 07:37) >  Dimensions:    Normal Values:  LA:     3.5    2.0 - 4.0 cm  Ao:     2.9    2.0 - 3.8 cm  SEPTUM:        0.6 - 1.2 cm  PWT:           0.6 - 1.1 cm  LVIDd:         3.0 - 5.6 cm  LVIDs:         1.8 - 4.0 cm  EF (Ron Rule): 63 %Doppler Peak Velocity (m/sec):  AoV=3.1  ------------------------------------------------------------------------  Observations:  Mitral Valve: Normal mitral valve. No mitral regurgitation  seen.  Aortic Valve/Aorta: Calcified trileaflet aortic valve with  decreased opening. Difficult to determine the number of  leaflets.There is LVOT obstruction due to a hyperdynamic  ventricle witha peak velocity of 27 mmHg. Peak transaortic  valve gradient equals 38 mm Hg, mean transaortic valve  gradient equals 24 mm Hg, estimated aortic valve area  equals 1.1 sqcm (by continuity equation), aortic valve  velocity time integral equals 37 cm, consistent with  moderate aortic stenosis. Peak velocity was 3.1 m/s, DVI  was 0.3. Minimal aortic regurgitation.  Peak left  ventricular outflow tract gradient equals 3 mm Hg, mean  gradient is equal to 2 mm Hg, LVOT velocity time integral  equals 13cm.  Aortic Root: 2.9 cm. The proximal ascending aorta and root  are not well visualized.  LVOT diameter: 2 cm.  Left Atrium: Normal left atrium.  LA volume index = 26  cc/m2.  Left Ventricle: Normal left ventricular systolic function.  No segmental wall motion abnormalities. LVEF calculated  using biplane Ron's method was 63%. Small left  ventricle. Increased relative wall thickness with normal  left ventricular mass index, consistent with concentric  left ventricular remodeling. Normal diastolic function  Right Heart: Normal right atrium. Normal right ventricular  size and function. Normal tricuspid valve. No tricuspid  regurgitation. Normal pulmonic valve.  Pericardium/Pleura: No pericardial effusion seen.  Hemodynamic: Estimated rightatrial pressure is 8 mm Hg.  ------------------------------------------------------------------------  Conclusions:  1. Calcified trileaflet aortic valve with decreased  opening. Difficult to determine the number of  leaflets.There is LVOT obstruction due to a hyperdynamic  ventricle with a peak velocity of 27 mmHg. Peak transaortic  valve gradient equals 38 mm Hg, mean transaortic valve  gradient equals 24 mm Hg, estimated aortic valve area  equals 1.1 sqcm (by continuity equation), aortic valve  velocity time integral equals 37 cm, consistent with  moderate aortic stenosis. Peak velocity was 3.1 m/s, DVI  was 0.3. Minimal aortic regurgitation.  Please note that peak velocities were obtained only from  apical views and Pedoff was not performed.  2. Aortic Root: 2.9 cm. The proximal ascending aorta and  root are not well visualized.  LVOT diameter: 2 cm.  3. Normal left atrium.  LA volume index = 26 cc/m2.  4. Small left ventricle. Increased relative wall thickness  with normal left ventricular mass index, consistent with  concentric left ventricular remodeling.Normal left  ventricular systolic function. No segmental wall motion  abnormalities. LVEF calculated using biplane Ron's  method was 63%.Normal diastolic function  5. Normal right atrium.Normal right ventricular size and  function.Normal tricuspid valve. No tricuspid  regurgitation.  6. No pericardial effusion seen.  *** Compared with echocardiogram of 8/20/2018, there is now  moderate aortic stenosis.  Please note thatthe patient refused a bubble study. IV was  painful. Hence, would suggest providing an IV line that is  less painful so that we can complete this study. Also, we  would perform additional imaging to assess the severity of  aortic stenosis. Would recommend EpiQ machine for  additional imaging due to poor image quality.    < end of copied text >

## 2022-08-01 NOTE — PROGRESS NOTE ADULT - ASSESSMENT
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

## 2022-08-01 NOTE — PROGRESS NOTE ADULT - PROBLEM SELECTOR PLAN 4
Smoking cessation education provided   -Can order nicotine patch if pt agreeable  -VBG with no CO2 retention.

## 2022-08-01 NOTE — PRE PROCEDURE NOTE - PRE PROCEDURE EVALUATION
Vascular & Interventional Radiology Pre-Procedure Note    Procedure Name: hepatic mass biopsy    HPI: 81y Male found to have a hepatic mass with IR consulted fo image guided hepatic mass biopsy.    Allergies: Advil (Hives)  penicillin (Hives)      Medications:    heparin   Injectable: 5000 Unit(s) SubCutaneous (07-31 @ 17:18)  tamsulosin: 0.8 milliGRAM(s) Oral (07-31 @ 21:16)      Data:  Vital Signs Last 24 Hrs  T(C): 36.7 (01 Aug 2022 09:48), Max: 37.2 (31 Jul 2022 16:38)  T(F): 98.1 (01 Aug 2022 09:48), Max: 98.9 (31 Jul 2022 16:38)  HR: 92 (01 Aug 2022 12:20) (75 - 94)  BP: 116/53 (01 Aug 2022 12:20) (85/44 - 123/60)  BP(mean): --  RR: 18 (01 Aug 2022 09:48) (18 - 18)  SpO2: 95% (01 Aug 2022 09:48) (95% - 96%)    Parameters below as of 01 Aug 2022 09:48  Patient On (Oxygen Delivery Method): room air        LABS:                        12.5   26.55 )-----------( 193      ( 01 Aug 2022 07:42 )             38.5     08-01    130<L>  |  92<L>  |  22  ----------------------------<  131<H>  4.1   |  26  |  1.05    Ca    8.4      01 Aug 2022 07:42    TPro  6.1  /  Alb  2.0<L>  /  TBili  0.9  /  DBili  x   /  AST  41<H>  /  ALT  15  /  AlkPhos  221<H>  07-31    PT/INR - ( 01 Aug 2022 07:42 )   PT: 13.7 sec;   INR: 1.18 ratio         PTT - ( 01 Aug 2022 07:42 )  PTT:28.3 sec      Plan:   -81y Male presents for image guided hepatic mass biopsy  -Risks/Benefits/alternatives explained with the patient's healthcare proxy (Daughter) and witnessed informed consent obtained. DNR suspended for the case.

## 2022-08-01 NOTE — PROGRESS NOTE ADULT - SUBJECTIVE AND OBJECTIVE BOX
[facility] [phone]        Patient seen and examined in bed.  He somulent this am.  Answered questions but did not open his eyes         MEDICATIONS  (STANDING):  atorvastatin 80 milliGRAM(s) Oral at bedtime  cyanocobalamin 1000 MICROGram(s) Oral daily  dextrose 5%. 1000 milliLiter(s) (50 mL/Hr) IV Continuous <Continuous>  dextrose 5%. 1000 milliLiter(s) (100 mL/Hr) IV Continuous <Continuous>  dextrose 50% Injectable 25 Gram(s) IV Push once  dextrose 50% Injectable 12.5 Gram(s) IV Push once  dextrose 50% Injectable 25 Gram(s) IV Push once  glucagon  Injectable 1 milliGRAM(s) IntraMuscular once  heparin   Injectable 5000 Unit(s) SubCutaneous every 12 hours  insulin lispro (ADMELOG) corrective regimen sliding scale   SubCutaneous three times a day before meals  mirtazapine 7.5 milliGRAM(s) Oral at bedtime  tamsulosin 0.8 milliGRAM(s) Oral at bedtime      VITAL:  T(C): , Max: 37.2 (07-31-22 @ 16:38)  T(F): , Max: 98.9 (07-31-22 @ 16:38)  HR: 94 (08-01-22 @ 09:48)  BP: 123/60 (08-01-22 @ 09:48)  BP(mean): --  RR: 18 (08-01-22 @ 09:48)  SpO2: 95% (08-01-22 @ 09:48)  Wt(kg): --    I and O's:    07-31 @ 07:01  -  08-01 @ 07:00  --------------------------------------------------------  IN: 200 mL / OUT: 0 mL / NET: 200 mL          PHYSICAL EXAM:    Constitutional: NAD  Neck:  No JVD  Respiratory: CTAB/L  Cardiovascular: S1 and S2  Gastrointestinal: BS+, soft, NT/ND  Extremities: No peripheral edema  Neurological: tired this am   Psychiatric:    : No Villasenor  Skin: No rashes  Access: Not applicable    LABS:                        12.5   26.55 )-----------( 193      ( 01 Aug 2022 07:42 )             38.5     08-01    130<L>  |  92<L>  |  22  ----------------------------<  131<H>  4.1   |  26  |  1.05    Ca    8.4      01 Aug 2022 07:42    TPro  6.1  /  Alb  2.0<L>  /  TBili  0.9  /  DBili  x   /  AST  41<H>  /  ALT  15  /  AlkPhos  221<H>  07-31          Urine Studies:          RADIOLOGY & ADDITIONAL STUDIES:

## 2022-08-01 NOTE — PROGRESS NOTE ADULT - PROBLEM SELECTOR PLAN 2
likely secondary to dehydration secondary to poor po  resolved  hyponatremia resolving   likely secondary to poor po  continue to monitor

## 2022-08-01 NOTE — PROGRESS NOTE ADULT - ASSESSMENT
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  these findings discussed with daughter  options of biopsy versus comfort care discussed with family   she is obviously overwhelmed with this new information and would like a biopsy to confirm diagnosis  he has poor performance status and unlikely to tolerate any dmt which she understands  IR consult noted, biopsy planned for Monday 8/1  would defer AC for PV thrombosis at this time   will follow  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

## 2022-08-02 NOTE — PROGRESS NOTE ADULT - SUBJECTIVE AND OBJECTIVE BOX
[facility] [phone]        Patient seen and examined in bed.  He was more alert but still not eating well         MEDICATIONS  (STANDING):  atorvastatin 80 milliGRAM(s) Oral at bedtime  cyanocobalamin 1000 MICROGram(s) Oral daily  dextrose 5%. 1000 milliLiter(s) (50 mL/Hr) IV Continuous <Continuous>  dextrose 5%. 1000 milliLiter(s) (100 mL/Hr) IV Continuous <Continuous>  dextrose 50% Injectable 25 Gram(s) IV Push once  dextrose 50% Injectable 12.5 Gram(s) IV Push once  dextrose 50% Injectable 25 Gram(s) IV Push once  glucagon  Injectable 1 milliGRAM(s) IntraMuscular once  heparin   Injectable 5000 Unit(s) SubCutaneous every 12 hours  insulin lispro (ADMELOG) corrective regimen sliding scale   SubCutaneous three times a day before meals  mirtazapine 7.5 milliGRAM(s) Oral at bedtime  sodium chloride 0.9% with potassium chloride 20 mEq/L 1000 milliLiter(s) (50 mL/Hr) IV Continuous <Continuous>  tamsulosin 0.8 milliGRAM(s) Oral at bedtime      VITAL:  T(C): , Max: 37.1 (08-01-22 @ 15:45)  T(F): , Max: 98.8 (08-01-22 @ 15:45)  HR: 77 (08-02-22 @ 04:58)  BP: 121/61 (08-02-22 @ 04:58)  BP(mean): 67 (08-01-22 @ 17:15)  RR: 17 (08-02-22 @ 04:58)  SpO2: 96% (08-02-22 @ 04:58)  Wt(kg): --    I and O's:    08-01 @ 07:01  -  08-02 @ 07:00  --------------------------------------------------------  IN: 900 mL / OUT: 0 mL / NET: 900 mL      Height (cm): 165.1 (08-01 @ 15:36)  Weight (kg): 73.1 (08-01 @ 15:36)  BMI (kg/m2): 26.8 (08-01 @ 15:36)  BSA (m2): 1.8 (08-01 @ 15:36)    PHYSICAL EXAM:    Constitutional: NAD  Neck:  No JVD  Respiratory: CTAB/L  Cardiovascular: S1 and S2  Gastrointestinal: BS+, soft, NT/ND  Extremities: No peripheral edema  Neurological: A/O x 3, no focal deficits  Psychiatric: Normal mood, normal affect  : No Villasenor  Skin: No rashes  Access: Not applicable    LABS:                        12.5   26.55 )-----------( 193      ( 01 Aug 2022 07:42 )             38.5     08-01    130<L>  |  92<L>  |  22  ----------------------------<  131<H>  4.1   |  26  |  1.05    Ca    8.4      01 Aug 2022 07:42            Urine Studies:          RADIOLOGY & ADDITIONAL STUDIES:

## 2022-08-02 NOTE — DIETITIAN INITIAL EVALUATION ADULT - NSFNSADHERENCEPTAFT_GEN_A_CORE
history DM, A1C with Estimated Average Glucose Result: 6.0 % (07-24-22 @ 09:58)  per family cannot state usual glucose levels per "machine broke" and pt was not able to measure glucose levels at home

## 2022-08-02 NOTE — DIETITIAN INITIAL EVALUATION ADULT - NSFNSGIASSESSMENTFT_GEN_A_CORE
- Pt's daughter denies pt with nausea, vomiting; reports fluctuating diarrhea/ constipation.   - Last BM:8/2; not currently ordered for bowel regimen

## 2022-08-02 NOTE — DIETITIAN NUTRITION RISK NOTIFICATION - TREATMENT: THE FOLLOWING DIET HAS BEEN RECOMMENDED
Diet, Soft and Bite Sized:   Supplement Feeding Modality:  Oral  Glucerna Shake Cans or Servings Per Day:  3       Frequency:  Daily (08-02-22 @ 17:04) [Pending Verification By Attending]  Diet, Soft and Bite Sized:   Consistent Carbohydrate {No Snacks} (CSTCHO)  Supplement Feeding Modality:  Oral  Ensure Enlive Cans or Servings Per Day:  1       Frequency:  Three Times a day (07-26-22 @ 13:46) [Active]

## 2022-08-02 NOTE — DIETITIAN INITIAL EVALUATION ADULT - ADD RECOMMEND
- Monitor glucose; Team to provide/adjust insulin as needed to help maintain BG WNL   - Nutrition care plan to remain consistent with pt goals of care  - RD remains available to review diet education and adjust diet recommendations as needed.

## 2022-08-02 NOTE — DIETITIAN INITIAL EVALUATION ADULT - ORAL INTAKE PTA/DIET HISTORY
Pt endorses good appetite and PO intake PTA but decreased in the last 4 weeks per pt's daughter. Reports following low CHO low Na diet PTA.  Confirmed no known food allergies/ food intolerances

## 2022-08-02 NOTE — PROGRESS NOTE ADULT - SUBJECTIVE AND OBJECTIVE BOX
C A R D I O L O G Y  **********************************     DATE OF SERVICE: 08-02-22    Denies chest pain or SOB. Review of systems otherwise negative      MEDICATIONS  (STANDING):  aspirin enteric coated 81 milliGRAM(s) Oral daily  atorvastatin 80 milliGRAM(s) Oral at bedtime  cyanocobalamin 1000 MICROGram(s) Oral daily  dextrose 5%. 1000 milliLiter(s) (50 mL/Hr) IV Continuous <Continuous>  dextrose 5%. 1000 milliLiter(s) (100 mL/Hr) IV Continuous <Continuous>  dextrose 50% Injectable 25 Gram(s) IV Push once  dextrose 50% Injectable 12.5 Gram(s) IV Push once  dextrose 50% Injectable 25 Gram(s) IV Push once  glucagon  Injectable 1 milliGRAM(s) IntraMuscular once  heparin   Injectable 5000 Unit(s) SubCutaneous every 12 hours  insulin lispro (ADMELOG) corrective regimen sliding scale   SubCutaneous three times a day before meals  mirtazapine 7.5 milliGRAM(s) Oral at bedtime  tamsulosin 0.8 milliGRAM(s) Oral at bedtime    MEDICATIONS  (PRN):  acetaminophen     Tablet .. 650 milliGRAM(s) Oral every 6 hours PRN Temp greater or equal to 38C (100.4F), Mild Pain (1 - 3)  dextrose Oral Gel 15 Gram(s) Oral once PRN Blood Glucose LESS THAN 70 milliGRAM(s)/deciliter      LABS:                          12.5   26.55 )-----------( 193      ( 01 Aug 2022 07:42 )             38.5     Hemoglobin: 12.5 g/dL (08-01 @ 07:42)  Hemoglobin: 12.3 g/dL (07-31 @ 10:03)  Hemoglobin: 11.6 g/dL (07-30 @ 10:58)    08-02    132<L>  |  98  |  22  ----------------------------<  139<H>  5.0   |  24  |  1.09    Ca    8.3<L>      02 Aug 2022 09:47      Creatinine Trend: 1.09<--, 1.05<--, 0.98<--, 0.77<--, 0.76<--, 0.78<--     CARDIAC MARKERS ( 30 Jul 2022 22:04 )  x     / x     / 19 U/L / x     / 1.0 ng/mL          08-01-22 @ 07:01  -  08-02-22 @ 07:00  --------------------------------------------------------  IN: 900 mL / OUT: 0 mL / NET: 900 mL        PHYSICAL EXAM  Vital Signs Last 24 Hrs  T(C): 36.8 (02 Aug 2022 11:45), Max: 37.1 (01 Aug 2022 15:45)  T(F): 98.2 (02 Aug 2022 11:45), Max: 98.8 (01 Aug 2022 15:45)  HR: 90 (02 Aug 2022 11:45) (68 - 92)  BP: 119/54 (02 Aug 2022 11:45) (90/38 - 121/61)  BP(mean): 67 (01 Aug 2022 17:15) (67 - 67)  RR: 18 (02 Aug 2022 11:45) (12 - 19)  SpO2: 97% (02 Aug 2022 11:45) (95% - 98%)    Parameters below as of 02 Aug 2022 11:45  Patient On (Oxygen Delivery Method): room air            Gen: NAD  HEENT:  (-)icterus (-)pallor  CV: N S1 S2 1/6 JULISSA (+)2 Pulses B/l  Resp:  Clear to auscultation B/L, normal effort  GI: (+) BS Soft, NT, ND  Lymph:  (-)Edema, (-)obvious lymphadenopathy  Skin: Warm to touch, Normal turgor  Psych: Appropriate mood and affect      TELEMETRY: SR 80-90  ECG: Sinus Bradycardia, narrow QRS 	  	    < from: Transthoracic Echocardiogram (07.26.22 @ 07:37) >  Conclusions:  1. Calcified trileaflet aortic valve with decreased  opening. Difficult to determine the number of  leaflets.There is LVOT obstruction due to a hyperdynamic  ventricle with a peak velocity of 27 mmHg. Peak transaortic  valve gradient equals 38 mm Hg, mean transaortic valve  gradient equals 24 mm Hg, estimated aortic valve area  equals 1.1 sqcm (by continuity equation), aortic valve  velocity time integral equals 37 cm, consistent with  moderate aortic stenosis. Peak velocity was 3.1 m/s, DVI  was 0.3. Minimal aortic regurgitation.  Please note that peak velocities were obtained only from  apical views and Pedoff was not performed.  2. Aortic Root: 2.9 cm. The proximal ascending aorta and  root are not well visualized.  LVOT diameter: 2 cm.  3. Normal left atrium.  LA volume index = 26 cc/m2.  4. Small left ventricle. Increased relative wall thickness  with normal left ventricular mass index, consistent with  concentric left ventricular remodeling.Normal left  ventricular systolic function. No segmental wall motion  abnormalities. LVEF calculated using biplane Ron's  method was 63%.Normal diastolic function  5. Normal right atrium.Normal right ventricular size and  function.Normal tricuspid valve. No tricuspid  regurgitation.  6. No pericardial effusion seen.  *** Compared with echocardiogram of 8/20/2018, there is now  moderate aortic stenosis.  Please note thatthe patient refused a bubble study. IV was  painful. Hence, would suggest providing an IV line that is  less painful so that we can complete this study. Also, we  would perform additional imaging to assess the severity of  aortic stenosis. Would recommend EpiQ machine for  additional imaging due to poor image quality.    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 Cardiology consulted for further evaluation.    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

## 2022-08-02 NOTE — DIETITIAN INITIAL EVALUATION ADULT - PERTINENT LABORATORY DATA
08-02    132<L>  |  98  |  22  ----------------------------<  139<H>  5.0   |  24  |  1.09    Ca    8.3<L>      02 Aug 2022 09:47    08-02 @ 09:47: Na 132<L>, BUN 22, Cr 1.09, <H>, K+ 5.0, Phos --, Mg --, Alk Phos --, ALT/SGPT --, AST/SGOT --, HbA1c --    POCT Blood Glucose.: 151 mg/dL (08-02-22 @ 16:39)  POCT Blood Glucose.: 136 mg/dL (08-02-22 @ 12:20)  POCT Blood Glucose.: 139 mg/dL (08-02-22 @ 07:51)  POCT Blood Glucose.: 140 mg/dL (08-01-22 @ 21:30)  POCT Blood Glucose.: 151 mg/dL (08-01-22 @ 18:47)    A1C with Estimated Average Glucose Result: 6.0 % (07-24-22 @ 09:58)

## 2022-08-02 NOTE — DIETITIAN INITIAL EVALUATION ADULT - PERSON TAUGHT/METHOD
- Encouraged adequate PO intake for meeting nutritional needs, improving nutritional status and for preventing wt loss/verbal instruction/daughter instructed

## 2022-08-02 NOTE — PROGRESS NOTE ADULT - SUBJECTIVE AND OBJECTIVE BOX
Patient is a 81y old  Male who presents with a chief complaint of failure to thrive (02 Aug 2022 11:54)      DATE OF SERVICE: 08-02-22 @ 14:43    SUBJECTIVE / OVERNIGHT EVENTS: overnight events noted  daughter at bed Shahi[place]    ROS:  Resp: No cough no sputum production  CVS: No chest pain no palpitations no orthopnea  GI: no N/V/D  : no dysuria, no hematuria        MEDICATIONS  (STANDING):  aspirin enteric coated 81 milliGRAM(s) Oral daily  atorvastatin 80 milliGRAM(s) Oral at bedtime  cyanocobalamin 1000 MICROGram(s) Oral daily  dextrose 5%. 1000 milliLiter(s) (50 mL/Hr) IV Continuous <Continuous>  dextrose 5%. 1000 milliLiter(s) (100 mL/Hr) IV Continuous <Continuous>  dextrose 50% Injectable 25 Gram(s) IV Push once  dextrose 50% Injectable 12.5 Gram(s) IV Push once  dextrose 50% Injectable 25 Gram(s) IV Push once  glucagon  Injectable 1 milliGRAM(s) IntraMuscular once  heparin   Injectable 5000 Unit(s) SubCutaneous every 12 hours  insulin lispro (ADMELOG) corrective regimen sliding scale   SubCutaneous three times a day before meals  mirtazapine 7.5 milliGRAM(s) Oral at bedtime  tamsulosin 0.8 milliGRAM(s) Oral at bedtime    MEDICATIONS  (PRN):  acetaminophen     Tablet .. 650 milliGRAM(s) Oral every 6 hours PRN Temp greater or equal to 38C (100.4F), Mild Pain (1 - 3)  dextrose Oral Gel 15 Gram(s) Oral once PRN Blood Glucose LESS THAN 70 milliGRAM(s)/deciliter        CAPILLARY BLOOD GLUCOSE      POCT Blood Glucose.: 136 mg/dL (02 Aug 2022 12:20)  POCT Blood Glucose.: 139 mg/dL (02 Aug 2022 07:51)  POCT Blood Glucose.: 140 mg/dL (01 Aug 2022 21:30)  POCT Blood Glucose.: 151 mg/dL (01 Aug 2022 18:47)    I&O's Summary    01 Aug 2022 07:01  -  02 Aug 2022 07:00  --------------------------------------------------------  IN: 900 mL / OUT: 0 mL / NET: 900 mL        Vital Signs Last 24 Hrs  T(C): 36.8 (02 Aug 2022 11:45), Max: 37.1 (01 Aug 2022 15:45)  T(F): 98.2 (02 Aug 2022 11:45), Max: 98.8 (01 Aug 2022 15:45)  HR: 90 (02 Aug 2022 11:45) (68 - 92)  BP: 119/54 (02 Aug 2022 11:45) (90/38 - 121/61)  BP(mean): 67 (01 Aug 2022 17:15) (67 - 67)  RR: 18 (02 Aug 2022 11:45) (12 - 19)  SpO2: 97% (02 Aug 2022 11:45) (95% - 98%)    PHYSICAL EXAM:  EYES: EOMI, PERRLA  NECK: Supple, No JVD  CHEST/LUNG: clear  HEART: S1 S2; no murmurs   ABDOMEN: Soft, Nontender  ventral swelling no change  EXTREMITIES:   no edema  NEUROLOGY: alert non-focal  SKIN: No rashes or lesions    LABS:                        12.5   26.55 )-----------( 193      ( 01 Aug 2022 07:42 )             38.5     08-02    132<L>  |  98  |  22  ----------------------------<  139<H>  5.0   |  24  |  1.09    Ca    8.3<L>      02 Aug 2022 09:47      PT/INR - ( 01 Aug 2022 07:42 )   PT: 13.7 sec;   INR: 1.18 ratio         PTT - ( 01 Aug 2022 07:42 )  PTT:28.3 sec            All consultant(s) notes reviewed and care discussed with other providers        Contact Number, Dr Douglas [phone]

## 2022-08-02 NOTE — PROGRESS NOTE ADULT - PROBLEM SELECTOR PLAN 1
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

## 2022-08-02 NOTE — PROGRESS NOTE ADULT - PROBLEM SELECTOR PLAN 4
Smoking cessation education provided   -Can order nicotine patch if pt agreeable  -VBG with no CO2 retention  -Eventual outpatient PFTs.

## 2022-08-02 NOTE — CHART NOTE - NSCHARTNOTEFT_GEN_A_CORE
Aspirin was on hold for liver bx. S/P bx on 8/1/22. Contacted RAMÓN Ash via teams, She  cleared to resume ASA.  Dinorah Dias NP  [phone]

## 2022-08-02 NOTE — DIETITIAN INITIAL EVALUATION ADULT - DIET TYPE
- Per pt poor PO intake and well glycemic control A1C 6%, consider to liberalize diet to promote adequate PO intake. monitor labs closely and adjust diet as needed/ indicated.   - Continue soft bite size consistency per family's request per dentition status. Monitor tolerance, PO intake, and adjust as needed.

## 2022-08-02 NOTE — DIETITIAN INITIAL EVALUATION ADULT - OTHER INFO
Home Medications:  Amitiza 24 mcg oral capsule: 1 cap(s) orally once a day (23 Jul 2022 13:55)  atorvastatin 80 mg oral tablet: 1 tab(s) orally once a day (at bedtime) (23 Jul 2022 13:55)  gabapentin 300 mg oral capsule: 1 cap(s) orally once a day (23 Jul 2022 13:55)  Kerendia 10 mg oral tablet: 1 tab(s) orally once a day (23 Jul 2022 13:55)  losartan 25 mg oral tablet: 0.5 tab(s) orally 2 times a day  (pharmacy dispensed directions 1 tab once a day) (23 Jul 2022 13:55)  Prandin 2 mg oral tablet: 1 tab(s) orally 2 times a day (23 Jul 2022 13:55)  tamsulosin 0.4 mg oral capsule: 1 cap(s) orally 2 times a day    (pharmacy dispensed directions 1 cap once a day) (23 Jul 2022 13:55)  Tylenol: as needed (23 Jul 2022 13:55)  Vitamin B12: 1 tab(s) orally once a day (23 Jul 2022 13:55)

## 2022-08-02 NOTE — DIETITIAN INITIAL EVALUATION ADULT - NSFNSNUTRCHEWSWALLOWFT_GEN_A_CORE
per pt's family with difficulty chewing per no dentures at this time (refuses); on soft bite size diet at this time

## 2022-08-02 NOTE — DIETITIAN INITIAL EVALUATION ADULT - PERTINENT MEDS FT
MEDICATIONS  (STANDING):  aspirin enteric coated 81 milliGRAM(s) Oral daily  atorvastatin 80 milliGRAM(s) Oral at bedtime  cyanocobalamin 1000 MICROGram(s) Oral daily  dextrose 5%. 1000 milliLiter(s) (50 mL/Hr) IV Continuous <Continuous>  dextrose 5%. 1000 milliLiter(s) (100 mL/Hr) IV Continuous <Continuous>  dextrose 50% Injectable 25 Gram(s) IV Push once  dextrose 50% Injectable 12.5 Gram(s) IV Push once  dextrose 50% Injectable 25 Gram(s) IV Push once  glucagon  Injectable 1 milliGRAM(s) IntraMuscular once  heparin   Injectable 5000 Unit(s) SubCutaneous every 12 hours  insulin lispro (ADMELOG) corrective regimen sliding scale   SubCutaneous three times a day before meals  mirtazapine 7.5 milliGRAM(s) Oral at bedtime  tamsulosin 0.8 milliGRAM(s) Oral at bedtime    MEDICATIONS  (PRN):  acetaminophen     Tablet .. 650 milliGRAM(s) Oral every 6 hours PRN Temp greater or equal to 38C (100.4F), Mild Pain (1 - 3)  dextrose Oral Gel 15 Gram(s) Oral once PRN Blood Glucose LESS THAN 70 milliGRAM(s)/deciliter

## 2022-08-02 NOTE — PROGRESS NOTE ADULT - ASSESSMENT
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

## 2022-08-02 NOTE — PROGRESS NOTE ADULT - SUBJECTIVE AND OBJECTIVE BOX
Date of Service: 08-02-22 @ 10:09    Patient is a 81y old  Male who presents with a chief complaint of failure to thrive (02 Aug 2022 10:05)      Any change in ROS:   Awake & alert  O2 sats 96% on RA  Denies CP, SOB    MEDICATIONS  (STANDING):  atorvastatin 80 milliGRAM(s) Oral at bedtime  cyanocobalamin 1000 MICROGram(s) Oral daily  dextrose 5%. 1000 milliLiter(s) (50 mL/Hr) IV Continuous <Continuous>  dextrose 5%. 1000 milliLiter(s) (100 mL/Hr) IV Continuous <Continuous>  dextrose 50% Injectable 25 Gram(s) IV Push once  dextrose 50% Injectable 12.5 Gram(s) IV Push once  dextrose 50% Injectable 25 Gram(s) IV Push once  glucagon  Injectable 1 milliGRAM(s) IntraMuscular once  heparin   Injectable 5000 Unit(s) SubCutaneous every 12 hours  insulin lispro (ADMELOG) corrective regimen sliding scale   SubCutaneous three times a day before meals  mirtazapine 7.5 milliGRAM(s) Oral at bedtime  sodium chloride 0.9% with potassium chloride 20 mEq/L 1000 milliLiter(s) (50 mL/Hr) IV Continuous <Continuous>  tamsulosin 0.8 milliGRAM(s) Oral at bedtime    MEDICATIONS  (PRN):  acetaminophen     Tablet .. 650 milliGRAM(s) Oral every 6 hours PRN Temp greater or equal to 38C (100.4F), Mild Pain (1 - 3)  dextrose Oral Gel 15 Gram(s) Oral once PRN Blood Glucose LESS THAN 70 milliGRAM(s)/deciliter    Vital Signs Last 24 Hrs  T(C): 36.7 (02 Aug 2022 04:58), Max: 37.1 (01 Aug 2022 15:45)  T(F): 98.1 (02 Aug 2022 04:58), Max: 98.8 (01 Aug 2022 15:45)  HR: 77 (02 Aug 2022 04:58) (68 - 92)  BP: 121/61 (02 Aug 2022 04:58) (90/38 - 121/61)  BP(mean): 67 (01 Aug 2022 17:15) (67 - 67)  RR: 17 (02 Aug 2022 04:58) (12 - 19)  SpO2: 96% (02 Aug 2022 04:58) (95% - 98%)    Parameters below as of 02 Aug 2022 04:58  Patient On (Oxygen Delivery Method): room air        I&O's Summary    01 Aug 2022 07:01  -  02 Aug 2022 07:00  --------------------------------------------------------  IN: 900 mL / OUT: 0 mL / NET: 900 mL    Physical Exam:   GENERAL: NAD  HEENT: ADAM  ENMT: No tonsillar erythema, exudates, or enlargement  NECK: Supple, No JVD  CHEST/LUNG: Clear to auscultation b/l   CVS: Regular rate and rhythm  GI: : Soft, Nontender, Nondistended  NERVOUS SYSTEM:  Alert & Oriented X2  EXTREMITIES:  2+ Peripheral Pulses, No clubbing, cyanosis, or edema  SKIN: No rashes or lesions  PSYCH: Appropriate    Labs:                              12.5   26.55 )-----------( 193      ( 01 Aug 2022 07:42 )             38.5                         12.3   26.38 )-----------( 208      ( 31 Jul 2022 10:03 )             38.1                         11.6   26.90 )-----------( 228      ( 30 Jul 2022 10:58 )             35.2     08-01    130<L>  |  92<L>  |  22  ----------------------------<  131<H>  4.1   |  26  |  1.05  07-31    130<L>  |  91<L>  |  20  ----------------------------<  141<H>  3.1<L>   |  29  |  0.98  07-30    127<L>  |  90<L>  |  17  ----------------------------<  107<H>  3.7   |  26  |  0.77    Ca    8.4      01 Aug 2022 07:42    TPro  6.1  /  Alb  2.0<L>  /  TBili  0.9  /  DBili  x   /  AST  41<H>  /  ALT  15  /  AlkPhos  221<H>  07-31  TPro  6.1  /  Alb  1.9<L>  /  TBili  0.9  /  DBili  x   /  AST  39  /  ALT  13  /  AlkPhos  238<H>  07-30    CAPILLARY BLOOD GLUCOSE  POCT Blood Glucose.: 139 mg/dL (02 Aug 2022 07:51)  POCT Blood Glucose.: 140 mg/dL (01 Aug 2022 21:30)  POCT Blood Glucose.: 151 mg/dL (01 Aug 2022 18:47)  POCT Blood Glucose.: 152 mg/dL (01 Aug 2022 12:16)    PT/INR - ( 01 Aug 2022 07:42 )   PT: 13.7 sec;   INR: 1.18 ratio         PTT - ( 01 Aug 2022 07:42 )  PTT:28.3 sec    Studies  Chest X-RAY< from: Xray Chest 1 View AP/PA (07.23.22 @ 04:50) >  INTERPRETATION:  INDICATION: Shortness of breath    COMPARISON: Radiograph chest 8/15/2018    FINDINGS:  Heart/Vascular: Heart size is poorly assessed on this projection  Pulmonary: The lungs are clear. No pleural effusion. No pneumothorax.  Bones: No acute osseous abnormalities    IMPRESSION:  Clear lungs.    --- End of Report ---    < end of copied text >    CT SCAN Chest < from: CT Chest No Cont (07.24.22 @ 17:54) >  PROCEDURE:  CT scan of the chest was obtained without intravenous contrast.    FINDINGS:    LYMPH NODES: No lymphadenopathy.    HEART/VASCULATURE: Atherosclerotic disease of the abdominal aorta and   SMA. Heart is normal in size. No pericardial effusion. Coronary artery   calcifications. Aorta and pulmonary artery normal in caliber.    AIRWAYS/LUNGS/PLEURA: Small amount mucus within the trachea otherwise no   endobronchial lesions. No evidence of consolidation. No suspicious lung   nodules. No pleural effusion.    UPPER ABDOMEN: Multiple hypodensities within the liver measuring up to   2.7 cm as well as well-circumscribed partially calcified lesion measuring   2.0 cm.    BONES/SOFT TISSUES: Moderate are degenerative changes of the bone. No   soft tissue lesions.    IMPRESSION:    No evidence of pneumonia.    Multiple hypodensities within the liver measuring up to 2.7 cm as well as   a well-circumscribed partially calcified lesion measuring 2.0 cm. These   findings are concerning for metastatic disease. Appropriate clinical   follow-up suggested.    --- End of Report ---    < end of copied text >    < from: CT Abdomen and Pelvis w/ Oral Cont and w/ IV Cont (07.25.22 @ 19:24) >  FINDINGS:  LOWER CHEST: Aortic root and coronary artery calcifications. Bilateral   trace pleural effusions.    LIVER: Innumerable hypodense lesions which nearly replace segments 5 and   6.  BILE DUCTS: Normal caliber.  GALLBLADDER: Continuous with segment 6 hepatic lesion (3:55).   Cholelithiasis.  SPLEEN: Within normal limits.  PANCREAS: Within normal limits.  ADRENALS: Within normal limits.  KIDNEYS/URETERS: Multiple right cysts measuring up to 1.7 cm. Bilateral   subcentimeter hypodensities too small to characterize.    BLADDER: Within normal limits.  REPRODUCTIVE ORGANS: Enlarged with coarse calcifications.    BOWEL: No bowel obstruction. Appendix is normal.  PERITONEUM: Trace free fluid.  VESSELS: Atherosclerotic changes. Thrombus within the anterior and   posterior branches of the right portal vein.  RETROPERITONEUM/LYMPH NODES: No lymphadenopathy.  ABDOMINAL WALL: Large fat-containing umbilical hernia.  BONES: Within normal limits.    IMPRESSION:  Innumerable hepatic lesions concerning for metastatic disease.    Gallbladder lumen is continuous with hepatic lesions. 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    Dr. Olivera discussed these findings with Dr. Mccall on 7/25/2022 7:34   PM with read back.    --- End of Report ---    < end of copied text >  < from: Transthoracic Echocardiogram (07.26.22 @ 07:37) >  Dimensions:    Normal Values:  LA:     3.5    2.0 - 4.0 cm  Ao:     2.9    2.0 - 3.8 cm  SEPTUM:        0.6 - 1.2 cm  PWT:           0.6 - 1.1 cm  LVIDd:         3.0 - 5.6 cm  LVIDs:         1.8 - 4.0 cm  EF (Ron Rule): 63 %Doppler Peak Velocity (m/sec):  AoV=3.1  ------------------------------------------------------------------------  Observations:  Mitral Valve: Normal mitral valve. No mitral regurgitation  seen.  Aortic Valve/Aorta: Calcified trileaflet aortic valve with  decreased opening. Difficult to determine the number of  leaflets.There is LVOT obstruction due to a hyperdynamic  ventricle witha peak velocity of 27 mmHg. Peak transaortic  valve gradient equals 38 mm Hg, mean transaortic valve  gradient equals 24 mm Hg, estimated aortic valve area  equals 1.1 sqcm (by continuity equation), aortic valve  velocity time integral equals 37 cm, consistent with  moderate aortic stenosis. Peak velocity was 3.1 m/s, DVI  was 0.3. Minimal aortic regurgitation.  Peak left  ventricular outflow tract gradient equals 3 mm Hg, mean  gradient is equal to 2 mm Hg, LVOT velocity time integral  equals 13cm.  Aortic Root: 2.9 cm. The proximal ascending aorta and root  are not well visualized.  LVOT diameter: 2 cm.  Left Atrium: Normal left atrium.  LA volume index = 26  cc/m2.  Left Ventricle: Normal left ventricular systolic function.  No segmental wall motion abnormalities. LVEF calculated  using biplane Ron's method was 63%. Small left  ventricle. Increased relative wall thickness with normal  left ventricular mass index, consistent with concentric  left ventricular remodeling. Normal diastolic function  Right Heart: Normal right atrium. Normal right ventricular  size and function. Normal tricuspid valve. No tricuspid  regurgitation. Normal pulmonic valve.  Pericardium/Pleura: No pericardial effusion seen.  Hemodynamic: Estimated rightatrial pressure is 8 mm Hg.  ------------------------------------------------------------------------  Conclusions:  1. Calcified trileaflet aortic valve with decreased  opening. Difficult to determine the number of  leaflets.There is LVOT obstruction due to a hyperdynamic  ventricle with a peak velocity of 27 mmHg. Peak transaortic  valve gradient equals 38 mm Hg, mean transaortic valve  gradient equals 24 mm Hg, estimated aortic valve area  equals 1.1 sqcm (by continuity equation), aortic valve  velocity time integral equals 37 cm, consistent with  moderate aortic stenosis. Peak velocity was 3.1 m/s, DVI  was 0.3. Minimal aortic regurgitation.  Please note that peak velocities were obtained only from  apical views and Pedoff was not performed.  2. Aortic Root: 2.9 cm. The proximal ascending aorta and  root are not well visualized.  LVOT diameter: 2 cm.  3. Normal left atrium.  LA volume index = 26 cc/m2.  4. Small left ventricle. Increased relative wall thickness  with normal left ventricular mass index, consistent with  concentric left ventricular remodeling.Normal left  ventricular systolic function. No segmental wall motion  abnormalities. LVEF calculated using biplane Ron's  method was 63%.Normal diastolic function  5. Normal right atrium.Normal right ventricular size and  function.Normal tricuspid valve. No tricuspid  regurgitation.  6. No pericardial effusion seen.  *** Compared with echocardiogram of 8/20/2018, there is now  moderate aortic stenosis.  Please note thatthe patient refused a bubble study. IV was  painful. Hence, would suggest providing an IV line that is  less painful so that we can complete this study. Also, we  would perform additional imaging to assess the severity of  aortic stenosis. Would recommend EpiQ machine for  additional imaging due to poor image quality.    < end of copied text >

## 2022-08-02 NOTE — PROGRESS NOTE ADULT - ASSESSMENT
80 y/o M w/ PMH of DM2, HTN, HLD CAD s/p MI 2018 presents to the hospital with 2 months of progressive difficulty to walking  Hyponatremia - Seems clinically dry now   COPD   Metastatic disease -  Possible gallbladder cancer         1 Renal-DC IVF this am   2 ID- now off IV Abx;    3 CVS-BP appropriate   4 GI- Likely metastatic disease and awaiting liver bx results.  Successful bx done       [employer]  [phone]

## 2022-08-02 NOTE — DIETITIAN INITIAL EVALUATION ADULT - NSFNSPHYEXAMSKINFT_GEN_A_CORE
Pt's daughter states pt's  pounds " lost wt drastically in the past 2 months ~20 pounds" 11%?  118% IBW (IBW  136 pounds)  Skin: no noted pressure injuries as per flowsheets   Performed nutrition focused physical exam with pt's daughter consent and noted moderate signs of muscle/fat loss

## 2022-08-02 NOTE — PROGRESS NOTE ADULT - SUBJECTIVE AND OBJECTIVE BOX
Interventional Radiology Follow-Up Note    Patient seen and examined @ bedside    This is a 81y Male s/p Liver Biopsy on 8/2/22 in Interventional Radiology with Dr. Lindquist    No complaint offered.      Medication:  heparin   Injectable: (08-02)  tamsulosin: (08-01)    Vitals:  T(F): 98.1, Max: 98.8 (15:45)  HR: 77  BP: 121/61  RR: 17  SpO2: 96%    Physical Exam:  General: Nontoxic, in NAD.  Abdomen: soft, NTND. No hematoma or signs of bleeding noted      LABS:  Na: 130 (08-01 @ 07:42), 130 (07-31 @ 10:03)  K: 4.1 (08-01 @ 07:42), 3.1 (07-31 @ 10:03)  Cl: 92 (08-01 @ 07:42), 91 (07-31 @ 10:03)  CO2: 26 (08-01 @ 07:42), 29 (07-31 @ 10:03)  BUN: 22 (08-01 @ 07:42), 20 (07-31 @ 10:03)  Cr: 1.05 (08-01 @ 07:42), 0.98 (07-31 @ 10:03)  Glu: 131(08-01 @ 07:42), 141(07-31 @ 10:03)  Hgb: 12.5 (08-01 @ 07:42), 12.3 (07-31 @ 10:03), 11.6 (07-30 @ 10:58)  Hct: 38.5 (08-01 @ 07:42), 38.1 (07-31 @ 10:03), 35.2 (07-30 @ 10:58)  WBC: 26.55 (08-01 @ 07:42), 26.38 (07-31 @ 10:03), 26.90 (07-30 @ 10:58)  Plt: 193 (08-01 @ 07:42), 208 (07-31 @ 10:03), 228 (07-30 @ 10:58)  INR: 1.18 08-01-22 @ 07:42, 1.28 07-30-22 @ 10:58  PTT: 28.3 08-01-22 @ 07:42, 29.1 07-30-22 @ 10:58      LIVER FUNCTIONS - ( 31 Jul 2022 10:03 )  Alb: 2.0 g/dL / Pro: 6.1 g/dL / ALK PHOS: 221 U/L / ALT: 15 U/L / AST: 41 U/L / GGT: x         Aspartate Aminotransferase (AST/SGOT): 41 U/L (07-31-22 @ 10:03)  Alanine Aminotransferase (ALT/SGPT): 15 U/L (07-31-22 @ 10:03)  Bilirubin Total, Serum: 0.9 mg/dL (07-31-22 @ 10:03)  Bilirubin Total, Serum: 0.9 mg/dL (07-30-22 @ 07:08)          Assessment/Plan: This is a 81y Male s/p Liver Biopsy on 8/2/22 in Interventional Radiology with Dr. Lindquist    - Successful right lobe infiltrative liver mass biopsy.   - Tract embolized with gelfoam.  -  Monitor h/h; transfuse as needed  - Trend vs/labs  - Continue global management per primary team, IR will sign off     Please call IR at [phone] with any questions, concerns, or issues regarding above.      Also available on TEAMS

## 2022-08-02 NOTE — PROGRESS NOTE ADULT - SUBJECTIVE AND OBJECTIVE BOX
[facility]  Wero Stafford PA-C  [address] [place], [place] [id number] [phone]      INTERVAL HPI/OVERNIGHT EVENTS:  pt seen and examined, no new events   s/p liver biopsy yesterday   still with poor appetite and poor po intake     MEDICATIONS  (STANDING):  atorvastatin 80 milliGRAM(s) Oral at bedtime  cyanocobalamin 1000 MICROGram(s) Oral daily  dextrose 5%. 1000 milliLiter(s) (100 mL/Hr) IV Continuous <Continuous>  dextrose 5%. 1000 milliLiter(s) (50 mL/Hr) IV Continuous <Continuous>  dextrose 50% Injectable 25 Gram(s) IV Push once  dextrose 50% Injectable 12.5 Gram(s) IV Push once  dextrose 50% Injectable 25 Gram(s) IV Push once  gabapentin 300 milliGRAM(s) Oral daily  glucagon  Injectable 1 milliGRAM(s) IntraMuscular once  heparin   Injectable 5000 Unit(s) SubCutaneous every 12 hours  insulin lispro (ADMELOG) corrective regimen sliding scale   SubCutaneous three times a day before meals  tamsulosin 0.8 milliGRAM(s) Oral at bedtime    MEDICATIONS  (PRN):  acetaminophen     Tablet .. 650 milliGRAM(s) Oral every 6 hours PRN Temp greater or equal to 38C (100.4F), Mild Pain (1 - 3)  dextrose Oral Gel 15 Gram(s) Oral once PRN Blood Glucose LESS THAN 70 milliGRAM(s)/deciliter      Allergies    Advil (Hives)  penicillin (Hives)    Intolerances        ROS:   General:  No wt loss, fevers, chills, night sweats, +fatigue,   Eyes:  Good vision, no reported pain  ENT:  No sore throat, pain, runny nose, dysphagia  CV:  No pain, palpitations, hypo/hypertension  Resp:  No dyspnea, cough, tachypnea, wheezing  GI:  No pain, No nausea, No vomiting, No diarrhea, No constipation, No weight loss, No fever, No pruritis, No rectal bleeding, No tarry stools, No dysphagia,  :  No pain, bleeding, incontinence, nocturia  Muscle:  No pain, weakness  Neuro:  No weakness, tingling, memory problems  Psych:  No fatigue, insomnia, mood problems, +depression  Endocrine:  No polyuria, polydipsia, cold/heat intolerance  Heme:  No petechiae, ecchymosis, easy bruisability  Skin:  No rash, tattoos, scars, edema      PHYSICAL EXAM:   Vital Signs Last 24 Hrs  T(C): 36.7 (02 Aug 2022 04:58), Max: 37.1 (01 Aug 2022 15:45)  T(F): 98.1 (02 Aug 2022 04:58), Max: 98.8 (01 Aug 2022 15:45)  HR: 77 (02 Aug 2022 04:58) (68 - 92)  BP: 121/61 (02 Aug 2022 04:58) (90/38 - 121/61)  BP(mean): 67 (01 Aug 2022 17:15) (67 - 67)  RR: 17 (02 Aug 2022 04:58) (12 - 19)  SpO2: 96% (02 Aug 2022 04:58) (95% - 98%)    Parameters below as of 02 Aug 2022 04:58  Patient On (Oxygen Delivery Method): room air      Daily     Daily     GENERAL:  Appears stated age,   HEENT:  NC/AT,    CHEST:  Full & symmetric excursion,   HEART:  Regular rhythm,  ABDOMEN:  Soft, non-tender, distended,  EXTEREMITIES:  no cyanosis  SKIN:  No rash  NEURO:  Alert,       LABS:                        12.5   26.55 )-----------( 193      ( 01 Aug 2022 07:42 )             38.5   08-02    132<L>  |  98  |  22  ----------------------------<  139<H>  5.0   |  24  |  1.09    Ca    8.3<L>      02 Aug 2022 09:47      RADIOLOGY & ADDITIONAL TESTS:

## 2022-08-02 NOTE — DIETITIAN INITIAL EVALUATION ADULT - NS FNS DIET ORDER
Diet, Soft and Bite Sized:   Consistent Carbohydrate {No Snacks} (CSTCHO)  Supplement Feeding Modality:  Oral  Ensure Enlive Cans or Servings Per Day:  1       Frequency:  Three Times a day (07-26-22 @ 13:46) [Active]

## 2022-08-02 NOTE — DIETITIAN INITIAL EVALUATION ADULT - REASON INDICATOR FOR ASSESSMENT
Assessment warranted for length of stay. Pt speaks Mongolian  Information obtained from pt's daughter on phone (speaks english) and bedside, EMR

## 2022-08-03 NOTE — PROGRESS NOTE ADULT - PROBLEM SELECTOR PLAN 1
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

## 2022-08-03 NOTE — PROGRESS NOTE ADULT - PROBLEM SELECTOR PLAN 1
? if 2nd to underlying malignancy  -CT chest with no PNA  -BC with NGTD  -UC negative  -Monitoring off ABX  -Afebrile, leukocytosis slowly downtrending  -D/c planning per primary team.

## 2022-08-03 NOTE — PROGRESS NOTE ADULT - SUBJECTIVE AND OBJECTIVE BOX
Date of Service: 08-03-22 @ 10:19    Patient is a 81y old  Male who presents with a chief complaint of failure to thrive (03 Aug 2022 09:40)    Any change in ROS:   No new respiratory events overnight. Denies SOB/CP.  O2 sats 96% RA    MEDICATIONS  (STANDING):  aspirin enteric coated 81 milliGRAM(s) Oral daily  atorvastatin 80 milliGRAM(s) Oral at bedtime  chlorhexidine 2% Cloths 1 Application(s) Topical daily  cyanocobalamin 1000 MICROGram(s) Oral daily  dextrose 5%. 1000 milliLiter(s) (50 mL/Hr) IV Continuous <Continuous>  dextrose 5%. 1000 milliLiter(s) (100 mL/Hr) IV Continuous <Continuous>  dextrose 50% Injectable 25 Gram(s) IV Push once  dextrose 50% Injectable 12.5 Gram(s) IV Push once  dextrose 50% Injectable 25 Gram(s) IV Push once  glucagon  Injectable 1 milliGRAM(s) IntraMuscular once  heparin   Injectable 5000 Unit(s) SubCutaneous every 12 hours  insulin lispro (ADMELOG) corrective regimen sliding scale   SubCutaneous three times a day before meals  mirtazapine 7.5 milliGRAM(s) Oral at bedtime  tamsulosin 0.8 milliGRAM(s) Oral at bedtime    MEDICATIONS  (PRN):  acetaminophen     Tablet .. 650 milliGRAM(s) Oral every 6 hours PRN Temp greater or equal to 38C (100.4F), Mild Pain (1 - 3)  dextrose Oral Gel 15 Gram(s) Oral once PRN Blood Glucose LESS THAN 70 milliGRAM(s)/deciliter    Vital Signs Last 24 Hrs  T(C): 36.8 (03 Aug 2022 08:38), Max: 36.8 (02 Aug 2022 11:45)  T(F): 98.2 (03 Aug 2022 08:38), Max: 98.2 (02 Aug 2022 11:45)  HR: 112 (03 Aug 2022 08:38) (68 - 112)  BP: 152/70 (03 Aug 2022 08:38) (110/52 - 152/70)  BP(mean): --  RR: 18 (03 Aug 2022 08:38) (17 - 18)  SpO2: 96% (03 Aug 2022 08:38) (96% - 98%)    Parameters below as of 03 Aug 2022 08:38  Patient On (Oxygen Delivery Method): room air    I&O's Summary    02 Aug 2022 07:01  -  03 Aug 2022 07:00  --------------------------------------------------------  IN: 450 mL / OUT: 0 mL / NET: 450 mL    03 Aug 2022 07:01  -  03 Aug 2022 10:19  --------------------------------------------------------  IN: 120 mL / OUT: 0 mL / NET: 120 mL        Physical Exam   GENERAL: NAD  HEENT: ADAM  ENMT: No tonsillar erythema, exudates, or enlargement  NECK: Supple, No JVD  CHEST/LUNG: Clear to auscultation b/l   CVS: Regular rate and rhythm  GI: : Soft, Nontender, Nondistended  NERVOUS SYSTEM:  Alert & Oriented X2  EXTREMITIES:  2+ Peripheral Pulses, No clubbing, cyanosis, or edema  SKIN: No rashes or lesions  PSYCH: Appropriate    Labs:                              12.5   21.92 )-----------( 148      ( 03 Aug 2022 08:56 )             39.7                         12.5   26.55 )-----------( 193      ( 01 Aug 2022 07:42 )             38.5                         12.3   26.38 )-----------( 208      ( 31 Jul 2022 10:03 )             38.1                         11.6   26.90 )-----------( 228      ( 30 Jul 2022 10:58 )             35.2     08-03    133<L>  |  98  |  24<H>  ----------------------------<  110<H>  4.4   |  25  |  1.26  08-02    132<L>  |  98  |  22  ----------------------------<  139<H>  5.0   |  24  |  1.09  08-01    130<L>  |  92<L>  |  22  ----------------------------<  131<H>  4.1   |  26  |  1.05  07-31    130<L>  |  91<L>  |  20  ----------------------------<  141<H>  3.1<L>   |  29  |  0.98    Ca    8.8      03 Aug 2022 08:57  Ca    8.3<L>      02 Aug 2022 09:47    TPro  6.1  /  Alb  2.0<L>  /  TBili  0.9  /  DBili  x   /  AST  41<H>  /  ALT  15  /  AlkPhos  221<H>  07-31    CAPILLARY BLOOD GLUCOSE  POCT Blood Glucose.: 123 mg/dL (03 Aug 2022 07:50)  POCT Blood Glucose.: 141 mg/dL (02 Aug 2022 21:43)  POCT Blood Glucose.: 151 mg/dL (02 Aug 2022 16:39)  POCT Blood Glucose.: 136 mg/dL (02 Aug 2022 12:20)    Studies  Chest X-RAY< from: Xray Chest 1 View AP/PA (07.23.22 @ 04:50) >  INTERPRETATION:  INDICATION: Shortness of breath    COMPARISON: Radiograph chest 8/15/2018    FINDINGS:  Heart/Vascular: Heart size is poorly assessed on this projection  Pulmonary: The lungs are clear. No pleural effusion. No pneumothorax.  Bones: No acute osseous abnormalities    IMPRESSION:  Clear lungs.    --- End of Report ---    < end of copied text >    CT SCAN Chest < from: CT Chest No Cont (07.24.22 @ 17:54) >  PROCEDURE:  CT scan of the chest was obtained without intravenous contrast.    FINDINGS:    LYMPH NODES: No lymphadenopathy.    HEART/VASCULATURE: Atherosclerotic disease of the abdominal aorta and   SMA. Heart is normal in size. No pericardial effusion. Coronary artery   calcifications. Aorta and pulmonary artery normal in caliber.    AIRWAYS/LUNGS/PLEURA: Small amount mucus within the trachea otherwise no   endobronchial lesions. No evidence of consolidation. No suspicious lung   nodules. No pleural effusion.    UPPER ABDOMEN: Multiple hypodensities within the liver measuring up to   2.7 cm as well as well-circumscribed partially calcified lesion measuring   2.0 cm.    BONES/SOFT TISSUES: Moderate are degenerative changes of the bone. No   soft tissue lesions.    IMPRESSION:    No evidence of pneumonia.    Multiple hypodensities within the liver measuring up to 2.7 cm as well as   a well-circumscribed partially calcified lesion measuring 2.0 cm. These   findings are concerning for metastatic disease. Appropriate clinical   follow-up suggested.    --- End of Report ---    < end of copied text >    < from: CT Abdomen and Pelvis w/ Oral Cont and w/ IV Cont (07.25.22 @ 19:24) >  FINDINGS:  LOWER CHEST: Aortic root and coronary artery calcifications. Bilateral   trace pleural effusions.    LIVER: Innumerable hypodense lesions which nearly replace segments 5 and   6.  BILE DUCTS: Normal caliber.  GALLBLADDER: Continuous with segment 6 hepatic lesion (3:55).   Cholelithiasis.  SPLEEN: Within normal limits.  PANCREAS: Within normal limits.  ADRENALS: Within normal limits.  KIDNEYS/URETERS: Multiple right cysts measuring up to 1.7 cm. Bilateral   subcentimeter hypodensities too small to characterize.    BLADDER: Within normal limits.  REPRODUCTIVE ORGANS: Enlarged with coarse calcifications.    BOWEL: No bowel obstruction. Appendix is normal.  PERITONEUM: Trace free fluid.  VESSELS: Atherosclerotic changes. Thrombus within the anterior and   posterior branches of the right portal vein.  RETROPERITONEUM/LYMPH NODES: No lymphadenopathy.  ABDOMINAL WALL: Large fat-containing umbilical hernia.  BONES: Within normal limits.    IMPRESSION:  Innumerable hepatic lesions concerning for metastatic disease.    Gallbladder lumen is continuous with hepatic lesions. 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    Dr. Olivera discussed these findings with Dr. Mccall on 7/25/2022 7:34   PM with read back.    --- End of Report ---    < end of copied text >  < from: Transthoracic Echocardiogram (07.26.22 @ 07:37) >  Dimensions:    Normal Values:  LA:     3.5    2.0 - 4.0 cm  Ao:     2.9    2.0 - 3.8 cm  SEPTUM:        0.6 - 1.2 cm  PWT:           0.6 - 1.1 cm  LVIDd:         3.0 - 5.6 cm  LVIDs:         1.8 - 4.0 cm  EF (Ron Rule): 63 %Doppler Peak Velocity (m/sec):  AoV=3.1  ------------------------------------------------------------------------  Observations:  Mitral Valve: Normal mitral valve. No mitral regurgitation  seen.  Aortic Valve/Aorta: Calcified trileaflet aortic valve with  decreased opening. Difficult to determine the number of  leaflets.There is LVOT obstruction due to a hyperdynamic  ventricle witha peak velocity of 27 mmHg. Peak transaortic  valve gradient equals 38 mm Hg, mean transaortic valve  gradient equals 24 mm Hg, estimated aortic valve area  equals 1.1 sqcm (by continuity equation), aortic valve  velocity time integral equals 37 cm, consistent with  moderate aortic stenosis. Peak velocity was 3.1 m/s, DVI  was 0.3. Minimal aortic regurgitation.  Peak left  ventricular outflow tract gradient equals 3 mm Hg, mean  gradient is equal to 2 mm Hg, LVOT velocity time integral  equals 13cm.  Aortic Root: 2.9 cm. The proximal ascending aorta and root  are not well visualized.  LVOT diameter: 2 cm.  Left Atrium: Normal left atrium.  LA volume index = 26  cc/m2.  Left Ventricle: Normal left ventricular systolic function.  No segmental wall motion abnormalities. LVEF calculated  using biplane Ron's method was 63%. Small left  ventricle. Increased relative wall thickness with normal  left ventricular mass index, consistent with concentric  left ventricular remodeling. Normal diastolic function  Right Heart: Normal right atrium. Normal right ventricular  size and function. Normal tricuspid valve. No tricuspid  regurgitation. Normal pulmonic valve.  Pericardium/Pleura: No pericardial effusion seen.  Hemodynamic: Estimated rightatrial pressure is 8 mm Hg.  ------------------------------------------------------------------------  Conclusions:  1. Calcified trileaflet aortic valve with decreased  opening. Difficult to determine the number of  leaflets.There is LVOT obstruction due to a hyperdynamic  ventricle with a peak velocity of 27 mmHg. Peak transaortic  valve gradient equals 38 mm Hg, mean transaortic valve  gradient equals 24 mm Hg, estimated aortic valve area  equals 1.1 sqcm (by continuity equation), aortic valve  velocity time integral equals 37 cm, consistent with  moderate aortic stenosis. Peak velocity was 3.1 m/s, DVI  was 0.3. Minimal aortic regurgitation.  Please note that peak velocities were obtained only from  apical views and Pedoff was not performed.  2. Aortic Root: 2.9 cm. The proximal ascending aorta and  root are not well visualized.  LVOT diameter: 2 cm.  3. Normal left atrium.  LA volume index = 26 cc/m2.  4. Small left ventricle. Increased relative wall thickness  with normal left ventricular mass index, consistent with  concentric left ventricular remodeling.Normal left  ventricular systolic function. No segmental wall motion  abnormalities. LVEF calculated using biplane Ron's  method was 63%.Normal diastolic function  5. Normal right atrium.Normal right ventricular size and  function.Normal tricuspid valve. No tricuspid  regurgitation.  6. No pericardial effusion seen.  *** Compared with echocardiogram of 8/20/2018, there is now  moderate aortic stenosis.  Please note thatthe patient refused a bubble study. IV was  painful. Hence, would suggest providing an IV line that is  less painful so that we can complete this study. Also, we  would perform additional imaging to assess the severity of  aortic stenosis. Would recommend EpiQ machine for  additional imaging due to poor image quality.    < end of copied text >

## 2022-08-03 NOTE — PROGRESS NOTE ADULT - SUBJECTIVE AND OBJECTIVE BOX
Patient is a 81y old  Male who presents with a chief complaint of failure to thrive (03 Aug 2022 11:19)      DATE OF SERVICE: 08-03-22 @ 14:06    SUBJECTIVE / OVERNIGHT EVENTS: overnight events noted  daughters at bedside     ROS:  Resp: No cough no sputum production  CVS: No chest pain no palpitations no orthopnea  GI: no N/V/D  : no dysuria, no hematuria          MEDICATIONS  (STANDING):  aspirin enteric coated 81 milliGRAM(s) Oral daily  atorvastatin 80 milliGRAM(s) Oral at bedtime  chlorhexidine 2% Cloths 1 Application(s) Topical daily  cyanocobalamin 1000 MICROGram(s) Oral daily  dextrose 5%. 1000 milliLiter(s) (100 mL/Hr) IV Continuous <Continuous>  dextrose 5%. 1000 milliLiter(s) (50 mL/Hr) IV Continuous <Continuous>  dextrose 50% Injectable 25 Gram(s) IV Push once  dextrose 50% Injectable 12.5 Gram(s) IV Push once  dextrose 50% Injectable 25 Gram(s) IV Push once  glucagon  Injectable 1 milliGRAM(s) IntraMuscular once  heparin   Injectable 5000 Unit(s) SubCutaneous every 12 hours  insulin lispro (ADMELOG) corrective regimen sliding scale   SubCutaneous three times a day before meals  mirtazapine 7.5 milliGRAM(s) Oral at bedtime  tamsulosin 0.8 milliGRAM(s) Oral at bedtime    MEDICATIONS  (PRN):  acetaminophen     Tablet .. 650 milliGRAM(s) Oral every 6 hours PRN Temp greater or equal to 38C (100.4F), Mild Pain (1 - 3)  dextrose Oral Gel 15 Gram(s) Oral once PRN Blood Glucose LESS THAN 70 milliGRAM(s)/deciliter        CAPILLARY BLOOD GLUCOSE      POCT Blood Glucose.: 139 mg/dL (03 Aug 2022 12:05)  POCT Blood Glucose.: 123 mg/dL (03 Aug 2022 07:50)  POCT Blood Glucose.: 141 mg/dL (02 Aug 2022 21:43)  POCT Blood Glucose.: 151 mg/dL (02 Aug 2022 16:39)    I&O's Summary    02 Aug 2022 07:01  -  03 Aug 2022 07:00  --------------------------------------------------------  IN: 450 mL / OUT: 0 mL / NET: 450 mL    03 Aug 2022 07:01  -  03 Aug 2022 14:06  --------------------------------------------------------  IN: 120 mL / OUT: 0 mL / NET: 120 mL        Vital Signs Last 24 Hrs  T(C): 36.8 (03 Aug 2022 08:38), Max: 36.8 (03 Aug 2022 08:38)  T(F): 98.2 (03 Aug 2022 08:38), Max: 98.2 (03 Aug 2022 08:38)  HR: 112 (03 Aug 2022 08:38) (68 - 112)  BP: 152/70 (03 Aug 2022 08:38) (110/52 - 152/70)  BP(mean): --  RR: 18 (03 Aug 2022 08:38) (17 - 18)  SpO2: 96% (03 Aug 2022 08:38) (96% - 98%)    PHYSICAL EXAM:  EYES: EOMI, PERRLA  NECK: Supple, No JVD  CHEST/LUNG: clear  HEART: S1 S2; no murmurs   ABDOMEN: Soft, Nontender  EXTREMITIES:   no edema  NEUROLOGY: alert non-focal  SKIN: No rashes or lesions    LABS:                        12.5   21.92 )-----------( 148      ( 03 Aug 2022 08:56 )             39.7     08-03    133<L>  |  98  |  24<H>  ----------------------------<  110<H>  4.4   |  25  |  1.26    Ca    8.8      03 Aug 2022 08:57                  All consultant(s) notes reviewed and care discussed with other providers        Contact Number, Dr Douglas [phone]

## 2022-08-03 NOTE — PROGRESS NOTE ADULT - SUBJECTIVE AND OBJECTIVE BOX
[facility]  Wero Stafford PA-C  [address] [place], [place] [id number] [phone]      INTERVAL HPI/OVERNIGHT EVENTS:  pt seen and examined, no new events   still with poor appetite and poor po intake     MEDICATIONS  (STANDING):  atorvastatin 80 milliGRAM(s) Oral at bedtime  cyanocobalamin 1000 MICROGram(s) Oral daily  dextrose 5%. 1000 milliLiter(s) (100 mL/Hr) IV Continuous <Continuous>  dextrose 5%. 1000 milliLiter(s) (50 mL/Hr) IV Continuous <Continuous>  dextrose 50% Injectable 25 Gram(s) IV Push once  dextrose 50% Injectable 12.5 Gram(s) IV Push once  dextrose 50% Injectable 25 Gram(s) IV Push once  gabapentin 300 milliGRAM(s) Oral daily  glucagon  Injectable 1 milliGRAM(s) IntraMuscular once  heparin   Injectable 5000 Unit(s) SubCutaneous every 12 hours  insulin lispro (ADMELOG) corrective regimen sliding scale   SubCutaneous three times a day before meals  tamsulosin 0.8 milliGRAM(s) Oral at bedtime    MEDICATIONS  (PRN):  acetaminophen     Tablet .. 650 milliGRAM(s) Oral every 6 hours PRN Temp greater or equal to 38C (100.4F), Mild Pain (1 - 3)  dextrose Oral Gel 15 Gram(s) Oral once PRN Blood Glucose LESS THAN 70 milliGRAM(s)/deciliter      Allergies    Advil (Hives)  penicillin (Hives)    Intolerances        ROS:   General:  No wt loss, fevers, chills, night sweats, +fatigue,   Eyes:  Good vision, no reported pain  ENT:  No sore throat, pain, runny nose, dysphagia  CV:  No pain, palpitations, hypo/hypertension  Resp:  No dyspnea, cough, tachypnea, wheezing  GI:  No pain, No nausea, No vomiting, No diarrhea, No constipation, No weight loss, No fever, No pruritis, No rectal bleeding, No tarry stools, No dysphagia,  :  No pain, bleeding, incontinence, nocturia  Muscle:  No pain, weakness  Neuro:  No weakness, tingling, memory problems  Psych:  No fatigue, insomnia, mood problems, +depression  Endocrine:  No polyuria, polydipsia, cold/heat intolerance  Heme:  No petechiae, ecchymosis, easy bruisability  Skin:  No rash, tattoos, scars, edema      PHYSICAL EXAM:   Vital Signs Last 24 Hrs  T(C): 36.8 (03 Aug 2022 08:38), Max: 36.8 (02 Aug 2022 11:45)  T(F): 98.2 (03 Aug 2022 08:38), Max: 98.2 (02 Aug 2022 11:45)  HR: 112 (03 Aug 2022 08:38) (68 - 112)  BP: 152/70 (03 Aug 2022 08:38) (110/52 - 152/70)  BP(mean): --  RR: 18 (03 Aug 2022 08:38) (17 - 18)  SpO2: 96% (03 Aug 2022 08:38) (96% - 98%)    Parameters below as of 03 Aug 2022 08:38  Patient On (Oxygen Delivery Method): room air      Daily     Daily     GENERAL:  Appears stated age,   HEENT:  NC/AT,    CHEST:  Full & symmetric excursion,   HEART:  Regular rhythm,  ABDOMEN:  Soft, non-tender, distended,  EXTEREMITIES:  no cyanosis  SKIN:  No rash  NEURO:  Alert,       LABS:                        12.5   21.92 )-----------( 148      ( 03 Aug 2022 08:56 )             39.7   08-03    133<L>  |  98  |  24<H>  ----------------------------<  110<H>  4.4   |  25  |  1.26    Ca    8.8      03 Aug 2022 08:57      RADIOLOGY & ADDITIONAL TESTS:

## 2022-08-03 NOTE — PROGRESS NOTE ADULT - ASSESSMENT
81M RH Slovenian speaking w/  DM2, HTN, HLD CAD s/p MI 2018 presents to the hospital after being sent in by PCP with 2 months of progressive difficulty to walking, found to have UTI and hyponatremia. Neurology consulted for CTH finding R chronic corona radiata infarct. Neuro exam 4+/5 L hemiparesis, L facial droop  CTH R CR infarct   MRI brain 7/24 with scattered embolic appearing infarcts   MRA H/N : unremarkable   CTH 7/29 stable   A1c 6  LDL 93   TTE EF 63% no cardiac source of embolus ; refused bubble study.  normal LA , mod AS   NIHSS~15  premrs2  s/p liver bx 8/1  +_ cardiolipin Ab , +beta 2 glycoprotine. IgM cardiolin elevated 21.6   Impression: Embolic vs watershed type infarcts. current etiology ESUS     Recommendations:   - liver bx monday 8/1.  asa being held.  resume ASA when able , restarted   - atorvastatin 80mg PO daily (titrate to LDL < 70)   - stroke risk factor modification and counseling   - extended cardiac monitoring to assessed for occult AF  - hypercoag panel including APLS --> +, speak to hematology, if + again in 12 weeks suggest AC   - telemetry  - PT/OT/SS/SLP, OOBC  - check FS, glucose control <180  - GI/DVT ppx  - Thank you for allowing me to participate in the care of this patient. Call with questions.    Patient can follow up with Dr. Goyo Batista after discharge. Please instruct the patient to call [phone] to schedule an appointment within the next 2-3 days. Office is located at [address] [place] [address], [place] [address].  - d/c planning   Goyo Batista MD  Vascular Neurology.

## 2022-08-03 NOTE — PROGRESS NOTE ADULT - SUBJECTIVE AND OBJECTIVE BOX
CARDIOLOGY ATTENDING    tele: NSR, no events    he denies palpitations, syncope, nor angina, ROS otherwise -       DATE OF SERVICE - 08-03-22     Review of Systems:   Constitutional: [ ] fevers, [ ] chills.   Skin: [ ] dry skin. [ ] rashes.  Psychiatric: [ ] depression, [ ] anxiety.   Gastrointestinal: [ ] BRBPR, [ ] melena.   Neurological: [ ] confusion. [ ] seizures. [ ] shuffling gait.   Ears,Nose,Mouth and Throat: [ ] ear pain [ ] sore throat.   Eyes: [ ] diplopia.   Respiratory: [ ] hemoptysis. [ ] shortness of breath  Cardiovascular: See HPI above  Hematologic/Lymphatic: [ ] anemia. [ ] painful nodes. [ ] prolonged bleeding.   Genitourinary: [ ] hematuria. [ ] flank pain.   Endocrine: [ ] significant change in weight. [ ] intolerance to heat and cold.     Review of systems [ x] otherwise negative, [ ] otherwise unable to obtain    FH: no family history of sudden cardiac death in first degree relatives    SH: [ ] tobacco, [ ] alcohol, [ ] drugs    acetaminophen     Tablet .. 650 milliGRAM(s) Oral every 6 hours PRN  aspirin enteric coated 81 milliGRAM(s) Oral daily  atorvastatin 80 milliGRAM(s) Oral at bedtime  chlorhexidine 2% Cloths 1 Application(s) Topical daily  cyanocobalamin 1000 MICROGram(s) Oral daily  dextrose 5%. 1000 milliLiter(s) IV Continuous <Continuous>  dextrose 5%. 1000 milliLiter(s) IV Continuous <Continuous>  dextrose 50% Injectable 25 Gram(s) IV Push once  dextrose 50% Injectable 12.5 Gram(s) IV Push once  dextrose 50% Injectable 25 Gram(s) IV Push once  dextrose Oral Gel 15 Gram(s) Oral once PRN  glucagon  Injectable 1 milliGRAM(s) IntraMuscular once  heparin   Injectable 5000 Unit(s) SubCutaneous every 12 hours  insulin lispro (ADMELOG) corrective regimen sliding scale   SubCutaneous three times a day before meals  mirtazapine 7.5 milliGRAM(s) Oral at bedtime  tamsulosin 0.8 milliGRAM(s) Oral at bedtime                            12.5   21.92 )-----------( 148      ( 03 Aug 2022 08:56 )             39.7       08-03    133<L>  |  98  |  24<H>  ----------------------------<  110<H>  4.4   |  25  |  1.26    Ca    8.8      03 Aug 2022 08:57              T(C): 36.8 (08-03-22 @ 08:38), Max: 36.8 (08-02-22 @ 11:45)  HR: 112 (08-03-22 @ 08:38) (68 - 112)  BP: 152/70 (08-03-22 @ 08:38) (110/52 - 152/70)  RR: 18 (08-03-22 @ 08:38) (17 - 18)  SpO2: 96% (08-03-22 @ 08:38) (96% - 98%)  Wt(kg): --    I&O's Summary    02 Aug 2022 07:01  -  03 Aug 2022 07:00  --------------------------------------------------------  IN: 450 mL / OUT: 0 mL / NET: 450 mL    03 Aug 2022 07:01  -  03 Aug 2022 09:40  --------------------------------------------------------  IN: 120 mL / OUT: 0 mL / NET: 120 mL        General: Well nourished in no acute distress. Alert and Oriented * 3.   Head: Normocephalic and atraumatic.   Neck: No JVD. No bruits. Supple. Does not appear to be enlarged.   Cardiovascular: + S1,S2 ; RRR Soft systolic murmur at the left lower sternal border. No rubs noted.    Lungs: CTA b/l. No rhonchi, rales or wheezes.   Abdomen: + BS, soft. Non tender. Non distended. No rebound. No guarding.   Extremities: No clubbing/cyanosis/edema.   Neurologic: Moves all four extremities. Full range of motion.   Skin: Warm and moist. The patient's skin has normal elasticity and good skin turgor.   Psychiatric: Appropriate mood and affect.  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 Cardiology consulted for further evaluation. Echo unremarkable, and tele has been NSR without evidence of AF. 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

## 2022-08-03 NOTE — PROGRESS NOTE ADULT - ASSESSMENT
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

## 2022-08-03 NOTE — PROGRESS NOTE ADULT - SUBJECTIVE AND OBJECTIVE BOX
Neurology Progress Note    S: Patient seen and examined. No new events overnight.      Medication:     MEDICATIONS  (STANDING):  aspirin enteric coated 81 milliGRAM(s) Oral daily  atorvastatin 80 milliGRAM(s) Oral at bedtime  chlorhexidine 2% Cloths 1 Application(s) Topical daily  cyanocobalamin 1000 MICROGram(s) Oral daily  dextrose 5%. 1000 milliLiter(s) (50 mL/Hr) IV Continuous <Continuous>  dextrose 5%. 1000 milliLiter(s) (100 mL/Hr) IV Continuous <Continuous>  dextrose 50% Injectable 25 Gram(s) IV Push once  dextrose 50% Injectable 12.5 Gram(s) IV Push once  dextrose 50% Injectable 25 Gram(s) IV Push once  glucagon  Injectable 1 milliGRAM(s) IntraMuscular once  heparin   Injectable 5000 Unit(s) SubCutaneous every 12 hours  insulin lispro (ADMELOG) corrective regimen sliding scale   SubCutaneous three times a day before meals  mirtazapine 7.5 milliGRAM(s) Oral at bedtime  tamsulosin 0.8 milliGRAM(s) Oral at bedtime    MEDICATIONS  (PRN):  acetaminophen     Tablet .. 650 milliGRAM(s) Oral every 6 hours PRN Temp greater or equal to 38C (100.4F), Mild Pain (1 - 3)  dextrose Oral Gel 15 Gram(s) Oral once PRN Blood Glucose LESS THAN 70 milliGRAM(s)/deciliter      Vitals:    Vital Signs Last 24 Hrs  T(C): 36.8 (08-03-22 @ 08:38), Max: 36.8 (08-02-22 @ 11:45)  T(F): 98.2 (08-03-22 @ 08:38), Max: 98.2 (08-02-22 @ 11:45)  HR: 112 (08-03-22 @ 08:38) (68 - 112)  BP: 152/70 (08-03-22 @ 08:38) (110/52 - 152/70)  BP(mean): --  RR: 18 (08-03-22 @ 08:38) (17 - 18)  SpO2: 96% (08-03-22 @ 08:38) (96% - 98%)            General:  Constitutional: Male, appears stated age, in no apparent distress including pain  Head: Normocephalic & atraumatic.  Respiratory: Slightly increased work of breathing at rest when speaking  Extremities: No cyanosis, clubbing, or edema.  Skin: No rashes, bruising, or discoloration.    Neurological (>12):  MS: Eyes open to voice, oriented to person, place, situation, time. Normal affect. Follows all commands.    Language: Speech is clear, fluent with good repetition & comprehension (able to name objects mask, thumb)    CNs: VFF. EOMI no nystagmus, no diplopia; slightly L eye exotropia on orthophoric gaze. V1-3 intact to LT, well developed masseter muscles b/l. L facial droop, full eye closure strength b/l. Hearing grossly normal (rubbing fingers) b/l. Symmetric palate elevation in midline. Gag reflex deferred. Head turning & shoulder shrug intact b/l. Tongue midline, normal movements, no atrophy.    Motor: Normal muscle bulk & tone. No noticeable tremor or seizure.                Deltoid	Biceps	Triceps	Wrist	Finger ABd	   R	5	5	5				5 	  L	4	4+	4				5    	H-Flex	K-Flex	K-Ext	D-Flex	P-Flex  R	5					   L	4+					 B/l LE with drift (L>R in 5 seconds)    Sensation: Intact to LT b/l throughout.     Cortical: Extinction on DSS (neglect): none    Reflexes:              Biceps(C5)       BR(C6)     Triceps(C7)               Patellar(L4)    Achilles(S1)    Plantar Resp  R	0	          1	             0		        0		    0		mute  L	0	          1	             0		        0		    0		mute     Coordination: No dysmetria to FTN, although exam limited slightly by pt cooperation    Gait: deferred due to pt refusal      I personally reviewed the below data/images/labs:      CBC Full  -  ( 03 Aug 2022 08:56 )  WBC Count : 21.92 K/uL  RBC Count : 4.83 M/uL  Hemoglobin : 12.5 g/dL  Hematocrit : 39.7 %  Platelet Count - Automated : 148 K/uL  Mean Cell Volume : 82.2 fl  Mean Cell Hemoglobin : 25.9 pg  Mean Cell Hemoglobin Concentration : 31.5 gm/dL  Auto Neutrophil # : x  Auto Lymphocyte # : x  Auto Monocyte # : x  Auto Eosinophil # : x  Auto Basophil # : x  Auto Neutrophil % : x  Auto Lymphocyte % : x  Auto Monocyte % : x  Auto Eosinophil % : x  Auto Basophil % : x      08-03    133<L>  |  98  |  24<H>  ----------------------------<  110<H>  4.4   |  25  |  1.26    Ca    8.8      03 Aug 2022 08:57      < from: CT Head No Cont (07.23.22 @ 04:38) >  FINDINGS:  No CT evidence of acute intracranial hemorrhage, subdural collection,   vasogenic edema, mass effect or large acute territorial infarction.    There is a small age indeterminate white matter infarct in the right   corona radiata (2:21-23, 601:35, and 602:16-17).    There is mild to moderate generalized cerebral volume loss and patchy   periventricular white matter hypoattenuation compatible chronic   microvascular ischemic disease.    The paranasal sinuses are grossly clear.    There is nonspecific trace opacification of a few right mastoid air   cells.  The left mastoid is clear.    The patient is status post cataract lens placement surgery bilaterally.    The calvarium and skull base appear within normal limits.    IMPRESSION:  Small age indeterminate white matter infarct in the right corona radiata.    Follow-up MRI may be obtained for further evaluation    < end of copied text >    < from: MR Head No Cont (07.24.22 @ 12:35) >    ACC: [id number] EXAM:  MR BRAIN                          PROCEDURE DATE:  07/24/2022          INTERPRETATION:  CLINICAL INDICATION: Generalized weakness and failure to   thrive      Magnetic resonance imaging of the brain was carried out with transaxial   SPGR, FLAIR, fast spin echo T2 weighted images, axial susceptibility   weighted series, diffusion weighted series and sagittal T1 weighted   series on a 1.5 Mary magnet.    Comparison is made with the prior brain CT of 7/23/2022.        Ventricular and sulcal prominence is consistent with age-appropriate   involutional change. Small vessel white matter ischemic changes are   noted. There are scattered punctate foci of diffusion restriction in the   occipital cortex bilaterally as well as the centrum semiovale ovale.   These could represent either cardioembolic infarcts or infarcts due to   hypercoagulable state, or hypotension. Recommend clinical correlation.   There is no hemorrhage. The sellar and parasellar structures are   unremarkable. The paranasal nasal sinuses are clear. There has been   previous bilateral lens replacement surgery.      IMPRESSION: Age-appropriate involutional and ischemic gliotic changes.   Scattered bilateral supratentorial infarcts could be related to   cardioembolic source, hypercoagulability or hypotension.    --- End of Report ---            TATIANA CULLEN MD; Attending Radiologist  This document has been electronically signed. Jul 24 2022  1:49PM    < end of copied text >  < from: MR Angio Head No Cont (07.25.22 @ 10:36) >    ACC: [id number] EXAM:  MR ANGIO BRAIN                        ACC: [id number] EXAM:  MR ANGIO NECK                          PROCEDURE DATE:  07/25/2022          INTERPRETATION:  Clinical indications: Multiple scattered infarcts    Magnetic resonance angiography of the carotid and vertebral circulation   the neck was obtained using 2D time-of-flight technique with an   additional 3D time-of-flight acquisition through the carotid bifurcation.   The intracranial circulation centered the [id number] was evaluated   using 3D time-of-flight technique.    The common, internal and external carotid arteries are unremarkable   bilaterally. There is no carotid stenosis. The vertebral arteries are   unremarkable as well. The left vertebral artery is dominant.    The distal cervical, petrous, cavernous and supraclinoid internal carotid   arteries, anterior and middle cerebral artery branches are unremarkable.   There is no evidence of aneurysm, stenosis, or vasculitis. The distal   vertebral arteries, and region of the vertebral basilar confluence are   unremarkable. The basilar artery, superior cerebellar arteries and   posterior cerebral arteries are unremarkable.    IMPRESSION: No evidence of carotid or vertebral stenosis in the neck. The   intracranial circulation is unremarkable at the [id number]-Fpyggy. There   is no evidence of stenosis or vasculitis.    --- End of Report ---            TATIANA CULLEN MD; Attending Radiologist  This document has been electronically signed. Jul 25 2022 11:45AM    < end of copied text >  < from: Transthoracic Echocardiogram (07.26.22 @ 07:37) >    Patient name: SAWYER BELCHER  YOB: 1940   Age: 81 (M)   MR#: [id number]  Study Date: 7/26/2022  Location: [address] [place], [place] [id number]: [facility]  Study quality: Technically difficult  Referring Physician: Elmer Douglas MD  Blood Pressure: 126/64 mmHg  Height: 165 cm  Weight: 73 kg  BSA: 1.8 m2  Heart Rate: 122 mmHg  ------------------------------------------------------------------------  PROCEDURE: Transthoracic echocardiogram with 2-D, M-Mode  and complete spectral and colorflow Doppler.  INDICATION: Cardiac murmur, unspecified (R01.1)  ------------------------------------------------------------------------  Dimensions:    Normal Values:  LA:     3.5    2.0 - 4.0 cm  Ao:     2.9    2.0 - 3.8 cm  SEPTUM:        0.6 - 1.2 cm  PWT:           0.6 - 1.1 cm  LVIDd:         3.0 - 5.6 cm  LVIDs:         1.8 - 4.0 cm  EF (Ron Rule): 63 %Doppler Peak Velocity (m/sec):  AoV=3.1  ------------------------------------------------------------------------  Observations:  Mitral Valve: Normal mitral valve. No mitral regurgitation  seen.  Aortic Valve/Aorta: Calcified trileaflet aortic valve with  decreased opening. Difficult to determine the number of  leaflets.There is LVOT obstruction due to a hyperdynamic  ventricle witha peak velocity of 27 mmHg. Peak transaortic  valve gradient equals 38 mm Hg, mean transaortic valve  gradient equals 24 mm Hg, estimated aortic valve area  equals 1.1 sqcm (by continuity equation), aortic valve  velocity time integral equals 37 cm, consistent with  moderate aortic stenosis. Peak velocity was 3.1 m/s, DVI  was 0.3. Minimal aortic regurgitation.  Peak left  ventricular outflow tract gradient equals 3 mm Hg, mean  gradient is equal to 2 mm Hg, LVOT velocity time integral  equals 13cm.  Aortic Root: 2.9 cm. The proximal ascending aorta and root  are not well visualized.  LVOT diameter: 2 cm.  Left Atrium: Normal left atrium.  LA volume index = 26  cc/m2.  Left Ventricle: Normal left ventricular systolic function.  No segmental wall motion abnormalities. LVEF calculated  using biplane Ron's method was 63%. Small left  ventricle. Increased relative wall thickness with normal  left ventricular mass index, consistent with concentric  left ventricular remodeling. Normal diastolic function  Right Heart: Normal right atrium. Normal right ventricular  size and function. Normal tricuspid valve. No tricuspid  regurgitation. Normal pulmonic valve.  Pericardium/Pleura: No pericardial effusion seen.  Hemodynamic: Estimated rightatrial pressure is 8 mm Hg.  ------------------------------------------------------------------------  Conclusions:  1. Calcified trileaflet aortic valve with decreased  opening. Difficult to determine the number of  leaflets.There is LVOT obstruction due to a hyperdynamic  ventricle with a peak velocity of 27 mmHg. Peak transaortic  valve gradient equals 38 mm Hg, mean transaortic valve  gradient equals 24 mm Hg, estimated aortic valve area  equals 1.1 sqcm (by continuity equation), aortic valve  velocity time integral equals 37 cm, consistent with  moderate aortic stenosis. Peak velocity was 3.1 m/s, DVI  was 0.3. Minimal aortic regurgitation.  Please note that peak velocities were obtained only from  apical views and Pedoff was not performed.  2. Aortic Root: 2.9 cm. The proximal ascending aorta and  root are not well visualized.  LVOT diameter: 2 cm.  3. Normal left atrium.  LA volume index = 26 cc/m2.  4. Small left ventricle. Increased relative wall thickness  with normal left ventricular mass index, consistent with  concentric left ventricular remodeling.Normal left  ventricular systolic function. No segmental wall motion  abnormalities. LVEF calculated using biplane Ron's  method was 63%.Normal diastolic function  5. Normal right atrium.Normal right ventricular size and  function.Normal tricuspid valve. No tricuspid  regurgitation.  6. No pericardial effusion seen.  *** Compared with echocardiogram of 8/20/2018, there is now  moderate aortic stenosis.  Please note thatthe patient refused a bubble study. IV was  painful. Hence, would suggest providing an IV line that is  less painful so that we can complete this study. Also, we  would perform additional imaging to assess the severity of  aortic stenosis. Would recommend EpiQ machine for  additional imaging due to poor image quality.  ------------------------------------------------------------------------  Confirmed on  7/26/2022 - 10:39:21 by Alida Hirsch M.D.  ------------------------------------------------------------------------    < end of copied text >

## 2022-08-04 NOTE — PROGRESS NOTE ADULT - NSPROGADDITIONALINFOA_GEN_ALL_CORE
discussed with covering ACP  discussed with daughter at bedside in detail over the phone discussed with covering ACP  discussed with daughter at bedside in detail over the phone  encounter 45 min

## 2022-08-04 NOTE — DISCHARGE NOTE PROVIDER - HOSPITAL COURSE
82 y/o M w/ PMH of DM2, HTN, HLD CAD s/p MI 2018 presents to the hospital with 2 months of progressive difficulty to walking admitted for further evaluation and management, likely has associated sepsis secondary to UTI     Metastatic disease.    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     NELSON (acute kidney injury).   likely secondary to dehydration secondary to poor po  resolved.     Generalized weakness.   neuro consultation appreciated  CT positive for right lacunar infarct unclear age   follow up  within the next 2-3 days. Office is located at [address] [facility], [place] [address].    Chronic heart failure with preserved ejection fraction (HFpEF).   repeat echocardiogram noted  is now euvolemic.     Essential hypertension.   acceptable  continue meds with hold parameters.     CAD (coronary artery disease).   chest pain free.    Type 2 diabetes mellitus without complication, without long-term current use of insulin.    continue finger sticks with short acting insulin sliding scale  no oral meds  HbA1C 6   diabetic diet.    BPH (benign prostatic hyperplasia).   continue Flomax 0.8.    Depression, reactive.    consultation appreciated  continue Mirtazapine.    Medically cleared per Dr. Douglas. 80 y/o M w/ PMH of DM2, HTN, HLD CAD s/p MI 2018 presents to the hospital with 2 months of progressive difficulty to walking admitted for further evaluation and management, likely has associated sepsis secondary to UTI     Metastatic disease.    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     NELSON (acute kidney injury).   likely secondary to dehydration secondary to poor po  resolved.     Generalized weakness.   neuro consultation appreciated  CT positive for right lacunar infarct unclear age   follow up outpatient    Chronic heart failure with preserved ejection fraction (HFpEF).   repeat echocardiogram noted  is now euvolemic.     Essential hypertension.   acceptable  continue meds with hold parameters.     CAD (coronary artery disease).   chest pain free.    Type 2 diabetes mellitus without complication, without long-term current use of insulin.    continue finger sticks with short acting insulin sliding scale  no oral meds  HbA1C 6   diabetic diet.    BPH (benign prostatic hyperplasia).   continue Flomax 0.8.    Depression, reactive.    consultation appreciated  continue Mirtazapine.    Medically cleared per Dr. Douglas.

## 2022-08-04 NOTE — PROGRESS NOTE ADULT - ASSESSMENT
81M RH English speaking w/  DM2, HTN, HLD CAD s/p MI 2018 presents to the hospital after being sent in by PCP with 2 months of progressive difficulty to walking, found to have UTI and hyponatremia. Neurology consulted for CTH finding R chronic corona radiata infarct. Neuro exam 4+/5 L hemiparesis, L facial droop  CTH R CR infarct   MRI brain 7/24 with scattered embolic appearing infarcts   MRA H/N : unremarkable   CTH 7/29 stable   A1c 6  LDL 93   TTE EF 63% no cardiac source of embolus ; refused bubble study.  normal LA , mod AS   NIHSS~15  premrs2  s/p liver bx 8/1  +_ cardiolipin Ab , +beta 2 glycoprotine. IgM cardiolin elevated 21.6   Impression: Embolic vs watershed type infarcts. current etiology ESUS     Recommendations:   - liver bx monday 8/1.  asa being held.  resume ASA when able , restarted   - atorvastatin 80mg PO daily (titrate to LDL < 70)   - stroke risk factor modification and counseling   - extended cardiac monitoring to assessed for occult AF  - hypercoag panel including APLS --> +, speak to hematology, if + again in 12 weeks suggest AC   - telemetry  - PT/OT/SS/SLP, OOBC  - check FS, glucose control <180  - GI/DVT ppx  - Thank you for allowing me to participate in the care of this patient. Call with questions.    Patient can follow up with Dr. Goyo Batista after discharge. Please instruct the patient to call [phone] to schedule an appointment within the next 2-3 days. Office is located at [address] [place] [address], [place] [address].  - d/c planning   Goyo Batista MD  Vascular Neurology.

## 2022-08-04 NOTE — PROGRESS NOTE ADULT - NS ATTEND OPT1 GEN_ALL_CORE
I attest my time as attending is greater than 50% of the total combined time spent on qualifying patient care activities by the PA/NP and attending.
I attest my time as attending is greater than 50% of the total combined time spent on qualifying patient care activities by the PA/NP and attending.
I independently performed the documented:

## 2022-08-04 NOTE — DISCHARGE NOTE PROVIDER - NSDCMRMEDTOKEN_GEN_ALL_CORE_FT
Amitiza 24 mcg oral capsule: 1 cap(s) orally once a day  aspirin 81 mg oral delayed release tablet: 1 tab(s) orally once a day  atorvastatin 80 mg oral tablet: 1 tab(s) orally once a day (at bedtime)  gabapentin 300 mg oral capsule: 1 cap(s) orally once a day  Kerendia 10 mg oral tablet: 1 tab(s) orally once a day  losartan 25 mg oral tablet: 0.5 tab(s) orally 2 times a day  (pharmacy dispensed directions 1 tab once a day)  Prandin 2 mg oral tablet: 1 tab(s) orally 2 times a day  tamsulosin 0.4 mg oral capsule: 1 cap(s) orally 2 times a day    (pharmacy dispensed directions 1 cap once a day)  Tylenol: as needed  Vitamin B12: 1 tab(s) orally once a day   Amitiza 24 mcg oral capsule: 1 cap(s) orally once a day  aspirin 81 mg oral delayed release tablet: 1 tab(s) orally once a day  atorvastatin 80 mg oral tablet: 1 tab(s) orally once a day (at bedtime)  gabapentin 300 mg oral capsule: 1 cap(s) orally once a day  Kerendia 10 mg oral tablet: 1 tab(s) orally once a day  losartan 25 mg oral tablet: 0.5 tab(s) orally 2 times a day  (pharmacy dispensed directions 1 tab once a day)  Prandin 2 mg oral tablet: 1 tab(s) orally 2 times a day  tamsulosin 0.4 mg oral capsule: 1 cap(s) orally 2 times a day    (pharmacy dispensed directions 1 cap once a day)  Vitamin B12: 1 tab(s) orally once a day   Amitiza 24 mcg oral capsule: 1 cap(s) orally once a day  aspirin 81 mg oral delayed release tablet: 1 tab(s) orally once a day  atorvastatin 80 mg oral tablet: 1 tab(s) orally once a day (at bedtime)  gabapentin 300 mg oral capsule: 1 cap(s) orally once a day  Kerendia 10 mg oral tablet: 1 tab(s) orally once a day  losartan 25 mg oral tablet: 0.5 tab(s) orally 2 times a day  (pharmacy dispensed directions 1 tab once a day)  mirtazapine 7.5 mg oral tablet: 1 tab(s) orally once a day (at bedtime)  Prandin 2 mg oral tablet: 1 tab(s) orally 2 times a day  tamsulosin 0.4 mg oral capsule: 1 cap(s) orally 2 times a day    (pharmacy dispensed directions 1 cap once a day)  Vitamin B12: 1 tab(s) orally once a day   Amitiza 24 mcg oral capsule: 1 cap(s) orally once a day  aspirin 81 mg oral delayed release tablet: 1 tab(s) orally once a day  gabapentin 300 mg oral capsule: 1 cap(s) orally once a day  Kerendia 10 mg oral tablet: 1 tab(s) orally once a day  losartan 25 mg oral tablet: 0.5 tab(s) orally 2 times a day  (pharmacy dispensed directions 1 tab once a day)  mirtazapine 7.5 mg oral tablet: 1 tab(s) orally once a day (at bedtime)  tamsulosin 0.4 mg oral capsule: 1 cap(s) orally 2 times a day    (pharmacy dispensed directions 1 cap once a day)  Vitamin B12: 1 tab(s) orally once a day   Amitiza 24 mcg oral capsule: 1 cap(s) orally once a day  apixaban 5 mg oral tablet: 1 tab(s) orally every 12 hours  aspirin 81 mg oral delayed release tablet: 1 tab(s) orally once a day  gabapentin 300 mg oral capsule: 1 cap(s) orally once a day  Kerendia 10 mg oral tablet: 1 tab(s) orally once a day  losartan 25 mg oral tablet: 0.5 tab(s) orally 2 times a day  (pharmacy dispensed directions 1 tab once a day)  mirtazapine 7.5 mg oral tablet: 1 tab(s) orally once a day (at bedtime)  tamsulosin 0.4 mg oral capsule: 1 cap(s) orally 2 times a day    (pharmacy dispensed directions 1 cap once a day)  Vitamin B12: 1 tab(s) orally once a day

## 2022-08-04 NOTE — PROGRESS NOTE ADULT - SUBJECTIVE AND OBJECTIVE BOX
CARDIOLOGY    tele: NSR, no events    resting comfortably in no distress. he denies palpitations, syncope, nor angina, ROS otherwise -       DATE OF SERVICE - 08-04-22     Review of Systems:   Constitutional: [ ] fevers, [ ] chills.   Skin: [ ] dry skin. [ ] rashes.  Psychiatric: [ ] depression, [ ] anxiety.   Gastrointestinal: [ ] BRBPR, [ ] melena.   Neurological: [ ] confusion. [ ] seizures. [ ] shuffling gait.   Ears,Nose,Mouth and Throat: [ ] ear pain [ ] sore throat.   Eyes: [ ] diplopia.   Respiratory: [ ] hemoptysis. [ ] shortness of breath  Cardiovascular: See HPI above  Hematologic/Lymphatic: [ ] anemia. [ ] painful nodes. [ ] prolonged bleeding.   Genitourinary: [ ] hematuria. [ ] flank pain.   Endocrine: [ ] significant change in weight. [ ] intolerance to heat and cold.     Review of systems [ x] otherwise negative, [ ] otherwise unable to obtain    FH: no family history of sudden cardiac death in first degree relatives    SH: [ ] tobacco, [ ] alcohol, [ ] drugs      MEDICATIONS  (STANDING):  aspirin enteric coated 81 milliGRAM(s) Oral daily  atorvastatin 80 milliGRAM(s) Oral at bedtime  chlorhexidine 2% Cloths 1 Application(s) Topical daily  cyanocobalamin 1000 MICROGram(s) Oral daily  dextrose 5%. 1000 milliLiter(s) (100 mL/Hr) IV Continuous <Continuous>  dextrose 5%. 1000 milliLiter(s) (50 mL/Hr) IV Continuous <Continuous>  dextrose 50% Injectable 25 Gram(s) IV Push once  dextrose 50% Injectable 12.5 Gram(s) IV Push once  dextrose 50% Injectable 25 Gram(s) IV Push once  glucagon  Injectable 1 milliGRAM(s) IntraMuscular once  heparin   Injectable 5000 Unit(s) SubCutaneous every 12 hours  insulin lispro (ADMELOG) corrective regimen sliding scale   SubCutaneous three times a day before meals  mirtazapine 7.5 milliGRAM(s) Oral at bedtime  tamsulosin 0.8 milliGRAM(s) Oral at bedtime    MEDICATIONS  (PRN):  acetaminophen     Tablet .. 650 milliGRAM(s) Oral every 6 hours PRN Temp greater or equal to 38C (100.4F), Mild Pain (1 - 3)  dextrose Oral Gel 15 Gram(s) Oral once PRN Blood Glucose LESS THAN 70 milliGRAM(s)/deciliter      LABS:                          12.5   21.92 )-----------( 148      ( 03 Aug 2022 08:56 )             39.7     Hemoglobin: 12.5 g/dL (08-03 @ 08:56)  Hemoglobin: 12.5 g/dL (08-01 @ 07:42)  Hemoglobin: 12.3 g/dL (07-31 @ 10:03)    08-03    133<L>  |  98  |  24<H>  ----------------------------<  110<H>  4.4   |  25  |  1.26    Ca    8.8      03 Aug 2022 08:57      Creatinine Trend: 1.26<--, 1.09<--, 1.05<--, 0.98<--, 0.77<--, 0.76<--             08-03-22 @ 07:01  -  08-04-22 @ 07:00  --------------------------------------------------------  IN: 420 mL / OUT: 0 mL / NET: 420 mL        PHYSICAL EXAM  Vital Signs Last 24 Hrs  T(C): 36.9 (04 Aug 2022 09:33), Max: 36.9 (03 Aug 2022 16:03)  T(F): 98.4 (04 Aug 2022 09:33), Max: 98.4 (03 Aug 2022 16:03)  HR: 100 (04 Aug 2022 09:33) (78 - 102)  BP: 111/74 (04 Aug 2022 09:33) (111/74 - 124/55)  BP(mean): --  RR: 18 (04 Aug 2022 09:33) (18 - 18)  SpO2: 95% (04 Aug 2022 09:33) (95% - 100%)    Parameters below as of 04 Aug 2022 09:33  Patient On (Oxygen Delivery Method): room air          General: Well nourished in no acute distress. Alert and Oriented * 3.   Head: Normocephalic and atraumatic.   Neck: No JVD. No bruits. Supple. Does not appear to be enlarged.   Cardiovascular: + S1,S2 ; RRR Soft systolic murmur at the left lower sternal border. No rubs noted.    Lungs: CTA b/l. No rhonchi, rales or wheezes.   Abdomen: + BS, soft. Non tender. Non distended. No rebound. No guarding.   Extremities: No clubbing/cyanosis/edema.   Neurologic: Moves all four extremities. Full range of motion.   Skin: Warm and moist. The patient's skin has normal elasticity and good skin turgor.   Psychiatric: Appropriate mood and affect.  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 Cardiology consulted for further evaluation. Echo unremarkable, and tele has been NSR without evidence of AF. 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

## 2022-08-04 NOTE — PROGRESS NOTE ADULT - NUTRITIONAL ASSESSMENT
This patient has been assessed with a concern for Malnutrition and has been determined to have a diagnosis/diagnoses of Severe protein-calorie malnutrition.    This patient is being managed with:   Diet Soft and Bite Sized-  Supplement Feeding Modality:  Oral  Glucerna Shake Cans or Servings Per Day:  3       Frequency:  Daily  Entered: Aug  2 2022  5:03PM    
This patient has been assessed with a concern for Malnutrition and has been determined to have a diagnosis/diagnoses of Severe protein-calorie malnutrition.    This patient is being managed with:   Diet Soft and Bite Sized-  Supplement Feeding Modality:  Oral  Glucerna Shake Cans or Servings Per Day:  3       Frequency:  Daily  Entered: Aug  2 2022  5:03PM

## 2022-08-04 NOTE — PROGRESS NOTE ADULT - ASSESSMENT
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

## 2022-08-04 NOTE — DISCHARGE NOTE NURSING/CASE MANAGEMENT/SOCIAL WORK - NSDCVIVACCINE_GEN_ALL_CORE_FT
COVID-19, mRNA, LNP-S, PF, 30 mcg/0.3 mL dose, sera-sucrose (Pf[employer]); 03-Aug-2022 17:53; Margaret Ruggiero (ADALID); [employer]; [id number] (Exp. Date: 20-Sep-2022); IntraMuscular; Deltoid Left.; 0.3 milliLiter(s);

## 2022-08-04 NOTE — PROGRESS NOTE ADULT - PROBLEM SELECTOR PLAN 1
? if 2nd to underlying malignancy  -CT chest with no PNA  -BC with NGTD  -UC negative  -Monitoring off ABX  -Afebrile, leukocytosis slowly downtrending  -D/c planning per primary team.    8/4: still persistent: no fever:   likely secondary to malignancy: for dc today :  biopsy results are still awaited:

## 2022-08-04 NOTE — PROGRESS NOTE ADULT - PROBLEM SELECTOR PLAN 1
likely metastatic GB cancer  path pending  plan remains for now per family wishes is for discharge to Subacute Rehab  no plan for any treatment or chemo for patient while in Subacute Rehab or even after discharge from Subacute Rehab  discontinue statin on discharge as LFTs will likely get worse as the disease progresses  patient and family refuse any oncology consultation likely metastatic GB cancer  path pending  plan remains for now per family wishes is for discharge to Subacute Rehab  no plan for any treatment or chemo for patient while in Subacute Rehab or even after discharge from Subacute Rehab  discontinue statin on discharge as LFTs will likely get worse as the disease progresses  patient and family refuse any oncology consultation  patient's PTR interpretation noted  positive for increased risk of clotting  discussed with daughter   she is amenable for apixaban  will start maintenance dose 5 po BID to minimise risks of bleeding with higher loading dose

## 2022-08-04 NOTE — PROGRESS NOTE ADULT - REASON FOR ADMISSION
failure to thrive

## 2022-08-04 NOTE — PROGRESS NOTE ADULT - NS ATTEND AMEND GEN_ALL_CORE FT
Patient seen and examined.  Agree with above.   No further inpatient cardiac workup needed at this time.     Dion Ferguson MD
Patient seen and examined.  Agree with above.   - Plan for outpatient ILR if inpatient tele unremarkable and if within GOC  - No further inpatient cardiac w/u planned at this time     Dion Ferguson MD
pt seems stable: sleepy:  s/p ir guided biopsy: per PMD: family does not seem to be interested in treatment: now plan for dc to rehab:
he seems stable: pretty weak: s/p ir guide d biopsy: path awaited:  awaiting dc:
seems OK: no sob:\for ir guided biopsy today:    alert and sitting on chair: no sob;     await biopsy

## 2022-08-04 NOTE — DISCHARGE NOTE PROVIDER - NSDCCPCAREPLAN_GEN_ALL_CORE_FT
PRINCIPAL DISCHARGE DIAGNOSIS  Diagnosis: Metastatic disease  Assessment and Plan of Treatment: Gallbladder metastatis-   underwent successful biopsy.   Results pending.   Follow up with GI and neurology.      SECONDARY DISCHARGE DIAGNOSES  Diagnosis: BPH (benign prostatic hyperplasia)  Assessment and Plan of Treatment: Continue FLomax.    Diagnosis: Chronic heart failure with preserved ejection fraction (HFpEF)  Assessment and Plan of Treatment: Continue meds as prescribed.    Diagnosis: Type 2 diabetes mellitus without complication, without long-term current use of insulin  Assessment and Plan of Treatment: Routine follow up with endocrinology, opthamology, and nephrology as well as podiaty.   Monitor finger sticks.   Continue diabetic diet.    Diagnosis: Metastatic disease  Assessment and Plan of Treatment:      PRINCIPAL DISCHARGE DIAGNOSIS  Diagnosis: Metastatic disease  Assessment and Plan of Treatment: Gallbladder metastatis-   underwent successful biopsy.   Results pending.   Follow up with GI and neurology.      SECONDARY DISCHARGE DIAGNOSES  Diagnosis: BPH (benign prostatic hyperplasia)  Assessment and Plan of Treatment: Continue FLomax.    Diagnosis: Chronic heart failure with preserved ejection fraction (HFpEF)  Assessment and Plan of Treatment: Continue meds as prescribed.    Diagnosis: Type 2 diabetes mellitus without complication, without long-term current use of insulin  Assessment and Plan of Treatment: Routine follow up with endocrinology, opthamology, and nephrology as well as podiaty.   Monitor finger sticks.   Continue diabetic diet.  A1C 6.0    Diagnosis: Metastatic disease  Assessment and Plan of Treatment:

## 2022-08-04 NOTE — PROGRESS NOTE ADULT - PROBLEM SELECTOR PROBLEM 1
Metastatic disease
Sepsis secondary to UTI
Metastatic disease
Sepsis secondary to UTI
Leukocytosis
Metastatic disease
Sepsis secondary to UTI
Sepsis secondary to UTI
Leukocytosis
Leukocytosis
Sepsis secondary to UTI
Leukocytosis
Metastatic disease
Sepsis secondary to UTI
Sepsis secondary to UTI

## 2022-08-04 NOTE — DISCHARGE NOTE NURSING/CASE MANAGEMENT/SOCIAL WORK - PATIENT PORTAL LINK FT
You can access the FollowMyHealth Patient Portal offered by [facility] by registering at the following website: [email or web address]. By joining [facility]’s FollowMyHealth portal, you will also be able to view your health information using other applications (apps) compatible with our system.

## 2022-08-04 NOTE — PROGRESS NOTE ADULT - SUBJECTIVE AND OBJECTIVE BOX
Date of Service: 08-04-22 @ 11:08    Patient is a 81y old  Male who presents with a chief complaint of failure to thrive (04 Aug 2022 10:17)      Any change in ROS: Sleepyt and tired:  no sob:  on roomai r:       MEDICATIONS  (STANDING):  aspirin enteric coated 81 milliGRAM(s) Oral daily  atorvastatin 80 milliGRAM(s) Oral at bedtime  chlorhexidine 2% Cloths 1 Application(s) Topical daily  cyanocobalamin 1000 MICROGram(s) Oral daily  dextrose 5%. 1000 milliLiter(s) (100 mL/Hr) IV Continuous <Continuous>  dextrose 5%. 1000 milliLiter(s) (50 mL/Hr) IV Continuous <Continuous>  dextrose 50% Injectable 25 Gram(s) IV Push once  dextrose 50% Injectable 12.5 Gram(s) IV Push once  dextrose 50% Injectable 25 Gram(s) IV Push once  glucagon  Injectable 1 milliGRAM(s) IntraMuscular once  heparin   Injectable 5000 Unit(s) SubCutaneous every 12 hours  insulin lispro (ADMELOG) corrective regimen sliding scale   SubCutaneous three times a day before meals  mirtazapine 7.5 milliGRAM(s) Oral at bedtime  tamsulosin 0.8 milliGRAM(s) Oral at bedtime    MEDICATIONS  (PRN):  acetaminophen     Tablet .. 650 milliGRAM(s) Oral every 6 hours PRN Temp greater or equal to 38C (100.4F), Mild Pain (1 - 3)  dextrose Oral Gel 15 Gram(s) Oral once PRN Blood Glucose LESS THAN 70 milliGRAM(s)/deciliter    Vital Signs Last 24 Hrs  T(C): 36.9 (04 Aug 2022 09:33), Max: 36.9 (03 Aug 2022 16:03)  T(F): 98.4 (04 Aug 2022 09:33), Max: 98.4 (03 Aug 2022 16:03)  HR: 100 (04 Aug 2022 09:33) (78 - 102)  BP: 111/74 (04 Aug 2022 09:33) (111/74 - 124/55)  BP(mean): --  RR: 18 (04 Aug 2022 09:33) (18 - 18)  SpO2: 95% (04 Aug 2022 09:33) (95% - 100%)    Parameters below as of 04 Aug 2022 09:33  Patient On (Oxygen Delivery Method): room air        I&O's Summary    03 Aug 2022 07:01  -  04 Aug 2022 07:00  --------------------------------------------------------  IN: 420 mL / OUT: 0 mL / NET: 420 mL          Physical Exam:   GENERAL: NAD, well-groomed, well-developed  HEENT: ADAM/   Atraumatic, Normocephalic  ENMT: No tonsillar erythema, exudates, or enlargement; Moist mucous membranes, Good dentition, No lesions  NECK: Supple, No JVD, Normal thyroid  CHEST/LUNG: poor air entry bilaterally:   CVS: Regular rate and rhythm; No murmurs, rubs, or gallops  GI: : Soft, Nontender, Nondistended; Bowel sounds present  NERVOUS SYSTEM: open eyes: no resp distress:   EXTREMITIES:  - edema  LYMPH: No lymphadenopathy noted  SKIN: No rashes or lesions  ENDOCRINOLOGY: No Thyromegaly  PSYCH: Appropriate    Labs:                              12.5   21.92 )-----------( 148      ( 03 Aug 2022 08:56 )             39.7                         12.5   26.55 )-----------( 193      ( 01 Aug 2022 07:42 )             38.5     08-03    133<L>  |  98  |  24<H>  ----------------------------<  110<H>  4.4   |  25  |  1.26  08-02    132<L>  |  98  |  22  ----------------------------<  139<H>  5.0   |  24  |  1.09  08-01    130<L>  |  92<L>  |  22  ----------------------------<  131<H>  4.1   |  26  |  1.05    Ca    8.8      03 Aug 2022 08:57      CAPILLARY BLOOD GLUCOSE      POCT Blood Glucose.: 147 mg/dL (03 Aug 2022 21:45)  POCT Blood Glucose.: 172 mg/dL (03 Aug 2022 16:37)  POCT Blood Glucose.: 139 mg/dL (03 Aug 2022 12:05)    rd< from: CT Head No Cont (07.29.22 @ 05:59) >    FINDINGS: Previous noted multiple acute/subacute lacunar infarct within   the bilateral centrum semiovale and bilateral parietal cortices were   better evaluated on the prior MRI study.    There is no acute intracranial hemorrhage, mass effect, shift of the   midline structures, herniation, or sepsis, or abnormal extra axial fluid   collection.    There is diffuse cerebral volume loss with prominence of the sulci,   fissures, and cisternal spaces which is normal for the patient's age.   There is moderate patchy confluent periventricular white matter   hypoattenuation statistically compatible with microvascular changes given   calcific atherosclerotic disease of the intracranial arteries.    The paranasal sinuses and mastoid air cells are clear. The calvarium   appears intact. There is evidence of bilateral cataract removal.    IMPRESSION: No acute intracranial hemorrhage.    Similar-appearing moderate severity chronic white matter microvascular   type changes.    --- End of Report ---            VLAD GONZALEZ MD; Attending Radiologist  This document has been electronically signed. Jul 29 2022  7:50AM    < end of copied text >  < from: CT Abdomen and Pelvis w/ Oral Cont and w/ IV Cont (07.25.22 @ 19:24) >    BOWEL: No bowel obstruction. Appendix is normal.  PERITONEUM: Trace free fluid.  VESSELS: Atherosclerotic changes. Thrombus within the anterior and   posterior branches of the right portal vein.  RETROPERITONEUM/LYMPH NODES: No lymphadenopathy.  ABDOMINAL WALL: Large fat-containing umbilical hernia.  BONES: Within normal limits.    IMPRESSION:  Innumerable hepatic lesions concerning for metastatic disease.    Gallbladder lumen is continuous with hepatic lesions. 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    Dr. Olivera discussed these findings with Dr. Mccall on 7/25/2022 7:34   PM with read back.    --- End of Report ---    < end of copied text >                RECENT CULTURES:        RESPIRATORY CULTURES:          Studies  Chest X-RAY  CT SCAN Chest   Venous Dopplers: LE:   CT Abdomen  Others      c< from: CT Chest No Cont (07.24.22 @ 17:54) >  AIRWAYS/LUNGS/PLEURA: Small amount mucus within the trachea otherwise no   endobronchial lesions. No evidence of consolidation. No suspicious lung   nodules. No pleural effusion.    UPPER ABDOMEN: Multiple hypodensities within the liver measuring up to   2.7 cm as well as well-circumscribed partially calcified lesion measuring   2.0 cm.    BONES/SOFT TISSUES: Moderate are degenerative changes of the bone. No   soft tissue lesions.    IMPRESSION:    No evidence of pneumonia.    Multiple hypodensities within the liver measuring up to 2.7 cm as well as   a well-circumscribed partially calcified lesion measuring 2.0 cm. These   findings are concerning for metastatic disease. Appropriate clinical   follow-up suggested.    --- End of Report ---           ADRIANO MCKEON MD; Resident Radiologist  This document has been electronically signed.  BENEDICT ROSAS MD; Attending Radiologist  This document has been electronically signed. Jul 25 2022 10:15AM    < end of copied text >

## 2022-08-04 NOTE — DISCHARGE NOTE PROVIDER - NSDCFUADDAPPT_GEN_ALL_CORE_FT
APPTS ARE READY TO BE MADE: [ X] YES    Best Family or Patient Contact (if needed):    Additional Information about above appointments (if needed):    1: Dr. Batista (neurology) in the next 2-3 days. Office is located at [address] [place] [address].  2: GI follow up for metastatic gallbladder CA within 1-2 weeks  3: Primary care follow up within 1-2 weeks     Other comments or requests:    APPTS ARE READY TO BE MADE: [ X] YES    Best Family or Patient Contact (if needed):    Additional Information about above appointments (if needed):    1: Dr. Batista (neurology) in the next 2-3 days. Office is located at [address] [place] [address].  2: GI follow up for metastatic gallbladder CA within 1-2 weeks  3: Primary care follow up within 1-2 weeks     Other comments or requests:     Patient is being discharged to rehab. Caregiver will arrange follow up.

## 2022-08-04 NOTE — PROGRESS NOTE ADULT - SUBJECTIVE AND OBJECTIVE BOX
[facility]  Wero Stafford PA-C  [address] [place], [place] [id number] [phone]      INTERVAL HPI/OVERNIGHT EVENTS:  pt seen and examined, no new events   poor po intake     MEDICATIONS  (STANDING):  atorvastatin 80 milliGRAM(s) Oral at bedtime  cyanocobalamin 1000 MICROGram(s) Oral daily  dextrose 5%. 1000 milliLiter(s) (100 mL/Hr) IV Continuous <Continuous>  dextrose 5%. 1000 milliLiter(s) (50 mL/Hr) IV Continuous <Continuous>  dextrose 50% Injectable 25 Gram(s) IV Push once  dextrose 50% Injectable 12.5 Gram(s) IV Push once  dextrose 50% Injectable 25 Gram(s) IV Push once  gabapentin 300 milliGRAM(s) Oral daily  glucagon  Injectable 1 milliGRAM(s) IntraMuscular once  heparin   Injectable 5000 Unit(s) SubCutaneous every 12 hours  insulin lispro (ADMELOG) corrective regimen sliding scale   SubCutaneous three times a day before meals  tamsulosin 0.8 milliGRAM(s) Oral at bedtime    MEDICATIONS  (PRN):  acetaminophen     Tablet .. 650 milliGRAM(s) Oral every 6 hours PRN Temp greater or equal to 38C (100.4F), Mild Pain (1 - 3)  dextrose Oral Gel 15 Gram(s) Oral once PRN Blood Glucose LESS THAN 70 milliGRAM(s)/deciliter      Allergies    Advil (Hives)  penicillin (Hives)    Intolerances        ROS:   General:  No wt loss, fevers, chills, night sweats, +fatigue,   Eyes:  Good vision, no reported pain  ENT:  No sore throat, pain, runny nose, dysphagia  CV:  No pain, palpitations, hypo/hypertension  Resp:  No dyspnea, cough, tachypnea, wheezing  GI:  No pain, No nausea, No vomiting, No diarrhea, No constipation, No weight loss, No fever, No pruritis, No rectal bleeding, No tarry stools, No dysphagia,  :  No pain, bleeding, incontinence, nocturia  Muscle:  No pain, weakness  Neuro:  No weakness, tingling, memory problems  Psych:  No fatigue, insomnia, mood problems, +depression  Endocrine:  No polyuria, polydipsia, cold/heat intolerance  Heme:  No petechiae, ecchymosis, easy bruisability  Skin:  No rash, tattoos, scars, edema      PHYSICAL EXAM:   Vital Signs Last 24 Hrs  T(C): 36.9 (04 Aug 2022 09:33), Max: 36.9 (03 Aug 2022 16:03)  T(F): 98.4 (04 Aug 2022 09:33), Max: 98.4 (03 Aug 2022 16:03)  HR: 100 (04 Aug 2022 09:33) (78 - 102)  BP: 111/74 (04 Aug 2022 09:33) (111/74 - 124/55)  BP(mean): --  RR: 18 (04 Aug 2022 09:33) (18 - 18)  SpO2: 95% (04 Aug 2022 09:33) (95% - 100%)    Parameters below as of 04 Aug 2022 09:33  Patient On (Oxygen Delivery Method): room air      Daily     Daily     GENERAL:  Appears stated age,   HEENT:  NC/AT,    CHEST:  Full & symmetric excursion,   HEART:  Regular rhythm,  ABDOMEN:  Soft, non-tender, distended,  EXTEREMITIES:  no cyanosis  SKIN:  No rash  NEURO:  Alert,       LABS:                        12.5   21.92 )-----------( 148      ( 03 Aug 2022 08:56 )             39.7   08-03    133<L>  |  98  |  24<H>  ----------------------------<  110<H>  4.4   |  25  |  1.26    Ca    8.8      03 Aug 2022 08:57      RADIOLOGY & ADDITIONAL TESTS:

## 2022-08-04 NOTE — PROGRESS NOTE ADULT - PROBLEM SELECTOR PROBLEM 9
Type 2 diabetes mellitus without complication, without long-term current use of insulin
Type 2 diabetes mellitus without complication, without long-term current use of insulin
BPH (benign prostatic hyperplasia)
BPH (benign prostatic hyperplasia)
Type 2 diabetes mellitus without complication, without long-term current use of insulin
Depression, reactive
Depression, reactive
Type 2 diabetes mellitus without complication, without long-term current use of insulin
BPH (benign prostatic hyperplasia)
Type 2 diabetes mellitus without complication, without long-term current use of insulin
BPH (benign prostatic hyperplasia)
Type 2 diabetes mellitus without complication, without long-term current use of insulin
BPH (benign prostatic hyperplasia)
BPH (benign prostatic hyperplasia)
Depression, reactive
Depression, reactive
Type 2 diabetes mellitus without complication, without long-term current use of insulin
BPH (benign prostatic hyperplasia)

## 2022-08-04 NOTE — PROGRESS NOTE ADULT - SUBJECTIVE AND OBJECTIVE BOX
Patient is a 81y old  Male who presents with a chief complaint of failure to thrive (04 Aug 2022 11:08)      DATE OF SERVICE: 08-04-22 @ 11:42    SUBJECTIVE / OVERNIGHT EVENTS: overnight events noted    ROS:  Resp: No cough no sputum production  CVS: No chest pain no palpitations no orthopnea  GI: no N/V/D  : no dysuria, no hematuria          MEDICATIONS  (STANDING):  aspirin enteric coated 81 milliGRAM(s) Oral daily  atorvastatin 80 milliGRAM(s) Oral at bedtime  chlorhexidine 2% Cloths 1 Application(s) Topical daily  cyanocobalamin 1000 MICROGram(s) Oral daily  dextrose 5%. 1000 milliLiter(s) (100 mL/Hr) IV Continuous <Continuous>  dextrose 5%. 1000 milliLiter(s) (50 mL/Hr) IV Continuous <Continuous>  dextrose 50% Injectable 25 Gram(s) IV Push once  dextrose 50% Injectable 12.5 Gram(s) IV Push once  dextrose 50% Injectable 25 Gram(s) IV Push once  glucagon  Injectable 1 milliGRAM(s) IntraMuscular once  heparin   Injectable 5000 Unit(s) SubCutaneous every 12 hours  insulin lispro (ADMELOG) corrective regimen sliding scale   SubCutaneous three times a day before meals  mirtazapine 7.5 milliGRAM(s) Oral at bedtime  tamsulosin 0.8 milliGRAM(s) Oral at bedtime    MEDICATIONS  (PRN):  acetaminophen     Tablet .. 650 milliGRAM(s) Oral every 6 hours PRN Temp greater or equal to 38C (100.4F), Mild Pain (1 - 3)  dextrose Oral Gel 15 Gram(s) Oral once PRN Blood Glucose LESS THAN 70 milliGRAM(s)/deciliter        CAPILLARY BLOOD GLUCOSE      POCT Blood Glucose.: 147 mg/dL (03 Aug 2022 21:45)  POCT Blood Glucose.: 172 mg/dL (03 Aug 2022 16:37)  POCT Blood Glucose.: 139 mg/dL (03 Aug 2022 12:05)    I&O's Summary    03 Aug 2022 07:01  -  04 Aug 2022 07:00  --------------------------------------------------------  IN: 420 mL / OUT: 0 mL / NET: 420 mL        Vital Signs Last 24 Hrs  T(C): 36.9 (04 Aug 2022 09:33), Max: 36.9 (03 Aug 2022 16:03)  T(F): 98.4 (04 Aug 2022 09:33), Max: 98.4 (03 Aug 2022 16:03)  HR: 100 (04 Aug 2022 09:33) (78 - 102)  BP: 111/74 (04 Aug 2022 09:33) (111/74 - 124/55)  BP(mean): --  RR: 18 (04 Aug 2022 09:33) (18 - 18)  SpO2: 95% (04 Aug 2022 09:33) (95% - 100%)      PHYSICAL EXAM:  EYES: EOMI, PERRLA  NECK: Supple, No JVD  CHEST/LUNG: clear  HEART: S1 S2; no murmurs   ABDOMEN: Soft, Nontender  EXTREMITIES:   no edema  NEUROLOGY: alert non-focal  SKIN: No rashes or lesions    LABS:                        12.5   21.92 )-----------( 148      ( 03 Aug 2022 08:56 )             39.7     08-03    133<L>  |  98  |  24<H>  ----------------------------<  110<H>  4.4   |  25  |  1.26    Ca    8.8      03 Aug 2022 08:57                  All consultant(s) notes reviewed and care discussed with other providers        Contact Number, Dr Douglas [phone] Patient is a 81y old  Male who presents with a chief complaint of failure to thrive (04 Aug 2022 11:08)      DATE OF SERVICE: 08-04-22 @ 11:42    SUBJECTIVE / OVERNIGHT EVENTS: overnight events noted    ROS:  Resp: No cough no sputum production  CVS: No chest pain no palpitations no orthopnea  GI: no N/V/D  : no dysuria, no hematuria          MEDICATIONS  (STANDING):  aspirin enteric coated 81 milliGRAM(s) Oral daily  atorvastatin 80 milliGRAM(s) Oral at bedtime  chlorhexidine 2% Cloths 1 Application(s) Topical daily  cyanocobalamin 1000 MICROGram(s) Oral daily  dextrose 5%. 1000 milliLiter(s) (100 mL/Hr) IV Continuous <Continuous>  dextrose 5%. 1000 milliLiter(s) (50 mL/Hr) IV Continuous <Continuous>  dextrose 50% Injectable 25 Gram(s) IV Push once  dextrose 50% Injectable 12.5 Gram(s) IV Push once  dextrose 50% Injectable 25 Gram(s) IV Push once  glucagon  Injectable 1 milliGRAM(s) IntraMuscular once  heparin   Injectable 5000 Unit(s) SubCutaneous every 12 hours  insulin lispro (ADMELOG) corrective regimen sliding scale   SubCutaneous three times a day before meals  mirtazapine 7.5 milliGRAM(s) Oral at bedtime  tamsulosin 0.8 milliGRAM(s) Oral at bedtime    MEDICATIONS  (PRN):  acetaminophen     Tablet .. 650 milliGRAM(s) Oral every 6 hours PRN Temp greater or equal to 38C (100.4F), Mild Pain (1 - 3)  dextrose Oral Gel 15 Gram(s) Oral once PRN Blood Glucose LESS THAN 70 milliGRAM(s)/deciliter        CAPILLARY BLOOD GLUCOSE      POCT Blood Glucose.: 147 mg/dL (03 Aug 2022 21:45)  POCT Blood Glucose.: 172 mg/dL (03 Aug 2022 16:37)  POCT Blood Glucose.: 139 mg/dL (03 Aug 2022 12:05)    I&O's Summary    03 Aug 2022 07:01  -  04 Aug 2022 07:00  --------------------------------------------------------  IN: 420 mL / OUT: 0 mL / NET: 420 mL        Vital Signs Last 24 Hrs  T(C): 36.9 (04 Aug 2022 09:33), Max: 36.9 (03 Aug 2022 16:03)  T(F): 98.4 (04 Aug 2022 09:33), Max: 98.4 (03 Aug 2022 16:03)  HR: 100 (04 Aug 2022 09:33) (78 - 102)  BP: 111/74 (04 Aug 2022 09:33) (111/74 - 124/55)  BP(mean): --  RR: 18 (04 Aug 2022 09:33) (18 - 18)  SpO2: 95% (04 Aug 2022 09:33) (95% - 100%)      PHYSICAL EXAM:  EYES: EOMI, PERRLA  NECK: Supple, No JVD  CHEST/LUNG: clear  HEART: S1 S2; no murmurs   ABDOMEN: Soft, Nontender  EXTREMITIES:   no edema  NEUROLOGY: alert non-focal  SKIN: No rashes or lesions    LABS:                        12.5   21.92 )-----------( 148      ( 03 Aug 2022 08:56 )             39.7     08-03    133<L>  |  98  |  24<H>  ----------------------------<  110<H>  4.4   |  25  |  1.26    Ca    8.8      03 Aug 2022 08:57                  All consultant(s) notes reviewed and care discussed with other providers        Contact Number, Dr Douglas [phone]

## 2022-08-04 NOTE — PROGRESS NOTE ADULT - PROBLEM SELECTOR PROBLEM 10
Depression, reactive
BPH (benign prostatic hyperplasia)
BPH (benign prostatic hyperplasia)
R/O Metastatic disease
Depression, reactive
R/O Metastatic disease
BPH (benign prostatic hyperplasia)
R/O Metastatic disease
BPH (benign prostatic hyperplasia)
BPH (benign prostatic hyperplasia)
R/O Metastatic disease
Depression, reactive

## 2022-08-04 NOTE — PROGRESS NOTE ADULT - PROVIDER SPECIALTY LIST ADULT
Cardiology
Gastroenterology
Gastroenterology
Intervent Radiology
Cardiology
Gastroenterology
Nephrology
Nephrology
Pulmonology
Cardiology
Gastroenterology
Internal Medicine
Nephrology
Neurology
Pulmonology
Gastroenterology
Nephrology
Nephrology
Neurology
Neurology
Internal Medicine
Internal Medicine
Pulmonology
Pulmonology
Internal Medicine
Pulmonology
Pulmonology
Internal Medicine
Pulmonology

## 2022-08-04 NOTE — PROGRESS NOTE ADULT - PROBLEM SELECTOR PLAN 7
continue finger sticks with short acting insulin sliding scale  no oral meds  HbA1C 6   discontinue Prandin on discharge secondary to high risk of hypoglycemia   very poor po intake

## 2022-08-04 NOTE — CHART NOTE - NSCHARTNOTEFT_GEN_A_CORE
Patient medically cleared per Dr. Douglas.   D/C meds d/w Dr. Douglas.   D/C paperwork completed.   CM updated.   VSS. Labs reviewed- stable. Patient medically cleared per Dr. Douglas.   D/C meds d/w Dr. Douglas. Discontinue statin and Prandin per Dr. Douglas. Patient not eating that much and FS < 200.   D/C paperwork completed.   CM updated.   VSS. Labs reviewed- stable.

## 2022-08-04 NOTE — PROGRESS NOTE ADULT - SUBJECTIVE AND OBJECTIVE BOX
[facility] [phone]        Patient seen and examined in bed. He was the same         MEDICATIONS  (STANDING):  aspirin enteric coated 81 milliGRAM(s) Oral daily  atorvastatin 80 milliGRAM(s) Oral at bedtime  chlorhexidine 2% Cloths 1 Application(s) Topical daily  cyanocobalamin 1000 MICROGram(s) Oral daily  dextrose 5%. 1000 milliLiter(s) (100 mL/Hr) IV Continuous <Continuous>  dextrose 5%. 1000 milliLiter(s) (50 mL/Hr) IV Continuous <Continuous>  dextrose 50% Injectable 25 Gram(s) IV Push once  dextrose 50% Injectable 12.5 Gram(s) IV Push once  dextrose 50% Injectable 25 Gram(s) IV Push once  glucagon  Injectable 1 milliGRAM(s) IntraMuscular once  heparin   Injectable 5000 Unit(s) SubCutaneous every 12 hours  insulin lispro (ADMELOG) corrective regimen sliding scale   SubCutaneous three times a day before meals  mirtazapine 7.5 milliGRAM(s) Oral at bedtime  tamsulosin 0.8 milliGRAM(s) Oral at bedtime      VITAL:  T(C): , Max: 36.9 (08-03-22 @ 16:03)  T(F): , Max: 98.4 (08-03-22 @ 16:03)  HR: 100 (08-04-22 @ 09:33)  BP: 111/74 (08-04-22 @ 09:33)  BP(mean): --  RR: 18 (08-04-22 @ 09:33)  SpO2: 95% (08-04-22 @ 09:33)  Wt(kg): --    I and O's:    08-03 @ 07:01  -  08-04 @ 07:00  --------------------------------------------------------  IN: 420 mL / OUT: 0 mL / NET: 420 mL          PHYSICAL EXAM:    Constitutional: NAD  Neck:  No JVD  Respiratory: CTAB/L  Cardiovascular: S1 and S2  Gastrointestinal: BS+, soft, NT/ND  Extremities: No peripheral edema  Neurological: A/O x 3, no focal deficits  Psychiatric: Normal mood, normal affect  : No Villasenor  Skin: No rashes  Access: Not applicable    LABS:                        12.5   21.92 )-----------( 148      ( 03 Aug 2022 08:56 )             39.7     08-03    133<L>  |  98  |  24<H>  ----------------------------<  110<H>  4.4   |  25  |  1.26    Ca    8.8      03 Aug 2022 08:57            Urine Studies:          RADIOLOGY & ADDITIONAL STUDIES:

## 2022-08-04 NOTE — DISCHARGE NOTE NURSING/CASE MANAGEMENT/SOCIAL WORK - NSDCFUADDAPPT_GEN_ALL_CORE_FT
APPTS ARE READY TO BE MADE: [ X] YES    Best Family or Patient Contact (if needed):    Additional Information about above appointments (if needed):    1: Dr. Batista (neurology) in the next 2-3 days. Office is located at [address] [place] [address].  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

## 2022-08-04 NOTE — PROGRESS NOTE ADULT - PROBLEM SELECTOR PLAN 10
BH consultation appreciated  Mirtazapine
BH consultation appreciated  Mirtazapine if patient agrees
CT A/P with many hepatic lesions concerning for metastatic disease, gallbladder lumen continuous with hepatic lesions. Findings suspicious for gallbladder malignancy  -GI f/u  -S/p liver biopsy in IR 8/1. F/u results.
BH consultation appreciated  Mirtazapine if patient agrees
CT A/P with many hepatic lesions concerning for metastatic disease, gallbladder lumen continuous with hepatic lesions. Findings suspicious for gallbladder malignancy  -GI f/u  -S/p liver biopsy in IR 8/1. F/u results.
CT A/P with many hepatic lesions concerning for metastatic disease, gallbladder lumen continuous with hepatic lesions. Findings suspicious for gallbladder malignancy  -GI f/u  -S/p liver biopsy in IR 8/1. F/u results.
BH consultation appreciated  Mirtazapine
CT A/P with many hepatic lesions concerning for metastatic disease, gallbladder lumen continuous with hepatic lesions. Findings suspicious for gallbladder malignancy  -GI f/u  -Plan for IR biopsy today 8/1. No pulmonary contraindications to planned procedures.
continue Flomax 0.8
BH consultation appreciated  will consider Mirtazapine if patient agrees
continue Flomax 0.8

## 2022-08-04 NOTE — PROGRESS NOTE ADULT - PROBLEM SELECTOR PLAN 9
continue Flomax 0.8
Monitor & control.
continue finger sticks with short acting insulin sliding scale  no oral meds  HbA1C testing   diabetic diet
 consultation appreciated  continue Mirtazapine
continue Flomax 0.8
Monitor & control.
Monitor & control.
 consultation appreciated  continue Mirtazapine
continue Flomax 0.8
Monitor & control.
monitor and control    7/28: controlled
monitor and control    7/28: controlled    7/31: contorlled
continue Flomax 0.8
monitor and control    7/28: controlled
continue Flomax 0.8
continue finger sticks with short acting insulin sliding scale  no oral meds  HbA1C 6   diabetic diet
continue Flomax 0.8

## 2022-08-04 NOTE — PROGRESS NOTE ADULT - ASSESSMENT
80 y/o M w/ PMH of DM2, HTN, HLD CAD s/p MI 2018 presents to the hospital with 2 months of progressive difficulty to walking  Hyponatremia - Stable and still present   COPD   Metastatic disease -  Possible gallbladder cancer         1 Renal-Off  IVF and allow self hydration his am ;  Hyponatremia is not clinically important   2 ID- now off IV Abx;    3 CVS-BP  on the soft side but not on medication   4 GI- Likely metastatic disease and awaiting liver bx results.  Successful bx done     Full DNR  AIMEE pending       Sayed Dreampod  [phone]

## 2022-08-04 NOTE — DISCHARGE NOTE PROVIDER - PROVIDER TOKENS
FREE:[LAST:[medical provider],FIRST:[primary],PHONE:[(   )    -],FAX:[(   )    -]],PROVIDER:[TOKEN:[[id number]]]

## 2022-08-04 NOTE — DISCHARGE NOTE PROVIDER - CARE PROVIDER_API CALL
medical provider, primary  Phone: (   )    -  Fax: (   )    -  Follow Up Time:     Goyo Batista (MD)  Neurology; Vascular Neurology  [address] [facility], [address]  [place] [address]  Phone: [phone]  Fax: [phone]  Follow Up Time:

## 2022-08-08 PROBLEM — I25.10 ATHEROSCLEROTIC HEART DISEASE OF NATIVE CORONARY ARTERY WITHOUT ANGINA PECTORIS: Chronic | Status: ACTIVE | Noted: 2022-01-01

## 2022-08-08 NOTE — H&P ADULT - HISTORY OF PRESENT ILLNESS
81 year old man with DM2, HTN, HLD CAD s/p MI 2018, HFpEF, recent admission for sepsis 2/2 UTI, presents from rehab after being found unresponsive at rehab. History obtained from daughter at bedside 81 year old man with metastatic gallbladder cancer, DM2, HTN, HLD CAD s/p MI 2018, HFpEF, recent admission for sepsis 2/2 UTI, presents from rehab after being found unresponsive at rehab. History obtained from daughter at bedside. States at baseline patient is weak, uses wheelchair, however is alert and oriented. She found him slumped over and with food in his mouth. EMS was activated 81 year old man with metastatic gallbladder cancer, DM2, HTN, HLD CAD s/p MI 2018, HFpEF, recent admission for sepsis 2/2 UTI, presents from rehab after being found unresponsive at rehab. History obtained from daughter at bedside. States at baseline patient is weak, uses wheelchair, however is alert and oriented. She found him slumped over and with food in his mouth. EMS was activated, was intubated in the field, started on norepinephrine ggt. On assessment, patient is obtunded, follows some commands, not withdrawing to pain.    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 History obtained from daughter at bedside. States at baseline patient is weak, uses wheelchair, however is alert and oriented. She found him slumped over and with food in his mouth. EMS was activated, was intubated in the field, started on norepinephrine ggt. On assessment, patient is obtunded, follows some commands, not withdrawing to pain.    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

## 2022-08-08 NOTE — ED ADULT NURSE NOTE - OBJECTIVE STATEMENT
81y male BIBEMS complaining of unresponsive. PMHX DM, HTN 81y male BIBEMS from nursing home complaining of unresponsive. PMHX DM, HTN, CVA. EMS reports when they arrived pt was unresponsive and had food in his mouth. EMS intubated with 7.5cm tube. EMS states according to nursing home he had a facial droop to the left side at an unknown time and was last seen normal around noon. Code stroke was called at 1455 and pt was unable to be transported to ct due to unstable BP. Emergency MD and Stroke team at bedside. Labs was collected and sent to lab. Pt pending ct scan. Pt pending dispo.

## 2022-08-08 NOTE — ED PROVIDER NOTE - PROGRESS NOTE DETAILS
[facility]- MICU consulted as CT head scan do not appear consistent with stroke and pt is moving all extremities spontaneously at this time. 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

## 2022-08-08 NOTE — CONSULT NOTE ADULT - SUBJECTIVE AND OBJECTIVE BOX
**STROKE CODE CONSULT NOTE**    Last known well time/Time of onset of symptoms: 8/8/2022 1200    HPI:  81M with h/o T2DM, HTN, HLD, CAD s/p MI, metastatic GB cancer who was recently in the hospital for progressive difficulty to walk presented to the hospital after being found down in rehabilitation. He was previously seen by Dr. Pearson for a CTH finding R chronic corona radiata infarct on 07/23/2022. He was last seen normal by staff at 8/8 1200. Afterwards, he was found to be unresponsive. He was intubated on site and brought to the hospital. CODE stroke called at 1454. Family at bedside deemed the patient full code. The patient was started on levo due to low blood pressure that precluded him, temporarily from getting a CT scan. NIHSS 29. CT non con, angio, and perfusion acquired at 1607. CT non con demonstrated no acute hemorrhage nor mass effect. CT angio demonstrated no LVO. CT perfusion demonstrated no perfusion abnormality. The patient was no given tPA given that he took eliquis in the morning and his INR was elevated. No thrombectomy given that the patient has no LVO on angio. Patient returned to the holding area and was able to respond to pain with nailbed pressure on all four extremities and responded to commands to raise both his hands up.     PAST MEDICAL & SURGICAL HISTORY:  Essential hypertension      Type 2 diabetes mellitus without complication, without long-term current use of insulin      HLD (hyperlipidemia)      BPH (benign prostatic hyperplasia)      CAD (coronary artery disease)      Benign bladder tumor  removed      History of colonoscopy      History of cataract extraction          FAMILY HISTORY:  No pertinent family history in first degree relatives        SOCIAL HISTORY:  Smoking Cesation: Discussed    ROS:  Neurological: As per HPI        MEDICATIONS  (STANDING):  cefepime   IVPB 2000 milliGRAM(s) IV Intermittent once  chlorhexidine 0.12% Liquid 15 milliLiter(s) Oral Mucosa every 12 hours  clindamycin IVPB 600 milliGRAM(s) IV Intermittent once  vancomycin  IVPB. 1000 milliGRAM(s) IV Intermittent once    MEDICATIONS  (PRN):      Allergies    Advil (Hives)  penicillin (Hives)    Intolerances    Parameters below as of 08 Aug 2022 15:44  Patient On (Oxygen Delivery Method): room air        PHYSICAL EXAM:    Vital Signs Last 24 Hrs  T(C): 36.1 (08 Aug 2022 15:44), Max: 36.1 (08 Aug 2022 15:44)  T(F): 97 (08 Aug 2022 15:44), Max: 97 (08 Aug 2022 15:44)  HR: 59 (08 Aug 2022 15:44) (57 - 79)  BP: 105/38 (08 Aug 2022 15:44) (54/31 - 158/52)  BP(mean): 57 (08 Aug 2022 15:44) (57 - 82)  RR: 14 (08 Aug 2022 15:44) (14 - 14)  SpO2: 100% (08 Aug 2022 15:44) (96% - 100%)    Parameters below as of 08 Aug 2022 15:44  Patient On (Oxygen Delivery Method): room air    Gen: Laying in bed, eyes half open, spontaneous movements  CVS: no edema   CHEST: intubated   Neuro:  MS: responded to sternal rub and nailbed stimuli on all four extremities     Language: intubated   CNs: Pupils b/l equal 2mm with sluggish reaction, cephalo-ocular reflex intact, face symmetric,  gag present, rest of cranial nerves exam is limited by mental status.  Motor: Spontaneous movement   Sensory: response to painful stimulation  Reflexes: no reflexes could be elicited  Coordination: not assessed, due to mental status   GAIT: deferred due to mental status  _  NIHSS: 29    Fingerstick Blood Glucose: CAPILLARY BLOOD GLUCOSE      POCT Blood Glucose.: 143 mg/dL (08 Aug 2022 14:57)       LABS:                        9.4    19.00 )-----------( 201      ( 08 Aug 2022 15:01 )             29.0     08-08    133<L>  |  100  |  49<H>  ----------------------------<  151<H>  4.5   |  21<L>  |  1.89<H>    Ca    7.5<L>      08 Aug 2022 15:01  Phos  5.3     08-08  Mg     2.2     08-08    TPro  5.7<L>  /  Alb  1.5<L>  /  TBili  0.9  /  DBili  x   /  AST  46<H>  /  ALT  17  /  AlkPhos  225<H>  08-08    PT/INR - ( 08 Aug 2022 15:01 )   PT: 26.9 sec;   INR: 2.32 ratio         PTT - ( 08 Aug 2022 15:01 )  PTT:29.3 sec          RADIOLOGY & ADDITIONAL STUDIES:    IV-tPA (Y/N):           N                        Bolus time:  Reason IV-tPA not given: Taken eliquis and INR levels elevated    **STROKE CODE CONSULT NOTE**    Last known well time/Time of onset of symptoms: 8/8/2022 1200    HPI:  81M with h/o T2DM, HTN, HLD, CAD s/p MI, metastatic GB cancer who was recently in the hospital for progressive difficulty to walk presented to the hospital after being found down in rehabilitation. He was previously seen by Dr. Pearson for a CTH finding R chronic corona radiata infarct on 07/23/2022. He was last seen normal by staff at 8/8 1200. Afterwards, he was found to be unresponsive. He was intubated on site and brought to the hospital. CODE stroke called at 1454. Family at bedside deemed the patient full code. The patient was started on levo due to low blood pressure that precluded him, temporarily from getting a CT scan. NIHSS 29. CT non con, angio, and perfusion acquired at 1607. CT non con demonstrated no acute hemorrhage nor mass effect. CT angio demonstrated no LVO. CT perfusion demonstrated no perfusion abnormality. The patient was no given tPA given that he took eliquis in the morning and his INR was elevated. No thrombectomy given that the patient has no LVO on angio. Patient returned to the holding area and was able to respond to pain with nailbed pressure on all four extremities and responded to commands to raise both his hands up.     PAST MEDICAL & SURGICAL HISTORY:  Essential hypertension      Type 2 diabetes mellitus without complication, without long-term current use of insulin      HLD (hyperlipidemia)      BPH (benign prostatic hyperplasia)      CAD (coronary artery disease)      Benign bladder tumor  removed      History of colonoscopy      History of cataract extraction          FAMILY HISTORY:  No pertinent family history in first degree relatives        SOCIAL HISTORY:  Smoking Cesation: Discussed    ROS:  Neurological: As per HPI        MEDICATIONS  (STANDING):  cefepime   IVPB 2000 milliGRAM(s) IV Intermittent once  chlorhexidine 0.12% Liquid 15 milliLiter(s) Oral Mucosa every 12 hours  clindamycin IVPB 600 milliGRAM(s) IV Intermittent once  vancomycin  IVPB. 1000 milliGRAM(s) IV Intermittent once    MEDICATIONS  (PRN):      Allergies    Advil (Hives)  penicillin (Hives)    Intolerances    Parameters below as of 08 Aug 2022 15:44  Patient On (Oxygen Delivery Method): room air        PHYSICAL EXAM:    Vital Signs Last 24 Hrs  T(C): 36.1 (08 Aug 2022 15:44), Max: 36.1 (08 Aug 2022 15:44)  T(F): 97 (08 Aug 2022 15:44), Max: 97 (08 Aug 2022 15:44)  HR: 59 (08 Aug 2022 15:44) (57 - 79)  BP: 105/38 (08 Aug 2022 15:44) (54/31 - 158/52)  BP(mean): 57 (08 Aug 2022 15:44) (57 - 82)  RR: 14 (08 Aug 2022 15:44) (14 - 14)  SpO2: 100% (08 Aug 2022 15:44) (96% - 100%)    Parameters below as of 08 Aug 2022 15:44  Patient On (Oxygen Delivery Method): room air    Gen: Laying in bed, eyes half open, spontaneous movements  CVS: no edema   CHEST: intubated   Neuro:  MS: responded to sternal rub and nailbed stimuli on all four extremities     Language: intubated   CNs: Pupils b/l equal 2mm with sluggish reaction, cephalo-ocular reflex intact, face symmetric,  gag present, rest of cranial nerves exam is limited by mental status.  Motor: Spontaneous movement   Sensory: response to painful stimulation  Reflexes: no reflexes could be elicited  Coordination: not assessed, due to mental status   GAIT: deferred due to mental status  _  NIHSS: 29    Fingerstick Blood Glucose: CAPILLARY BLOOD GLUCOSE      POCT Blood Glucose.: 143 mg/dL (08 Aug 2022 14:57)       LABS:                        9.4    19.00 )-----------( 201      ( 08 Aug 2022 15:01 )             29.0     08-08    133<L>  |  100  |  49<H>  ----------------------------<  151<H>  4.5   |  21<L>  |  1.89<H>    Ca    7.5<L>      08 Aug 2022 15:01  Phos  5.3     08-08  Mg     2.2     08-08    TPro  5.7<L>  /  Alb  1.5<L>  /  TBili  0.9  /  DBili  x   /  AST  46<H>  /  ALT  17  /  AlkPhos  225<H>  08-08    PT/INR - ( 08 Aug 2022 15:01 )   PT: 26.9 sec;   INR: 2.32 ratio         PTT - ( 08 Aug 2022 15:01 )  PTT:29.3 sec          RADIOLOGY & ADDITIONAL STUDIES:    < from: CT Abdomen and Pelvis w/ IV Cont (08.08.22 @ 16:22) >  IMPRESSION:  Extensive hepatic metastatic disease possibly secondary to locally   invasive gallbladder cancer.    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    Mildly increasing bilateral pleural effusions.      < end of copied text >  < from: CT Chest w/ IV Cont (08.08.22 @ 16:22) >  IMPRESSION:  Extensive hepatic metastatic disease possibly secondary to locally   invasive gallbladder cancer.    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    Mildly increasing bilateral pleural effusions.      < end of copied text >  < from: CT Brain Perfusion Maps Stroke (08.08.22 @ 16:21) >  CT PERFUSION:  1. No perfusion abnormality.    CTA NECK:  1. Calcified plaque affecting the left carotid bifurcation with   approximately mild-moderate (45-55%) stenosis of the left internal   carotid artery origin by NASCET criteria.  2. Atherosclerotic plaque affecting the origin and proximal segment of   the left subclavian artery with associated mild (less than 50%) stenosis.  3. Otherwise, no large vessel occlusion or major stenosis.  4. Heterogeneous appearing thyroid gland containing multiple nodules, the   largest of which measures up to 2 cm on the right side. Thyroid likely   cannot be excluded. Recommend ultrasound correlation.    CTA HEAD:  1. No large vessel occlusion or major stenosis.    < end of copied text >  < from: CT Brain Stroke Protocol (08.08.22 @ 16:20) >  IMPRESSION: No acute intracranial hemorrhage, mass effect, or shift of   the midline structures.    Similar-appearing moderate severity chronic white matter microvascular   type changes and chronic lacunar infarct within the right thalamus.    < end of copied text >      IV-tPA (Y/N):           N                        Bolus time:  Reason IV-tPA not given: Taken eliquis and INR levels elevated

## 2022-08-08 NOTE — H&P ADULT - ATTENDING COMMENTS
81M recently found to have malignancy s/p liver bx. Pt recently discharged from hospital d/t urosepsis ( no + cultures found ). Pt was seen by daughter Friday and Saturday appeared to be doing well. Daughter went to visit him in the rehab facility today and he was found to be unresponsive. States she did see things in the back of his mouth. In speaking with daughter she states the doctor felt cancer was advanced and she did not want him to suffer so they were not thinking of pursuing treatment. Pt intubated now starting to have purposeful movements. Pt on levophed which we are titrating down. In speaking with daughter she reiterates she does not want him to suffer and at this point would like pt to be a DNR.     ROS unable to obtain due to pt intubated   Neuro: per RN reaching for ETT moving extremities  Resp: CTA BL a lines on pocus scattered B at bases   Cardiac: RRR  GI: soft nontender   : price with urine noted   POCUS revealed normal EF (~65%), a lines scattered b lines at bases, IVC collapsible with inspiration  Pt admitted with suspected aspiration     Neuro: Propofol for goal RAAS 0  Resp: will wean vent as tolerated   Cardiac: MAP goal 65 ( per daughter BP on lower side upon discharge from hospital ) will trial 500cc of LR due to pocus findings   GI: can initiate tube feeds tomorrow with special attention to refeeding syndrome   : Monitor I&O pending blood cultures and urine culture  ID: blood and urine culture pending will continue Vanc until MRSA PCR results and will continue Cefepime for pseudomonal coverage due to recent hospitalization with deescalation as warranted     Concern for aspiration, down titrating on pressors. Due to POCUS will trial fluids. Pt on eliquis at home due to thrombosis from malignancy, will switch to heparin drip at this time. Pt daughter is primary decision maker and she confirms that she wishes father to be DNR at this time. Will continue pressors at this time and will readdress goals of care in 2-3 days to see how patient is progressing. I did speak about palliative care with daughter and this is something she would be interested in.

## 2022-08-08 NOTE — ED ADULT TRIAGE NOTE - ACCOMPANIED BY
[facility] [place]  [address] [place], [address] ~ [phone]                Infant Custody Release   7/20/2017    Darrel Crenshaw Began           Admission Information     Date & Time  7/20/2017 Provider  Dana Miller MD Departme
EMT/paramedic

## 2022-08-08 NOTE — ED ADULT NURSE REASSESSMENT NOTE - NS ED NURSE REASSESS COMMENT FT1
Transport was delayed due to pt condition unstable. Pt is stable for transport at this time. Pt is with MD at ct scan.

## 2022-08-08 NOTE — H&P ADULT - NSHPLABSRESULTS_GEN_ALL_CORE
LABS:                        9.4    19.00 )-----------( 201      ( 08 Aug 2022 15:01 )             29.0     08-08    133<L>  |  100  |  49<H>  ----------------------------<  151<H>  4.5   |  21<L>  |  1.89<H>    Ca    7.5<L>      08 Aug 2022 15:01  Phos  5.3     08  Mg     2.2     08-    TPro  5.7<L>  /  Alb  1.5<L>  /  TBili  0.9  /  DBili  x   /  AST  46<H>  /  ALT  17  /  AlkPhos  225<H>  08    PT/INR - ( 08 Aug 2022 15:01 )   PT: 26.9 sec;   INR: 2.32 ratio         PTT - ( 08 Aug 2022 15:01 )  PTT:29.3 sec      Urinalysis Basic - ( 08 Aug 2022 16:52 )    Color: Yellow / Appearance: Turbid / S.021 / pH: x  Gluc: x / Ketone: Negative  / Bili: Negative / Urobili: Negative   Blood: x / Protein: 100 mg/dL / Nitrite: Negative   Leuk Esterase: Large / RBC: 3 /hpf / WBC 56 /HPF   Sq Epi: x / Non Sq Epi: 24 /hpf / Bacteria: Negative

## 2022-08-08 NOTE — ED ADULT NURSE REASSESSMENT NOTE - NS ED NURSE REASSESS COMMENT FT1
Indwelling cath performed with second RN Sam. Sterile technique maintained. Pt has 200ml of dark yellow urine. Pt tolerated well.

## 2022-08-08 NOTE — H&P ADULT - ASSESSMENT
81 year old man with metastatic gallbladder cancer, DM2, HTN, HLD, CAD s/p MI 2018, HFpEF, recent admission for sepsis 2/2 UTI, presents from rehab after being found unresponsive at rehab 81 year old man with metastatic gallbladder cancer, DM2, HTN, HLD, CAD s/p MI 2018, HFpEF, recent admission for sepsis 2/2 UTI, presents from rehab after being found unresponsive at rehab likely due to aspiration, intubated i/s/o hypoxemic respiratory failure and septic shock, admitted to MICU for further management.    Neuro    #Obtundation  Baseline A&Ox3, currently not withdrawing to noxious stimuli though following some commands  Likely 2/2 septic encephalopathy i/s/o aspiration PNA  Code stroke called on arrival, no evidence of CVA found  - continue to monitor mental status per ICU protocol; expect improvement with treatment of underlying sepsis    Respiratory    #Acute hypoxemic respiratory failure  Likely 2/2 aspiration PNA, as was found unresponsive with food in mouth and rhonchorous breath sounds on exam  Vent was set to 12/500/100/5 when pt was assessed; was not overbreathing  - continue to monitor on vent, titrate to ABG  - f/u CXR, ordered but unclear if performed  - fentanyl prn    Cardiac    #Shock  Pt with likely septic shock 2/2 aspiration PNA  - c/w norepinephrine ggt, goal MAP 65  - wean vasopressors as able    ID    #?Aspiration pneumonia  Suspect pt with PNA as stated above, though of note pt is afebrile and WBC  s/p vanc, clindamycin, and cefepime in ED  - c/w renally dosed cefepime  - c/w vancomycin pending results of MRSA swab  - f/u blood, urine, and sputum cultures    /Renal    #NELSON  sCr ~2x his baseline on arrival to ED, likely 2/2 ATN iso sepsis  - f/u urine electrolytes  - Villasenor catheter to monitor UO  - maintain MAP >65 as above to ensure adequate renal perfusion  - continue to trend Cr, expect improvement with treatment of underlying condition  - pending POCUS, maybe give some fluids    GI    #Ppx while intubated  - start Pepcid 20mg bid    Endocrine    #Hx of DM  - monitor on ISS q6h    #Nodular thyroid  - will order thyroid ultrasound per recommendation of radiology report to assess further    Heme/onc    #Portal vein thrombosis  #Hepatic vein thrombosis  On apixaban 5mg bid at home  - will start heparin ggt given NELSON    #Metastatic gallbladder cancer  Unclear what interventions have been done to this point; will     DVT ppx    Ethics    Will need to establish GOC given decompensation; may need palliative

## 2022-08-08 NOTE — ED ADULT NURSE REASSESSMENT NOTE - NS ED NURSE REASSESS COMMENT FT1
Report received from Sam CORDOBA. pt is unresponsive, intubated, Levo drip running at 1.4 mcg/kg/min. Pt is awaiting for a MICU bed  assignment.

## 2022-08-08 NOTE — ED PROVIDER NOTE - OBJECTIVE STATEMENT
80 y/o M w/ PMH of DM2, HTN, HLD CAD s/p MI 2018 with recent admission for UTI/sepsis is BIB EMS after being found unresponsive in rehab facility. Per EMS, pt LKW by staff at 12PM. He was later found unresponsive with ?facial droop, appearing to be choking on lunch. Evaluated by EMS and intubated for airway protection.

## 2022-08-08 NOTE — H&P ADULT - NSHPPHYSICALEXAM_GEN_ALL_CORE
VITALS:   T(C): 36.1 (08-08-22 @ 18:22), Max: 36.4 (08-08-22 @ 17:27)  HR: 71 (08-08-22 @ 18:46) (57 - 83)  BP: 99/42 (08-08-22 @ 18:22) (54/31 - 158/52)  RR: 14 (08-08-22 @ 18:22) (14 - 16)  SpO2: 100% (08-08-22 @ 18:46) (96% - 100%)    GENERAL: obtunded  HEAD:  Atraumatic, Normocephalic  EYES: pupils constricted and sluggishly reactive, conjunctiva and sclera clear  ENT: Moist mucous membranes  NECK: Supple, No JVD  CHEST/LUNG: coarse rhonchi b/l; Unlabored respirations on vent  HEART: Regular rate and rhythm; No murmurs, rubs, or gallops  ABDOMEN: BSx4; Soft, nontender, nondistended, +large midline hernia  EXTREMITIES:  2+ Peripheral Pulses, brisk capillary refill. No clubbing, cyanosis, or edema  NERVOUS SYSTEM: obtunded though follows some commands, does not withdraw to skin pinching  SKIN: No rashes or lesions

## 2022-08-08 NOTE — CHART NOTE - NSCHARTNOTEFT_GEN_A_CORE
: Dr Roscoe ELLIOTT     INDICATION: intubated on pressors concern for septic shock     PROCEDURE:  [x ] LIMITED ECHO      FINDINGS:  Pt IVC small and completely collapsible with inspiration  A lines with scattered B lines at bases  EF approximately 65% with good contraction no pericardial effusion noted

## 2022-08-08 NOTE — ED PROVIDER NOTE - CLINICAL SUMMARY MEDICAL DECISION MAKING FREE TEXT BOX
80 y/o M w/ PMH of DM2, HTN, HLD CAD s/p MI 2018 with recent admission for UTI/sepsis is BIB EMS after being found unresponsive in rehab facility. Per EMS, pt LKW by staff at 12PM. He was later found unresponsive with ?facial droop, appearing to be choking on lunch. Evaluated by EMS and intubated for airway protection. Pt arrived minimally responsive. Given hx, stroke code called. PT proceeded to have BP drop steadily and pressors were started. Neuro to decide on plan after initial imaging. Concern for CVA, ICH, other metabolic/infectious cause, aspiration event, other. Pt TBA to higher level of care

## 2022-08-08 NOTE — STROKE CODE NOTE - MRS SCORE
(5) Severe disability. Requires constant nursing care and attention, bedridden, incontent. (3) Moderate disability. Requires some help, but able to walk unassisted.

## 2022-08-08 NOTE — CONSULT NOTE ADULT - ASSESSMENT
Assessment:    81M with h/o T2DM, HTN, HLD, CAD s/p MI, metastatic GB cancer who was recently in the hospital for progressive difficulty to walk presented to the hospital after being found down in rehabilitation. At the time, was also found to have right corona radiata infarct on CTH. Intubated for airway protection and placed on pressures and arrived at the hospital firstly for stroke evaluation.     mRS: 3  LKN: 8/8/2022 1200  NIHSS: 29    Impression: Transient loss of consciousness i/s/o previous stroke now intubated and placed on pressors 2/2 hypotensive crisis due to toxic, infectious, or metabolic etiology. Less likely stroke given that patient is non focal and imaging showing no hemorrhage nor LVO.     Recommendations:    Diagnostics:  [] Recommend MICU evaluation   [] MRI brain without contrast   [] check UA, Utox, ETOH level, TSH, T3/T4, RPR, vitamin B1, B6, B12, D, folate, lactate, creatinine kinase, ammonia  [] TTE   [] vEEG to rule out seizures   [] Implantable loop recorder  [] Lipid panel, HbA1c  [] CBC, CMP, coagulation panel, troponin    Medications:  [] ASA 81 mg PO (325 per rectum if fails dysphagia)   [] Atorvastatin 40mg (titrate to LDL < 70)   [] AC per primary team     Miscellaneous:  [] NPO unless passes dysphagia screen; swallow eval if fails  [] Keep BP permissive up to 220/110 for 48 hours followed by gradual normotension over 2-3 days   [] Telemonitoring  [] Neurological checks and vital signs per unit protocol  [] BGM goals 140-180  [] PT/OT; S/S evaluation   Assessment:    81M with h/o T2DM, HTN, HLD, CAD s/p MI, metastatic GB cancer who was recently in the hospital for progressive difficulty to walk presented to the hospital after being found down in rehabilitation. At the time, was also found to have right corona radiata infarct on CTH. Intubated for airway protection and placed on pressures and arrived at the hospital firstly for stroke evaluation.     mRS: 3  LKN: 8/8/2022 1200  NIHSS: 29    Impression: Transient loss of consciousness i/s/o previous stroke now intubated and placed on pressors 2/2 hypotensive crisis due to toxic, infectious, or metabolic etiology. Less likely stroke given that patient is non focal and imaging showing no hemorrhage nor LVO.     Recommendations:    Diagnostics:  [] Recommend MICU evaluation   [] MRI brain without contrast   [] check UA, Utox, ETOH level, TSH, T3/T4, RPR, vitamin B1, B6, B12, D, folate, lactate, creatinine kinase, ammonia  [] TTE   [] vEEG to rule out seizures   [] Implantable loop recorder  [] Lipid panel, HbA1c  [] CBC, CMP, coagulation panel, troponin    Medications:  [] ASA 81 mg PO (325 per rectum if fails dysphagia)   [] Atorvastatin 40mg (titrate to LDL < 70)   [] AC per primary team     Miscellaneous:  [] NPO unless passes dysphagia screen; swallow eval if fails  [] Keep BP permissive up to 220/110 for 48 hours followed by gradual normotension over 2-3 days   [] Telemonitoring  [] Neurological checks and vital signs per unit protocol  [] BGM goals 140-180  [] PT/OT; S/S evaluation    Patient to be seen and discussed with attending in AM.

## 2022-08-08 NOTE — CONSULT NOTE ADULT - CRITICAL CARE ATTENDING COMMENT
code stroke called and neurology emergently assessed patient. Briefly`     81M RH [place] speaking w/  DM2, HTN, HLD CAD s/p MI 2018, metastatic GB cancer presents to the hospital after found down at rehab found to have new infarcts last admission now intubated in ICU for sepsis 2/2 acute resp failure   mRS: 3  LKN: 8/8/2022 1200  NIHSS: 29  Prior Admission:   CTH R CR infarct   MRI brain 7/24 with scattered embolic appearing infarcts   MRA H/N : unremarkable   CTH 7/29 stable   A1c 6  LDL 93   TTE EF 63% no cardiac source of embolus ; refused bubble study.  normal LA , mod AS   NIHSS~15  premrs2  s/p liver bx 8/1  +_ cardiolipin Ab , +b`eta 2 glycoprotine. IgM cardiolin elevated 21.6   This admission:   CTH unchanged from prior no IPH   CTA NECK: L ICA 45-55 stenosis;   CTA HEAD: No large vessel occlusion or major stenosis.    o/e this AM on propofol, was held for about 10 minutes. withdraws x 4 but L>R?   Impression: Transient loss of consciousness i/s/o previous stroke now intubated and placed on pressors 2/2 hypotensive crisis due to  septic shock 2/2 infection/aspiration PNA. less likely acute infarct   last admission etiology of stroke was esus     Recommendations:   - on heparin drip for portal vein thrombosis   - levo for pressure support   - on propofol, wean as tolerated   - repeat CTH in 48hrs if no improvement   - abx for infection per priamry team   - consider EEG   - atorvastatin 80mg PO daily (titrate to LDL < 70)   - stroke risk factor modification and counseling   - hypercoag panel including APLS --> + last admissoin, speak to hematology, now on DOAC.  consider lovenox given malignancy hx?   - telemetry  - PT/OT/SS/SLP, OOBC  - check FS, glucose control <180  - GI/DVT ppx  - Counseling on diet, exercise, and medication adherence was done  - Counseling on smoking cessation and alcohol consumption offered when appropriate.  - Pain assessed and judicious use of narcotics when appropriate was discussed.    - Stroke education given when appropriate.  - Importance of fall prevention discussed.   - Differential diagnosis and plan of care discussed with patient and/or family and primary team  - Thank you for allowing me to participate in the care of this patient. Call with questions.   - GOC with family. DNR/I, consider palliative eval   Goyo Batista MD  Vascular Neurology.

## 2022-08-09 NOTE — CONSULT NOTE ADULT - PROBLEM SELECTOR RECOMMENDATION 3
Pt is DNR/DNI    MOLST filled and placed in chart  DNR/DNI order placed antibiotics per ICU team   wean off ventilatory support per ICU team  pressors per ICU team  family wants to continue these interventions for now, but would not want reintubation if medically extubated

## 2022-08-09 NOTE — CONSULT NOTE ADULT - ASSESSMENT
80 Y/O M with metastatic gallbladder cancer, DM2, HTN, HLD, CAD s/p MI 2018, HFpEF, recent admission for sepsis 2/2 UTI was found unresponsive at rehab s/p intubation in the field for AHRF also found to be in septic shock likely 2/2 aspiration event. Palliative consulted for GOC

## 2022-08-09 NOTE — CONSULT NOTE ADULT - CONVERSATION/DISCUSSION
Diagnosis/Prognosis/MOLST Discussed Diagnosis/Prognosis/MOLST Discussed/Treatment Options/Chaplaincy Referral

## 2022-08-09 NOTE — CHART NOTE - NSCHARTNOTEFT_GEN_A_CORE
: Pedro Oconnell    PROCEDURE:  [X] LIMITED ECHO  [X] LIMITED CHEST  [] LIMITED RETROPERITONEAL  [] LIMITED ABDOMINAL  [] LIMITED DVT  [] NEEDLE GUIDANCE VASCULAR  [] NEEDLE GUIDANCE THORACENTESIS  [] NEEDLE GUIDANCE PARACENTESIS  [] NEEDLE GUIDANCE PERICARDIOCENTESIS  [] OTHER    FINDINGS/INTERPRETATION:    Cardiac  Findings: Difficult windows. LV>RV grossly    Lung  Findings: B/L A lines anteriorly with focal B lines L anteriorly. Small b/l pleural effusions L>R  Interpretation: Small b/l pleural effusions    To be discussed with fellow/attending : Pedro Oconnell    INDICATION: Shock and Respiratory failure    PROCEDURE:  [X] LIMITED ECHO  [X] LIMITED CHEST  [] LIMITED RETROPERITONEAL  [] LIMITED ABDOMINAL  [] LIMITED DVT  [] NEEDLE GUIDANCE VASCULAR  [] NEEDLE GUIDANCE THORACENTESIS  [] NEEDLE GUIDANCE PARACENTESIS  [] NEEDLE GUIDANCE PERICARDIOCENTESIS  [] OTHER    FINDINGS/INTERPRETATION:    Cardiac  Findings: limited views, subcostal only LV>RV, grossly normal Lv function, IVC 1.5cm  Interpretation: grossly normal Lv function with indeterminate IVC    Lung  Findings: B/L A lines anteriorly with focal B lines on Left anteriorly. Small b/l pleural effusions L>R  Interpretation: Small b/l pleural effusions    Attending Note: Agree with above. I was present through out the scan.

## 2022-08-09 NOTE — CONSULT NOTE ADULT - PROBLEM SELECTOR RECOMMENDATION 9
GOC discussion as above: remains DNR and does not want re-intubation     MOLST form completed and placed in chart  if patient fails extubation, will reevaluate for PCU   will continue to follow GOC discussion as above: remains DNR and does not want re-intubation     MOLST form completed and placed in chart  DNR/DNI order placed   will continue to follow PPS 10%, requires total care  currently intubated, previously at rehab  functional decline likely 2/2 metastatic cancer

## 2022-08-09 NOTE — PROGRESS NOTE ADULT - SUBJECTIVE AND OBJECTIVE BOX
PROGRESS NOTE:  Written by Denis Salgado.    INTERVAL HPI/OVERNIGHT EVENTS: Overnight, admitted to MICU. Started propofol for agitation as he was trying to pull out endotracheal tube.  500cc LR bolus and 5% albumin 250cc with decreasing pressor requirements.     SUBJECTIVE: Patient seen and examined at bedside.       OBJECTIVE:    VITAL SIGNS:  ICU Vital Signs Last 24 Hrs  T(C): 36 (09 Aug 2022 07:00), Max: 36.4 (08 Aug 2022 17:27)  T(F): 96.8 (09 Aug 2022 07:00), Max: 97.5 (08 Aug 2022 17:27)  HR: 57 (09 Aug 2022 07:00) (57 - 86)  BP: 111/53 (09 Aug 2022 07:00) (54/31 - 158/52)  BP(mean): 77 (09 Aug 2022 07:00) (57 - 94)  ABP: --  ABP(mean): --  RR: 12 (09 Aug 2022 07:00) (12 - 24)  SpO2: 100% (09 Aug 2022 07:00) (96% - 100%)    O2 Parameters below as of 09 Aug 2022 00:51      O2 Concentration (%): 30      Mode: AC/ CMV (Assist Control/ Continuous Mandatory Ventilation), RR (machine): 12, TV (machine): 500, FiO2: 30, PEEP: 5, ITime: 1, MAP: 8, PIP: 19    08-08 @ 07:01  -  - @ 07:00  --------------------------------------------------------  IN: 3151 mL / OUT: 985 mL / NET: 2166 mL      CAPILLARY BLOOD GLUCOSE  212 (09 Aug 2022 05:30)      POCT Blood Glucose.: 212 mg/dL (09 Aug 2022 05:34)      PHYSICAL EXAM:    General: NAD  HEENT: NC/AT; pupils contricted, clear conjunctiva  Neck: supple  Respiratory: +coarse breath sounds b/l  Cardiovascular: +S1/S2; RRR  Abdomen: soft, NT/ND; +BS x4  Extremities: WWP, 2+ peripheral pulses b/l; no LE edema  Skin: normal color and turgor; no rash  Neurological: sedated    MEDICATIONS:  MEDICATIONS  (STANDING):  cefepime   IVPB 1000 milliGRAM(s) IV Intermittent every 24 hours  chlorhexidine 0.12% Liquid 15 milliLiter(s) Oral Mucosa every 12 hours  chlorhexidine 4% Liquid 1 Application(s) Topical <User Schedule>  dextrose 5%. 1000 milliLiter(s) (50 mL/Hr) IV Continuous <Continuous>  dextrose 5%. 1000 milliLiter(s) (100 mL/Hr) IV Continuous <Continuous>  dextrose 50% Injectable 25 Gram(s) IV Push once  dextrose 50% Injectable 12.5 Gram(s) IV Push once  dextrose 50% Injectable 25 Gram(s) IV Push once  famotidine Injectable 20 milliGRAM(s) IV Push every 12 hours  glucagon  Injectable 1 milliGRAM(s) IntraMuscular once  heparin  Infusion 11 Unit(s)/Hr (11 mL/Hr) IV Continuous <Continuous>  insulin lispro (ADMELOG) corrective regimen sliding scale   SubCutaneous every 6 hours  norepinephrine Infusion 0.6 MICROgram(s)/kG/Min (90 mL/Hr) IV Continuous <Continuous>  propofol Infusion 20 MICROgram(s)/kG/Min (9.04 mL/Hr) IV Continuous <Continuous>    MEDICATIONS  (PRN):  dextrose Oral Gel 15 Gram(s) Oral once PRN Blood Glucose LESS THAN 70 milliGRAM(s)/deciliter  fentaNYL    Injectable 50 MICROGram(s) IV Push every 2 hours PRN Severe Pain (7 - 10)      ALLERGIES:  Allergies    Advil (Hives)  penicillin (Hives)    Intolerances        LABS:                        11.2   35.20 )-----------( 219      ( 09 Aug 2022 00:40 )             34.4         132<L>  |  100  |  45<H>  ----------------------------<  239<H>  4.0   |  16<L>  |  1.51<H>    Ca    7.4<L>      09 Aug 2022 00:40  Phos  5.5       Mg     2.1         TPro  6.6  /  Alb  1.7<L>  /  TBili  1.0  /  DBili  x   /  AST  58<H>  /  ALT  18  /  AlkPhos  236<H>  08-09    PT/INR - ( 08 Aug 2022 15:01 )   PT: 26.9 sec;   INR: 2.32 ratio         PTT - ( 09 Aug 2022 04:22 )  PTT:103.2 sec  Urinalysis Basic - ( 08 Aug 2022 16:52 )    Color: Yellow / Appearance: Turbid / S.021 / pH: x  Gluc: x / Ketone: Negative  / Bili: Negative / Urobili: Negative   Blood: x / Protein: 100 mg/dL / Nitrite: Negative   Leuk Esterase: Large / RBC: 3 /hpf / WBC 56 /HPF   Sq Epi: x / Non Sq Epi: 24 /hpf / Bacteria: Negative        RADIOLOGY & ADDITIONAL TESTS: Reviewed.   PROGRESS NOTE:  Written by Denis Salgado.    INTERVAL HPI/OVERNIGHT EVENTS: Overnight, admitted to MICU. Started propofol for agitation as he was trying to pull out endotracheal tube.  500cc LR bolus and 5% albumin 250cc with decreasing pressor requirements.     SUBJECTIVE: Patient seen and examined at bedside.     OBJECTIVE:    VITAL SIGNS:  ICU Vital Signs Last 24 Hrs  T(C): 36 (09 Aug 2022 07:00), Max: 36.4 (08 Aug 2022 17:27)  T(F): 96.8 (09 Aug 2022 07:00), Max: 97.5 (08 Aug 2022 17:27)  HR: 57 (09 Aug 2022 07:00) (57 - 86)  BP: 111/53 (09 Aug 2022 07:00) (54/31 - 158/52)  BP(mean): 77 (09 Aug 2022 07:00) (57 - 94)  ABP: --  ABP(mean): --  RR: 12 (09 Aug 2022 07:00) (12 - 24)  SpO2: 100% (09 Aug 2022 07:00) (96% - 100%)    O2 Parameters below as of 09 Aug 2022 00:51      O2 Concentration (%): 30      Mode: AC/ CMV (Assist Control/ Continuous Mandatory Ventilation), RR (machine): 12, TV (machine): 500, FiO2: 30, PEEP: 5, ITime: 1, MAP: 8, PIP: 19    08-08 @ 07:01  -  - @ 07:00  --------------------------------------------------------  IN: 3151 mL / OUT: 985 mL / NET: 2166 mL      CAPILLARY BLOOD GLUCOSE  212 (09 Aug 2022 05:30)      POCT Blood Glucose.: 212 mg/dL (09 Aug 2022 05:34)      PHYSICAL EXAM:    General: NAD  HEENT: NC/AT; PERRL, clear conjunctiva  Neck: supple  Respiratory: +coarse breath sounds b/l +intubated on ventilator  Cardiovascular: +S1/S2; RRR  Abdomen: soft, NT/ND; +BS, +periumbilical ventral hernia not reducible  Extremities: WWP, 2+ peripheral pulses b/l; no LE edema  Skin: normal color and turgor; no rash  Neurological: sedated    MEDICATIONS:  MEDICATIONS  (STANDING):  cefepime   IVPB 1000 milliGRAM(s) IV Intermittent every 24 hours  chlorhexidine 0.12% Liquid 15 milliLiter(s) Oral Mucosa every 12 hours  chlorhexidine 4% Liquid 1 Application(s) Topical <User Schedule>  dextrose 5%. 1000 milliLiter(s) (50 mL/Hr) IV Continuous <Continuous>  dextrose 5%. 1000 milliLiter(s) (100 mL/Hr) IV Continuous <Continuous>  dextrose 50% Injectable 25 Gram(s) IV Push once  dextrose 50% Injectable 12.5 Gram(s) IV Push once  dextrose 50% Injectable 25 Gram(s) IV Push once  famotidine Injectable 20 milliGRAM(s) IV Push every 12 hours  glucagon  Injectable 1 milliGRAM(s) IntraMuscular once  heparin  Infusion 11 Unit(s)/Hr (11 mL/Hr) IV Continuous <Continuous>  insulin lispro (ADMELOG) corrective regimen sliding scale   SubCutaneous every 6 hours  norepinephrine Infusion 0.6 MICROgram(s)/kG/Min (90 mL/Hr) IV Continuous <Continuous>  propofol Infusion 20 MICROgram(s)/kG/Min (9.04 mL/Hr) IV Continuous <Continuous>    MEDICATIONS  (PRN):  dextrose Oral Gel 15 Gram(s) Oral once PRN Blood Glucose LESS THAN 70 milliGRAM(s)/deciliter  fentaNYL    Injectable 50 MICROGram(s) IV Push every 2 hours PRN Severe Pain (7 - 10)      ALLERGIES:  Allergies    Advil (Hives)  penicillin (Hives)    Intolerances        LABS:                        11.2   35.20 )-----------( 219      ( 09 Aug 2022 00:40 )             34.4     08-    132<L>  |  100  |  45<H>  ----------------------------<  239<H>  4.0   |  16<L>  |  1.51<H>    Ca    7.4<L>      09 Aug 2022 00:40  Phos  5.5     08-  Mg     2.1     08-    TPro  6.6  /  Alb  1.7<L>  /  TBili  1.0  /  DBili  x   /  AST  58<H>  /  ALT  18  /  AlkPhos  236<H>      PT/INR - ( 08 Aug 2022 15:01 )   PT: 26.9 sec;   INR: 2.32 ratio         PTT - ( 09 Aug 2022 04:22 )  PTT:103.2 sec  Urinalysis Basic - ( 08 Aug 2022 16:52 )    Color: Yellow / Appearance: Turbid / S.021 / pH: x  Gluc: x / Ketone: Negative  / Bili: Negative / Urobili: Negative   Blood: x / Protein: 100 mg/dL / Nitrite: Negative   Leuk Esterase: Large / RBC: 3 /hpf / WBC 56 /HPF   Sq Epi: x / Non Sq Epi: 24 /hpf / Bacteria: Negative        RADIOLOGY & ADDITIONAL TESTS: Reviewed.   PROGRESS NOTE:  Written by Denis Salgado.    INTERVAL HPI/OVERNIGHT EVENTS: Overnight, admitted to MICU. Started propofol for agitation as he was trying to pull out endotracheal tube.  500cc LR bolus and 5% albumin 250cc with decreasing pressor requirements.     SUBJECTIVE: Patient seen and examined at bedside.     OBJECTIVE:    VITAL SIGNS:  ICU Vital Signs Last 24 Hrs  T(C): 36 (09 Aug 2022 07:00), Max: 36.4 (08 Aug 2022 17:27)  T(F): 96.8 (09 Aug 2022 07:00), Max: 97.5 (08 Aug 2022 17:27)  HR: 57 (09 Aug 2022 07:00) (57 - 86)  BP: 111/53 (09 Aug 2022 07:00) (54/31 - 158/52)  BP(mean): 77 (09 Aug 2022 07:00) (57 - 94)  ABP: --  ABP(mean): --  RR: 12 (09 Aug 2022 07:00) (12 - 24)  SpO2: 100% (09 Aug 2022 07:00) (96% - 100%)    O2 Parameters below as of 09 Aug 2022 00:51      O2 Concentration (%): 30      Mode: AC/ CMV (Assist Control/ Continuous Mandatory Ventilation), RR (machine): 12, TV (machine): 500, FiO2: 30, PEEP: 5, ITime: 1, MAP: 8, PIP: 19    08-08 @ 07:01  -  - @ 07:00  --------------------------------------------------------  IN: 3151 mL / OUT: 985 mL / NET: 2166 mL      CAPILLARY BLOOD GLUCOSE  212 (09 Aug 2022 05:30)      POCT Blood Glucose.: 212 mg/dL (09 Aug 2022 05:34)      PHYSICAL EXAM:  General: NAD  HEENT: NC/AT; PERRL, clear conjunctiva  Neck: supple  Respiratory: +coarse breath sounds b/l +intubated on ventilator  Cardiovascular: +S1/S2; RRR  Abdomen: soft, NT/ND; +BS, +periumbilical ventral hernia not reducible  Extremities: WWP, 2+ peripheral pulses b/l; no LE edema  Skin: normal color and turgor; no rash  Neurological: sedated    MEDICATIONS:  MEDICATIONS  (STANDING):  cefepime   IVPB 1000 milliGRAM(s) IV Intermittent every 24 hours  chlorhexidine 0.12% Liquid 15 milliLiter(s) Oral Mucosa every 12 hours  chlorhexidine 4% Liquid 1 Application(s) Topical <User Schedule>  dextrose 5%. 1000 milliLiter(s) (50 mL/Hr) IV Continuous <Continuous>  dextrose 5%. 1000 milliLiter(s) (100 mL/Hr) IV Continuous <Continuous>  dextrose 50% Injectable 25 Gram(s) IV Push once  dextrose 50% Injectable 12.5 Gram(s) IV Push once  dextrose 50% Injectable 25 Gram(s) IV Push once  famotidine Injectable 20 milliGRAM(s) IV Push every 12 hours  glucagon  Injectable 1 milliGRAM(s) IntraMuscular once  heparin  Infusion 11 Unit(s)/Hr (11 mL/Hr) IV Continuous <Continuous>  insulin lispro (ADMELOG) corrective regimen sliding scale   SubCutaneous every 6 hours  norepinephrine Infusion 0.6 MICROgram(s)/kG/Min (90 mL/Hr) IV Continuous <Continuous>  propofol Infusion 20 MICROgram(s)/kG/Min (9.04 mL/Hr) IV Continuous <Continuous>    MEDICATIONS  (PRN):  dextrose Oral Gel 15 Gram(s) Oral once PRN Blood Glucose LESS THAN 70 milliGRAM(s)/deciliter  fentaNYL    Injectable 50 MICROGram(s) IV Push every 2 hours PRN Severe Pain (7 - 10)      ALLERGIES:  Allergies    Advil (Hives)  penicillin (Hives)    Intolerances        LABS:                        11.2   35.20 )-----------( 219      ( 09 Aug 2022 00:40 )             34.4     08-    132<L>  |  100  |  45<H>  ----------------------------<  239<H>  4.0   |  16<L>  |  1.51<H>    Ca    7.4<L>      09 Aug 2022 00:40  Phos  5.5     08-  Mg     2.1     08-    TPro  6.6  /  Alb  1.7<L>  /  TBili  1.0  /  DBili  x   /  AST  58<H>  /  ALT  18  /  AlkPhos  236<H>      PT/INR - ( 08 Aug 2022 15:01 )   PT: 26.9 sec;   INR: 2.32 ratio         PTT - ( 09 Aug 2022 04:22 )  PTT:103.2 sec  Urinalysis Basic - ( 08 Aug 2022 16:52 )    Color: Yellow / Appearance: Turbid / S.021 / pH: x  Gluc: x / Ketone: Negative  / Bili: Negative / Urobili: Negative   Blood: x / Protein: 100 mg/dL / Nitrite: Negative   Leuk Esterase: Large / RBC: 3 /hpf / WBC 56 /HPF   Sq Epi: x / Non Sq Epi: 24 /hpf / Bacteria: Negative        RADIOLOGY & ADDITIONAL TESTS: Reviewed.

## 2022-08-09 NOTE — CONSULT NOTE ADULT - SUBJECTIVE AND OBJECTIVE BOX
HPI:  81 year old man with metastatic gallbladder cancer, DM2, HTN, HLD CAD s/p MI 2018, HFpEF, recent admission for sepsis 2/2 UTI, presents from rehab after being found unresponsive at rehab. History obtained from daughter at bedside. States at baseline patient is weak, uses wheelchair, however is alert and oriented. She found him slumped over and with food in his mouth. EMS was activated, was intubated in the field, started on norepinephrine ggt. On assessment, patient is obtunded, follows some commands, not withdrawing to pain.    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 Daughter Shahida along with elder daughter and pt's wife. Unable to obtain ROS as intubated.    PERTINENT PM/SXH:   Essential hypertension    Type 2 diabetes mellitus without complication, without long-term current use of insulin    HLD (hyperlipidemia)    BPH (benign prostatic hyperplasia)    CAD (coronary artery disease)      No significant past surgical history    Benign bladder tumor    History of colonoscopy    History of cataract extraction      FAMILY HISTORY:  No pertinent family history in first degree relatives      Family Hx substance abuse [ ]yes [ ]no  ITEMS NOT CHECKED ARE NOT PRESENT    SOCIAL HISTORY:   Significant other/partner[X ]  Children[X ]  Yarsani/Spirituality: Spiritism   Substance hx:  [ ]   Tobacco hx:  [ ]   Alcohol hx: [ ]   Home Opioid hx:  [ ] I-Stop Reference No:  Living Situation: [ ]Home  [ ]Long term care  [ X]Rehab [ ]Other    ADVANCE DIRECTIVES:    DNR/MOLST  [ ]  Living Will  [ ]   DECISION MAKER(s):   Shahida Fu   [ ] Health Care Proxy(s)  [X  ] Surrogate(s)  [ ] Guardian           Name(s): Phone Number(s):  Shahida Fu     BASELINE (I)ADL(s) (prior to admission):  [place]: [ ]Total  [ ] Moderate [ ]Dependent  Was in rehab prior to admission    Allergies    Advil (Hives)  penicillin (Hives)    Intolerances    MEDICATIONS  (STANDING):  albumin human  5% IVPB 250 milliLiter(s) IV Intermittent every 6 hours  cefepime   IVPB 1000 milliGRAM(s) IV Intermittent every 24 hours  chlorhexidine 0.12% Liquid 15 milliLiter(s) Oral Mucosa every 12 hours  chlorhexidine 4% Liquid 1 Application(s) Topical <User Schedule>  dexMEDEtomidine Infusion 0.5 MICROgram(s)/kG/Hr (9.41 mL/Hr) IV Continuous <Continuous>  dextrose 5%. 1000 milliLiter(s) (100 mL/Hr) IV Continuous <Continuous>  dextrose 5%. 1000 milliLiter(s) (50 mL/Hr) IV Continuous <Continuous>  dextrose 50% Injectable 25 Gram(s) IV Push once  dextrose 50% Injectable 12.5 Gram(s) IV Push once  dextrose 50% Injectable 25 Gram(s) IV Push once  glucagon  Injectable 1 milliGRAM(s) IntraMuscular once  heparin  Infusion 11 Unit(s)/Hr (11 mL/Hr) IV Continuous <Continuous>  insulin lispro (ADMELOG) corrective regimen sliding scale   SubCutaneous every 6 hours  norepinephrine Infusion 0.6 MICROgram(s)/kG/Min (90 mL/Hr) IV Continuous <Continuous>    MEDICATIONS  (PRN):  dextrose Oral Gel 15 Gram(s) Oral once PRN Blood Glucose LESS THAN 70 milliGRAM(s)/deciliter    PRESENT SYMPTOMS: [ ]Unable to self-report  [ ] CPOT [ ] PAINADs [ ] RDOS  Source if other than patient:  [ ]Family   [ ]Team     Pain: Unable to assess     CPOT:    https://www.[email or web address]/getattachment/[email or web address]/Critical-Care-Pain-Observation-Tool-(CPOT)    PAIN AD Score:   http://geriatrictoolkit.[facility]/cog/painad.pdf (press ctrl +  left click to view)    Dyspnea:                           [ ]Mild [ ]Moderate [ ]Severe      RDOS:  0 to 2  minimal or no respiratory distress   3  mild distress  4 to 6 moderate distress  >7 severe distress  https://[email or web address]/handouts/hen/Respiratory_Distress_Observation_Scale.pdf (Ctrl +  left click to view)     Anxiety:                             [ ]Mild [ ]Moderate [ ]Severe  Fatigue:                             [ ]Mild [ ]Moderate [ ]Severe  Nausea:                             [ ]Mild [ ]Moderate [ ]Severe  Loss of appetite:              [ ]Mild [ ]Moderate [ ]Severe  Constipation:                    [ ]Mild [ ]Moderate [ ]Severe    PCSSQ[Palliative Care Spiritual Screening Question]   Severity (0-10):  Score of 4 or > indicate consideration of Chaplaincy referral.  Chaplaincy Referral: [ ] yes [ ] refused [ ] following    Other Symptoms:    Palliative Performance Status Version 2:         %    http://npcrc.org/files/news/palliative_performance_scale_ppsv2.pdf  PHYSICAL EXAM:  Vital Signs Last 24 Hrs  T(C): 36 (09 Aug 2022 07:00), Max: 36.4 (08 Aug 2022 17:27)  T(F): 96.8 (09 Aug 2022 07:00), Max: 97.5 (08 Aug 2022 17:27)  HR: 86 (09 Aug 2022 09:15) (57 - 86)  BP: 100/54 (09 Aug 2022 08:45) (54/31 - 158/52)  BP(mean): 75 (09 Aug 2022 08:45) (57 - 94)  RR: 12 (09 Aug 2022 08:45) (12 - 38)  SpO2: 100% (09 Aug 2022 09:15) (96% - 100%)    Parameters below as of 09 Aug 2022 00:51      O2 Concentration (%): 30 I&O's Summary    08 Aug 2022 07:01  -  09 Aug 2022 07:00  --------------------------------------------------------  IN: 3151 mL / OUT: 985 mL / NET: 2166 mL    09 Aug 2022 07:01  -  09 Aug 2022 11:27  --------------------------------------------------------  IN: 118 mL / OUT: 150 mL / NET: -32 mL      GENERAL: [X ]Cachexia    [ ]Alert  [ ]Oriented x   [ ]Lethargic  [ ]Unarousable  [ ]Verbal  [ ]Non-Verbal  Behavioral:   [ ] Anxiety  [ ] Delirium [ ] Agitation [ ] Other  HEENT:  [ ]Normal   [ ]Dry mouth   [ ]ET Tube/Trach  [ ]Oral lesions  PULMONARY:   [ ]Clear [ ]Tachypnea  [ ]Audible excessive secretions   [ ]Rhonchi        [ ]Right [ ]Left [ ]Bilateral  [ ]Crackles        [ ]Right [ ]Left [ ]Bilateral  [ ]Wheezing     [ ]Right [ ]Left [ ]Bilateral  [ ]Diminished breath sounds [ ]right [ ]left [ ]bilateral  CARDIOVASCULAR:    [ ]Regular [ ]Irregular [ ]Tachy  [ ]Mickey [ ]Murmur [ ]Other  GASTROINTESTINAL:  [ ]Soft  [ ]Distended   [ ]+BS  [ ]Non tender [ ]Tender  [ ]Other [ ]PEG [ ]OGT/ NGT  Last BM:  GENITOURINARY:  [ ]Normal [ ] Incontinent   [ ]Oliguria/Anuria   [ ]Villasenor  MUSCULOSKELETAL:   [ ]Normal   [ ]Weakness  [ ]Bed/Wheelchair bound [ ]Edema  NEUROLOGIC:   [ ]No focal deficits  [ ]Cognitive impairment  [ ]Dysphagia [ ]Dysarthria [ ]Paresis [ ]Other   SKIN:   [ ]Normal  [ ]Rash  [ ]Other  [ ]Pressure ulcer(s)       Present on admission [ ]y [ ]n    CRITICAL CARE:  [X ] Shock Present  [X ]Septic [ ]Cardiogenic [ ]Neurologic [ ]Hypovolemic  [ ]  Vasopressors [ ]  Inotropes   [ X]Respiratory failure present [X ]Mechanical ventilation [ ]Non-invasive ventilatory support [ ]High flow  Mode: CPAP with PS, FiO2: 30, PEEP: 5, PS: 8, MAP: 9  [X ]Acute  [ ]Chronic [ ]Hypoxic  [ ]Hypercarbic [ ]Other  [ ]Other organ failure     LABS:                        11.2   35.20 )-----------( 219      ( 09 Aug 2022 00:40 )             34.4       132<L>  |  100  |  45<H>  ----------------------------<  239<H>  4.0   |  16<L>  |  1.51<H>    Ca    7.4<L>      09 Aug 2022 00:40  Phos  5.5       Mg     2.1         TPro  6.6  /  Alb  1.7<L>  /  TBili  1.0  /  DBili  x   /  AST  58<H>  /  ALT  18  /  AlkPhos  236<H>    PT/INR - ( 08 Aug 2022 15:01 )   PT: 26.9 sec;   INR: 2.32 ratio         PTT - ( 09 Aug 2022 04:22 )  PTT:103.2 sec    Urinalysis Basic - ( 08 Aug 2022 16:52 )    Color: Yellow / Appearance: Turbid / S.021 / pH: x  Gluc: x / Ketone: Negative  / Bili: Negative / Urobili: Negative   Blood: x / Protein: 100 mg/dL / Nitrite: Negative   Leuk Esterase: Large / RBC: 3 /hpf / WBC 56 /HPF   Sq Epi: x / Non Sq Epi: 24 /hpf / Bacteria: Negative      RADIOLOGY & ADDITIONAL STUDIES:    PROTEIN CALORIE MALNUTRITION PRESENT: [ ]mild [ ]moderate [ ]severe [ ]underweight [ ]morbid obesity  https://www.andeal.org/vault/2440/web/files/ONC/Table_Clinical%20Characteristics%20to%20Document%20Malnutrition-White%20JV%20et%20al%2020.pdf    Height (cm): 165.1 (22 @ 20:16), 165.1 (22 @ 15:36)  Weight (kg): 75.3 (22 @ 20:29), 73.1 (22 @ 15:36)  BMI (kg/m2): 27.6 (22 @ 20:29), 26.8 (22 @ 20:16), 26.8 (22 @ 15:36)    [ ]PPSV2 < or = to 30% [ ]significant weight loss  [ ]poor nutritional intake  [ ]anasarca[ ]Artificial Nutrition      Other REFERRALS:  [ ]Hospice  [ ]Child Life  [ ]Social Work  [ ]Case management [ ]Holistic Therapy     Goals of Care Document:  HPI:  81 year old man with metastatic gallbladder cancer, DM2, HTN, HLD CAD s/p MI 2018, HFpEF, recent admission for sepsis 2/2 UTI, presents from rehab after being found unresponsive at rehab. History obtained from daughter at bedside. States at baseline patient is weak, uses wheelchair, however is alert and oriented. She found him slumped over and with food in his mouth. EMS was activated, was intubated in the field, started on norepinephrine ggt. On assessment, patient is obtunded, follows some commands, not withdrawing to pain.    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 Daughter Shahida along with elder daughter and pt's wife. Unable to obtain ROS as intubated.    PERTINENT PM/SXH:   Essential hypertension    Type 2 diabetes mellitus without complication, without long-term current use of insulin    HLD (hyperlipidemia)    BPH (benign prostatic hyperplasia)    CAD (coronary artery disease)      No significant past surgical history    Benign bladder tumor    History of colonoscopy    History of cataract extraction      FAMILY HISTORY:  No pertinent family history in first degree relatives      Family Hx substance abuse [ ]yes [ ]no  ITEMS NOT CHECKED ARE NOT PRESENT    SOCIAL HISTORY:   Significant other/partner[X ]  Children[X ]  Islam/Spirituality: Lutheran   Substance hx:  [ ]   Tobacco hx:  [ ]   Alcohol hx: [ ]   Home Opioid hx:  [ ] I-Stop Reference No:  Living Situation: [ ]Home  [ ]Long term care  [ X]Rehab [ ]Other    ADVANCE DIRECTIVES:    DNR/MOLST  [ ]  Living Will  [ ]   DECISION MAKER(s):   Shahida Fu   [ ] Health Care Proxy(s)  [X  ] Surrogate(s)  [ ] Guardian           Name(s): Phone Number(s):  Shahida Fu     BASELINE (I)ADL(s) (prior to admission):  [place]: [ ]Total  [ ] Moderate [ ]Dependent  Was in rehab prior to admission    Allergies    Advil (Hives)  penicillin (Hives)    Intolerances    MEDICATIONS  (STANDING):  albumin human  5% IVPB 250 milliLiter(s) IV Intermittent every 6 hours  cefepime   IVPB 1000 milliGRAM(s) IV Intermittent every 24 hours  chlorhexidine 0.12% Liquid 15 milliLiter(s) Oral Mucosa every 12 hours  chlorhexidine 4% Liquid 1 Application(s) Topical <User Schedule>  dexMEDEtomidine Infusion 0.5 MICROgram(s)/kG/Hr (9.41 mL/Hr) IV Continuous <Continuous>  dextrose 5%. 1000 milliLiter(s) (100 mL/Hr) IV Continuous <Continuous>  dextrose 5%. 1000 milliLiter(s) (50 mL/Hr) IV Continuous <Continuous>  dextrose 50% Injectable 25 Gram(s) IV Push once  dextrose 50% Injectable 12.5 Gram(s) IV Push once  dextrose 50% Injectable 25 Gram(s) IV Push once  glucagon  Injectable 1 milliGRAM(s) IntraMuscular once  heparin  Infusion 11 Unit(s)/Hr (11 mL/Hr) IV Continuous <Continuous>  insulin lispro (ADMELOG) corrective regimen sliding scale   SubCutaneous every 6 hours  norepinephrine Infusion 0.6 MICROgram(s)/kG/Min (90 mL/Hr) IV Continuous <Continuous>    MEDICATIONS  (PRN):  dextrose Oral Gel 15 Gram(s) Oral once PRN Blood Glucose LESS THAN 70 milliGRAM(s)/deciliter    PRESENT SYMPTOMS: [ ]Unable to self-report  [ ] CPOT [ ] PAINADs [ ] RDOS  Source if other than patient:  [ ]Family   [ ]Team     Pain: Unable to assess     CPOT:    https://www.[facility].org/getattachment/[email or web address]/Critical-Care-Pain-Observation-Tool-(CPOT)    PAIN AD Score:   http://geriatrictoolkit.[facility]/cog/painad.pdf (press ctrl +  left click to view)    Dyspnea:                           [ ]Mild [ ]Moderate [ ]Severe      RDOS:  0 to 2  minimal or no respiratory distress   3  mild distress  4 to 6 moderate distress  >7 severe distress  https://[email or web address]/handouts/hen/Respiratory_Distress_Observation_Scale.pdf (Ctrl +  left click to view)     Anxiety:                             [ ]Mild [ ]Moderate [ ]Severe  Fatigue:                             [ ]Mild [ ]Moderate [ ]Severe  Nausea:                             [ ]Mild [ ]Moderate [ ]Severe  Loss of appetite:              [ ]Mild [ ]Moderate [ ]Severe  Constipation:                    [ ]Mild [ ]Moderate [ ]Severe    PCSSQ[Palliative Care Spiritual Screening Question]   Severity (0-10):  Score of 4 or > indicate consideration of Chaplaincy referral.  Chaplaincy Referral: [X] yes [ ] refused [ ] following    Other Symptoms:    Palliative Performance Status Version 2:       40  %    http://npcrc.org/files/news/palliative_performance_scale_ppsv2.pdf  PHYSICAL EXAM:  Vital Signs Last 24 Hrs  T(C): 36 (09 Aug 2022 07:00), Max: 36.4 (08 Aug 2022 17:27)  T(F): 96.8 (09 Aug 2022 07:00), Max: 97.5 (08 Aug 2022 17:27)  HR: 86 (09 Aug 2022 09:15) (57 - 86)  BP: 100/54 (09 Aug 2022 08:45) (54/31 - 158/52)  BP(mean): 75 (09 Aug 2022 08:45) (57 - 94)  RR: 12 (09 Aug 2022 08:45) (12 - 38)  SpO2: 100% (09 Aug 2022 09:15) (96% - 100%)    Parameters below as of 09 Aug 2022 00:51      O2 Concentration (%): 30 I&O's Summary    08 Aug 2022 07:01  -  09 Aug 2022 07:00  --------------------------------------------------------  IN: 3151 mL / OUT: 985 mL / NET: 2166 mL    09 Aug 2022 07:01  -  09 Aug 2022 11:27  --------------------------------------------------------  IN: 118 mL / OUT: 150 mL / NET: -32 mL      GENERAL:   General: [X ]Cachetic, intubated, follows commands with family at bedside   [ ]Alert  [ ]Oriented   [ ]Lethargic  [ ]Unarousable  [ ]Verbal  [ ]Non-Verbal  Behavioral:   [ ] Anxiety  [ ] Delirium [ ] Agitation [ ] Other  HEENT:  [ ]Normal   [ ]Dry mouth   [ ]ET Tube/Trach  [ ]Oral lesions  PULMONARY:   [ ]Clear [ ]Tachypnea  [ ]Audible excessive secretions   [ ]Rhonchi        [ ]Right [ ]Left [ ]Bilateral  [ ]Crackles        [ ]Right [ ]Left [ ]Bilateral  [ ]Wheezing     [ ]Right [ ]Left [ ]Bilateral  [ ]Diminished breath sounds [ ]right [ ]left [ ]bilateral  CARDIOVASCULAR:    [ ]Regular [ ]Irregular [ ]Tachy  [ ]Mickey [ ]Murmur [ ]Other  GASTROINTESTINAL:  [ ]Soft  [ ]Distended   [ ]+BS  [ ]Non tender [ ]Tender  [ ]Other [ ]PEG [ ]OGT/ NGT  Last BM:  GENITOURINARY:  [ ]Normal [ ] Incontinent   [ ]Oliguria/Anuria   [ ]Villasenor  MUSCULOSKELETAL:   [ ]Normal   [ ]Weakness  [ ]Bed/Wheelchair bound [ ]Edema  NEUROLOGIC:   [ ]No focal deficits  [ ]Cognitive impairment  [ ]Dysphagia [ ]Dysarthria [ ]Paresis [ ]Other   SKIN:   [ ]Normal  [ ]Rash  [ ]Other  [ ]Pressure ulcer(s)       Present on admission [ ]y [ ]n    CRITICAL CARE:  [X ] Shock Present  [X ]Septic [ ]Cardiogenic [ ]Neurologic [ ]Hypovolemic  [ ]  Vasopressors [ ]  Inotropes   [ X]Respiratory failure present [X ]Mechanical ventilation [ ]Non-invasive ventilatory support [ ]High flow  Mode: CPAP with PS, FiO2: 30, PEEP: 5, PS: 8, MAP: 9  [X ]Acute  [ ]Chronic [ ]Hypoxic  [ ]Hypercarbic [ ]Other  [ ]Other organ failure     LABS:                        11.2   35.20 )-----------( 219      ( 09 Aug 2022 00:40 )             34.4       132<L>  |  100  |  45<H>  ----------------------------<  239<H>  4.0   |  16<L>  |  1.51<H>    Ca    7.4<L>      09 Aug 2022 00:40  Phos  5.5       Mg     2.1         TPro  6.6  /  Alb  1.7<L>  /  TBili  1.0  /  DBili  x   /  AST  58<H>  /  ALT  18  /  AlkPhos  236<H>    PT/INR - ( 08 Aug 2022 15:01 )   PT: 26.9 sec;   INR: 2.32 ratio         PTT - ( 09 Aug 2022 04:22 )  PTT:103.2 sec    Urinalysis Basic - ( 08 Aug 2022 16:52 )    Color: Yellow / Appearance: Turbid / S.021 / pH: x  Gluc: x / Ketone: Negative  / Bili: Negative / Urobili: Negative   Blood: x / Protein: 100 mg/dL / Nitrite: Negative   Leuk Esterase: Large / RBC: 3 /hpf / WBC 56 /HPF   Sq Epi: x / Non Sq Epi: 24 /hpf / Bacteria: Negative      RADIOLOGY & ADDITIONAL STUDIES:    PROTEIN CALORIE MALNUTRITION PRESENT: [ ]mild [ ]moderate [ ]severe [ ]underweight [ ]morbid obesity  https://www.andeal.org/vault/2440/web/files/ONC/Table_Clinical%20Characteristics%20to%20Document%20Malnutrition-White%20JV%20et%20al%2020.pdf    Height (cm): 165.1 (22 @ 20:16), 165.1 (22 @ 15:36)  Weight (kg): 75.3 (22 @ 20:29), 73.1 (22 @ 15:36)  BMI (kg/m2): 27.6 (22 @ 20:29), 26.8 (22 @ 20:16), 26.8 (22 @ 15:36)    [ ]PPSV2 < or = to 30% [ ]significant weight loss  [ ]poor nutritional intake  [ ]anasarca[ ]Artificial Nutrition      Other REFERRALS:  [ ]Hospice  [ ]Child Life  [ ]Social Work  [ ]Case management [ ]Holistic Therapy     Goals of Care Document: MOLST filled and placed in the chart  HPI:  81 year old man with metastatic gallbladder cancer, DM2, HTN, HLD CAD s/p MI 2018, HFpEF, recent admission for sepsis 2/2 UTI, presents from rehab after being found unresponsive at rehab. History obtained from daughter at bedside. States at baseline patient is weak, uses wheelchair, however is alert and oriented. She found him slumped over and with food in his mouth. EMS was activated, was intubated in the field, started on norepinephrine ggt. On assessment, patient is obtunded, follows some commands, not withdrawing to pain.    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 Daughter Shahida along with elder daughter and pt's wife. Unable to obtain ROS as intubated.    PERTINENT PM/SXH:   Essential hypertension    Type 2 diabetes mellitus without complication, without long-term current use of insulin    HLD (hyperlipidemia)    BPH (benign prostatic hyperplasia)    CAD (coronary artery disease)      No significant past surgical history    Benign bladder tumor    History of colonoscopy    History of cataract extraction      FAMILY HISTORY:  No pertinent family history in first degree relatives      Family Hx substance abuse [ ]yes [ ]no  ITEMS NOT CHECKED ARE NOT PRESENT    SOCIAL HISTORY:   Significant other/partner[X ]  Children[X ]  Muslim/Spirituality: Church   Substance hx:  [ ]   Tobacco hx:  [ ]   Alcohol hx: [ ]   Home Opioid hx:  [ ] I-Stop Reference No:  Living Situation: [ ]Home  [ ]Long term care  [ X]Rehab [ ]Other    ADVANCE DIRECTIVES:    DNR/MOLST  [ ]  Living Will  [ ]   DECISION MAKER(s):   Shahida Fu   [ ] Health Care Proxy(s)  [X  ] Surrogate(s)  [ ] Guardian           Name(s): Phone Number(s):  Shahida Fu, per EMR    BASELINE (I)ADL(s) (prior to admission):  [place]: [ ]Total  [ x] Moderate [ ]Dependent  Was in rehab prior to admission    Allergies    Advil (Hives)  penicillin (Hives)    Intolerances    MEDICATIONS  (STANDING):  albumin human  5% IVPB 250 milliLiter(s) IV Intermittent every 6 hours  cefepime   IVPB 1000 milliGRAM(s) IV Intermittent every 24 hours  chlorhexidine 0.12% Liquid 15 milliLiter(s) Oral Mucosa every 12 hours  chlorhexidine 4% Liquid 1 Application(s) Topical <User Schedule>  dexMEDEtomidine Infusion 0.5 MICROgram(s)/kG/Hr (9.41 mL/Hr) IV Continuous <Continuous>  dextrose 5%. 1000 milliLiter(s) (100 mL/Hr) IV Continuous <Continuous>  dextrose 5%. 1000 milliLiter(s) (50 mL/Hr) IV Continuous <Continuous>  dextrose 50% Injectable 25 Gram(s) IV Push once  dextrose 50% Injectable 12.5 Gram(s) IV Push once  dextrose 50% Injectable 25 Gram(s) IV Push once  glucagon  Injectable 1 milliGRAM(s) IntraMuscular once  heparin  Infusion 11 Unit(s)/Hr (11 mL/Hr) IV Continuous <Continuous>  insulin lispro (ADMELOG) corrective regimen sliding scale   SubCutaneous every 6 hours  norepinephrine Infusion 0.6 MICROgram(s)/kG/Min (90 mL/Hr) IV Continuous <Continuous>    MEDICATIONS  (PRN):  dextrose Oral Gel 15 Gram(s) Oral once PRN Blood Glucose LESS THAN 70 milliGRAM(s)/deciliter    PRESENT SYMPTOMS: [x ]Unable to self-report  [ ] CPOT [ x] PAINADs [ ] RDOS  Source if other than patient:  [ ]Family   [ ]Team     Pain: Unable to assess     CPOT:    https://www.[email or web address].org/getattachment/[email or web address]/Critical-Care-Pain-Observation-Tool-(CPOT)    PAIN AD Score: 0  http://geriatrictoolkit.[facility]/cog/painad.pdf (press ctrl +  left click to view)    Dyspnea:                           [ ]Mild [ ]Moderate [ ]Severe      RDOS:0  0 to 2  minimal or no respiratory distress   3  mild distress  4 to 6 moderate distress  >7 severe distress  https://[email or web address]/handouts/hen/Respiratory_Distress_Observation_Scale.pdf (Ctrl +  left click to view)     Anxiety:                             [ ]Mild [ ]Moderate [ ]Severe  Fatigue:                             [ ]Mild [ ]Moderate [ ]Severe  Nausea:                             [ ]Mild [ ]Moderate [ ]Severe  Loss of appetite:              [ ]Mild [ ]Moderate [ ]Severe  Constipation:                    [ ]Mild [ ]Moderate [ ]Severe    PCSSQ[Palliative Care Spiritual Screening Question]   Severity (0-10):  Score of 4 or > indicate consideration of Chaplaincy referral.  Chaplaincy Referral: [X] yes [ ] refused [ ] following    Other Symptoms:    Palliative Performance Status Version 2:       10  %    http://npcrc.org/files/news/palliative_performance_scale_ppsv2.pdf  PHYSICAL EXAM:  Vital Signs Last 24 Hrs  T(C): 36 (09 Aug 2022 07:00), Max: 36.4 (08 Aug 2022 17:27)  T(F): 96.8 (09 Aug 2022 07:00), Max: 97.5 (08 Aug 2022 17:27)  HR: 86 (09 Aug 2022 09:15) (57 - 86)  BP: 100/54 (09 Aug 2022 08:45) (54/31 - 158/52)  BP(mean): 75 (09 Aug 2022 08:45) (57 - 94)  RR: 12 (09 Aug 2022 08:45) (12 - 38)  SpO2: 100% (09 Aug 2022 09:15) (96% - 100%)    Parameters below as of 09 Aug 2022 00:51      O2 Concentration (%): 30 I&O's Summary    08 Aug 2022 07:01  -  09 Aug 2022 07:00  --------------------------------------------------------  IN: 3151 mL / OUT: 985 mL / NET: 2166 mL    09 Aug 2022 07:01  -  09 Aug 2022 11:27  --------------------------------------------------------  IN: 118 mL / OUT: 150 mL / NET: -32 mL      GENERAL:   General: [X ]Cachetic, intubated, follows commands with family at bedside   [ ]Alert  [ ]Oriented   [ ]Lethargic  [ ]Unarousable  [ ]Verbal  [ ]Non-Verbal  Behavioral:   [ ] Anxiety  [ ] Delirium [ ] Agitation [ ] Other  HEENT:  [ ]Normal   [ ]Dry mouth   [ x]ET Tube/Trach  [ ]Oral lesions  PULMONARY:   [ x]Clear [ ]Tachypnea  [ ]Audible excessive secretions   [ ]Rhonchi        [ ]Right [ ]Left [ ]Bilateral  [ ]Crackles        [ ]Right [ ]Left [ ]Bilateral  [ ]Wheezing     [ ]Right [ ]Left [ ]Bilateral  [ ]Diminished breath sounds [ ]right [ ]left [ ]bilateral  CARDIOVASCULAR:    [x ]Regular [ ]Irregular [ ]Tachy  [ ]Mickey [ ]Murmur [ ]Other  GASTROINTESTINAL:  [x ]Soft  [ ]Distended   [ ]+BS  [ x]Non tender [ ]Tender  [ ]Other [ ]PEG [ ]OGT/ NGT  Last BM:  GENITOURINARY:  [ ]Normal [ ] Incontinent   [ ]Oliguria/Anuria   [x ]Villasenor  MUSCULOSKELETAL:   [ ]Normal   [ ]Weakness  [ x]Bed/Wheelchair bound [ ]Edema  NEUROLOGIC: intubated, follows simple commands  [ ]No focal deficits  [ ]Cognitive impairment  [ ]Dysphagia [ ]Dysarthria [ ]Paresis [x ]Other   SKIN: ecchymoses  [ ]Normal  [ ]Rash  [ ]Other  [ ]Pressure ulcer(s)       Present on admission [ ]y [ ]n    CRITICAL CARE:  [X ] Shock Present  [X ]Septic [ ]Cardiogenic [ ]Neurologic [ ]Hypovolemic  [x ]  Vasopressors [ ]  Inotropes   [ X]Respiratory failure present [X ]Mechanical ventilation [ ]Non-invasive ventilatory support [ ]High flow  Mode: CPAP with PS, FiO2: 30, PEEP: 5, PS: 8, MAP: 9  [X ]Acute  [ ]Chronic [ x]Hypoxic  [ ]Hypercarbic [ ]Other  [ ]Other organ failure     LABS:                        11.2   35.20 )-----------( 219      ( 09 Aug 2022 00:40 )             34.4       132<L>  |  100  |  45<H>  ----------------------------<  239<H>  4.0   |  16<L>  |  1.51<H>    Ca    7.4<L>      09 Aug 2022 00:40  Phos  5.5       Mg     2.1         TPro  6.6  /  Alb  1.7<L>  /  TBili  1.0  /  DBili  x   /  AST  58<H>  /  ALT  18  /  AlkPhos  236<H>    PT/INR - ( 08 Aug 2022 15:01 )   PT: 26.9 sec;   INR: 2.32 ratio         PTT - ( 09 Aug 2022 04:22 )  PTT:103.2 sec    Urinalysis Basic - ( 08 Aug 2022 16:52 )    Color: Yellow / Appearance: Turbid / S.021 / pH: x  Gluc: x / Ketone: Negative  / Bili: Negative / Urobili: Negative   Blood: x / Protein: 100 mg/dL / Nitrite: Negative   Leuk Esterase: Large / RBC: 3 /hpf / WBC 56 /HPF   Sq Epi: x / Non Sq Epi: 24 /hpf / Bacteria: Negative      RADIOLOGY & ADDITIONAL STUDIES:  < from: CT Chest w/ IV Cont (22 @ 16:22) >    ACC: [id number] EXAM:  CT ABDOMEN AND PELVIS IC                        ACC: [id number] EXAM:  CT CHEST IC                          PROCEDURE DATE:  2022          INTERPRETATION:  CLINICAL INFORMATION: Metastatic malignancy. Cardiac   arrest.    COMPARISON: CT chest dated 2022. CT abdomen pelvis dated 2022.    CONTRAST/COMPLICATIONS:  IV Contrast: Omnipaque 350. 90 cc administered. 10 cc discarded.  Oral Contrast: NONE  Complications: None reported at time of study completion    PROCEDURE:  CT of the Chest, Abdomen and Pelvis was performed.  Sagittal and coronal reformats were performed.    FINDINGS:  CHEST:  LUNGS AND LARGE AIRWAYS: Endotracheal tube tip 2 cm above the francisco.   Bibasilar dependent atelectasis.  PLEURA: Small bilateral pleural effusions, increased from prior exam.  VESSELS: Aortic and coronary artery calcifications. No pulmonary embolism   is detected, although note that study is not performed as pulmonary CTA.  HEART: Heart size is normal. No pericardial effusion.  MEDIASTINUM AND ZACKERY: No lymphadenopathy.  CHEST WALL AND LOWER NECK: Bilateral thyroid nodules.    ABDOMEN AND PELVIS:  LIVER: Multiple bilobar hepatic metastases with large confluent mass in   the inferior right lobe, unchanged.  BILE DUCTS: Normal caliber.  GALLBLADDER: Large gallstones. No fat plane preserved between the   gallbladder and right lobe liver metastases suspicious for direct tumor   invasion.  SPLEEN: Within normal limits.  PANCREAS: Within normal limits.  ADRENALS: Subcentimeter right adrenal adenoma, unchanged.  KIDNEYS/URETERS: Within normal limits.    BLADDER: Within normal limits.  REPRODUCTIVE ORGANS: Enlarged prostate with dystrophic calcifications.    BOWEL: Enteric tube tip in the body of the stomach. No bowel obstruction.   Appendix is normal.  PERITONEUM: Trace ascites.  VESSELS: Atheromatous calcifications. Persistent thrombosis of the   posterior and anterior branches of the right portal vein. Thrombosis of   branch of the left hepatic vein.    RETROPERITONEUM/LYMPH NODES: Kaykay hepatis lymphadenopathy unchanged.  ABDOMINAL WALL: Subcutaneous dependent edema. Moderately large   fat-containing periumbilical ventral hernia.  BONES: Thoracic spine osteophytes.    IMPRESSION:  Extensive hepatic metastatic disease possibly secondary to locally   invasive gallbladder cancer.    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    Mildly increasing bilateral pleural effusions.        --- End of Report ---            TALAT PEOPLES MD; Attending Radiologist  This document has been electronically signed. Aug  8 2022  5:02PM    < end of copied text >      PROTEIN CALORIE MALNUTRITION PRESENT: [ ]mild [ ]moderate [ ]severe [ ]underweight [ ]morbid obesity  https://www.andeal.org/vault/2440/web/files/ONC/Table_Clinical%20Characteristics%20to%20Document%20Malnutrition-White%20JV%20et%20al%2020.pdf    Height (cm): 165.1 (22 @ 20:16), 165.1 (22 @ 15:36)  Weight (kg): 75.3 (22 @ 20:29), 73.1 (22 @ 15:36)  BMI (kg/m2): 27.6 (22 @ 20:29), 26.8 (22 @ 20:16), 26.8 (22 @ 15:36)    [x ]PPSV2 < or = to 30% [ ]significant weight loss  [ x]poor nutritional intake  [ ]anasarca[ ]Artificial Nutrition      Other REFERRALS:  [ ]Hospice  [ ]Child Life  [x ]Social Work  [ ]Case management [ ]Holistic Therapy     Goals of Care Document: MOLST filled and placed in the chart

## 2022-08-09 NOTE — PROGRESS NOTE ADULT - ASSESSMENT
81 year old man with metastatic gallbladder cancer, DM2, HTN, HLD, CAD s/p MI 2018, HFpEF, recent admission for sepsis 2/2 UTI, presents from rehab after being found unresponsive at rehab likely due to aspiration, intubated i/s/o hypoxemic respiratory failure and septic shock, admitted to MICU for further management.    Neuro    #Obtundation  Baseline A&Ox3, currently not withdrawing to noxious stimuli though following some commands  Likely 2/2 septic encephalopathy i/s/o aspiration PNA  Code stroke called on arrival, no evidence of CVA found  - continue to monitor mental status per ICU protocol; expect improvement with treatment of underlying sepsis    Respiratory    #Acute hypoxemic respiratory failure  Likely 2/2 aspiration PNA, as was found unresponsive with food in mouth and rhonchorous breath sounds on exam  Vent was set to 12/500/100/5 when pt was assessed; was not overbreathing  - continue to monitor on vent, titrate to ABG  - f/u CXR, ordered but unclear if performed  - fentanyl prn    Cardiac    #Shock  Pt with likely septic shock 2/2 aspiration PNA  - c/w norepinephrine ggt, goal MAP 65  - given 500cc bolus and 5% albumin 250cc overnight with decreasing vasopressor requirements    ID    #?Aspiration pneumonia  Suspect pt with PNA as stated above, though of note pt is afebrile and WBC  s/p vanc, clindamycin, and cefepime in ED  - c/w renally dosed cefepime  - c/w vancomycin pending results of MRSA swab  - f/u blood, urine, and sputum cultures    /Renal    #NELSON  sCr ~2x his baseline on arrival to ED, likely 2/2 ATN iso sepsis  - f/u urine electrolytes  - Villasenor catheter to monitor UO  - maintain MAP >65 as above to ensure adequate renal perfusion  - continue to trend Cr, expect improvement with treatment of underlying condition      GI    #Ppx while intubated  - start Pepcid 20mg bid    Endocrine    #Hx of DM  - monitor on ISS q6h    #Nodular thyroid  - will order thyroid ultrasound per recommendation of radiology report to assess further    Heme/onc    #Portal vein thrombosis  #Hepatic vein thrombosis  On apixaban 5mg bid at home  - will start heparin ggt given NELSON    #Metastatic gallbladder cancer  Unclear what interventions have been done to this point; will     DVT ppx    Ethics  Code status: DNR/DNI   81 year old man with metastatic gallbladder cancer, DM2, HTN, HLD, CAD s/p MI 2018, HFpEF, recent admission for sepsis 2/2 UTI, presents from rehab after being found unresponsive at rehab likely due to aspiration, intubated i/s/o hypoxemic respiratory failure and septic shock, admitted to MICU for further management.    Neuro    #Obtundation  Baseline A&Ox3, currently not withdrawing to noxious stimuli though following some commands  Likely 2/2 septic encephalopathy i/s/o aspiration PNA  Code stroke called on arrival, no evidence of CVA found  - continue to monitor mental status per ICU protocol; expect improvement with treatment of underlying sepsis    Respiratory    #Acute hypoxemic respiratory failure  Likely 2/2 aspiration PNA, as was found unresponsive with food in mouth and rhonchorous breath sounds on exam  Vent was set to 12/500/100/5 when pt was assessed; was not overbreathing  - continue to monitor on vent, titrate to ABG  - f/u CXR, ordered but unclear if performed  - fentanyl prn    Cardiac    #Shock  Pt with likely septic shock 2/2 aspiration PNA  - c/w norepinephrine ggt, goal MAP 65  - given 500cc bolus and 5% albumin 250cc overnight with decreasing vasopressor requirements    ID    #?Aspiration pneumonia  Suspect pt with PNA as stated above, though of note pt is afebrile and WBC  s/p vanc, clindamycin, and cefepime in ED  - c/w renally dosed cefepime  - c/w vancomycin pending results of MRSA swab  - f/u blood, urine, and sputum cultures    /Renal    #NELSON  sCr ~2x his baseline on arrival to ED, likely 2/2 ATN iso sepsis  - f/u urine electrolytes  - Villasenor catheter to monitor UO  - maintain MAP >65 as above to ensure adequate renal perfusion  - continue to trend Cr, expect improvement with treatment of underlying condition      GI  #Periumbilical ventral hernia  - CT A/P showing moderately large fat containing periumbilical ventral hernia     #Ppx while intubated  - start Pepcid 20mg bid    Endocrine    #Hx of DM  - monitor on ISS q6h    #Nodular thyroid  - will order thyroid ultrasound per recommendation of radiology report to assess further    Heme/onc    #Right Portal vein thrombosis  #Left Hepatic vein thrombosis  On apixaban 5mg bid at home  - will start heparin ggt given NELSON    #Metastatic gallbladder cancer  - CT A/P showed extensive hepatic metastases from likely gallbladder primary  Unclear what interventions have been done to this point; will     DVT ppx    Ethics  Code status: DNR/DNI   81 year old man with metastatic gallbladder cancer, DM2, HTN, HLD, CAD s/p MI 2018, HFpEF, recent admission for sepsis 2/2 UTI, presents from rehab after being found unresponsive at rehab likely due to aspiration, intubated i/s/o hypoxemic respiratory failure and septic shock, admitted to MICU for further management.    Neuro    #Obtundation  Baseline A&Ox3, currently not withdrawing to noxious stimuli though following some commands  Likely 2/2 septic encephalopathy i/s/o aspiration PNA  Code stroke called on arrival, no evidence of CVA found  - continue to monitor mental status per ICU protocol; expect improvement with treatment of underlying sepsis    Respiratory    #Acute hypoxemic respiratory failure  Likely 2/2 aspiration PNA, as was found unresponsive with food in mouth and rhonchorous breath sounds on exam  Vent was set to 12/500/100/5 when pt was assessed; was not overbreathing  - continue to monitor on vent, titrate to ABG  - f/u CXR, ordered but unclear if performed  - fentanyl prn    Cardiac    #Shock  Pt with likely septic shock 2/2 aspiration PNA  - c/w norepinephrine ggt, goal MAP 65  - given 500cc bolus and 5% albumin 250cc overnight with decreasing vasopressor requirements    ID    #?Aspiration pneumonia  Suspect pt with PNA as stated above, though of note pt is afebrile and WBC  s/p vanc, clindamycin, and cefepime in ED  - c/w renally dosed cefepime  - c/w vancomycin pending results of MRSA swab  - f/u blood, urine, and sputum cultures    /Renal    #NELSON  sCr ~2x his baseline on arrival to ED, likely 2/2 ATN iso sepsis  - f/u urine electrolytes  - Villasenor catheter to monitor UO  - maintain MAP >65 as above to ensure adequate renal perfusion  - continue to trend Cr, expect improvement with treatment of underlying condition    GI  #Periumbilical ventral hernia  - CT A/P showing moderately large fat containing periumbilical ventral hernia     #Ppx while intubated  - start Pepcid 20mg bid    Endocrine    #Hx of DM  - monitor on ISS q6h    #Nodular thyroid  - will order thyroid ultrasound per recommendation of radiology report to assess further    Heme/onc    #Right Portal vein thrombosis  #Left Hepatic vein thrombosis  On apixaban 5mg bid at home  - will start heparin ggt given NELSON    #Metastatic gallbladder cancer  - CT A/P showed extensive hepatic metastases from likely gallbladder primary  Unclear what interventions have been done to this point; will     DVT ppx    Ethics  Code status: DNR/DNI   81 year old man with metastatic gallbladder cancer, DM2, HTN, HLD, CAD s/p MI 2018, HFpEF, recent admission for sepsis 2/2 UTI, presents from rehab after being found unresponsive at rehab likely due to aspiration, intubated i/s/o hypoxemic respiratory failure and septic shock, admitted to MICU for further management.    Neuro    #Obtundation  Baseline A&Ox3, currently not withdrawing to noxious stimuli though following some commands  Likely 2/2 septic encephalopathy i/s/o aspiration PNA  Code stroke called on arrival, no evidence of CVA found  - continue to monitor mental status per ICU protocol; expect improvement with treatment of underlying sepsis    Respiratory    #Acute hypoxemic respiratory failure  Likely 2/2 aspiration PNA, as was found unresponsive with food in mouth and rhonchorous breath sounds on exam  Vent was set to 12/500/100/5 when pt was assessed; was not overbreathing  - continue to monitor on vent, monitor ABG for adjustments  - CXR showed bilateral small pleural effusions   - fentanyl prn    Cardiac    #Shock  Pt with likely septic shock 2/2 aspiration PNA vs. vasoplegic  - c/w norepinephrine ggt, goal MAP 65, wean as tolerates  - c/w 5% albumin 250cc q6h today for resuscitation     #CAD  - c/w ASA 81 daily     #HFpEF  - TTE 7/26 showed EF=63%, moderate aortic stenosis, minimal aortic regurg, concentric LV remodelling, normal LVSF, normal RV function,       ID    #?Aspiration pneumonia  Suspect pt with PNA as stated above, though of note pt is afebrile and WBC  s/p vanc, clindamycin, and cefepime in ED  - c/w Cefepime   - f/u blood, urine, and sputum cultures, MRSA PCR    /Renal    #NELSON  sCr ~2x his baseline on arrival to ED, likely 2/2 ATN iso sepsis  - Villasenor catheter placed in ED, remove 8/9  - maintain MAP >65 as above to ensure adequate renal perfusion  - continue to trend Cr, expect improvement with treatment of underlying condition  - f/u urine electrolytes    GI  #Periumbilical ventral hernia  - CT A/P showing moderately large fat containing periumbilical ventral hernia     Endocrine  #Hx of DM  - monitor on ISS q6h    #Nodular thyroid  - outpatient thyroid US for further workup    Heme/onc    #Right Portal vein thrombosis  #Left Hepatic vein thrombosis  On apixaban 5mg bid at home  - c/w heparin ggt    #Metastatic gallbladder cancer  - CT A/P showed extensive hepatic metastases from likely gallbladder primary  - 8/1/22 Liver metastasis biopsy showed carcinoma    DVT ppx: on Heparin gtt    Ethics  Code status: DNR/DNI 81 year old man with metastatic gallbladder cancer, DM2, HTN, HLD, CAD s/p MI 2018, HFpEF, recent admission for sepsis 2/2 UTI, presents from rehab after being found unresponsive at rehab likely due to aspiration, intubated i/s/o hypoxemic respiratory failure and septic shock, admitted to MICU for further management.    Neuro    #Obtundation  Baseline A&Ox3, currently not withdrawing to noxious stimuli though following some commands  Likely 2/2 septic encephalopathy i/s/o aspiration PNA  Code stroke called on arrival, no evidence of CVA found  - continue to monitor mental status per ICU protocol; expect improvement with treatment of underlying sepsis    Respiratory    #Acute hypoxemic respiratory failure  Likely 2/2 aspiration PNA, as was found unresponsive with food in mouth and rhonchorous breath sounds on exam  Vent was set to 12/500/100/5 when pt was assessed; was not overbreathing  - continue to monitor on vent, monitor ABG for adjustments  - CXR showed bilateral small pleural effusions   - fentanyl prn    Cardiac    #Shock  Pt with likely septic shock 2/2 aspiration PNA vs. vasoplegic  - c/w norepinephrine ggt, goal MAP 65, wean as tolerates  - c/w 5% albumin 250cc q8h today for resuscitation     #CAD  - c/w ASA 81 daily     #HFpEF  - TTE 7/26 showed EF=63%, moderate aortic stenosis, minimal aortic regurg, concentric LV remodelling, normal LVSF, normal RV function,       ID  #?Aspiration pneumonia  Suspect pt with PNA as stated above, though of note pt is afebrile and WBC  s/p vanc, clindamycin, and cefepime in ED  - c/w Cefepime   - f/u blood, urine, and sputum cultures, MRSA PCR    /Renal    #NELSON  sCr ~2x his baseline on arrival to ED, likely 2/2 ATN iso sepsis  - Villasenor catheter placed in ED, remove 8/9  - maintain MAP >65 as above to ensure adequate renal perfusion  - continue to trend Cr, expect improvement with treatment of underlying condition  - f/u urine electrolytes    GI  #Periumbilical ventral hernia  - CT A/P showing moderately large fat containing periumbilical ventral hernia     Endocrine  #Hx of DM  - monitor on ISS q6h    #Nodular thyroid  - outpatient thyroid US for further workup    Heme/onc    #Right Portal vein thrombosis  #Left Hepatic vein thrombosis  On apixaban 5mg bid at home  - c/w heparin ggt    #Metastatic gallbladder cancer  - CT A/P showed extensive hepatic metastases from likely gallbladder primary  - 8/1/22 Liver metastasis biopsy showed carcinoma    DVT ppx: on Heparin gtt    Ethics  Code status: DNR/DNI

## 2022-08-09 NOTE — CONSULT NOTE ADULT - PROBLEM SELECTOR RECOMMENDATION 4
MOLST form completed and placed in chart  DNR/DNI order placed GOC discussion as above: remains DNR and does not want re-intubation     MOLST form completed and placed in chart  DNR/DNI order placed   >16 minutes spent on ACP

## 2022-08-09 NOTE — CONSULT NOTE ADULT - PROBLEM SELECTOR RECOMMENDATION 2
antibiotics per ICU team   wean off ventilatory support per ICU team  pressors per ICU team pathology report reviewed  family not pursuing further intervention

## 2022-08-09 NOTE — CONSULT NOTE ADULT - CONVERSATION DETAILS
Discussed with pt's wife, pt's elder daughter and daughter Shahida at bedside; both deferred to Shahida to be the primary decision maker. Discussed with family regarding pt's current diagnosis including metastatic GB cancer, sepsis due to possible aspiration event, hypoxic respiratory failure. Family expressed good understanding of pt's diagnosis and current status.     Shahida notes that comfort was most important to patient. Family wants to keep current management with antibiotics and pressors as is. If pt fails extubation, they do not want re-intubation. if patient fails extubation, will reevaluate for PCU. DNR status that was discussed prior was re-confirmed. Discussed with pt's wife, pt's elder daughter and daughter Shahida at bedside; wife deferred to Shahida to be the primary decision maker. Discussed with family regarding pt's current diagnosis including metastatic GB cancer, sepsis due to possible aspiration event, hypoxic respiratory failure. Family expressed good understanding of pt's diagnosis and current status.     Shahida notes that comfort was most important to patient. Family wants to keep current management with antibiotics and pressors as is. If pt fails extubation, they do not want re-intubation. if patient fails extubation, will reevaluate for PCU. DNR status that was discussed prior was re-confirmed. Discussed with pt's wife, pt's elder daughter and daughter Shahida at bedside; wife deferred to Shahida to be the primary decision maker. Discussed with family regarding pt's current diagnosis including metastatic GB cancer, sepsis due to possible aspiration event, hypoxic respiratory failure. Family expressed good understanding of pt's diagnosis and current status.     Shahida notes that comfort was most important to patient. Family wants to keep current management with antibiotics and pressors as is. but if pt fails extubation, they do not want re-intubation. if patient fails extubation, will reevaluate for PCU. DNR status that was discussed prior was re-confirmed. MOLST completed today for DNR/DNI.  Family requested  referral, patient identifies as [place]ism.

## 2022-08-10 NOTE — PROGRESS NOTE ADULT - ASSESSMENT
81 year old man with metastatic gallbladder cancer, DM2, HTN, HLD, CAD s/p MI 2018, HFpEF, recent admission for sepsis 2/2 UTI, presents from rehab after being found unresponsive at rehab likely due to aspiration, intubated i/s/o hypoxemic respiratory failure and septic shock, admitted to MICU for further management.    Neuro  #Obtundation  Baseline A&Ox3, currently not withdrawing to noxious stimuli though following some commands  Likely 2/2 septic encephalopathy i/s/o aspiration PNA  Code stroke called on arrival, no evidence of CVA found  - weaned off propofol  - continue to monitor mental status per ICU protocol; expect improvement with treatment of underlying sepsis    Respiratory  #Acute hypoxemic respiratory failure  Likely 2/2 aspiration PNA, as was found unresponsive with food in mouth and rhonchorous breath sounds on exam  Vent was set to 12/500/100/5 when pt was assessed; was not overbreathing  - continue to monitor on vent, monitor ABG for adjustments  - CXR showed bilateral small pleural effusions   - fentanyl prn    Cardiac  #Shock  Pt with likely septic shock 2/2 aspiration PNA vs. vasoplegic  - c/w norepinephrine ggt, goal MAP 65, wean as tolerates  - c/w 5% albumin 250cc q8h today for resuscitation     #CAD  - c/w ASA 81 daily     #HFpEF  - TTE 7/26 showed EF=63%, moderate aortic stenosis, minimal aortic regurg, concentric LV remodelling, normal LVSF, normal RV function,       ID  #?Aspiration pneumonia  Suspect pt with PNA as stated above, though of note pt is afebrile and WBC  s/p vanc, clindamycin, and cefepime in ED  - c/w Cefepime   - f/u blood, urine, and sputum cultures, MRSA PCR    /Renal  #NELSON  sCr ~2x his baseline on arrival to ED, likely 2/2 ATN iso sepsis  - Villasenor catheter placed in ED, remove 8/9  - maintain MAP >65 as above to ensure adequate renal perfusion  - continue to trend Cr, expect improvement with treatment of underlying condition  - f/u urine electrolytes    GI  #Periumbilical ventral hernia  - CT A/P showing moderately large fat containing periumbilical ventral hernia     Endocrine  #Hx of DM  - monitor on ISS q6h    #Nodular thyroid  - outpatient thyroid US for further workup    Heme/onc  #Right Portal vein thrombosis  #Left Hepatic vein thrombosis  On apixaban 5mg bid at home  - c/w heparin ggt    #Metastatic gallbladder cancer  - CT A/P showed extensive hepatic metastases from likely gallbladder primary  - 8/1/22 Liver metastasis biopsy showed carcinoma    DVT ppx: on Heparin gtt    Ethics  Code status: DNR/DNI, family discussion with plan to not re-intubate as this would not be within GOC 81 year old man with metastatic gallbladder cancer, DM2, HTN, HLD, CAD s/p MI 2018, HFpEF, recent admission for sepsis 2/2 UTI, presents from rehab after being found unresponsive at rehab likely due to aspiration, intubated i/s/o hypoxemic respiratory failure and septic shock, admitted to MICU for further management.    Neuro  #Obtundation  Baseline A&Ox3, currently not withdrawing to noxious stimuli though following some commands  Likely 2/2 septic encephalopathy i/s/o aspiration PNA  Code stroke called on arrival, no evidence of CVA found  - weaned off propofol  - continue to monitor mental status per ICU protocol; expect improvement with treatment of underlying sepsis    Respiratory  #Acute hypoxemic respiratory failure  Likely 2/2 aspiration PNA, as was found unresponsive with food in mouth and rhonchorous breath sounds on exam  Vent was set to 12/500/100/5 when pt was assessed; was not overbreathing  - continue to monitor on vent, monitor ABG for adjustments  - CXR showed bilateral small pleural effusions   - fentanyl prn    Cardiac  #Shock  Pt with likely septic shock 2/2 aspiration PNA vs. vasoplegic  - c/w norepinephrine ggt, goal MAP 65, wean as tolerates  - c/w 5% albumin 250cc q8h today for resuscitation     #CAD  - c/w ASA 81 daily     #HFpEF  - TTE 7/26 showed EF=63%, moderate aortic stenosis, minimal aortic regurg, concentric LV remodelling, normal LVSF, normal RV function,       /Renal  #NELSON  sCr ~2x his baseline on arrival to ED, likely 2/2 ATN iso sepsis  - Villasenor catheter placed in ED, remove 8/9  - maintain MAP >65 as above to ensure adequate renal perfusion  - continue to trend Cr, expect improvement with treatment of underlying condition  - FENa=0.4% consistent with pre-renal etiology    GI  #Periumbilical ventral hernia  - CT A/P showing moderately large fat containing periumbilical ventral hernia     Endocrine  #Hx of DM  - monitor on ISS q6h    #Nodular thyroid  - outpatient thyroid US for further workup    ID  #?Aspiration pneumonia  Suspect pt with PNA as stated above, though of note pt is afebrile and WBC  s/p vanc, clindamycin, and cefepime in ED  - c/w Cefepime   - MRSA PCR neg  - f/u blood, urine, and sputum cultures,    Heme/onc  #Right Portal vein thrombosis  #Left Hepatic vein thrombosis  On apixaban 5mg bid at home  - c/w heparin ggt    #Metastatic gallbladder cancer  - CT A/P showed extensive hepatic metastases from likely gallbladder primary  - 8/1/22 Liver metastasis biopsy showed carcinoma    DVT ppx: on Heparin gtt    Ethics  Code status: DNR/DNI, family discussion with plan to not re-intubate as this would not be within GOC. MOLST in chart. 81 year old man with metastatic gallbladder cancer, DM2, HTN, HLD, CAD s/p MI 2018, HFpEF, recent admission for sepsis 2/2 UTI, presents from rehab after being found unresponsive at rehab likely due to aspiration, intubated i/s/o hypoxemic respiratory failure and septic shock, admitted to MICU for further management.    Neuro  #Obtundation  Baseline A&Ox3, currently not withdrawing to noxious stimuli though following some commands  Likely 2/2 septic encephalopathy i/s/o aspiration PNA  Code stroke called on arrival, no evidence of CVA found  - weaned off propofol and precedex  - patient following commands off sedation    Respiratory  #Acute hypoxemic respiratory failure  Likely 2/2 aspiration PNA, as was found unresponsive with food in mouth and rhonchorous breath sounds on exam  Vent was set to 12/500/100/5 when pt was assessed; was not overbreathing  - continue to monitor on vent, monitor ABG for adjustments  - CXR showed bilateral small pleural effusions   - fentanyl prn    Cardiac  #Shock  Pt with likely septic shock 2/2 aspiration PNA vs. vasoplegic  - s/p 5% albumin 250cc q8h x1 day for fluid resuscitation   - c/w norepinephrine ggt, goal MAP 65, wean as tolerates    #CAD  - c/w ASA 81 daily     #HFpEF  - TTE 7/26 showed EF=63%, moderate aortic stenosis, minimal aortic regurg, concentric LV remodelling, normal LVSF, normal RV function,       /Renal  #NELSON  sCr ~2x his baseline on arrival to ED, likely 2/2 ATN iso sepsis  - Villasenor catheter placed in ED, remove 8/9  - maintain MAP >65 as above to ensure adequate renal perfusion  - continue to trend Cr, expect improvement with treatment of underlying condition  - FENa=0.4% consistent with pre-renal etiology    GI  #Periumbilical ventral hernia  - CT A/P showing moderately large fat containing periumbilical ventral hernia     Endocrine  #Hx of DM  - monitor on ISS q6h    #Nodular thyroid  - outpatient thyroid US for further workup    ID  #?Aspiration pneumonia  Suspect pt with PNA as stated above, though of note pt is afebrile and WBC  s/p vanc, clindamycin, and cefepime in ED  - c/w Cefepime   - MRSA PCR neg  - f/u blood, urine, and sputum cultures,    Heme/onc  #Right Portal vein thrombosis  #Left Hepatic vein thrombosis  On apixaban 5mg bid at home  - c/w heparin ggt    #Metastatic gallbladder cancer  - CT A/P showed extensive hepatic metastases from likely gallbladder primary  - 8/1/22 Liver metastasis biopsy showed carcinoma    DVT ppx: on Heparin gtt    Ethics  Code status: DNR/DNI, family discussion with plan to not re-intubate as this would not be within GOC. MOLST in chart. 81 year old man with metastatic gallbladder cancer, DM2, HTN, HLD, CAD s/p MI 2018, HFpEF, recent admission for sepsis 2/2 UTI, presents from rehab after being found unresponsive at rehab likely due to aspiration, intubated i/s/o hypoxemic respiratory failure and septic shock, admitted to MICU for further management.    Neuro  #Obtundation  Baseline A&Ox3, currently not withdrawing to noxious stimuli though following some commands  Likely 2/2 septic encephalopathy i/s/o aspiration PNA  Code stroke called on arrival, no evidence of CVA found  - weaned off propofol and precedex  - patient following commands off sedation    Respiratory  #Acute hypoxemic respiratory failure  Likely 2/2 aspiration PNA, as was found unresponsive with food in mouth and rhonchorous breath sounds on exam  Vent was set to 12/500/100/5 when pt was assessed; was not overbreathing  - continue to monitor on vent, monitor ABG for adjustments  - CXR showed bilateral small pleural effusions   - fentanyl prn  - tentative extubation today 8/10    Cardiac  #Shock  Pt with likely septic shock 2/2 aspiration PNA vs. vasoplegic  - s/p 5% albumin 250cc q8h x1 day for fluid resuscitation   - c/w norepinephrine ggt, goal MAP 65, wean as tolerates    #CAD  - c/w ASA 81 daily     #HFpEF  - TTE 7/26 showed EF=63%, moderate aortic stenosis, minimal aortic regurg, concentric LV remodelling, normal LVSF, normal RV function,       /Renal  #NELSON  sCr ~2x his baseline on arrival to ED, likely 2/2 ATN iso sepsis. Improving.  - Villasenor catheter placed in ED, remove 8/9  - maintain MAP >65 as above to ensure adequate renal perfusion  - continue to trend Cr, expect improvement with treatment of underlying condition  - FENa=0.4% consistent with pre-renal etiology    GI  #Periumbilical ventral hernia  - CT A/P showing moderately large fat containing periumbilical ventral hernia     Endocrine  #Hx of DM  - monitor on ISS q6h    #Nodular thyroid  - outpatient thyroid US for further workup    ID  #?Aspiration pneumonia  Suspect pt with PNA as stated above, though of note pt is afebrile and WBC  s/p vanc, clindamycin, and cefepime in ED  - c/w Cefepime, adjusting dose based on renal function  - MRSA PCR neg  - f/u blood, urine, and sputum cultures,    Heme/onc  #Right Portal vein thrombosis  #Left Hepatic vein thrombosis  On apixaban 5mg bid at home  - c/w heparin ggt    #Metastatic gallbladder cancer  - CT A/P showed extensive hepatic metastases from likely gallbladder primary  - 8/1/22 Liver metastasis biopsy showed carcinoma    DVT ppx: on Heparin gtt    Ethics  Code status: DNR/DNI, family discussion with plan to not re-intubate as this would not be within GOC. MOLST in chart.

## 2022-08-10 NOTE — DIETITIAN INITIAL EVALUATION ADULT - ADD RECOMMEND
1. RD will remain available to honor pt/ family wishes regarding plan of care.   2. RD will continue to monitor and reassess malnutrition status as appropriate.

## 2022-08-10 NOTE — DIETITIAN INITIAL EVALUATION ADULT - ENERGY INTAKE
EN initiated 8/9. Glucerna1.2 advanced to 40ml/hr; high (250ml) residuals noted when EN stopped this morning. EN initiated 8/9. Glucerna1.2 advanced to 40ml/hr.  High (250ml) residuals noted when EN stopped this morning.

## 2022-08-10 NOTE — PROGRESS NOTE ADULT - PROBLEM SELECTOR PLAN 1
PPS 10%, requires total care  currently intubated, previously at rehab  functional decline likely 2/2 metastatic cancer

## 2022-08-10 NOTE — DIETITIAN INITIAL EVALUATION ADULT - PHYSCIAL ASSESSMENT
Weight history per HIE:  83.5kg (8/15/18), 73.1kg (7/24/22)  Current wt: 75.3kg (8/8/22)  IBW: 61.7 kg  Per 8/22/22 RD assessment, pt reported  pounds/ 72.6 kg  Pt noted with functional quadriplegia, per chart. Weight history per HIE:  83.5kg (8/15/18), 73.1kg (7/24/22)  Current wt: 75.3kg (8/8/22)  IBW: 61.7 kg  Per 8/22/22 RD assessment, pt reported  pounds/ 72.6 kg; consistent with dosing wt.  Pt noted with functional quadriplegia, per chart.  Unable to diagnose malnutrition at this time, in absence of detailed diet/weight history and full Nutrition Focused Physical Assessment. RD will continue to monitor and reassess malnutrition status.

## 2022-08-10 NOTE — DIETITIAN INITIAL EVALUATION ADULT - OTHER INFO
Nutrition Status:  - Metastatic gall bladder cancer with liver metastasis; Per [facility] (8/9), pt made DNR/DNI  - Currently with septic shock, respiratory failure, aspiration PNA, NELSON  - Hyperglycemia in setting of DM2, HbA1c 6%, addressed with SSI q6 hrs  - Potassium repleted for hypokalemia

## 2022-08-10 NOTE — PROGRESS NOTE ADULT - SUBJECTIVE AND OBJECTIVE BOX
PROGRESS NOTE:  Written by Denis Salgado.    INTERVAL HPI/OVERNIGHT EVENTS: No acute events overnight. Weaned off propofol with waxing and waning mental status.    SUBJECTIVE: Patient seen and examined at bedside.     OBJECTIVE:    VITAL SIGNS:  ICU Vital Signs Last 24 Hrs  T(C): 37.3 (10 Aug 2022 04:00), Max: 37.4 (10 Aug 2022 00:00)  T(F): 99.1 (10 Aug 2022 04:00), Max: 99.3 (10 Aug 2022 00:00)  HR: 54 (10 Aug 2022 07:00) (51 - 93)  BP: 114/54 (10 Aug 2022 07:00) (79/42 - 130/57)  BP(mean): 78 (10 Aug 2022 07:) (58 - 86)  ABP: --  ABP(mean): --  RR: 14 (10 Aug 2022 07:) (7 - 32)  SpO2: 100% (10 Aug 2022 07:) (100% - 100%)      Mode: AC/ CMV (Assist Control/ Continuous Mandatory Ventilation), RR (machine): 12, TV (machine): 500, FiO2: 30, PEEP: 5, ITime: 1, MAP: 8, PIP: 19    08- @ 07:01  -  08-10 @ 07:00  --------------------------------------------------------  IN: 4373.8 mL / OUT: 2035 mL / NET: 2338.8 mL      CAPILLARY BLOOD GLUCOSE  212 (09 Aug 2022 05:30)      POCT Blood Glucose.: 152 mg/dL (10 Aug 2022 06:02)      PHYSICAL EXAM:  General: NAD  HEENT: NC/AT; PERRL, clear conjunctiva  Neck: supple  Respiratory: +coarse breath sounds b/l +intubated on ventilator  Cardiovascular: +S1/S2; RRR  Abdomen: soft, NT/ND; +BS, +periumbilical ventral hernia not reducible  Extremities: WWP, 2+ peripheral pulses b/l; no LE edema  Skin: normal color and turgor; no rash  Neurological: sedated    MEDICATIONS:  MEDICATIONS  (STANDING):  aspirin  chewable 81 milliGRAM(s) Oral daily  cefepime   IVPB 1000 milliGRAM(s) IV Intermittent every 24 hours  chlorhexidine 0.12% Liquid 15 milliLiter(s) Oral Mucosa every 12 hours  chlorhexidine 4% Liquid 1 Application(s) Topical <User Schedule>  dexMEDEtomidine Infusion 0.5 MICROgram(s)/kG/Hr (9.41 mL/Hr) IV Continuous <Continuous>  dextrose 5%. 1000 milliLiter(s) (100 mL/Hr) IV Continuous <Continuous>  dextrose 5%. 1000 milliLiter(s) (50 mL/Hr) IV Continuous <Continuous>  dextrose 50% Injectable 25 Gram(s) IV Push once  dextrose 50% Injectable 12.5 Gram(s) IV Push once  dextrose 50% Injectable 25 Gram(s) IV Push once  glucagon  Injectable 1 milliGRAM(s) IntraMuscular once  heparin  Infusion 1200 Unit(s)/Hr (12 mL/Hr) IV Continuous <Continuous>  insulin lispro (ADMELOG) corrective regimen sliding scale   SubCutaneous every 6 hours  norepinephrine Infusion 0.6 MICROgram(s)/kG/Min (90 mL/Hr) IV Continuous <Continuous>  polyethylene glycol 3350 17 Gram(s) Oral two times a day    MEDICATIONS  (PRN):  dextrose Oral Gel 15 Gram(s) Oral once PRN Blood Glucose LESS THAN 70 milliGRAM(s)/deciliter      ALLERGIES:  Allergies    Advil (Hives)  penicillin (Hives)    Intolerances        LABS:                        10.0   30.46 )-----------( 220      ( 10 Aug 2022 00:16 )             31.2     08-10    135  |  105  |  31<H>  ----------------------------<  149<H>  3.4<L>   |  17<L>  |  1.08    Ca    7.4<L>      10 Aug 2022 00:16  Phos  4.0     08-10  Mg     2.0     08-10    TPro  6.2  /  Alb  2.2<L>  /  TBili  0.9  /  DBili  x   /  AST  35  /  ALT  12  /  AlkPhos  214<H>  08-10    PT/INR - ( 10 Aug 2022 00:16 )   PT: 21.7 sec;   INR: 1.86 ratio         PTT - ( 10 Aug 2022 00:16 )  PTT:52.0 sec  Urinalysis Basic - ( 08 Aug 2022 16:52 )    Color: Yellow / Appearance: Turbid / S.021 / pH: x  Gluc: x / Ketone: Negative  / Bili: Negative / Urobili: Negative   Blood: x / Protein: 100 mg/dL / Nitrite: Negative   Leuk Esterase: Large / RBC: 3 /hpf / WBC 56 /HPF   Sq Epi: x / Non Sq Epi: 24 /hpf / Bacteria: Negative        RADIOLOGY & ADDITIONAL TESTS: Reviewed.   PROGRESS NOTE:  Written by Denis Salgado.    INTERVAL HPI/OVERNIGHT EVENTS: No acute events overnight. Weaned off propofol with waxing and waning mental status.     SUBJECTIVE: Patient seen and examined at bedside.     OBJECTIVE:    VITAL SIGNS:  ICU Vital Signs Last 24 Hrs  T(C): 37.3 (10 Aug 2022 04:00), Max: 37.4 (10 Aug 2022 00:00)  T(F): 99.1 (10 Aug 2022 04:00), Max: 99.3 (10 Aug 2022 00:00)  HR: 54 (10 Aug 2022 07:00) (51 - 93)  BP: 114/54 (10 Aug 2022 07:00) (79/42 - 130/57)  BP(mean): 78 (10 Aug 2022 07:) (58 - 86)  ABP: --  ABP(mean): --  RR: 14 (10 Aug 2022 07:) (7 - 32)  SpO2: 100% (10 Aug 2022 07:) (100% - 100%)      Mode: AC/ CMV (Assist Control/ Continuous Mandatory Ventilation), RR (machine): 12, TV (machine): 500, FiO2: 30, PEEP: 5, ITime: 1, MAP: 8, PIP: 19    08- @ 07:01  -  08-10 @ 07:00  --------------------------------------------------------  IN: 4373.8 mL / OUT: 2035 mL / NET: 2338.8 mL      CAPILLARY BLOOD GLUCOSE  212 (09 Aug 2022 05:30)      POCT Blood Glucose.: 152 mg/dL (10 Aug 2022 06:02)      PHYSICAL EXAM:  General: NAD  HEENT: NC/AT; PERRL, clear conjunctiva  Neck: supple  Respiratory: +coarse breath sounds b/l +intubated on ventilator  Cardiovascular: +S1/S2; bradycardic rate, regular rhythm   Abdomen: soft, NT/ND; +BS, +periumbilical ventral hernia not reducible  Extremities: WWP, 2+ peripheral pulses b/l; no LE edema  Skin: normal color and turgor; no rash  Neurological: sedated    MEDICATIONS:  MEDICATIONS  (STANDING):  aspirin  chewable 81 milliGRAM(s) Oral daily  cefepime   IVPB 1000 milliGRAM(s) IV Intermittent every 24 hours  chlorhexidine 0.12% Liquid 15 milliLiter(s) Oral Mucosa every 12 hours  chlorhexidine 4% Liquid 1 Application(s) Topical <User Schedule>  dexMEDEtomidine Infusion 0.5 MICROgram(s)/kG/Hr (9.41 mL/Hr) IV Continuous <Continuous>  dextrose 5%. 1000 milliLiter(s) (100 mL/Hr) IV Continuous <Continuous>  dextrose 5%. 1000 milliLiter(s) (50 mL/Hr) IV Continuous <Continuous>  dextrose 50% Injectable 25 Gram(s) IV Push once  dextrose 50% Injectable 12.5 Gram(s) IV Push once  dextrose 50% Injectable 25 Gram(s) IV Push once  glucagon  Injectable 1 milliGRAM(s) IntraMuscular once  heparin  Infusion 1200 Unit(s)/Hr (12 mL/Hr) IV Continuous <Continuous>  insulin lispro (ADMELOG) corrective regimen sliding scale   SubCutaneous every 6 hours  norepinephrine Infusion 0.6 MICROgram(s)/kG/Min (90 mL/Hr) IV Continuous <Continuous>  polyethylene glycol 3350 17 Gram(s) Oral two times a day    MEDICATIONS  (PRN):  dextrose Oral Gel 15 Gram(s) Oral once PRN Blood Glucose LESS THAN 70 milliGRAM(s)/deciliter      ALLERGIES:  Allergies    Advil (Hives)  penicillin (Hives)    Intolerances        LABS:                        10.0   30.46 )-----------( 220      ( 10 Aug 2022 00:16 )             31.2     08-10    135  |  105  |  31<H>  ----------------------------<  149<H>  3.4<L>   |  17<L>  |  1.08    Ca    7.4<L>      10 Aug 2022 00:16  Phos  4.0     08-10  Mg     2.0     08-10    TPro  6.2  /  Alb  2.2<L>  /  TBili  0.9  /  DBili  x   /  AST  35  /  ALT  12  /  AlkPhos  214<H>  08-10    PT/INR - ( 10 Aug 2022 00:16 )   PT: 21.7 sec;   INR: 1.86 ratio         PTT - ( 10 Aug 2022 00:16 )  PTT:52.0 sec  Urinalysis Basic - ( 08 Aug 2022 16:52 )    Color: Yellow / Appearance: Turbid / S.021 / pH: x  Gluc: x / Ketone: Negative  / Bili: Negative / Urobili: Negative   Blood: x / Protein: 100 mg/dL / Nitrite: Negative   Leuk Esterase: Large / RBC: 3 /hpf / WBC 56 /HPF   Sq Epi: x / Non Sq Epi: 24 /hpf / Bacteria: Negative        RADIOLOGY & ADDITIONAL TESTS: Reviewed.

## 2022-08-10 NOTE — CHART NOTE - NSCHARTNOTEFT_GEN_A_CORE
: Pedro Oconnell    PROCEDURE:  [X] LIMITED ECHO  [X] LIMITED CHEST  [] LIMITED RETROPERITONEAL  [] LIMITED ABDOMINAL  [] LIMITED DVT  [] NEEDLE GUIDANCE VASCULAR  [] NEEDLE GUIDANCE THORACENTESIS  [] NEEDLE GUIDANCE PARACENTESIS  [] NEEDLE GUIDANCE PERICARDIOCENTESIS  [] OTHER    FINDINGS/INTERPRETATION:    Cardiac  Findings: Grossly normal LV function, LV>RV. IVC small with respirophasic variation  Interpretation: Grossly normal LV function with small IVC    Lung  Findings: B/L A lines anteriorly with focal B lines on Left anteriorly. Small b/l pleural effusions L>R.  Interpretation: Small b/l pleural effusions likely ascites    To be discussed with fellow/attending : Pedro Oconnell    Indication: Shock    PROCEDURE:  [X] LIMITED ECHO  [X] LIMITED CHEST  [] LIMITED RETROPERITONEAL  [] LIMITED ABDOMINAL  [] LIMITED DVT  [] NEEDLE GUIDANCE VASCULAR  [] NEEDLE GUIDANCE THORACENTESIS  [] NEEDLE GUIDANCE PARACENTESIS  [] NEEDLE GUIDANCE PERICARDIOCENTESIS  [] OTHER    FINDINGS/INTERPRETATION:    Cardiac  Findings: Grossly normal LV function, LV>RV. IVC small with respirophasic variation  Interpretation: Grossly normal LV function with small IVC    Lung  Findings: B/L A lines anteriorly with focal B lines on Left anteriorly. Small b/l pleural effusions L>R.  Interpretation: Small b/l pleural effusions likely ascites    To be discussed with fellow/attending

## 2022-08-10 NOTE — PROGRESS NOTE ADULT - ASSESSMENT
81M RH Maldivian speaking w/  DM2, HTN, HLD CAD s/p MI 2018, metastatic GB cancer presents to the hospital after found down at rehab found to have new infarcts last admission now intubated in ICU for sepsis 2/2 acute resp failure   mRS: 3  LKN: 8/8/2022 1200  NIHSS: 29  Prior Admission:   CTH R CR infarct   MRI brain 7/24 with scattered embolic appearing infarcts   MRA H/N : unremarkable   CTH 7/29 stable   A1c 6  LDL 93   TTE EF 63% no cardiac source of embolus ; refused bubble study.  normal LA , mod AS   NIHSS~15  premrs2  s/p liver bx 8/1  +_ cardiolipin Ab , +b`eta 2 glycoprotine. IgM cardiolin elevated 21.6   This admission:   CTH unchanged from prior no IPH   CTA NECK: L ICA 45-55 stenosis;   CTA HEAD: No large vessel occlusion or major stenosis.  o/e this AM 9/9 on propofol, was held for about 10 minutes. withdraws x 4 but L>R? --> exam 8/10 off sedation for ~1hr withdraws weakly x 4     Impression: Transient loss of consciousness i/s/o previous stroke now intubated and placed on pressors 2/2 hypotensive crisis due to  septic shock 2/2 infection/aspiration PNA. less likely acute infarct   last admission etiology of stroke was esus     Recommendations:   - on heparin drip for portal vein thrombosis, also getting asa   - levo for pressure support  - on propofol, wean as tolerated   - repeat CTH in 24hrs if no improvement   - abx for infection per priamry team   - consider EEG   - atorvastatin 80mg PO daily (titrate to LDL < 70)   - stroke risk factor modification and counseling   - hypercoag panel including APLS --> + last admissoin, speak to hematology, now on DOAC.  consider lovenox given malignancy hx?   - telemetry  - PT/OT/SS/SLP, OOBC  - check FS, glucose control <180  - GI/DVT ppx  - Thank you for allowing me to participate in the care of this patient. Call with questions.   - GOC with family. DNR/I, consider palliative eval   Goyo Batista MD  Vascular Neurology.

## 2022-08-10 NOTE — DIETITIAN INITIAL EVALUATION ADULT - ENTERAL
Increase EN as tolerated to GOAL: Glucerna1.2 @ 70 ml/hr x 18 hrs to provide 1260ml formula, 1512 (20 kcal/kg), 76 (1 g/kg), 1014ml free water; based on dosing wt 75.3kg, with consideration for intubation. Increase EN as tolerated to GOAL: Glucerna1.2 @ 70 ml/hr x 18 hrs to provide 1260ml formula, 1512 (20 kcal/kg), 76 (1 g/kg), 1014ml free water; based on dosing wt 75.3kg, with consideration for intubation - if appropriate for GOC.

## 2022-08-10 NOTE — DIETITIAN INITIAL EVALUATION ADULT - REASON
Unable to obtain consent for Nutrition Focused Physical Exam at this time; overt signs obtained: moderate depletion of orbital, temporal, buccal regions

## 2022-08-10 NOTE — DIETITIAN INITIAL EVALUATION ADULT - NS FNS DIET ORDER
Diet, NPO with Tube Feed:   Tube Feeding Modality: Nasogastric  Glucerna 1.2 Dre (GLUCERNARTH)  Total Volume for 24 Hours (mL): 900  Intermittent  Starting Tube Feed Rate {mL per Hour}: 20  Increase Tube Feed Rate by (mL): 10  Until Goal Tube Feed Rate (mL per Hour): 50  Tube Feeding Hours ON: 18  Tube Feeding OFF (Hours): 6  Tube Feed Start Time: 11:00 (08-09-22 @ 08:48) [Active]

## 2022-08-10 NOTE — DIETITIAN INITIAL EVALUATION ADULT - OTHER CALCULATIONS
Calculations with consideration for intubation.  The [place] State Equation (PSU) 2003b was used to calculate resting energy expenditure: 1534 kcal (20.4 kcal/kg).  Fluid needs: defer to team.

## 2022-08-10 NOTE — PROGRESS NOTE ADULT - ASSESSMENT
82 Y/O M with metastatic gallbladder cancer, DM2, HTN, HLD, CAD s/p MI 2018, HFpEF, recent admission for sepsis 2/2 UTI was found unresponsive at rehab s/p intubation in the field for AHRF also found to be in septic shock likely 2/2 aspiration event. Palliative consulted for GOC

## 2022-08-10 NOTE — CHART NOTE - NSCHARTNOTEFT_GEN_A_CORE
80 Y/O M with metastatic gallbladder cancer, DM2, HTN, HLD, CAD s/p MI 2018, HFpEF, recent admission for sepsis 2/2 UTI was found unresponsive at rehab s/p intubation in the field for AHRF also found to be in septic shock likely 2/2 aspiration event. Palliative following for GOC. Unable to be extubated yest as pt was lethargic. Pt more awake today and following commands.     DNR/DNI. MOLST in chart   will continue to follow to evalute for PCU candidacy after extubation  if any acute issues arise, please page [phone].

## 2022-08-10 NOTE — DIETITIAN INITIAL EVALUATION ADULT - PERTINENT LABORATORY DATA
08-10    135  |  105  |  31<H>  ----------------------------<  149<H>  3.4<L>   |  17<L>  |  1.08    Ca    7.4<L>      10 Aug 2022 00:16  Phos  4.0     08-10  Mg     2.0     08-10    TPro  6.2  /  Alb  2.2<L>  /  TBili  0.9  /  DBili  x   /  AST  35  /  ALT  12  /  AlkPhos  214<H>  08-10  POCT Blood Glucose.: 152 mg/dL (08-10-22 @ 06:02)  A1C with Estimated Average Glucose Result: 6.0 % (07-24-22 @ 09:58)

## 2022-08-10 NOTE — PROGRESS NOTE ADULT - SUBJECTIVE AND OBJECTIVE BOX
Neurology Progress Note    S: Patient seen and examined, now in ICU. on lveo and heparin.  off sedation for 1 hour. a bit more lethargic than yesterday     Medication:    MEDICATIONS  (STANDING):  aspirin  chewable 81 milliGRAM(s) Oral daily  cefepime   IVPB 1000 milliGRAM(s) IV Intermittent every 24 hours  chlorhexidine 0.12% Liquid 15 milliLiter(s) Oral Mucosa every 12 hours  chlorhexidine 4% Liquid 1 Application(s) Topical <User Schedule>  dexMEDEtomidine Infusion 0.5 MICROgram(s)/kG/Hr (9.41 mL/Hr) IV Continuous <Continuous>  dextrose 5%. 1000 milliLiter(s) (100 mL/Hr) IV Continuous <Continuous>  dextrose 5%. 1000 milliLiter(s) (50 mL/Hr) IV Continuous <Continuous>  dextrose 50% Injectable 25 Gram(s) IV Push once  dextrose 50% Injectable 12.5 Gram(s) IV Push once  dextrose 50% Injectable 25 Gram(s) IV Push once  glucagon  Injectable 1 milliGRAM(s) IntraMuscular once  heparin  Infusion 1200 Unit(s)/Hr (12 mL/Hr) IV Continuous <Continuous>  insulin lispro (ADMELOG) corrective regimen sliding scale   SubCutaneous every 6 hours  norepinephrine Infusion 0.6 MICROgram(s)/kG/Min (90 mL/Hr) IV Continuous <Continuous>  polyethylene glycol 3350 17 Gram(s) Oral two times a day    MEDICATIONS  (PRN):  dextrose Oral Gel 15 Gram(s) Oral once PRN Blood Glucose LESS THAN 70 milliGRAM(s)/deciliter      Vitals:    ICU Vital Signs Last 24 Hrs  T(C): 37.2 (10 Aug 2022 07:30), Max: 37.4 (10 Aug 2022 00:00)  T(F): 99 (10 Aug 2022 07:30), Max: 99.3 (10 Aug 2022 00:00)  HR: 66 (10 Aug 2022 08:15) (51 - 93)  BP: 119/55 (10 Aug 2022 08:15) (79/42 - 136/58)  BP(mean): 79 (10 Aug 2022 08:15) (58 - 84)  ABP: --  ABP(mean): --  RR: 12 (10 Aug 2022 08:15) (7 - 32)  SpO2: 100% (10 Aug 2022 08:15) (100% - 100%)          General Exam:   General Appearance: Appropriately dressed and in no acute distress       Head: Normocephalic, atraumatic and no dysmorphic features  Ear, Nose, and Throat: Moist mucous membranes  CVS: S1S2+  Resp: No SOB, no wheeze or rhonchi + ETT   Abd: soft NTND  Extremities: No edema, no cyanosis  Skin: No bruises, no rashes     Neurological Exam:  Mental Status: eyes closed, grimaces to noxious. no verbal, not following    Cranial Nerves: PERRL, EOMI, VFFC, sensation V1-V3 intact,  no obvious facial asymmetry , equal elevation of palate, scm/trap 5/5, tongue is midline on protrusion. + ETT   Motor: minimal spontaneous movement   Sensation:  withdraws weakly x 4   Coordination: unable   Gait: unable in ICU     I personally reviewed the below data/images/labs:      CBC Full  -  ( 10 Aug 2022 00:16 )  WBC Count : 30.46 K/uL  RBC Count : 3.74 M/uL  Hemoglobin : 10.0 g/dL  Hematocrit : 31.2 %  Platelet Count - Automated : 220 K/uL  Mean Cell Volume : 83.4 fl  Mean Cell Hemoglobin : 26.7 pg  Mean Cell Hemoglobin Concentration : 32.1 gm/dL  Auto Neutrophil # : 26.08 K/uL  Auto Lymphocyte # : 1.77 K/uL  Auto Monocyte # : 1.50 K/uL  Auto Eosinophil # : 0.60 K/uL  Auto Basophil # : 0.08 K/uL  Auto Neutrophil % : 85.6 %  Auto Lymphocyte % : 5.8 %  Auto Monocyte % : 4.9 %  Auto Eosinophil % : 2.0 %  Auto Basophil % : 0.3 %    08-10    135  |  105  |  31<H>  ----------------------------<  149<H>  3.4<L>   |  17<L>  |  1.08    Ca    7.4<L>      10 Aug 2022 00:16  Phos  4.0     08-10  Mg     2.0     08-10    TPro  6.2  /  Alb  2.2<L>  /  TBili  0.9  /  DBili  x   /  AST  35  /  ALT  12  /  AlkPhos  214<H>  08-10    LIVER FUNCTIONS - ( 10 Aug 2022 00:16 )  Alb: 2.2 g/dL / Pro: 6.2 g/dL / ALK PHOS: 214 U/L / ALT: 12 U/L / AST: 35 U/L / GGT: x           PT/INR - ( 10 Aug 2022 00:16 )   PT: 21.7 sec;   INR: 1.86 ratio         PTT - ( 10 Aug 2022 00:16 )  PTT:52.0 sec  Urinalysis Basic - ( 08 Aug 2022 16:52 )    Color: Yellow / Appearance: Turbid / S.021 / pH: x  Gluc: x / Ketone: Negative  / Bili: Negative / Urobili: Negative   Blood: x / Protein: 100 mg/dL / Nitrite: Negative   Leuk Esterase: Large / RBC: 3 /hpf / WBC 56 /HPF   Sq Epi: x / Non Sq Epi: 24 /hpf / Bacteria: Negative        < from: CT Abdomen and Pelvis w/ IV Cont (22 @ 16:22) >  IMPRESSION:  Extensive hepatic metastatic disease possibly secondary to locally   invasive gallbladder cancer.    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    Mildly increasing bilateral pleural effusions.      < end of copied text >  < from: CT Chest w/ IV Cont (22 @ 16:22) >  IMPRESSION:  Extensive hepatic metastatic disease possibly secondary to locally   invasive gallbladder cancer.    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    Mildly increasing bilateral pleural effusions.      < end of copied text >  < from: CT Brain Perfusion Maps Stroke (22 @ 16:21) >  CT PERFUSION:  1. No perfusion abnormality.    CTA NECK:  1. Calcified plaque affecting the left carotid bifurcation with   approximately mild-moderate (45-55%) stenosis of the left internal   carotid artery origin by NASCET criteria.  2. Atherosclerotic plaque affecting the origin and proximal segment of   the left subclavian artery with associated mild (less than 50%) stenosis.  3. Otherwise, no large vessel occlusion or major stenosis.  4. Heterogeneous appearing thyroid gland containing multiple nodules, the   largest of which measures up to 2 cm on the right side. Thyroid likely   cannot be excluded. Recommend ultrasound correlation.    CTA HEAD:  1. No large vessel occlusion or major stenosis.    < end of copied text >  < from: CT Brain Stroke Protocol (22 @ 16:20) >  IMPRESSION: No acute intracranial hemorrhage, mass effect, or shift of   the midline structures.    Similar-appearing moderate severity chronic white matter microvascular   type changes and chronic lacunar infarct within the right thalamus.    < end of copied text >

## 2022-08-10 NOTE — PROGRESS NOTE ADULT - SUBJECTIVE AND OBJECTIVE BOX
SUBJECTIVE AND OBJECTIVE: Pt seen and examined at bedside. Intubated. Was lethargic yest so could not be extubated.     Indication for Geriatrics and Palliative Care Services/INTERVAL HPI: GOC       OVERNIGHT EVENTS: NAEO.     DNR on chart:Yes  Yes      Allergies    Advil (Hives)  penicillin (Hives)    Intolerances    MEDICATIONS  (STANDING):  aspirin  chewable 81 milliGRAM(s) Oral daily  cefepime   IVPB 2000 milliGRAM(s) IV Intermittent every 12 hours  chlorhexidine 0.12% Liquid 15 milliLiter(s) Oral Mucosa every 12 hours  chlorhexidine 4% Liquid 1 Application(s) Topical <User Schedule>  dexMEDEtomidine Infusion 0.5 MICROgram(s)/kG/Hr (9.41 mL/Hr) IV Continuous <Continuous>  dextrose 5%. 1000 milliLiter(s) (100 mL/Hr) IV Continuous <Continuous>  dextrose 5%. 1000 milliLiter(s) (50 mL/Hr) IV Continuous <Continuous>  dextrose 50% Injectable 25 Gram(s) IV Push once  dextrose 50% Injectable 12.5 Gram(s) IV Push once  dextrose 50% Injectable 25 Gram(s) IV Push once  glucagon  Injectable 1 milliGRAM(s) IntraMuscular once  heparin  Infusion 1200 Unit(s)/Hr (12 mL/Hr) IV Continuous <Continuous>  insulin lispro (ADMELOG) corrective regimen sliding scale   SubCutaneous every 6 hours  norepinephrine Infusion 0.6 MICROgram(s)/kG/Min (90 mL/Hr) IV Continuous <Continuous>  polyethylene glycol 3350 17 Gram(s) Oral two times a day    MEDICATIONS  (PRN):  dextrose Oral Gel 15 Gram(s) Oral once PRN Blood Glucose LESS THAN 70 milliGRAM(s)/deciliter      ITEMS UNCHECKED ARE NOT PRESENT    PRESENT SYMPTOMS: [ ]Unable to self-report - see [ ] CPOT [X ] PAINADS [ ] RDOS  Source if other than patient:  [ ]Family   [ ]Team     Pain:  [ ]yes [X]no  QOL impact -   Location -                    Aggravating factors -  Quality -  Radiation -  Timing-  Severity (0-10 scale):  Minimal acceptable level (0-10 scale):     CPOT:    https://www.[facility].org/getattachment/[email or web address]/Critical-Care-Pain-Observation-Tool-(CPOT)    PAIN AD Score:	0  http://geriatrictoolkit.[email or web address]/cog/painad.pdf (Ctrl + left click to view)    Dyspnea:                           [ ]Mild [ ]Moderate [ ]Severe    RDOS: 0  0 to 2  minimal or no respiratory distress   3  mild distress  4 to 6 moderate distress  >7 severe distress  https://[email or web address]/handouts/hen/Respiratory_Distress_Observation_Scale.pdf (Ctrl +  left click to view)     Anxiety:                             [ ]Mild [ ]Moderate [ ]Severe  Fatigue:                             [ ]Mild [ ]Moderate [ ]Severe  Nausea:                             [ ]Mild [ ]Moderate [ ]Severe  Loss of appetite:              [ ]Mild [ ]Moderate [ ]Severe  Constipation:                    [ ]Mild [ ]Moderate [ ]Severe    PCSSQ[Palliative Care Spiritual Screening Question]   Severity (0-10):  Score of 4 or > indicate consideration of Chaplaincy referral.  Chaplaincy Referral: [X ] yes [ ] refused [ ] following    Other Symptoms:  [ ]All other review of systems negative     Palliative Performance Status Version 2:      10   %      http://npcrc.org/files/news/palliative_performance_scale_ppsv2.pdf  PHYSICAL EXAM:  Vital Signs Last 24 Hrs  T(C): 37.2 (10 Aug 2022 07:30), Max: 37.4 (10 Aug 2022 00:00)  T(F): 99 (10 Aug 2022 07:30), Max: 99.3 (10 Aug 2022 00:00)  HR: 75 (10 Aug 2022 10:45) (51 - 91)  BP: 99/51 (10 Aug 2022 10:45) (79/42 - 136/58)  BP(mean): 70 (10 Aug 2022 10:45) (58 - 87)  RR: 19 (10 Aug 2022 10:45) (7 - 32)  SpO2: 100% (10 Aug 2022 10:45) (100% - 100%)     I&O's Summary    09 Aug 2022 07:01  -  10 Aug 2022 07:00  --------------------------------------------------------  IN: 4373.8 mL / OUT:  mL / NET: 2338.8 mL    10 Aug 2022 07:01  -  10 Aug 2022 11:08  --------------------------------------------------------  IN: 205.4 mL / OUT: 0 mL / NET: 205.4 mL       GENERAL:   [X ]Cachetic, intubated, follows simple commands   [ ]Alert  [ ]Oriented   [ ]Lethargic  [ ]Unarousable  [ ]Verbal  [ ]Non-Verbal  Behavioral:   [ ] Anxiety  [ ] Delirium [ ] Agitation [ ] Other  HEENT:  [ ]Normal   [ ]Dry mouth   [ x]ET Tube/Trach  [ ]Oral lesions  PULMONARY:   [ ]Clear [ ]Tachypnea  [ ]Audible excessive secretions   [ X]Rhonchi        [ ]Right [ ]Left [ ]Bilateral  [ ]Crackles        [ ]Right [ ]Left [ ]Bilateral  [ ]Wheezing     [ ]Right [ ]Left [ ]Bilateral  [ ]Diminished breath sounds [ ]right [ ]left [ ]bilateral  CARDIOVASCULAR:    [x ]Regular [ ]Irregular [ ]Tachy  [ ]Mickey [ ]Murmur [ ]Other  GASTROINTESTINAL:  [x ]Soft  [ ]Distended   [ ]+BS  [ x]Non tender [ ]Tender  [ ]Other [ ]PEG [ ]OGT/ NGT  Last BM:  GENITOURINARY:  [ ]Normal [ ] Incontinent   [ ]Oliguria/Anuria   [x ]Villasenor  MUSCULOSKELETAL:   [ ]Normal   [ ]Weakness  [ x]Bed/Wheelchair bound [ ]Edema  NEUROLOGIC: intubated, follows simple commands  [ ]No focal deficits  [ ]Cognitive impairment  [ ]Dysphagia [ ]Dysarthria [ ]Paresis [x ]Other   SKIN: ecchymoses      CRITICAL CARE:  [X ]Shock Present  [X ]Septic [ ]Cardiogenic [ ]Neurologic [ ]Hypovolemic  [ ]Vasopressors [ ]Inotropes  [X ]Respiratory failure present [ X]Mechanical Ventilation [ ]Non-invasive ventilatory support [ ]High-Flow Mode: CPAP with PS, FiO2: 30, PEEP: 5, PS: 5, MAP: 9, PIP: 13  [X ]Acute  [ ]Chronic [ ]Hypoxic  [ ]Hypercarbic [ ]Other  [ ]Other organ failure     LABS:                        10.0   30.46 )-----------( 220      ( 10 Aug 2022 00:16 )             31.2   08-10    135  |  105  |  31<H>  ----------------------------<  149<H>  3.4<L>   |  17<L>  |  1.08    Ca    7.4<L>      10 Aug 2022 00:16  Phos  4.0     08-10  Mg     2.0     08-10    TPro  6.2  /  Alb  2.2<L>  /  TBili  0.9  /  DBili  x   /  AST  35  /  ALT  12  /  AlkPhos  214<H>  08-10  PT/INR - ( 10 Aug 2022 00:16 )   PT: 21.7 sec;   INR: 1.86 ratio         PTT - ( 10 Aug 2022 10:23 )  PTT:67.2 sec    Urinalysis Basic - ( 08 Aug 2022 16:52 )    Color: Yellow / Appearance: Turbid / S.021 / pH: x  Gluc: x / Ketone: Negative  / Bili: Negative / Urobili: Negative   Blood: x / Protein: 100 mg/dL / Nitrite: Negative   Leuk Esterase: Large / RBC: 3 /hpf / WBC 56 /HPF   Sq Epi: x / Non Sq Epi: 24 /hpf / Bacteria: Negative      RADIOLOGY & ADDITIONAL STUDIES:    Protein Calorie Malnutrition Present: [ ]mild [ ]moderate [ ]severe [ ]underweight [ ]morbid obesity  https://www.andeal.org/vault/0765/web/files/ONC/Table_Clinical%20Characteristics%20to%20Document%20Malnutrition-White%20JV%20et%20al%2020.pdf    Height (cm): 165.1 (22 @ 20:16), 165.1 (22 @ 15:36)  Weight (kg): 75.3 (22 @ 20:29), 73.1 (22 @ 15:36)  BMI (kg/m2): 27.6 (22 @ 20:29), 26.8 (22 @ 20:16), 26.8 (22 @ 15:36)    [ ]PPSV2 < or = 30%  [ ]significant weight loss [ ]poor nutritional intake [ ]anasarca[ ]Artificial Nutrition    Other REFERRALS:  [ ]Hospice  [ ]Child Life  [ ]Social Work  [ ]Case management [ ]Holistic Therapy     Goals of Care Document: Indication for Geriatrics and Palliative Care Services/INTERVAL HPI: GOC     81 year old man with metastatic gallbladder cancer, DM2, HTN, HLD CAD s/p MI 2018, HFpEF, recent admission for sepsis 2/2 UTI, presents from rehab after being found unresponsive at rehab. History obtained from daughter at bedside. States at baseline patient is weak, uses wheelchair, however is alert and oriented. She found him slumped over and with food in his mouth. EMS was activated, was intubated in the field, started on norepinephrine ggt. On assessment, patient is obtunded, follows some commands, not withdrawing to pain.    ED course  Vitals: afebrile, HR 50s-80s, MAP 50s-80s, RR 12 saturating well on the vent\par Labs: WBC 19, Hb 9.4, plt 201; BMP notable for BUN/Cr 49/1.89 (Cr .77 on ), INR elevated to 2.3, most recent VBG 7.31/35/57/18, lactate 2.4 => 2.7, UA with epithelial cells, neg bacteria, neg nitrites, large leuk esterase,   Imaging: CT perfusion wnl, CTA neck no occlusion or stenosis, multinodular thyroid (recommend ultrasound eval), CTA head no vessel occlusion or stenosis, CT A/P w/hepatic mets 2/2 gallbladder cancer, as well as R portal vein and L hepatic vein thrombosis, mildly increasing pleural effusions  Interventions: received 1g vancomycin x1, 600mg clindamycin x1, 2g cefepime x1, started on norepinephrine ggt, vent set to 12/500/100/5  (08 Aug 2022 18:07)     SUBJECTIVE AND OBJECTIVE: Pt seen and examined at bedside. Intubated. Was lethargic yest so could not be extubated.     OVERNIGHT EVENTS: NAEO.     DNR on chart:Yes  Yes      Allergies    Advil (Hives)  penicillin (Hives)    Intolerances    MEDICATIONS  (STANDING):  aspirin  chewable 81 milliGRAM(s) Oral daily  cefepime   IVPB 2000 milliGRAM(s) IV Intermittent every 12 hours  chlorhexidine 0.12% Liquid 15 milliLiter(s) Oral Mucosa every 12 hours  chlorhexidine 4% Liquid 1 Application(s) Topical <User Schedule>  dexMEDEtomidine Infusion 0.5 MICROgram(s)/kG/Hr (9.41 mL/Hr) IV Continuous <Continuous>  dextrose 5%. 1000 milliLiter(s) (100 mL/Hr) IV Continuous <Continuous>  dextrose 5%. 1000 milliLiter(s) (50 mL/Hr) IV Continuous <Continuous>  dextrose 50% Injectable 25 Gram(s) IV Push once  dextrose 50% Injectable 12.5 Gram(s) IV Push once  dextrose 50% Injectable 25 Gram(s) IV Push once  glucagon  Injectable 1 milliGRAM(s) IntraMuscular once  heparin  Infusion 1200 Unit(s)/Hr (12 mL/Hr) IV Continuous <Continuous>  insulin lispro (ADMELOG) corrective regimen sliding scale   SubCutaneous every 6 hours  norepinephrine Infusion 0.6 MICROgram(s)/kG/Min (90 mL/Hr) IV Continuous <Continuous>  polyethylene glycol 3350 17 Gram(s) Oral two times a day    MEDICATIONS  (PRN):  dextrose Oral Gel 15 Gram(s) Oral once PRN Blood Glucose LESS THAN 70 milliGRAM(s)/deciliter      ITEMS UNCHECKED ARE NOT PRESENT    PRESENT SYMPTOMS: [ ]Unable to self-report - see [ ] CPOT [X ] PAINADS [ ] RDOS  Source if other than patient:  [ ]Family   [ ]Team     Pain:  [ ]yes [X]no  QOL impact -   Location -                    Aggravating factors -  Quality -  Radiation -  Timing-  Severity (0-10 scale):  Minimal acceptable level (0-10 scale):     CPOT:    https://www.[facility].org/getattachment/[email or web address]-[email or web address]/Critical-Care-Pain-Observation-Tool-(CPOT)    PAIN AD Score:	0  http://geriatrictoolkit.[place].[facility]/cog/painad.pdf (Ctrl + left click to view)    Dyspnea:                           [ ]Mild [ ]Moderate [ ]Severe    RDOS: 0  0 to 2  minimal or no respiratory distress   3  mild distress  4 to 6 moderate distress  >7 severe distress  https://[email or web address].net/handouts/hen/Respiratory_Distress_Observation_Scale.pdf (Ctrl +  left click to view)     Anxiety:                             [ ]Mild [ ]Moderate [ ]Severe  Fatigue:                             [ ]Mild [ ]Moderate [ ]Severe  Nausea:                             [ ]Mild [ ]Moderate [ ]Severe  Loss of appetite:              [ ]Mild [ ]Moderate [ ]Severe  Constipation:                    [ ]Mild [ ]Moderate [ ]Severe    PCSSQ[Palliative Care Spiritual Screening Question]   Severity (0-10):  Score of 4 or > indicate consideration of Chaplaincy referral.  Chaplaincy Referral: [X ] yes [ ] refused [ ] following    Other Symptoms:  [ ]All other review of systems negative     Palliative Performance Status Version 2:      10   %      http://[facility].org/files/news/palliative_performance_scale_ppsv2.pdf  PHYSICAL EXAM:  Vital Signs Last 24 Hrs  T(C): 37.2 (10 Aug 2022 07:30), Max: 37.4 (10 Aug 2022 00:00)  T(F): 99 (10 Aug 2022 07:30), Max: 99.3 (10 Aug 2022 00:00)  HR: 75 (10 Aug 2022 10:45) (51 - 91)  BP: 99/51 (10 Aug 2022 10:45) (79/42 - 136/58)  BP(mean): 70 (10 Aug 2022 10:45) (58 - 87)  RR: 19 (10 Aug 2022 10:45) (7 - 32)  SpO2: 100% (10 Aug 2022 10:45) (100% - 100%)     I&O's Summary    09 Aug 2022 07:01  -  10 Aug 2022 07:00  --------------------------------------------------------  IN: 4373.8 mL / OUT: 5 mL / NET: 2338.8 mL    10 Aug 2022 07:01  -  10 Aug 2022 11:08  --------------------------------------------------------  IN: 205.4 mL / OUT: 0 mL / NET: 205.4 mL       GENERAL:   [X ]Cachetic, intubated, follows simple commands   [ ]Alert  [ ]Oriented   [ ]Lethargic  [ ]Unarousable  [ ]Verbal  [ ]Non-Verbal  Behavioral:   [ ] Anxiety  [ ] Delirium [ ] Agitation [ ] Other  HEENT:  [ ]Normal   [ ]Dry mouth   [ x]ET Tube/Trach  [ ]Oral lesions  PULMONARY:   [ ]Clear [ ]Tachypnea  [ ]Audible excessive secretions   [ X]Rhonchi        [ ]Right [ ]Left [ ]Bilateral  [ ]Crackles        [ ]Right [ ]Left [ ]Bilateral  [ ]Wheezing     [ ]Right [ ]Left [ ]Bilateral  [ ]Diminished breath sounds [ ]right [ ]left [ ]bilateral  CARDIOVASCULAR:    [x ]Regular [ ]Irregular [ ]Tachy  [ ]Mickey [ ]Murmur [ ]Other  GASTROINTESTINAL:  [x ]Soft  [ ]Distended   [ ]+BS  [ x]Non tender [ ]Tender  [ ]Other [ ]PEG [ ]OGT/ NGT  Last BM:  GENITOURINARY:  [ ]Normal [ ] Incontinent   [ ]Oliguria/Anuria   [x ]Villasenor  MUSCULOSKELETAL:   [ ]Normal   [ ]Weakness  [ x]Bed/Wheelchair bound [ ]Edema  NEUROLOGIC: intubated, follows simple commands  [ ]No focal deficits  [ ]Cognitive impairment  [ ]Dysphagia [ ]Dysarthria [ ]Paresis [x ]Other   SKIN: ecchymoses      CRITICAL CARE:  [X ]Shock Present  [X ]Septic [ ]Cardiogenic [ ]Neurologic [ ]Hypovolemic  [ ]Vasopressors [ ]Inotropes  [X ]Respiratory failure present [ X]Mechanical Ventilation [ ]Non-invasive ventilatory support [ ]High-Flow Mode: CPAP with PS, FiO2: 30, PEEP: 5, PS: 5, MAP: 9, PIP: 13  [X ]Acute  [ ]Chronic [ ]Hypoxic  [ ]Hypercarbic [ ]Other  [ ]Other organ failure     LABS:                        10.0   30.46 )-----------( 220      ( 10 Aug 2022 00:16 )             31.2   08-10    135  |  105  |  31<H>  ----------------------------<  149<H>  3.4<L>   |  17<L>  |  1.08    Ca    7.4<L>      10 Aug 2022 00:16  Phos  4.0     08-10  Mg     2.0     08-10    TPro  6.2  /  Alb  2.2<L>  /  TBili  0.9  /  DBili  x   /  AST  35  /  ALT  12  /  AlkPhos  214<H>  08-10  PT/INR - ( 10 Aug 2022 00:16 )   PT: 21.7 sec;   INR: 1.86 ratio         PTT - ( 10 Aug 2022 10:23 )  PTT:67.2 sec    Urinalysis Basic - ( 08 Aug 2022 16:52 )    Color: Yellow / Appearance: Turbid / S.021 / pH: x  Gluc: x / Ketone: Negative  / Bili: Negative / Urobili: Negative   Blood: x / Protein: 100 mg/dL / Nitrite: Negative   Leuk Esterase: Large / RBC: 3 /hpf / WBC 56 /HPF   Sq Epi: x / Non Sq Epi: 24 /hpf / Bacteria: Negative      RADIOLOGY & ADDITIONAL STUDIES:    Protein Calorie Malnutrition Present: [ ]mild [ ]moderate [ ]severe [ ]underweight [ ]morbid obesity  https://www.andeal.org/vault/2440/web/files/ONC/Table_Clinical%20Characteristics%20to%20Document%20Malnutrition-White%20JV%20et%20al%2020.pdf    Height (cm): 165.1 (22 @ 20:16), 165.1 (22 @ 15:36)  Weight (kg): 75.3 (22 @ 20:29), 73.1 (22 @ 15:36)  BMI (kg/m2): 27.6 (22 @ 20:29), 26.8 (22 @ 20:16), 26.8 (22 @ 15:36)    [X ]PPSV2 < or = 30%  [ ]significant weight loss [ ]poor nutritional intake [ ]anasarca[ ]Artificial Nutrition    Other REFERRALS:  [ ]Hospice  [ ]Child Life  [ ]Social Work  [ ]Case management [ ]Holistic Therapy     Goals of Care Document: DNR/DNI in chart

## 2022-08-10 NOTE — DIETITIAN INITIAL EVALUATION ADULT - ETIOLOGY
inability to meet increased nutrient needs for metastatic cancer, in setting of respiratory failure and septic shock

## 2022-08-10 NOTE — DIETITIAN INITIAL EVALUATION ADULT - REASON INDICATOR FOR ASSESSMENT
Nutrition Consult for Tube Feeding received and appreciated.   Information obtained from: medical record, communication with team.   Pt intubated.

## 2022-08-11 NOTE — PROGRESS NOTE ADULT - ASSESSMENT
81M RH [place] speaking w/  DM2, HTN, HLD CAD s/p MI 2018, metastatic GB cancer presents to the hospital after found down at rehab found to have new infarcts last admission now intubated in ICU for sepsis 2/2 acute resp failure   mRS: 3  LKN: 8/8/2022 1200  NIHSS: 29  Prior Admission:   CTH R CR infarct   MRI brain 7/24 with scattered embolic appearing infarcts   MRA H/N : unremarkable   CTH 7/29 stable   A1c 6  LDL 93   TTE EF 63% no cardiac source of embolus ; refused bubble study.  normal LA , mod AS   NIHSS~15  premrs2  s/p liver bx 8/1  +_ cardiolipin Ab , +b`eta 2 glycoprotine. IgM cardiolin elevated 21.6   This admission:   CTH unchanged from prior no IPH   CTA NECK: L ICA 45-55 stenosis;   CTA HEAD: No large vessel occlusion or major stenosis.  o/e this AM 8/9 on propofol, was held for about 10 minutes. withdraws x 4 but L>R? --> exam 8/10 off sedation for ~1hr withdraws weakly x 4   8/11 exam following a bit (). possible extubation and plan for possible PCU     Impression: Transient loss of consciousness i/s/o previous stroke now intubated and placed on pressors 2/2 hypotensive crisis due to  septic shock 2/2 infection/aspiration PNA. less likely acute infarct   last admission etiology of stroke was esus     Recommendations:   - possible extubation with PCU   - on heparin drip for portal vein thrombosis, also getting asa   - levo for pressure support  - on propofol, wean as tolerated   - repeat CTH in 24hrs if no improvement   - abx for infection per priamry team   - consider EEG   - atorvastatin 80mg PO daily (titrate to LDL < 70)   - stroke risk factor modification and counseling   - hypercoag panel including APLS --> + last admissoin, speak to hematology, now on DOAC.  consider lovenox given malignancy hx?   - telemetry  - PT/OT/SS/SLP, OOBC  - check FS, glucose control <180  - GI/DVT ppx  - Thank you for allowing me to participate in the care of this patient. Call with questions.   - GOC with family. DNR/I, consider palliative eval --> possible extubation and PCU   Goyo Batista MD  Vascular Neurology.

## 2022-08-11 NOTE — CHART NOTE - NSCHARTNOTEFT_GEN_A_CORE
: Pedro Oconnell    INDICATION:     PROCEDURE:  [] LIMITED ECHO  [X] LIMITED CHEST  [] LIMITED RETROPERITONEAL  [] LIMITED ABDOMINAL  [] LIMITED DVT  [] NEEDLE GUIDANCE VASCULAR  [] NEEDLE GUIDANCE THORACENTESIS  [] NEEDLE GUIDANCE PARACENTESIS  [] NEEDLE GUIDANCE PERICARDIOCENTESIS  [] OTHER    FINDINGS/INTERPRETATION:    Lung  Findings: B/L A lines anteriorly with focal B lines. Small b/l pleural effusions L>R.  Interpretation: Small b/l pleural effusions likely ascites  Small IVC    To be discussed with fellow/attending. : Pedro Oconnell    INDICATION: Shock    PROCEDURE:  [] LIMITED ECHO  [X] LIMITED CHEST  [] LIMITED RETROPERITONEAL  [] LIMITED ABDOMINAL  [] LIMITED DVT  [] NEEDLE GUIDANCE VASCULAR  [] NEEDLE GUIDANCE THORACENTESIS  [] NEEDLE GUIDANCE PARACENTESIS  [] NEEDLE GUIDANCE PERICARDIOCENTESIS  [] OTHER    FINDINGS/INTERPRETATION:    Lung  Findings: B/L A lines anteriorly with focal B lines. Small b/l pleural effusions L>R.  Interpretation: Small b/l pleural effusions likely ascites  Small IVC    To be discussed with fellow/attending. : Pedro Oconnell    INDICATION: Shock/Respiratory failure    PROCEDURE:  [] LIMITED ECHO  [X] LIMITED CHEST  [] LIMITED RETROPERITONEAL  [] LIMITED ABDOMINAL  [] LIMITED DVT  [] NEEDLE GUIDANCE VASCULAR  [] NEEDLE GUIDANCE THORACENTESIS  [] NEEDLE GUIDANCE PARACENTESIS  [] NEEDLE GUIDANCE PERICARDIOCENTESIS  [] OTHER    FINDINGS/INTERPRETATION:    Lung  Findings: B/L A lines anteriorly with focal B lines. Small b/l pleural effusions L>R.  Interpretation: Small b/l pleural effusions likely ascites  Small IVC    Attending Note: Agree with above. I was present through out the scan.

## 2022-08-11 NOTE — PROGRESS NOTE ADULT - SUBJECTIVE AND OBJECTIVE BOX
PROGRESS NOTE:  Written by Denis Salgado.    INTERVAL HPI/OVERNIGHT EVENTS: No acute events overnight. Weaned off precedex, weaning Levophed. On pressure support, will monitor for possible extubation today.     SUBJECTIVE: Patient seen and examined at bedside.       OBJECTIVE:    VITAL SIGNS:  ICU Vital Signs Last 24 Hrs  T(C): 37.4 (11 Aug 2022 03:00), Max: 37.5 (10 Aug 2022 16:00)  T(F): 99.3 (11 Aug 2022 03:00), Max: 99.5 (10 Aug 2022 16:00)  HR: 90 (11 Aug 2022 06:45) (50 - 93)  BP: 122/66 (11 Aug 2022 06:45) (80/48 - 147/79)  BP(mean): 87 (11 Aug 2022 06:45) (59 - 105)  ABP: --  ABP(mean): --  RR: 17 (11 Aug 2022 06:45) (12 - 27)  SpO2: 100% (11 Aug 2022 06:45) (100% - 100%)    O2 Parameters below as of 10 Aug 2022 19:00  Patient On (Oxygen Delivery Method): ventilator,12/500/5    O2 Concentration (%): 30      Mode: AC/ CMV (Assist Control/ Continuous Mandatory Ventilation), RR (machine): 12, TV (machine): 500, FiO2: 30, PEEP: 5, ITime: 1, MAP: 14, PIP: 16    08-10 @ 07:01  -  08-11 @ 07:00  --------------------------------------------------------  IN: 1624.1 mL / OUT: 815 mL / NET: 809.1 mL      CAPILLARY BLOOD GLUCOSE  144 (10 Aug 2022 23:30)      POCT Blood Glucose.: 173 mg/dL (11 Aug 2022 05:06)      PHYSICAL EXAM:  General: NAD  HEENT: NC/AT; PERRL, clear conjunctiva  Neck: supple  Respiratory: +coarse breath sounds b/l +intubated on ventilator  Cardiovascular: +S1/S2; bradycardic rate, regular rhythm   Abdomen: soft, NT/ND; +BS, +periumbilical ventral hernia not reducible  Extremities: WWP, 2+ peripheral pulses b/l; no LE edema  Skin: normal color and turgor; no rash  Neurological: sedated    MEDICATIONS:  MEDICATIONS  (STANDING):  aspirin  chewable 81 milliGRAM(s) Oral daily  cefepime   IVPB 2000 milliGRAM(s) IV Intermittent every 12 hours  chlorhexidine 0.12% Liquid 15 milliLiter(s) Oral Mucosa every 12 hours  chlorhexidine 4% Liquid 1 Application(s) Topical <User Schedule>  dextrose 5%. 1000 milliLiter(s) (100 mL/Hr) IV Continuous <Continuous>  dextrose 5%. 1000 milliLiter(s) (50 mL/Hr) IV Continuous <Continuous>  dextrose 50% Injectable 25 Gram(s) IV Push once  dextrose 50% Injectable 12.5 Gram(s) IV Push once  dextrose 50% Injectable 25 Gram(s) IV Push once  glucagon  Injectable 1 milliGRAM(s) IntraMuscular once  heparin  Infusion 1200 Unit(s)/Hr (12 mL/Hr) IV Continuous <Continuous>  insulin lispro (ADMELOG) corrective regimen sliding scale   SubCutaneous every 6 hours  norepinephrine Infusion 0.6 MICROgram(s)/kG/Min (90 mL/Hr) IV Continuous <Continuous>  polyethylene glycol 3350 17 Gram(s) Oral two times a day    MEDICATIONS  (PRN):  dextrose Oral Gel 15 Gram(s) Oral once PRN Blood Glucose LESS THAN 70 milliGRAM(s)/deciliter      ALLERGIES:  Allergies    Advil (Hives)  penicillin (Hives)    Intolerances        LABS:                        9.7    26.68 )-----------( 214      ( 10 Aug 2022 23:35 )             30.5     08-10    139  |  109<H>  |  26<H>  ----------------------------<  152<H>  3.3<L>   |  20<L>  |  0.96    Ca    7.5<L>      10 Aug 2022 23:35  Phos  2.7     08-10  Mg     1.9     08-10    TPro  5.8<L>  /  Alb  1.8<L>  /  TBili  0.9  /  DBili  x   /  AST  29  /  ALT  12  /  AlkPhos  221<H>  08-10    PT/INR - ( 10 Aug 2022 23:35 )   PT: 20.3 sec;   INR: 1.76 ratio         PTT - ( 10 Aug 2022 23:35 )  PTT:72.6 sec      RADIOLOGY & ADDITIONAL TESTS: Reviewed.   PROGRESS NOTE:  Written by Denis Salgado.    INTERVAL HPI/OVERNIGHT EVENTS: No acute events overnight. Weaned off precedex, weaning Levophed. On pressure support, will monitor for possible extubation today.     SUBJECTIVE: Patient seen and examined at bedside.       OBJECTIVE:    VITAL SIGNS:  ICU Vital Signs Last 24 Hrs  T(C): 37.4 (11 Aug 2022 03:00), Max: 37.5 (10 Aug 2022 16:00)  T(F): 99.3 (11 Aug 2022 03:00), Max: 99.5 (10 Aug 2022 16:00)  HR: 90 (11 Aug 2022 06:45) (50 - 93)  BP: 122/66 (11 Aug 2022 06:45) (80/48 - 147/79)  BP(mean): 87 (11 Aug 2022 06:45) (59 - 105)  ABP: --  ABP(mean): --  RR: 17 (11 Aug 2022 06:45) (12 - 27)  SpO2: 100% (11 Aug 2022 06:45) (100% - 100%)    O2 Parameters below as of 10 Aug 2022 19:00  Patient On (Oxygen Delivery Method): ventilator,12/500/5    O2 Concentration (%): 30      Mode: AC/ CMV (Assist Control/ Continuous Mandatory Ventilation), RR (machine): 12, TV (machine): 500, FiO2: 30, PEEP: 5, ITime: 1, MAP: 14, PIP: 16    08-10 @ 07:01  -  08-11 @ 07:00  --------------------------------------------------------  IN: 1624.1 mL / OUT: 815 mL / NET: 809.1 mL      CAPILLARY BLOOD GLUCOSE  144 (10 Aug 2022 23:30)      POCT Blood Glucose.: 173 mg/dL (11 Aug 2022 05:06)      PHYSICAL EXAM:  General: NAD  HEENT: NC/AT; PERRL, clear conjunctiva  Neck: supple  Respiratory: +coarse breath sounds b/l +intubated on ventilator  Cardiovascular: +S1/S2; bradycardic rate, regular rhythm, +systolic murmur in R 2nd ICS  Abdomen: soft, NT/ND; +BS, +periumbilical ventral hernia not reducible  Extremities: WWP, 2+ peripheral pulses b/l; no LE edema  Skin: normal color and turgor; no rash  Neurological: sedated    MEDICATIONS:  MEDICATIONS  (STANDING):  aspirin  chewable 81 milliGRAM(s) Oral daily  cefepime   IVPB 2000 milliGRAM(s) IV Intermittent every 12 hours  chlorhexidine 0.12% Liquid 15 milliLiter(s) Oral Mucosa every 12 hours  chlorhexidine 4% Liquid 1 Application(s) Topical <User Schedule>  dextrose 5%. 1000 milliLiter(s) (100 mL/Hr) IV Continuous <Continuous>  dextrose 5%. 1000 milliLiter(s) (50 mL/Hr) IV Continuous <Continuous>  dextrose 50% Injectable 25 Gram(s) IV Push once  dextrose 50% Injectable 12.5 Gram(s) IV Push once  dextrose 50% Injectable 25 Gram(s) IV Push once  glucagon  Injectable 1 milliGRAM(s) IntraMuscular once  heparin  Infusion 1200 Unit(s)/Hr (12 mL/Hr) IV Continuous <Continuous>  insulin lispro (ADMELOG) corrective regimen sliding scale   SubCutaneous every 6 hours  norepinephrine Infusion 0.6 MICROgram(s)/kG/Min (90 mL/Hr) IV Continuous <Continuous>  polyethylene glycol 3350 17 Gram(s) Oral two times a day    MEDICATIONS  (PRN):  dextrose Oral Gel 15 Gram(s) Oral once PRN Blood Glucose LESS THAN 70 milliGRAM(s)/deciliter      ALLERGIES:  Allergies    Advil (Hives)  penicillin (Hives)    Intolerances        LABS:                        9.7    26.68 )-----------( 214      ( 10 Aug 2022 23:35 )             30.5     08-10    139  |  109<H>  |  26<H>  ----------------------------<  152<H>  3.3<L>   |  20<L>  |  0.96    Ca    7.5<L>      10 Aug 2022 23:35  Phos  2.7     08-10  Mg     1.9     08-10    TPro  5.8<L>  /  Alb  1.8<L>  /  TBili  0.9  /  DBili  x   /  AST  29  /  ALT  12  /  AlkPhos  221<H>  08-10    PT/INR - ( 10 Aug 2022 23:35 )   PT: 20.3 sec;   INR: 1.76 ratio         PTT - ( 10 Aug 2022 23:35 )  PTT:72.6 sec      RADIOLOGY & ADDITIONAL TESTS: Reviewed.   PROGRESS NOTE:  Written by Denis Salgado.    INTERVAL HPI/OVERNIGHT EVENTS: No acute events overnight. Weaned off precedex, weaning Levophed. On pressure support, will monitor for possible extubation today. Villasenor placed for urinary retention. 3 BM overnight.    SUBJECTIVE: Patient seen and examined at bedside.       OBJECTIVE:    VITAL SIGNS:  ICU Vital Signs Last 24 Hrs  T(C): 37.4 (11 Aug 2022 03:00), Max: 37.5 (10 Aug 2022 16:00)  T(F): 99.3 (11 Aug 2022 03:00), Max: 99.5 (10 Aug 2022 16:00)  HR: 90 (11 Aug 2022 06:45) (50 - 93)  BP: 122/66 (11 Aug 2022 06:45) (80/48 - 147/79)  BP(mean): 87 (11 Aug 2022 06:45) (59 - 105)  ABP: --  ABP(mean): --  RR: 17 (11 Aug 2022 06:45) (12 - 27)  SpO2: 100% (11 Aug 2022 06:45) (100% - 100%)    O2 Parameters below as of 10 Aug 2022 19:00  Patient On (Oxygen Delivery Method): ventilator,12/500/5    O2 Concentration (%): 30      Mode: AC/ CMV (Assist Control/ Continuous Mandatory Ventilation), RR (machine): 12, TV (machine): 500, FiO2: 30, PEEP: 5, ITime: 1, MAP: 14, PIP: 16    08-10 @ 07:01  -  08-11 @ 07:00  --------------------------------------------------------  IN: 1624.1 mL / OUT: 815 mL / NET: 809.1 mL      CAPILLARY BLOOD GLUCOSE  144 (10 Aug 2022 23:30)      POCT Blood Glucose.: 173 mg/dL (11 Aug 2022 05:06)      PHYSICAL EXAM:  General: NAD  HEENT: NC/AT; PERRL, clear conjunctiva  Neck: supple  Respiratory: +coarse breath sounds b/l +intubated on ventilator  Cardiovascular: +S1/S2; bradycardic rate, regular rhythm, +systolic murmur in R 2nd ICS  Abdomen: soft, NT/ND; +BS, +periumbilical ventral hernia not reducible  Extremities: WWP, 2+ peripheral pulses b/l; 2+ LE edema  Skin: normal color and turgor; no rash  Neurological: sedated    MEDICATIONS:  MEDICATIONS  (STANDING):  aspirin  chewable 81 milliGRAM(s) Oral daily  cefepime   IVPB 2000 milliGRAM(s) IV Intermittent every 12 hours  chlorhexidine 0.12% Liquid 15 milliLiter(s) Oral Mucosa every 12 hours  chlorhexidine 4% Liquid 1 Application(s) Topical <User Schedule>  dextrose 5%. 1000 milliLiter(s) (100 mL/Hr) IV Continuous <Continuous>  dextrose 5%. 1000 milliLiter(s) (50 mL/Hr) IV Continuous <Continuous>  dextrose 50% Injectable 25 Gram(s) IV Push once  dextrose 50% Injectable 12.5 Gram(s) IV Push once  dextrose 50% Injectable 25 Gram(s) IV Push once  glucagon  Injectable 1 milliGRAM(s) IntraMuscular once  heparin  Infusion 1200 Unit(s)/Hr (12 mL/Hr) IV Continuous <Continuous>  insulin lispro (ADMELOG) corrective regimen sliding scale   SubCutaneous every 6 hours  norepinephrine Infusion 0.6 MICROgram(s)/kG/Min (90 mL/Hr) IV Continuous <Continuous>  polyethylene glycol 3350 17 Gram(s) Oral two times a day    MEDICATIONS  (PRN):  dextrose Oral Gel 15 Gram(s) Oral once PRN Blood Glucose LESS THAN 70 milliGRAM(s)/deciliter      ALLERGIES:  Allergies    Advil (Hives)  penicillin (Hives)    Intolerances        LABS:                        9.7    26.68 )-----------( 214      ( 10 Aug 2022 23:35 )             30.5     08-10    139  |  109<H>  |  26<H>  ----------------------------<  152<H>  3.3<L>   |  20<L>  |  0.96    Ca    7.5<L>      10 Aug 2022 23:35  Phos  2.7     08-10  Mg     1.9     08-10    TPro  5.8<L>  /  Alb  1.8<L>  /  TBili  0.9  /  DBili  x   /  AST  29  /  ALT  12  /  AlkPhos  221<H>  08-10    PT/INR - ( 10 Aug 2022 23:35 )   PT: 20.3 sec;   INR: 1.76 ratio         PTT - ( 10 Aug 2022 23:35 )  PTT:72.6 sec      RADIOLOGY & ADDITIONAL TESTS: Reviewed.

## 2022-08-11 NOTE — PROGRESS NOTE ADULT - SUBJECTIVE AND OBJECTIVE BOX
Neurology Progress Note    S: Patient seen and examined, now in ICU. on lveo and heparin.  off sedation for 24hrs. follows a bit today    Medication:    MEDICATIONS  (STANDING):  aspirin  chewable 81 milliGRAM(s) Oral daily  cefepime   IVPB 2000 milliGRAM(s) IV Intermittent every 12 hours  chlorhexidine 0.12% Liquid 15 milliLiter(s) Oral Mucosa every 12 hours  chlorhexidine 4% Liquid 1 Application(s) Topical <User Schedule>  dextrose 5%. 1000 milliLiter(s) (100 mL/Hr) IV Continuous <Continuous>  dextrose 5%. 1000 milliLiter(s) (50 mL/Hr) IV Continuous <Continuous>  dextrose 50% Injectable 25 Gram(s) IV Push once  dextrose 50% Injectable 12.5 Gram(s) IV Push once  dextrose 50% Injectable 25 Gram(s) IV Push once  glucagon  Injectable 1 milliGRAM(s) IntraMuscular once  heparin  Infusion 1200 Unit(s)/Hr (12 mL/Hr) IV Continuous <Continuous>  insulin lispro (ADMELOG) corrective regimen sliding scale   SubCutaneous every 6 hours  norepinephrine Infusion 0.6 MICROgram(s)/kG/Min (90 mL/Hr) IV Continuous <Continuous>  polyethylene glycol 3350 17 Gram(s) Oral two times a day    MEDICATIONS  (PRN):  dextrose Oral Gel 15 Gram(s) Oral once PRN Blood Glucose LESS THAN 70 milliGRAM(s)/deciliter      Vitals:      ICU Vital Signs Last 24 Hrs  T(C): 37.4 (11 Aug 2022 03:00), Max: 37.5 (10 Aug 2022 16:00)  T(F): 99.3 (11 Aug 2022 03:00), Max: 99.5 (10 Aug 2022 16:00)  HR: 93 (11 Aug 2022 08:15) (50 - 96)  BP: 116/62 (11 Aug 2022 08:15) (80/48 - 147/79)  BP(mean): 83 (11 Aug 2022 08:15) (59 - 105)  ABP: --  ABP(mean): --  RR: 17 (11 Aug 2022 08:15) (12 - 27)  SpO2: 100% (11 Aug 2022 08:15) (100% - 100%)    O2 Parameters below as of 11 Aug 2022 07:00  Patient On (Oxygen Delivery Method): ventilator    O2 Concentration (%): 30        General Exam:   General Appearance: Appropriately dressed and in no acute distress       Head: Normocephalic, atraumatic and no dysmorphic features  Ear, Nose, and Throat: Moist mucous membranes  CVS: S1S2+  Resp: No SOB, no wheeze or rhonchi + ETT   Abd: soft NTND  Extremities: No edema, no cyanosis  Skin: No bruises, no rashes     Neurological Exam:  Mental Status: eyes closed, grimaces to noxious and now opens to noxious no verbal, following some commands today ( B/L)   Cranial Nerves: PERRL, EOMI, VFFC, sensation V1-V3 intact,  no obvious facial asymmetry , equal elevation of palate, scm/trap 5/5, tongue is midline on protrusion. + ETT   Motor:  moving more spontaneous today uppers>lowers 2-3/5 at times   Sensation:  withdraws weakly x 4   Coordination: unable   Gait: unable in ICU     I personally reviewed the below data/images/labs:      CBC Full  -  ( 10 Aug 2022 23:35 )  WBC Count : 26.68 K/uL  RBC Count : 3.69 M/uL  Hemoglobin : 9.7 g/dL  Hematocrit : 30.5 %  Platelet Count - Automated : 214 K/uL  Mean Cell Volume : 82.7 fl  Mean Cell Hemoglobin : 26.3 pg  Mean Cell Hemoglobin Concentration : 31.8 gm/dL  Auto Neutrophil # : 23.47 K/uL  Auto Lymphocyte # : 1.28 K/uL  Auto Monocyte # : 1.20 K/uL  Auto Eosinophil # : 0.25 K/uL  Auto Basophil # : 0.07 K/uL  Auto Neutrophil % : 88.0 %  Auto Lymphocyte % : 4.8 %  Auto Monocyte % : 4.5 %  Auto Eosinophil % : 0.9 %  Auto Basophil % : 0.3 %    08-10    139  |  109<H>  |  26<H>  ----------------------------<  152<H>  3.3<L>   |  20<L>  |  0.96    Ca    7.5<L>      10 Aug 2022 23:35  Phos  2.7     08-10  Mg     1.9     08-10    TPro  5.8<L>  /  Alb  1.8<L>  /  TBili  0.9  /  DBili  x   /  AST  29  /  ALT  12  /  AlkPhos  221<H>  08-10    Urinalysis Basic - ( 08 Aug 2022 16:52 )    Color: Yellow / Appearance: Turbid / S.021 / pH: x  Gluc: x / Ketone: Negative  / Bili: Negative / Urobili: Negative   Blood: x / Protein: 100 mg/dL / Nitrite: Negative   Leuk Esterase: Large / RBC: 3 /hpf / WBC 56 /HPF   Sq Epi: x / Non Sq Epi: 24 /hpf / Bacteria: Negative        < from: CT Abdomen and Pelvis w/ IV Cont (22 @ 16:22) >  IMPRESSION:  Extensive hepatic metastatic disease possibly secondary to locally   invasive gallbladder cancer.    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    Mildly increasing bilateral pleural effusions.      < end of copied text >  < from: CT Chest w/ IV Cont (22 @ 16:22) >  IMPRESSION:  Extensive hepatic metastatic disease possibly secondary to locally   invasive gallbladder cancer.    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    Mildly increasing bilateral pleural effusions.      < end of copied text >  < from: CT Brain Perfusion Maps Stroke (22 @ 16:21) >  CT PERFUSION:  1. No perfusion abnormality.    CTA NECK:  1. Calcified plaque affecting the left carotid bifurcation with   approximately mild-moderate (45-55%) stenosis of the left internal   carotid artery origin by NASCET criteria.  2. Atherosclerotic plaque affecting the origin and proximal segment of   the left subclavian artery with associated mild (less than 50%) stenosis.  3. Otherwise, no large vessel occlusion or major stenosis.  4. Heterogeneous appearing thyroid gland containing multiple nodules, the   largest of which measures up to 2 cm on the right side. Thyroid likely   cannot be excluded. Recommend ultrasound correlation.    CTA HEAD:  1. No large vessel occlusion or major stenosis.    < end of copied text >  < from: CT Brain Stroke Protocol (22 @ 16:20) >  IMPRESSION: No acute intracranial hemorrhage, mass effect, or shift of   the midline structures.    Similar-appearing moderate severity chronic white matter microvascular   type changes and chronic lacunar infarct within the right thalamus.    < end of copied text >

## 2022-08-11 NOTE — AIRWAY REMOVAL NOTE  ADULT & PEDS - ARTIFICAL AIRWAY REMOVAL COMMENTS
The patient was identified by full name and birth date and compared to the identification band.  Present during the extubation was Jose Del Rosario RN and MD residents..

## 2022-08-11 NOTE — PROGRESS NOTE ADULT - ASSESSMENT
81 year old man with metastatic gallbladder cancer, DM2, HTN, HLD, CAD s/p MI 2018, HFpEF, recent admission for sepsis 2/2 UTI, presents from rehab after being found unresponsive at rehab likely due to aspiration, intubated i/s/o hypoxemic respiratory failure and septic shock, admitted to MICU for further management.    Neuro  #Obtundation  Baseline A&Ox3, currently not withdrawing to noxious stimuli though following some commands  Likely 2/2 septic encephalopathy i/s/o aspiration PNA  Code stroke called on arrival, no evidence of CVA found  - weaned off propofol and precedex  - patient following commands off sedation    Respiratory  #Acute hypoxemic respiratory failure  Likely 2/2 aspiration PNA, as was found unresponsive with food in mouth and rhonchorous breath sounds on exam  Vent was set to 12/500/100/5 when pt was assessed; was not overbreathing  - continue to monitor on vent, monitor ABG for adjustments  - CXR showed bilateral small pleural effusions   - fentanyl prn  - tentative extubation today 8/10    Cardiac  #Shock  Pt with likely septic shock 2/2 aspiration PNA vs. vasoplegic  - s/p 5% albumin 250cc q8h x1 day for fluid resuscitation   - continue weaning off norepinephrine ggt as tolerated to maintain goal MAP>65    #CAD  - c/w ASA 81 daily     #HFpEF  - TTE 7/26 showed EF=63%, moderate aortic stenosis, minimal aortic regurg, concentric LV remodelling, normal LVSF, normal RV function,       /Renal  #NELSON  sCr ~2x his baseline on arrival to ED, likely 2/2 ATN iso sepsis. Improving.  - Villasenor catheter placed in ED, remove 8/9  - maintain MAP >65 as above to ensure adequate renal perfusion  - continue to trend Cr, expect improvement with treatment of underlying condition  - FENa=0.4% consistent with pre-renal etiology    GI  #Periumbilical ventral hernia  - CT A/P showing moderately large fat containing periumbilical ventral hernia     Endocrine  #Hx of DM  - monitor on ISS q6h    #Nodular thyroid  - outpatient thyroid US for further workup    ID  #?Aspiration pneumonia  Suspect pt with PNA as stated above, though of note pt is afebrile and WBC  s/p vanc, clindamycin, and cefepime in ED  - c/w Cefepime, adjusting dose based on renal function  - MRSA PCR neg  - f/u blood, urine, and sputum cultures,    Heme/onc  #Right Portal vein thrombosis  #Left Hepatic vein thrombosis  On apixaban 5mg bid at home  - c/w heparin ggt    #Metastatic gallbladder cancer  - CT A/P showed extensive hepatic metastases from likely gallbladder primary  - 8/1/22 Liver metastasis biopsy showed carcinoma    DVT ppx: on Heparin gtt    Ethics  Code status: DNR/DNI, family discussion with plan to not re-intubate as this would not be within GOC. MOLST in chart. 81 year old man with metastatic gallbladder cancer, DM2, HTN, HLD, CAD s/p MI 2018, HFpEF, recent admission for sepsis 2/2 UTI, presents from rehab after being found unresponsive at rehab likely due to aspiration, intubated i/s/o hypoxemic respiratory failure and septic shock, admitted to MICU for further management.    Neuro  #Obtundation  Baseline A&Ox3, currently not withdrawing to noxious stimuli though following some commands  Likely 2/2 septic encephalopathy i/s/o aspiration PNA  Code stroke called on arrival, no evidence of CVA found  - weaned off propofol and precedex  - patient nodding his head 'no' when asked questions, not directly following commands    Respiratory  #Acute hypoxemic respiratory failure  Likely 2/2 aspiration PNA, as was found unresponsive with food in mouth and rhonchorous breath sounds on exam  Vent was set to 12/500/100/5 when pt was assessed; was not overbreathing  - continue to monitor on vent, monitor ABG for adjustments  - CXR showed bilateral small pleural effusions   - fentanyl prn  - POCUS with small fluid in bases, giving Lasix 20 IVP x1 now to optimize for possible extubation today 8/11    Cardiac  #Shock  Pt with likely septic shock 2/2 aspiration PNA vs. vasoplegic  - s/p 5% albumin 250cc q8h x1 day for fluid resuscitation   - continue weaning off norepinephrine ggt as tolerated to maintain goal MAP>65    #CAD  - c/w ASA 81 daily     #HFpEF  - TTE 7/26 showed EF=63%, moderate aortic stenosis, minimal aortic regurg, concentric LV remodelling, normal LVSF, normal RV function,       /Renal  #NELSON  sCr ~2x his baseline on arrival to ED, likely 2/2 ATN iso sepsis. Improving.  - Price catheter placed in ED, remove 8/9  - maintain MAP >65 as above to ensure adequate renal perfusion  - continue to trend Cr, expect improvement with treatment of underlying condition  - FENa=0.4% consistent with pre-renal etiology    #Urinary retention  - price catheter   - monitor I/O    GI  #Periumbilical ventral hernia  - CT A/P showing moderately large fat containing periumbilical ventral hernia     #Frequent BM  - 3 bowel movements overnight, changed miralax from standing to PRN    Endocrine  #Hx of DM  - monitor on ISS q6h    #Nodular thyroid  - outpatient thyroid US for further workup    ID  #?Aspiration pneumonia  Suspect pt with PNA as stated above, though of note pt is afebrile and WBC  s/p vanc, clindamycin, and cefepime in ED  - c/w Cefepime, adjusting dose based on renal function  - MRSA PCR neg  - f/u blood, urine, and sputum cultures,    Heme/onc  #Right Portal vein thrombosis  #Left Hepatic vein thrombosis  On apixaban 5mg bid at home  - c/w heparin ggt    #Metastatic gallbladder cancer  - CT A/P showed extensive hepatic metastases from likely gallbladder primary  - 8/1/22 Liver metastasis biopsy showed carcinoma    DVT ppx: on Heparin gtt    Ethics  Code status: DNR/DNI, family discussion with plan to not re-intubate as this would not be within C. MOLST in chart.  - Palliative following, assessing candidacy for possible transfer to PCU following extubation

## 2022-08-12 NOTE — PROGRESS NOTE ADULT - SUBJECTIVE AND OBJECTIVE BOX
PROGRESS NOTE:  Written by Denis Salgado.    INTERVAL HPI/OVERNIGHT EVENTS:    SUBJECTIVE: Patient seen and examined at bedside.       OBJECTIVE:    VITAL SIGNS:  ICU Vital Signs Last 24 Hrs  T(C): 36.5 (12 Aug 2022 04:00), Max: 37.5 (11 Aug 2022 08:00)  T(F): 97.7 (12 Aug 2022 04:00), Max: 99.5 (11 Aug 2022 08:00)  HR: 86 (12 Aug 2022 07:00) (69 - 105)  BP: 98/51 (12 Aug 2022 07:00) (81/54 - 138/57)  BP(mean): 73 (12 Aug 2022 07:00) (60 - 94)  ABP: --  ABP(mean): --  RR: 12 (12 Aug 2022 07:00) (10 - 29)  SpO2: 100% (12 Aug 2022 07:00) (99% - 100%)    O2 Parameters below as of 12 Aug 2022 06:04  Patient On (Oxygen Delivery Method): mask, aerosol          Mode: CPAP with PS, FiO2: 30, PEEP: 5, PS: 5, MAP: 8, PIP: 11    08-11 @ 07:01  -  08-12 @ 07:00  --------------------------------------------------------  IN: 888.4 mL / OUT: 855 mL / NET: 33.4 mL      CAPILLARY BLOOD GLUCOSE  144 (10 Aug 2022 23:30)      POCT Blood Glucose.: 132 mg/dL (12 Aug 2022 04:50)      PHYSICAL EXAM:  General: NAD  HEENT: NC/AT; PERRL, clear conjunctiva  Neck: supple  Respiratory: +coarse breath sounds b/l +intubated on ventilator  Cardiovascular: +S1/S2; bradycardic rate, regular rhythm, +systolic murmur in R 2nd ICS  Abdomen: soft, NT/ND; +BS, +periumbilical ventral hernia not reducible  Extremities: WWP, 2+ peripheral pulses b/l; 2+ LE edema  Skin: normal color and turgor; no rash  Neurological: sedated    MEDICATIONS:  MEDICATIONS  (STANDING):  albuterol/ipratropium for Nebulization 3 milliLiter(s) Nebulizer every 6 hours  aspirin  chewable 81 milliGRAM(s) Oral daily  cefepime   IVPB 2000 milliGRAM(s) IV Intermittent every 12 hours  chlorhexidine 4% Liquid 1 Application(s) Topical <User Schedule>  dextrose 5%. 1000 milliLiter(s) (100 mL/Hr) IV Continuous <Continuous>  dextrose 5%. 1000 milliLiter(s) (50 mL/Hr) IV Continuous <Continuous>  dextrose 50% Injectable 25 Gram(s) IV Push once  dextrose 50% Injectable 12.5 Gram(s) IV Push once  dextrose 50% Injectable 25 Gram(s) IV Push once  glucagon  Injectable 1 milliGRAM(s) IntraMuscular once  heparin  Infusion 1200 Unit(s)/Hr (12 mL/Hr) IV Continuous <Continuous>  insulin lispro (ADMELOG) corrective regimen sliding scale   SubCutaneous every 6 hours  norepinephrine Infusion 0.6 MICROgram(s)/kG/Min (90 mL/Hr) IV Continuous <Continuous>  sodium chloride 3%  Inhalation 4 milliLiter(s) Inhalation every 12 hours    MEDICATIONS  (PRN):  dextrose Oral Gel 15 Gram(s) Oral once PRN Blood Glucose LESS THAN 70 milliGRAM(s)/deciliter  polyethylene glycol 3350 17 Gram(s) Oral daily PRN Constipation      ALLERGIES:  Allergies    Advil (Hives)  penicillin (Hives)    Intolerances        LABS:                        9.7    26.68 )-----------( 214      ( 10 Aug 2022 23:35 )             30.5     08-12    140  |  112<H>  |  26<H>  ----------------------------<  156<H>  3.5   |  16<L>  |  0.95    Ca    8.1<L>      12 Aug 2022 00:28  Phos  3.6     08-12  Mg     2.3     08-12    TPro  6.1  /  Alb  1.8<L>  /  TBili  1.0  /  DBili  x   /  AST  23  /  ALT  13  /  AlkPhos  185<H>  08-12    PT/INR - ( 12 Aug 2022 00:28 )   PT: 19.2 sec;   INR: 1.65 ratio         PTT - ( 12 Aug 2022 00:28 )  PTT:61.9 sec      RADIOLOGY & ADDITIONAL TESTS: Reviewed.   PROGRESS NOTE:  Written by Denis Salgado.    INTERVAL HPI/OVERNIGHT EVENTS: No acute events overnight.     SUBJECTIVE: Patient seen and examined at bedside.       OBJECTIVE:    VITAL SIGNS:  ICU Vital Signs Last 24 Hrs  T(C): 36.5 (12 Aug 2022 04:00), Max: 37.5 (11 Aug 2022 08:00)  T(F): 97.7 (12 Aug 2022 04:00), Max: 99.5 (11 Aug 2022 08:00)  HR: 86 (12 Aug 2022 07:00) (69 - 105)  BP: 98/51 (12 Aug 2022 07:00) (81/54 - 138/57)  BP(mean): 73 (12 Aug 2022 07:00) (60 - 94)  ABP: --  ABP(mean): --  RR: 12 (12 Aug 2022 07:00) (10 - 29)  SpO2: 100% (12 Aug 2022 07:00) (99% - 100%)    O2 Parameters below as of 12 Aug 2022 06:04  Patient On (Oxygen Delivery Method): mask, aerosol          Mode: CPAP with PS, FiO2: 30, PEEP: 5, PS: 5, MAP: 8, PIP: 11    08-11 @ 07:01  -  08-12 @ 07:00  --------------------------------------------------------  IN: 888.4 mL / OUT: 855 mL / NET: 33.4 mL      CAPILLARY BLOOD GLUCOSE  144 (10 Aug 2022 23:30)      POCT Blood Glucose.: 132 mg/dL (12 Aug 2022 04:50)      PHYSICAL EXAM:  General: NAD  HEENT: NC/AT; PERRL, clear conjunctiva  Neck: supple  Respiratory: +coarse breath sounds b/l +intubated on ventilator  Cardiovascular: +S1/S2; bradycardic rate, regular rhythm, +systolic murmur in R 2nd ICS  Abdomen: soft, NT/ND; +BS, +periumbilical ventral hernia not reducible  Extremities: WWP, 2+ peripheral pulses b/l; 2+ LE edema  Skin: normal color and turgor; no rash  Neurological: sedated    MEDICATIONS:  MEDICATIONS  (STANDING):  albuterol/ipratropium for Nebulization 3 milliLiter(s) Nebulizer every 6 hours  aspirin  chewable 81 milliGRAM(s) Oral daily  cefepime   IVPB 2000 milliGRAM(s) IV Intermittent every 12 hours  chlorhexidine 4% Liquid 1 Application(s) Topical <User Schedule>  dextrose 5%. 1000 milliLiter(s) (100 mL/Hr) IV Continuous <Continuous>  dextrose 5%. 1000 milliLiter(s) (50 mL/Hr) IV Continuous <Continuous>  dextrose 50% Injectable 25 Gram(s) IV Push once  dextrose 50% Injectable 12.5 Gram(s) IV Push once  dextrose 50% Injectable 25 Gram(s) IV Push once  glucagon  Injectable 1 milliGRAM(s) IntraMuscular once  heparin  Infusion 1200 Unit(s)/Hr (12 mL/Hr) IV Continuous <Continuous>  insulin lispro (ADMELOG) corrective regimen sliding scale   SubCutaneous every 6 hours  norepinephrine Infusion 0.6 MICROgram(s)/kG/Min (90 mL/Hr) IV Continuous <Continuous>  sodium chloride 3%  Inhalation 4 milliLiter(s) Inhalation every 12 hours    MEDICATIONS  (PRN):  dextrose Oral Gel 15 Gram(s) Oral once PRN Blood Glucose LESS THAN 70 milliGRAM(s)/deciliter  polyethylene glycol 3350 17 Gram(s) Oral daily PRN Constipation      ALLERGIES:  Allergies    Advil (Hives)  penicillin (Hives)    Intolerances        LABS:                        9.7    26.68 )-----------( 214      ( 10 Aug 2022 23:35 )             30.5     08-12    140  |  112<H>  |  26<H>  ----------------------------<  156<H>  3.5   |  16<L>  |  0.95    Ca    8.1<L>      12 Aug 2022 00:28  Phos  3.6     08-12  Mg     2.3     08-12    TPro  6.1  /  Alb  1.8<L>  /  TBili  1.0  /  DBili  x   /  AST  23  /  ALT  13  /  AlkPhos  185<H>  08-12    PT/INR - ( 12 Aug 2022 00:28 )   PT: 19.2 sec;   INR: 1.65 ratio         PTT - ( 12 Aug 2022 00:28 )  PTT:61.9 sec      RADIOLOGY & ADDITIONAL TESTS: Reviewed.   PROGRESS NOTE:  Written by Denis Salgado.    INTERVAL HPI/OVERNIGHT EVENTS: No acute events overnight. Patient's mentation stable since yesterday, intermittently saying words as per family at bedside.    SUBJECTIVE: Patient seen and examined at bedside.     OBJECTIVE:    VITAL SIGNS:  ICU Vital Signs Last 24 Hrs  T(C): 36.5 (12 Aug 2022 04:00), Max: 37.5 (11 Aug 2022 08:00)  T(F): 97.7 (12 Aug 2022 04:00), Max: 99.5 (11 Aug 2022 08:00)  HR: 86 (12 Aug 2022 07:00) (69 - 105)  BP: 98/51 (12 Aug 2022 07:00) (81/54 - 138/57)  BP(mean): 73 (12 Aug 2022 07:00) (60 - 94)  ABP: --  ABP(mean): --  RR: 12 (12 Aug 2022 07:00) (10 - 29)  SpO2: 100% (12 Aug 2022 07:00) (99% - 100%)    O2 Parameters below as of 12 Aug 2022 06:04  Patient On (Oxygen Delivery Method): mask, aerosol          Mode: CPAP with PS, FiO2: 30, PEEP: 5, PS: 5, MAP: 8, PIP: 11    08-11 @ 07:01  -  08-12 @ 07:00  --------------------------------------------------------  IN: 888.4 mL / OUT: 855 mL / NET: 33.4 mL      CAPILLARY BLOOD GLUCOSE  144 (10 Aug 2022 23:30)      POCT Blood Glucose.: 132 mg/dL (12 Aug 2022 04:50)      PHYSICAL EXAM:  General: NAD  HEENT: NC/AT; PERRL, clear conjunctiva  Neck: supple  Respiratory: +coarse breath sounds b/l    Cardiovascular: +S1/S2; bradycardic rate, regular rhythm, +systolic murmur in R 2nd ICS  Abdomen: soft, NT/ND; +BS, +periumbilical ventral hernia not reducible  Extremities: WWP, 2+ peripheral pulses b/l; 2+ LE edema  Skin: normal color and turgor; no rash  Neurological: AOx0, intermittently saying words as per family at bedside    MEDICATIONS:  MEDICATIONS  (STANDING):  albuterol/ipratropium for Nebulization 3 milliLiter(s) Nebulizer every 6 hours  aspirin  chewable 81 milliGRAM(s) Oral daily  cefepime   IVPB 2000 milliGRAM(s) IV Intermittent every 12 hours  chlorhexidine 4% Liquid 1 Application(s) Topical <User Schedule>  dextrose 5%. 1000 milliLiter(s) (100 mL/Hr) IV Continuous <Continuous>  dextrose 5%. 1000 milliLiter(s) (50 mL/Hr) IV Continuous <Continuous>  dextrose 50% Injectable 25 Gram(s) IV Push once  dextrose 50% Injectable 12.5 Gram(s) IV Push once  dextrose 50% Injectable 25 Gram(s) IV Push once  glucagon  Injectable 1 milliGRAM(s) IntraMuscular once  heparin  Infusion 1200 Unit(s)/Hr (12 mL/Hr) IV Continuous <Continuous>  insulin lispro (ADMELOG) corrective regimen sliding scale   SubCutaneous every 6 hours  norepinephrine Infusion 0.6 MICROgram(s)/kG/Min (90 mL/Hr) IV Continuous <Continuous>  sodium chloride 3%  Inhalation 4 milliLiter(s) Inhalation every 12 hours    MEDICATIONS  (PRN):  dextrose Oral Gel 15 Gram(s) Oral once PRN Blood Glucose LESS THAN 70 milliGRAM(s)/deciliter  polyethylene glycol 3350 17 Gram(s) Oral daily PRN Constipation      ALLERGIES:  Allergies    Advil (Hives)  penicillin (Hives)    Intolerances        LABS:                        9.7    26.68 )-----------( 214      ( 10 Aug 2022 23:35 )             30.5     08-12    140  |  112<H>  |  26<H>  ----------------------------<  156<H>  3.5   |  16<L>  |  0.95    Ca    8.1<L>      12 Aug 2022 00:28  Phos  3.6     08-12  Mg     2.3     08-12    TPro  6.1  /  Alb  1.8<L>  /  TBili  1.0  /  DBili  x   /  AST  23  /  ALT  13  /  AlkPhos  185<H>  08-12    PT/INR - ( 12 Aug 2022 00:28 )   PT: 19.2 sec;   INR: 1.65 ratio         PTT - ( 12 Aug 2022 00:28 )  PTT:61.9 sec      RADIOLOGY & ADDITIONAL TESTS: Reviewed.

## 2022-08-12 NOTE — PROGRESS NOTE ADULT - ASSESSMENT
81 year old man with metastatic gallbladder cancer, DM2, HTN, HLD, CAD s/p MI 2018, HFpEF, recent admission for sepsis 2/2 UTI, presents from rehab after being found unresponsive at rehab likely due to aspiration, intubated i/s/o hypoxemic respiratory failure and septic shock, admitted to MICU for further management.    Neuro  #Obtundation  Baseline A&Ox3, currently not withdrawing to noxious stimuli though following some commands  Likely 2/2 septic encephalopathy i/s/o aspiration PNA  Code stroke called on arrival, no evidence of CVA found  - weaned off propofol and precedex  - patient nodding his head 'no' when asked questions, not directly following commands    Respiratory  #Acute hypoxemic respiratory failure  Likely 2/2 aspiration PNA, as was found unresponsive with food in mouth and rhonchorous breath sounds on exam  Vent was set to 12/500/100/5 when pt was assessed; was not overbreathing  - CXR showed bilateral small pleural effusions   - fentanyl prn  - POCUS with small fluid in bases, giving Lasix 20 IVP x1 now to optimize for possible extubation today 8/11  - extubated 8/11 to NC    Cardiac  #Shock  Pt with likely septic shock 2/2 aspiration PNA vs. vasoplegic  - s/p 5% albumin 250cc q8h x1 day for fluid resuscitation   - continue weaning off norepinephrine ggt as tolerated to maintain goal MAP>65    #CAD  - c/w ASA 81 daily     #HFpEF  - TTE 7/26 showed EF=63%, moderate aortic stenosis, minimal aortic regurg, concentric LV remodelling, normal LVSF, normal RV function,     /Renal  #NELSON  sCr ~2x his baseline on arrival to ED, likely 2/2 ATN iso sepsis. Improving.  - Price catheter placed in ED, remove 8/9  - maintain MAP >65 as above to ensure adequate renal perfusion  - continue to trend Cr, expect improvement with treatment of underlying condition  - FENa=0.4% consistent with pre-renal etiology    #Urinary retention  - price catheter   - monitor I/O    GI  #Periumbilical ventral hernia  - CT A/P showing moderately large fat containing periumbilical ventral hernia     #Frequent BM  - 3 bowel movements overnight, changed miralax from standing to PRN    Endocrine  #Hx of DM  - monitor on ISS q6h    #Nodular thyroid  - outpatient thyroid US for further workup    ID  #?Aspiration pneumonia  Suspect pt with PNA as stated above, though of note pt is afebrile and WBC  s/p vanc, clindamycin, and cefepime in ED  - c/w Cefepime, adjusting dose based on renal function  - MRSA PCR neg  - f/u blood, urine, and sputum cultures,    Heme/onc  #Right Portal vein thrombosis  #Left Hepatic vein thrombosis  On apixaban 5mg bid at home  - c/w heparin ggt    #Metastatic gallbladder cancer  - CT A/P showed extensive hepatic metastases from likely gallbladder primary  - 8/1/22 Liver metastasis biopsy showed carcinoma    DVT ppx: on Heparin gtt    Ethics  Code status: DNR/DNI, family discussion with plan to not re-intubate as this would not be within [facility]. MOLST in chart.  - Palliative following, assessing candidacy for possible transfer to PCU following extubation 81 year old man with metastatic gallbladder cancer, DM2, HTN, HLD, CAD s/p MI 2018, HFpEF, recent admission for sepsis 2/2 UTI, presents from rehab after being found unresponsive at rehab likely due to aspiration, intubated i/s/o hypoxemic respiratory failure and septic shock, extubated 8/11.    Neuro  #Obtundation  Baseline A&Ox3, currently not withdrawing to noxious stimuli though following some commands  Likely 2/2 septic encephalopathy i/s/o aspiration PNA  Code stroke called on arrival, no evidence of CVA found  - weaned off propofol and precedex  - patient nodding his head 'no' when asked questions, not directly following commands    Respiratory  #Acute hypoxemic respiratory failure  Likely 2/2 aspiration PNA, as was found unresponsive with food in mouth and rhonchorous breath sounds on exam  Vent was set to 12/500/100/5 when pt was assessed; was not overbreathing  - CXR showed bilateral small pleural effusions   - fentanyl prn  - POCUS with small fluid in bases, giving Lasix 20 IVP x1 now to optimize for possible extubation today 8/11  - extubated 8/11 to NC    Cardiac  #Shock  Pt with likely septic shock 2/2 aspiration PNA vs. vasoplegic  - s/p 5% albumin 250cc q8h x1 day for fluid resuscitation   - continue weaning off norepinephrine ggt as tolerated to maintain goal MAP>65    #CAD  - c/w ASA 81 daily     #HFpEF  - TTE 7/26 showed EF=63%, moderate aortic stenosis, minimal aortic regurg, concentric LV remodelling, normal LVSF, normal RV function,     /Renal  #NELSON  sCr ~2x his baseline on arrival to ED, likely 2/2 ATN iso sepsis. Improving.  - Price catheter placed in ED, remove 8/9  - maintain MAP >65 as above to ensure adequate renal perfusion  - continue to trend Cr, expect improvement with treatment of underlying condition  - FENa=0.4% consistent with pre-renal etiology    #Urinary retention  - price catheter   - monitor I/O    GI  #Periumbilical ventral hernia  - CT A/P showing moderately large fat containing periumbilical ventral hernia     #Frequent BM  - 3 bowel movements overnight, changed miralax from standing to PRN    Endocrine  #Hx of DM  - monitor on ISS q6h    #Nodular thyroid  - outpatient thyroid US for further workup    ID  #?Aspiration pneumonia  Suspect pt with PNA as stated above, though of note pt is afebrile and WBC  s/p vanc, clindamycin, and cefepime in ED  - c/w Cefepime, adjusting dose based on renal function  - MRSA PCR neg  - f/u blood, urine, and sputum cultures,    Heme/onc  #Right Portal vein thrombosis  #Left Hepatic vein thrombosis  On apixaban 5mg bid at home  - c/w heparin ggt    #Metastatic gallbladder cancer  - CT A/P showed extensive hepatic metastases from likely gallbladder primary  - 8/1/22 Liver metastasis biopsy showed carcinoma    DVT ppx: on Heparin gtt    Ethics  Code status: DNR/DNI, family discussion with plan to not re-intubate as this would not be within [facility]. MOLST in chart.  - Palliative following, assessing candidacy for possible transfer to PCU following extubation 81 year old man with metastatic gallbladder cancer, DM2, HTN, HLD, CAD s/p MI 2018, HFpEF, recent admission for sepsis 2/2 UTI, presents from rehab after being found unresponsive at rehab likely due to aspiration, intubated i/s/o hypoxemic respiratory failure and septic shock, extubated 8/11.    Neuro  #Obtundation  Baseline A&Ox3, currently not withdrawing to noxious stimuli though following some commands  Likely 2/2 septic encephalopathy i/s/o aspiration PNA  Code stroke called on arrival, no evidence of CVA found  - weaned off sedation     Respiratory  #Acute hypoxemic respiratory failure  Likely 2/2 aspiration PNA, as was found unresponsive with food in mouth and rhonchorous breath sounds on exam  Vent was set to 12/500/100/5 when pt was assessed; was not overbreathing  - CXR showed bilateral small pleural effusions   - POCUS with small fluid in bases, giving Lasix 20 IVP x1 now to optimize for possible extubation today 8/11  - extubated 8/11 to aeroslized mask 35%, weaned to 28%    Cardiac  #Shock  Pt with likely septic shock 2/2 aspiration PNA vs. vasoplegic  - s/p 5% albumin 250cc q8h x1 day for fluid resuscitation   - continue weaning off norepinephrine infusion as tolerated to maintain goal MAP>65  - resuscitating with 1L LR maintenance IVF @100cc/h   - starting midodrine 10mg q8h to wean off pressors    #CAD  - c/w ASA 81 daily     #HFpEF  - TTE 7/26 showed EF=63%, moderate aortic stenosis, minimal aortic regurg, concentric LV remodelling, normal LVSF, normal RV function,     /Renal  #NELSON  sCr ~2x his baseline on arrival to ED, likely 2/2 ATN iso sepsis. Improving.  - Villas[place]or catheter placed in ED, remove 8/9  - maintain MAP >65 as above to ensure adequate renal perfusion  - continue to trend Cr, expect improvement with treatment of underlying condition  - FENa=0.4% consistent with pre-renal etiology    #Urinary retention  - TOV today  - monitor I/O    GI  #Periumbilical ventral hernia  - CT A/P showing moderately large fat containing periumbilical ventral hernia     #Frequent BM  - 3 bowel movements overnight, changed miralax from standing to PRN    Endocrine  #Hx of DM  - monitor on ISS q6h    #Nodular thyroid  - outpatient thyroid US for further workup    ID  #?Aspiration pneumonia  Suspect pt with PNA as stated above, though of note pt is afebrile and WBC  s/p vanc, clindamycin, and cefepime in ED  - c/w Cefepime, adjusting dose based on renal function  - MRSA PCR neg  - f/u blood, urine, and sputum cultures,    Heme/onc  #Right Portal vein thrombosis  #Left Hepatic vein thrombosis  On apixaban 5mg bid at home  - c/w heparin ggt    #Metastatic gallbladder cancer  - CT A/P showed extensive hepatic metastases from likely gallbladder primary  - 8/1/22 Liver metastasis biopsy showed carcinoma    DVT ppx: on Heparin gtt    Ethics  Code status: DNR/DNI, family discussion with plan to not re-intubate as this would not be within [facility]. YARELY in chart.  - Palliative following, assessing candidacy for possible transfer to PCU following extubation

## 2022-08-12 NOTE — PROGRESS NOTE ADULT - SUBJECTIVE AND OBJECTIVE BOX
Neurology Progress Note    S: Patient seen and examined, now in ICU. on lveo and heparin.   extubated.     Medication:    MEDICATIONS  (STANDING):  albuterol/ipratropium for Nebulization 3 milliLiter(s) Nebulizer every 6 hours  aspirin  chewable 81 milliGRAM(s) Oral daily  cefepime   IVPB 2000 milliGRAM(s) IV Intermittent every 12 hours  chlorhexidine 4% Liquid 1 Application(s) Topical <User Schedule>  dextrose 5%. 1000 milliLiter(s) (100 mL/Hr) IV Continuous <Continuous>  dextrose 5%. 1000 milliLiter(s) (50 mL/Hr) IV Continuous <Continuous>  dextrose 50% Injectable 25 Gram(s) IV Push once  dextrose 50% Injectable 12.5 Gram(s) IV Push once  dextrose 50% Injectable 25 Gram(s) IV Push once  glucagon  Injectable 1 milliGRAM(s) IntraMuscular once  heparin  Infusion 1200 Unit(s)/Hr (12 mL/Hr) IV Continuous <Continuous>  insulin lispro (ADMELOG) corrective regimen sliding scale   SubCutaneous every 6 hours  norepinephrine Infusion 0.6 MICROgram(s)/kG/Min (90 mL/Hr) IV Continuous <Continuous>  sodium chloride 3%  Inhalation 4 milliLiter(s) Inhalation every 12 hours    MEDICATIONS  (PRN):  dextrose Oral Gel 15 Gram(s) Oral once PRN Blood Glucose LESS THAN 70 milliGRAM(s)/deciliter  polyethylene glycol 3350 17 Gram(s) Oral daily PRN Constipation      Vitals:      ICU Vital Signs Last 24 Hrs  T(C): 36.5 (12 Aug 2022 04:00), Max: 37.2 (11 Aug 2022 12:00)  T(F): 97.7 (12 Aug 2022 04:00), Max: 99 (11 Aug 2022 12:00)  HR: 92 (12 Aug 2022 08:30) (69 - 94)  BP: 120/57 (12 Aug 2022 08:30) (81/54 - 138/57)  BP(mean): 82 (12 Aug 2022 08:30) (60 - 94)  ABP: --  ABP(mean): --  RR: 12 (12 Aug 2022 08:30) (10 - 26)  SpO2: 100% (12 Aug 2022 08:30) (99% - 100%)    O2 Parameters below as of 12 Aug 2022 07:00  Patient On (Oxygen Delivery Method): mask, aerosol    O2 Concentration (%): 35          General Exam:   General Appearance: Appropriately dressed and in no acute distress       Head: Normocephalic, atraumatic and no dysmorphic features  Ear, Nose, and Throat: Moist mucous membranes  CVS: S1S2+  Resp: No SOB, no wheeze or rhonchi    Abd: soft NTND  Extremities: No edema, no cyanosis  Skin: No bruises, no rashes     Neurological Exam:  Mental Status:  now opens eyes  to noxious, minimal verbal, following some commands today ( B/L)   Cranial Nerves: PERRL, EOMI, VFFC, sensation V1-V3 intact,  no obvious facial asymmetry , equal elevation of palate, scm/trap 5/5, tongue is midline on protrusion.    Motor:  moving more spontaneous today uppers>lowers 2-3/5 at times   Sensation:  withdraws weakly x 4   Coordination: unable   Gait: unable in ICU     I personally reviewed the below data/images/labs:      CBC Full  -  ( 10 Aug 2022 23:35 )  WBC Count : 26.68 K/uL  RBC Count : 3.69 M/uL  Hemoglobin : 9.7 g/dL  Hematocrit : 30.5 %  Platelet Count - Automated : 214 K/uL  Mean Cell Volume : 82.7 fl  Mean Cell Hemoglobin : 26.3 pg  Mean Cell Hemoglobin Concentration : 31.8 gm/dL  Auto Neutrophil # : 23.47 K/uL  Auto Lymphocyte # : 1.28 K/uL  Auto Monocyte # : 1.20 K/uL  Auto Eosinophil # : 0.25 K/uL  Auto Basophil # : 0.07 K/uL  Auto Neutrophil % : 88.0 %  Auto Lymphocyte % : 4.8 %  Auto Monocyte % : 4.5 %  Auto Eosinophil % : 0.9 %  Auto Basophil % : 0.3 %      08-12    140  |  112<H>  |  26<H>  ----------------------------<  156<H>  3.5   |  16<L>  |  0.95    Ca    8.1<L>      12 Aug 2022 00:28  Phos  3.6     08-12  Mg     2.3     08-12    TPro  6.1  /  Alb  1.8<L>  /  TBili  1.0  /  DBili  x   /  AST  23  /  ALT  13  /  AlkPhos  185<H>  08-12      < from: CT Abdomen and Pelvis w/ IV Cont (08.08.22 @ 16:22) >  IMPRESSION:  Extensive hepatic metastatic disease possibly secondary to locally   invasive gallbladder cancer.    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    Mildly increasing bilateral pleural effusions.      < end of copied text >  < from: CT Chest w/ IV Cont (08.08.22 @ 16:22) >  IMPRESSION:  Extensive hepatic metastatic disease possibly secondary to locally   invasive gallbladder cancer.    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    Mildly increasing bilateral pleural effusions.      < end of copied text >  < from: CT Brain Perfusion Maps Stroke (08.08.22 @ 16:21) >  CT PERFUSION:  1. No perfusion abnormality.    CTA NECK:  1. Calcified plaque affecting the left carotid bifurcation with   approximately mild-moderate (45-55%) stenosis of the left internal   carotid artery origin by NASCET criteria.  2. Atherosclerotic plaque affecting the origin and proximal segment of   the left subclavian artery with associated mild (less than 50%) stenosis.  3. Otherwise, no large vessel occlusion or major stenosis.  4. Heterogeneous appearing thyroid gland containing multiple nodules, the   largest of which measures up to 2 cm on the right side. Thyroid likely   cannot be excluded. Recommend ultrasound correlation.    CTA HEAD:  1. No large vessel occlusion or major stenosis.    < end of copied text >  < from: CT Brain Stroke Protocol (08.08.22 @ 16:20) >  IMPRESSION: No acute intracranial hemorrhage, mass effect, or shift of   the midline structures.    Similar-appearing moderate severity chronic white matter microvascular   type changes and chronic lacunar infarct within the right thalamus.    < end of copied text >

## 2022-08-12 NOTE — CHART NOTE - NSCHARTNOTEFT_GEN_A_CORE
: Pedro Oconnell    INDICATION: Shock    PROCEDURE:  [] LIMITED ECHO  [X] LIMITED CHEST  [] LIMITED RETROPERITONEAL  [] LIMITED ABDOMINAL  [] LIMITED DVT  [] NEEDLE GUIDANCE VASCULAR  [] NEEDLE GUIDANCE THORACENTESIS  [] NEEDLE GUIDANCE PARACENTESIS  [] NEEDLE GUIDANCE PERICARDIOCENTESIS  [] OTHER    FINDINGS/INTERPRETATION:    Lung  Findings: B/L A lines with focal B lines. Small IVC with respirophasic variation  Interpretation: Small IVC likely intravascular volume depletion    To be discussed with fellow/attending : Pedro Oconnell    INDICATION: Shock    PROCEDURE:  [] LIMITED ECHO  [X] LIMITED CHEST  [] LIMITED RETROPERITONEAL  [] LIMITED ABDOMINAL  [] LIMITED DVT  [] NEEDLE GUIDANCE VASCULAR  [] NEEDLE GUIDANCE THORACENTESIS  [] NEEDLE GUIDANCE PARACENTESIS  [] NEEDLE GUIDANCE PERICARDIOCENTESIS  [] OTHER    FINDINGS/INTERPRETATION:    Lung  Findings: B/L A lines with focal B lines. Small IVC with respirophasic variation  Interpretation: Small IVC likely intravascular volume depletion    Attending Note: Agree with above. I was present through out the scan.

## 2022-08-12 NOTE — GOALS OF CARE CONVERSATION - ADVANCED CARE PLANNING - CONVERSATION DETAILS
Met with patient and family at bedside. Patient confused and unable to participate meaningfully in conversation.  conversation held in Pashto which is a shared language of this provider.    I reviewed with patients spouse and daughter about current clinical situation. We discussed how he has been extubated, but now with delirium and still on pressors with goal to titrate them off. Family with much concern over mental status.     I reviewed options regarding care, including ongoing medical management in ICU and eventually medical floor. I also discussed transitioning focus of care and consideration for transfer to PCU. I explained that this level of care would be more in line with symptom directed, and care tailored to ensuring patient was comfortable at end of life.    Family appreciative of information and will consider choices. assured ongoing availability of our team. they have this providers contact information. MICU team updated.

## 2022-08-12 NOTE — PROGRESS NOTE ADULT - ASSESSMENT
81M RH Thai speaking w/  DM2, HTN, HLD CAD s/p MI 2018, metastatic GB cancer presents to the hospital after found down at rehab found to have new infarcts last admission now intubated in ICU for sepsis 2/2 acute resp failure   mRS: 3  LKN: 8/8/2022 1200  NIHSS: 29  Prior Admission:   CTH R CR infarct   MRI brain 7/24 with scattered embolic appearing infarcts   MRA H/N : unremarkable   CTH 7/29 stable   A1c 6  LDL 93   TTE EF 63% no cardiac source of embolus ; refused bubble study.  normal LA , mod AS   NIHSS~15  premrs2  s/p liver bx 8/1  +_ cardiolipin Ab , +b`eta 2 glycoprotine. IgM cardiolin elevated 21.6   This admission:   CTH unchanged from prior no IPH   CTA NECK: L ICA 45-55 stenosis;   CTA HEAD: No large vessel occlusion or major stenosis.  o/e this AM 8/9 on propofol, was held for about 10 minutes. withdraws x 4 but L>R? --> exam 8/10 off sedation for ~1hr withdraws weakly x 4   8/11 exam following a bit (). possible extubation and plan for possible PCU   8/12 extubated yesterday     Impression: Transient loss of consciousness i/s/o previous stroke now intubated and placed on pressors 2/2 hypotensive crisis due to  septic shock 2/2 infection/aspiration PNA. less likely acute infarct   last admission etiology of stroke was esus     Recommendations:   - possible tx to  PCU   - on heparin drip for portal vein thrombosis, also getting asa   - levo for pressure support  - on propofol, wean as tolerated   - repeat CTH in 24hrs if no improvement and eeg if in agreement with GOC> can hold off   - abx for infection per priamry team   - atorvastatin 80mg PO daily (titrate to LDL < 70)   - stroke risk factor modification and counseling   - hypercoag panel including APLS --> + last admissoin, speak to hematology, now on DOAC.  consider lovenox given malignancy hx?   - telemetry  - PT/OT/SS/SLP, OOBC  - check FS, glucose control <180  - GI/DVT ppx  - Thank you for allowing me to participate in the care of this patient. Call with questions.   - GOC with family. DNR/I, consider palliative eval --> possible PCU   Goyo Batista MD  Vascular Neurology.

## 2022-08-13 NOTE — PROGRESS NOTE ADULT - ASSESSMENT
80 Y/O M with metastatic gallbladder cancer, DM2, HTN, HLD, CAD s/p MI 2018, HFpEF, recent admission for sepsis 2/2 UTI was found unresponsive at rehab s/p intubation in the field for AHRF also found to be in septic shock likely 2/2 aspiration event. Palliative consulted for [facility]OC.

## 2022-08-13 NOTE — PROGRESS NOTE ADULT - SUBJECTIVE AND OBJECTIVE BOX
Neurology Progress Note    S: Patient seen and examined,  plan for RCU     Medication:    MEDICATIONS  (STANDING):  albuterol/ipratropium for Nebulization 3 milliLiter(s) Nebulizer every 6 hours  aspirin  chewable 81 milliGRAM(s) Oral daily  cefepime   IVPB 2000 milliGRAM(s) IV Intermittent every 12 hours  chlorhexidine 4% Liquid 1 Application(s) Topical <User Schedule>  glucagon  Injectable 1 milliGRAM(s) IntraMuscular once  heparin  Infusion 1200 Unit(s)/Hr (14 mL/Hr) IV Continuous <Continuous>  midodrine 10 milliGRAM(s) Oral every 8 hours    MEDICATIONS  (PRN):  polyethylene glycol 3350 17 Gram(s) Oral daily PRN Constipation        Vitals:      ICU Vital Signs Last 24 Hrs  T(C): 36.1 (13 Aug 2022 04:00), Max: 36.1 (13 Aug 2022 04:00)  T(F): 97 (13 Aug 2022 04:00), Max: 97 (13 Aug 2022 04:00)  HR: 80 (13 Aug 2022 11:27) (62 - 97)  BP: 111/90 (13 Aug 2022 07:01) (101/51 - 138/61)  BP(mean): 88 (13 Aug 2022 06:00) (70 - 97)  ABP: --  ABP(mean): --  RR: 20 (13 Aug 2022 07:01) (8 - 26)  SpO2: 93% (13 Aug 2022 11:27) (93% - 100%)    O2 Parameters below as of 13 Aug 2022 11:27  Patient On (Oxygen Delivery Method): face tent              General Exam:   General Appearance: Appropriately dressed and in no acute distress       Head: Normocephalic, atraumatic and no dysmorphic features  Ear, Nose, and Throat: Moist mucous membranes  CVS: S1S2+  Resp: No SOB, no wheeze or rhonchi    Abd: soft NTND  Extremities: No edema, no cyanosis  Skin: No bruises, no rashes     Neurological Exam:  Mental Status:  now opens eyes  to noxious, minimal verbal, following some commands today ( B/L)   Cranial Nerves: PERRL, EOMI, VFFC, sensation V1-V3 intact,  no obvious facial asymmetry , equal elevation of palate, scm/trap 5/5, tongue is midline on protrusion.    Motor:  moving more spontaneous today uppers>lowers 2-3/5 at times   Sensation:  withdraws weakly x 4   Coordination: unable   Gait: unable in ICU     I personally reviewed the below data/images/labs:    CBC Full  -  ( 13 Aug 2022 00:25 )  WBC Count : 28.06 K/uL  RBC Count : 3.43 M/uL  Hemoglobin : 9.1 g/dL  Hematocrit : 30.2 %  Platelet Count - Automated : 149 K/uL  Mean Cell Volume : 88.0 fl  Mean Cell Hemoglobin : 26.5 pg  Mean Cell Hemoglobin Concentration : 30.1 gm/dL  Auto Neutrophil # : 23.63 K/uL  Auto Lymphocyte # : 1.94 K/uL  Auto Monocyte # : 1.60 K/uL  Auto Eosinophil # : 0.39 K/uL  Auto Basophil # : 0.05 K/uL  Auto Neutrophil % : 84.2 %  Auto Lymphocyte % : 6.9 %  Auto Monocyte % : 5.7 %  Auto Eosinophil % : 1.4 %  Auto Basophil % : 0.2 %    08-13    138  |  112<H>  |  29<H>  ----------------------------<  169<H>  3.9   |  14<L>  |  0.94    Ca    8.3<L>      13 Aug 2022 00:25  Phos  3.5     08-13  Mg     2.2     08-13    TPro  5.7<L>  /  Alb  1.6<L>  /  TBili  0.7  /  DBili  x   /  AST  22  /  ALT  11  /  AlkPhos  171<H>  08-13    < from: CT Abdomen and Pelvis w/ IV Cont (08.08.22 @ 16:22) >  IMPRESSION:  Extensive hepatic metastatic disease possibly secondary to locally   invasive gallbladder cancer.    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    Mildly increasing bilateral pleural effusions.      < end of copied text >  < from: CT Chest w/ IV Cont (08.08.22 @ 16:22) >  IMPRESSION:  Extensive hepatic metastatic disease possibly secondary to locally   invasive gallbladder cancer.    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    Mildly increasing bilateral pleural effusions.      < end of copied text >  < from: CT Brain Perfusion Maps Stroke (08.08.22 @ 16:21) >  CT PERFUSION:  1. No perfusion abnormality.    CTA NECK:  1. Calcified plaque affecting the left carotid bifurcation with   approximately mild-moderate (45-55%) stenosis of the left internal   carotid artery origin by NASCET criteria.  2. Atherosclerotic plaque affecting the origin and proximal segment of   the left subclavian artery with associated mild (less than 50%) stenosis.  3. Otherwise, no large vessel occlusion or major stenosis.  4. Heterogeneous appearing thyroid gland containing multiple nodules, the   largest of which measures up to 2 cm on the right side. Thyroid likely   cannot be excluded. Recommend ultrasound correlation.    CTA HEAD:  1. No large vessel occlusion or major stenosis.    < end of copied text >  < from: CT Brain Stroke Protocol (08.08.22 @ 16:20) >  IMPRESSION: No acute intracranial hemorrhage, mass effect, or shift of   the midline structures.    Similar-appearing moderate severity chronic white matter microvascular   type changes and chronic lacunar infarct within the right thalamus.    < end of copied text >

## 2022-08-13 NOTE — CHART NOTE - NSCHARTNOTEFT_GEN_A_CORE
MICU Transfer Note    Transfer from: MICU    Transfer to: ( x ) Medicine    (  ) Telemetry     (   ) RCU        (    ) Palliative         (   ) Stroke Unit          (   ) __________________    Accepting physican:      MICU COURSE:    81 year old man with metastatic gallbladder cancer, DM2, HTN, HLD, CAD s/p MI 2018, HFpEF, recent admission for sepsis 2/2 UTI, presents from rehab after being found unresponsive at rehab likely due to aspiration, intubated i/s/o hypoxemic respiratory failure and septic shock, extubated 8/11. A GOC discussion was held and the patient currently holds a DNR/ DNI status. Palliative care was able to offer PCU for transfer however, at this time the patients family has refused. 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     For Followup:  Out patient follow up for Thyroid nodule   follow up with PT/OT as tolerated   ongoing goals of care           Vital Signs Last 24 Hrs  T(C): 36.1 (13 Aug 2022 04:00), Max: 36.1 (13 Aug 2022 04:00)  T(F): 97 (13 Aug 2022 04:00), Max: 97 (13 Aug 2022 04:00)  HR: 70 (13 Aug 2022 04:00) (62 - 97)  BP: 124/57 (13 Aug 2022 04:00) (96/54 - 132/59)  BP(mean): 82 (13 Aug 2022 04:00) (69 - 97)  RR: 16 (13 Aug 2022 04:00) (8 - 26)  SpO2: 100% (13 Aug 2022 04:00) (98% - 100%)    Parameters below as of 13 Aug 2022 00:11  Patient On (Oxygen Delivery Method): face tent      I&O's Summary    11 Aug 2022 07:01  -  12 Aug 2022 07:00  --------------------------------------------------------  IN: 888.4 mL / OUT: 855 mL / NET: 33.4 mL    12 Aug 2022 07:01  -  13 Aug 2022 04:56  --------------------------------------------------------  IN: 2508.3 mL / OUT: 530 mL / NET: 1978.3 mL        MEDICATIONS  (STANDING):  albuterol/ipratropium for Nebulization 3 milliLiter(s) Nebulizer every 6 hours  aspirin  chewable 81 milliGRAM(s) Oral daily  cefepime   IVPB 2000 milliGRAM(s) IV Intermittent every 12 hours  chlorhexidine 4% Liquid 1 Application(s) Topical <User Schedule>  dextrose 5%. 1000 milliLiter(s) (100 mL/Hr) IV Continuous <Continuous>  dextrose 5%. 1000 milliLiter(s) (50 mL/Hr) IV Continuous <Continuous>  dextrose 50% Injectable 25 Gram(s) IV Push once  dextrose 50% Injectable 12.5 Gram(s) IV Push once  dextrose 50% Injectable 25 Gram(s) IV Push once  glucagon  Injectable 1 milliGRAM(s) IntraMuscular once  heparin  Infusion 1200 Unit(s)/Hr (14 mL/Hr) IV Continuous <Continuous>  insulin lispro (ADMELOG) corrective regimen sliding scale   SubCutaneous every 6 hours  lactated ringers. 1000 milliLiter(s) (100 mL/Hr) IV Continuous <Continuous>  midodrine 10 milliGRAM(s) Oral every 8 hours  norepinephrine Infusion 0.6 MICROgram(s)/kG/Min (90 mL/Hr) IV Continuous <Continuous>  sodium chloride 3%  Inhalation 4 milliLiter(s) Inhalation every 12 hours    MEDICATIONS  (PRN):  dextrose Oral Gel 15 Gram(s) Oral once PRN Blood Glucose LESS THAN 70 milliGRAM(s)/deciliter  polyethylene glycol 3350 17 Gram(s) Oral daily PRN Constipation        LABS                                            9.1                   Neurophils% (auto):   84.2   (08-13 @ 00:25):    28.06)-----------(149          Lymphocytes% (auto):  6.9                                           30.2                   Eosinphils% (auto):   1.4      Manual%: Neutrophils x    ; Lymphocytes x    ; Eosinophils x    ; Bands%: x    ; Blasts x                                    138    |  112    |  29                  Calcium: 8.3   / iCa: x      (08-13 @ 00:25)    ----------------------------<  169       Magnesium: 2.2                              3.9     |  14     |  0.94             Phosphorous: 3.5      TPro  5.7    /  Alb  1.6    /  TBili  0.7    /  DBili  x      /  AST  22     /  ALT  11     /  AlkPhos  171    13 Aug 2022 00:25    ( 08-13 @ 00:25 )   PT: 19.4 sec;   INR: 1.68 ratio  aPTT: 55.3 sec      Gabriella Laguerre [facility] ex [id number] MICU Transfer Note    Transfer from: MICU    Transfer to: ( x ) Medicine    (  ) Telemetry     (   ) RCU        (    ) Palliative         (   ) Stroke Unit          (   ) __________________    Accepting physican: Dr. Jose DOVER COURSE:    81 year old man with metastatic gallbladder cancer, DM2, HTN, HLD, CAD s/p MI 2018, HFpEF, recent admission for sepsis 2/2 UTI, presents from rehab after being found unresponsive at rehab likely due to aspiration, intubated i/s/o hypoxemic respiratory failure and septic shock, extubated 8/11. A GOC discussion was held and the patient currently holds a DNR/ DNI status. Palliative care was able to offer PCU for transfer however, at this time the patients family has refused. 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     For Followup:  Out patient follow up for Thyroid nodule   follow up with PT/OT as tolerated   ongoing goals of care           Vital Signs Last 24 Hrs  T(C): 36.1 (13 Aug 2022 04:00), Max: 36.1 (13 Aug 2022 04:00)  T(F): 97 (13 Aug 2022 04:00), Max: 97 (13 Aug 2022 04:00)  HR: 70 (13 Aug 2022 04:00) (62 - 97)  BP: 124/57 (13 Aug 2022 04:00) (96/54 - 132/59)  BP(mean): 82 (13 Aug 2022 04:00) (69 - 97)  RR: 16 (13 Aug 2022 04:00) (8 - 26)  SpO2: 100% (13 Aug 2022 04:00) (98% - 100%)    Parameters below as of 13 Aug 2022 00:11  Patient On (Oxygen Delivery Method): face tent      I&O's Summary    11 Aug 2022 07:01  -  12 Aug 2022 07:00  --------------------------------------------------------  IN: 888.4 mL / OUT: 855 mL / NET: 33.4 mL    12 Aug 2022 07:01  -  13 Aug 2022 04:56  --------------------------------------------------------  IN: 2508.3 mL / OUT: 530 mL / NET: 1978.3 mL        MEDICATIONS  (STANDING):  albuterol/ipratropium for Nebulization 3 milliLiter(s) Nebulizer every 6 hours  aspirin  chewable 81 milliGRAM(s) Oral daily  cefepime   IVPB 2000 milliGRAM(s) IV Intermittent every 12 hours  chlorhexidine 4% Liquid 1 Application(s) Topical <User Schedule>  dextrose 5%. 1000 milliLiter(s) (100 mL/Hr) IV Continuous <Continuous>  dextrose 5%. 1000 milliLiter(s) (50 mL/Hr) IV Continuous <Continuous>  dextrose 50% Injectable 25 Gram(s) IV Push once  dextrose 50% Injectable 12.5 Gram(s) IV Push once  dextrose 50% Injectable 25 Gram(s) IV Push once  glucagon  Injectable 1 milliGRAM(s) IntraMuscular once  heparin  Infusion 1200 Unit(s)/Hr (14 mL/Hr) IV Continuous <Continuous>  insulin lispro (ADMELOG) corrective regimen sliding scale   SubCutaneous every 6 hours  lactated ringers. 1000 milliLiter(s) (100 mL/Hr) IV Continuous <Continuous>  midodrine 10 milliGRAM(s) Oral every 8 hours  norepinephrine Infusion 0.6 MICROgram(s)/kG/Min (90 mL/Hr) IV Continuous <Continuous>  sodium chloride 3%  Inhalation 4 milliLiter(s) Inhalation every 12 hours    MEDICATIONS  (PRN):  dextrose Oral Gel 15 Gram(s) Oral once PRN Blood Glucose LESS THAN 70 milliGRAM(s)/deciliter  polyethylene glycol 3350 17 Gram(s) Oral daily PRN Constipation        LABS                                            9.1                   Neurophils% (auto):   84.2   (08-13 @ 00:25):    28.06)-----------(149          Lymphocytes% (auto):  6.9                                           30.2                   Eosinphils% (auto):   1.4      Manual%: Neutrophils x    ; Lymphocytes x    ; Eosinophils x    ; Bands%: x    ; Blasts x                                    138    |  112    |  29                  Calcium: 8.3   / iCa: x      (08-13 @ 00:25)    ----------------------------<  169       Magnesium: 2.2                              3.9     |  14     |  0.94             Phosphorous: 3.5      TPro  5.7    /  Alb  1.6    /  TBili  0.7    /  DBili  x      /  AST  22     /  ALT  11     /  AlkPhos  171    13 Aug 2022 00:25    ( 08-13 @ 00:25 )   PT: 19.4 sec;   INR: 1.68 ratio  aPTT: 55.3 sec      Gabriella Laguerre [facility]- [facility] ex [id number]

## 2022-08-13 NOTE — PROGRESS NOTE ADULT - ASSESSMENT
81M RH [place]arag[place] speaking w/  DM2, HTN, HLD CAD s/p MI 2018, metastatic GB cancer presents to the hospital after found down at rehab found to have new infarcts last admission now intubated in ICU for sepsis 2/2 acute resp failure   mRS: 3  LKN: 8/8/2022 1200  NIHSS: 29  Prior Admission:   CTH R CR infarct   MRI brain 7/24 with scattered embolic appearing infarcts   MRA H/N : unremarkable   CTH 7/29 stable   A1c 6  LDL 93   TTE EF 63% no cardiac source of embolus ; refused bubble study.  normal LA , mod AS   NIHSS~15  premrs2  s/p liver bx 8/1  +_ cardiolipin Ab , +b`eta 2 glycoprotine. IgM cardiolin elevated 21.6   This admission:   CTH unchanged from prior no IPH   CTA NECK: L ICA 45-55 stenosis;   CTA HEAD: No large vessel occlusion or major stenosis.  o/e this AM 8/9 on propofol, was held for about 10 minutes. withdraws x 4 but L>R? --> exam 8/10 off sedation for ~1hr withdraws weakly x 4   8/11 exam following a bit (). possible extubation and plan for possible PCU   8/12 extubated yesterday   p-mohini for RCU   Impression: Transient loss of consciousness i/s/o previous stroke now intubated and placed on pressors 2/2 hypotensive crisis due to  septic shock 2/2 infection/aspiration PNA. less likely acute infarct   last admission etiology of stroke was esus     Recommendations:   - possible tx to  RCU   - on heparin drip for portal vein thrombosis, also getting asa   - cefepime for infection   - repeat CTH in 24hrs if no improvement and eeg if in agreement with GOC> can hold off   - abx for infection per priamry team   - atorvastatin 80mg PO daily (titrate to LDL < 70)   - stroke risk factor modification and counseling   - hypercoag panel including APLS --> + last admissoin, speak to hematology, now on DOAC.  consider lovenox given malignancy hx?   - telemetry  - PT/OT/SS/SLP, OOBC  - check FS, glucose control <180  - GI/DVT ppx  - Thank you for allowing me to participate in the care of this patient. Call with questions.   - GOC with family. DNR/I, consider palliative eval --> possible PCU , transfer to RCU   Goyo Batista MD  Vascular Neurology.

## 2022-08-13 NOTE — PROGRESS NOTE ADULT - CONVERSATION/DISCUSSION
YARELY Discussed/Treatment Options
madeleine Pinedo at bedside/Diagnosis/Prognosis/Treatment Options

## 2022-08-13 NOTE — PROGRESS NOTE ADULT - SUBJECTIVE AND OBJECTIVE BOX
INTERVAL HPI/OVERNIGHT EVENTS:    SUBJECTIVE: Patient seen and examined at bedside.       VITAL SIGNS:  ICU Vital Signs Last 24 Hrs  T(C): 36.1 (13 Aug 2022 04:00), Max: 36.1 (13 Aug 2022 04:00)  T(F): 97 (13 Aug 2022 04:00), Max: 97 (13 Aug 2022 04:00)  HR: 73 (13 Aug 2022 06:00) (62 - 97)  BP: 138/61 (13 Aug 2022 06:00) (96/54 - 138/61)  BP(mean): 88 (13 Aug 2022 06:00) (69 - 97)  ABP: --  ABP(mean): --  RR: 20 (13 Aug 2022 06:00) (8 - 26)  SpO2: 100% (13 Aug 2022 06:00) (98% - 100%)    O2 Parameters below as of 13 Aug 2022 05:50  Patient On (Oxygen Delivery Method): face tent            Plateau pressure:   P/F ratio:     08-11 @ 07:01 - 08-12 @ 07:00  --------------------------------------------------------  IN: 888.4 mL / OUT: 855 mL / NET: 33.4 mL    08-12 @ 07:01  -  08-13 @ 06:48  --------------------------------------------------------  IN: 2706.3 mL / OUT: 605 mL / NET: 2101.3 mL      CAPILLARY BLOOD GLUCOSE      POCT Blood Glucose.: 180 mg/dL (13 Aug 2022 06:05)    ECG:    PHYSICAL EXAM:    General:   HEENT:   Neck:   Respiratory:   Cardiovascular:   Abdomen:   Extremities:  Neurological:    MEDICATIONS:  MEDICATIONS  (STANDING):  albuterol/ipratropium for Nebulization 3 milliLiter(s) Nebulizer every 6 hours  aspirin  chewable 81 milliGRAM(s) Oral daily  cefepime   IVPB 2000 milliGRAM(s) IV Intermittent every 12 hours  chlorhexidine 4% Liquid 1 Application(s) Topical <User Schedule>  dextrose 5%. 1000 milliLiter(s) (50 mL/Hr) IV Continuous <Continuous>  dextrose 5%. 1000 milliLiter(s) (100 mL/Hr) IV Continuous <Continuous>  dextrose 50% Injectable 25 Gram(s) IV Push once  dextrose 50% Injectable 12.5 Gram(s) IV Push once  dextrose 50% Injectable 25 Gram(s) IV Push once  glucagon  Injectable 1 milliGRAM(s) IntraMuscular once  heparin  Infusion 1200 Unit(s)/Hr (14 mL/Hr) IV Continuous <Continuous>  insulin lispro (ADMELOG) corrective regimen sliding scale   SubCutaneous every 6 hours  midodrine 10 milliGRAM(s) Oral every 8 hours  sodium chloride 3%  Inhalation 4 milliLiter(s) Inhalation every 12 hours    MEDICATIONS  (PRN):  dextrose Oral Gel 15 Gram(s) Oral once PRN Blood Glucose LESS THAN 70 milliGRAM(s)/deciliter  polyethylene glycol 3350 17 Gram(s) Oral daily PRN Constipation      ALLERGIES:  Allergies    Advil (Hives)  penicillin (Hives)    Intolerances        LABS:                        9.1    28.06 )-----------( 149      ( 13 Aug 2022 00:25 )             30.2     08-13    138  |  112<H>  |  29<H>  ----------------------------<  169<H>  3.9   |  14<L>  |  0.94    Ca    8.3<L>      13 Aug 2022 00:25  Phos  3.5     08-13  Mg     2.2     08-13    TPro  5.7<L>  /  Alb  1.6<L>  /  TBili  0.7  /  DBili  x   /  AST  22  /  ALT  11  /  AlkPhos  171<H>  08-13    PT/INR - ( 13 Aug 2022 00:25 )   PT: 19.4 sec;   INR: 1.68 ratio         PTT - ( 13 Aug 2022 00:25 )  PTT:55.3 sec      RADIOLOGY & ADDITIONAL TESTS: Reviewed.

## 2022-08-13 NOTE — PROGRESS NOTE ADULT - SUBJECTIVE AND OBJECTIVE BOX
[facility] Geriatrics and Palliative Care  Rebekah Ellsworth, Palliative Care Attending    SUBJECTIVE AND OBJECTIVE: Patient seen and examined with family at bedside. Patient unable to participate in discussion.  Palliative provider received message from daughter, Shahida, that family was interested in pursuing comfort care. Discussed starting symptom directed medications, no further blood draws, stopping heparin gtt and d/c artificial nutrition and NGT. Daughter did ask for gentle hydration with IVF. Family amenable to PCU transition.     Indication for Geriatrics and Palliative Care Services/INTERVAL HPI:  > 8/13: Chart reviewed. Patient transferred to RCU on 8/12.     DNR on chart:Yes  Yes      Allergies    Advil (Hives)  penicillin (Hives)    Intolerances    MEDICATIONS  (STANDING):  cefepime   IVPB 2000 milliGRAM(s) IV Intermittent every 12 hours  chlorhexidine 4% Liquid 1 Application(s) Topical <User Schedule>    MEDICATIONS  (PRN):  glycopyrrolate Injectable 0.4 milliGRAM(s) IV Push every 4 hours PRN gurgling breath sounds  HYDROmorphone  Injectable 0.2 milliGRAM(s) IV Push every 1 hour PRN dyspnea  HYDROmorphone  Injectable 0.2 milliGRAM(s) IV Push every 1 hour PRN pain      ITEMS UNCHECKED ARE NOT PRESENT    PRESENT SYMPTOMS: [x ]Unable to self-report - see [x ] CPOT [ ] PAINADS [ ] RDOS  Source if other than patient:  [ x]Family   [ ]Team     Pain:  [ ]yes [ ]no  QOL impact -   Location -                    Aggravating factors -  Quality -  Radiation -  Timing-  Severity (0-10 scale):  Minimal acceptable level (0-10 scale):     CPOT:  2  https://www.sccm.org/getattachment/gln39h72-4d2h-5w5b-5k0m-5753x1690q1v/Critical-Care-Pain-Observation-Tool-(CPOT)    PAIN AD Score:	  http://geriatrictoolkit.[place].[facility]/cog/painad.pdf (Ctrl + left click to view)    Dyspnea:                           [ ]Mild [ x]Moderate [ ]Severe    RDOS:  0 to 2  minimal or no respiratory distress   3  mild distress  4 to 6 moderate distress  >7 severe distress  https://homecareinformation.net/handouts/hen/Respiratory_Distress_Observation_Scale.pdf (Ctrl +  left click to view)     Anxiety:                             [ ]Mild [ ]Moderate [ ]Severe  Fatigue:                             [ ]Mild [ ]Moderate [ ]Severe  Nausea:                             [ ]Mild [ ]Moderate [ ]Severe  Loss of appetite:              [ ]Mild [ ]Moderate [ ]Severe  Constipation:                    [ ]Mild [ ]Moderate [ ]Severe    PCSSQ[Palliative Care Spiritual Screening Question]   Severity (0-10):   Score of 4 or > indicate consideration of Chaplaincy referral.  Chaplaincy Referral: [ ] yes [ ] refused [ ] following    Other Symptoms:  [ ]All other review of systems negative     Palliative Performance Status Version 2:     10    %      http://npcrc.org/files/news/palliative_performance_scale_ppsv2.pdf  PHYSICAL EXAM:  Vital Signs Last 24 Hrs  T(C): 36.1 (13 Aug 2022 04:00), Max: 36.1 (13 Aug 2022 04:00)  T(F): 97 (13 Aug 2022 04:00), Max: 97 (13 Aug 2022 04:00)  HR: 73 (13 Aug 2022 12:00) (62 - 93)  BP: 124/82 (13 Aug 2022 12:00) (101/51 - 138/61)  BP(mean): 88 (13 Aug 2022 06:00) (70 - 97)  RR: 18 (13 Aug 2022 12:00) (8 - 26)  SpO2: 100% (13 Aug 2022 12:00) (93% - 100%)    Parameters below as of 13 Aug 2022 12:00  Patient On (Oxygen Delivery Method): face tent     I&O's Summary    12 Aug 2022 07:01  -  13 Aug 2022 07:00  --------------------------------------------------------  IN: 2706.3 mL / OUT: 605 mL / NET: 2101.3 mL       GENERAL:   General: [X ]Cachetic  [ ]Alert  [ ]Oriented   [ ]Lethargic  [x ]Unarousable  [ ]Verbal  [ ]Non-Verbal  Behavioral:   [ ] Anxiety  [ ] Delirium [ ] Agitation [ x] Other- pt appears restless, shakes his head from side to side during encounter   HEENT: NGT in place with face tent   [ ]Normal   [ ]Dry mouth   [ ]ET Tube/Trach  [ ]Oral lesions  PULMONARY:   [ ]Clear [ ]Tachypnea  [x ]Audible excessive secretions   [ ]Rhonchi        [ ]Right [ ]Left [ ]Bilateral  [ ]Crackles        [ ]Right [ ]Left [ ]Bilateral  [ ]Wheezing     [ ]Right [ ]Left [ ]Bilateral  [ ]Diminished breath sounds [ ]right [ ]left [ ]bilateral  CARDIOVASCULAR:    [x ]Regular [ ]Irregular [ ]Tachy  [ ]Mickey [ ]Murmur [ ]Other  GASTROINTESTINAL:  [x ]Soft  [ ]Distended   [ ]+BS  [ x]Non tender [ ]Tender  [ ]Other [ ]PEG [ x]OGT/ NGT  Last BM: 8/11  GENITOURINARY:  [ ]Normal [ ] Incontinent   [ ]Oliguria/Anuria   [x ]Villasenor  MUSCULOSKELETAL:   [ ]Normal   [ ]Weakness  [ x]Bed/Wheelchair bound [ ]Edema  NEUROLOGIC:   [ ]No focal deficits  [ ]Cognitive impairment  [x ]Dysphagia [ ]Dysarthria [ ]Paresis [x ]Other   SKIN: ecchymoses  [ ]Normal  [ ]Rash  [ ]Other  [ ]Pressure ulcer(s)       Present on admission [ ]y [ ]n    CRITICAL CARE:  [ ] Shock Present  [ ]Septic [ ]Cardiogenic [ ]Neurologic [ ]Hypovolemic  [ ]  Vasopressors [ ]  Inotropes   [ ]Respiratory failure present [ ]Mechanical ventilation [ ]Non-invasive ventilatory support [ ]High flow   [ ]Acute  [ ]Chronic [ ]Hypoxic  [ ]Hypercarbic [ ]Other  [ ]Other organ failure     LABS:                        9.1    28.06 )-----------( 149      ( 13 Aug 2022 00:25 )             30.2   08-13    138  |  112<H>  |  29<H>  ----------------------------<  169<H>  3.9   |  14<L>  |  0.94    Ca    8.3<L>      13 Aug 2022 00:25  Phos  3.5     08-13  Mg     2.2     08-13    TPro  5.7<L>  /  Alb  1.6<L>  /  TBili  0.7  /  DBili  x   /  AST  22  /  ALT  11  /  AlkPhos  171<H>  08-13  PT/INR - ( 13 Aug 2022 00:25 )   PT: 19.4 sec;   INR: 1.68 ratio         PTT - ( 13 Aug 2022 09:42 )  PTT:58.3 sec      RADIOLOGY & ADDITIONAL STUDIES: < from: CT Abdomen and Pelvis w/ IV Cont (08.08.22 @ 16:22) >  IMPRESSION:  Extensive hepatic metastatic disease possibly secondary to locally   invasive gallbladder cancer.    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    Mildly increasing bilateral pleural effusions.    < end of copied text >      Protein Calorie Malnutrition Present: [ ]mild [ ]moderate [ ]severe [ ]underweight [ ]morbid obesity  https://www.andeal.org/vault/2440/web/files/ONC/Table_Clinical%20Characteristics%20to%20Document%20Malnutrition-White%20JV%20et%20al%202012.pdf    Height (cm): 165.1 (08-08-22 @ 20:16), 165.1 (08-01-22 @ 15:36)  Weight (kg): 75.3 (08-08-22 @ 20:29), 73.1 (08-01-22 @ 15:36)  BMI (kg/m2): 27.6 (08-08-22 @ 20:29), 26.8 (08-08-22 @ 20:16), 26.8 (08-01-22 @ 15:36)    [x ]PPSV2 < or = 30%  [ ]significant weight loss [ ]poor nutritional intake [ ]anasarca[ ]Artificial Nutrition    Other REFERRALS:  [ ]Hospice  [ ]Child Life  [ ]Social Work  [ ]Case management [ ]Holistic Therapy     Goals of Care Document:GARTH Angeles (08-12-22 @ 15:29)  Goals of Care Conversation:   Participants:  · Participants  Family  · Spouse  present  · Child(merlin)  Shahida    Advance Directives:  · Caregiver:  information could not be obtained    Conversation Discussion:  · Conversation  Prognosis; Treatment Options  · Conversation Details  Met with patient and family at bedside. Patient confused and unable to participate meaningfully in conversation.  conversation held in Portuguese which is a shared language of this provider.    I reviewed with patients spouse and daughter about current clinical situation. We discussed how he has been extubated, but now with delirium and still on pressors with goal to titrate them off. Family with much concern over mental status.     I reviewed options regarding care, including ongoing medical management in ICU and eventually medical floor. I also discussed transitioning focus of care and consideration for transfer to PCU. I explained that this level of care would be more in line with symptom directed, and care tailored to ensuring patient was comfortable at end of life.    Family appreciative of information and will consider choices. assured ongoing availability of our team. they have this providers contact information. MICU team updated.    Personal Advance Directives Treatment Guidelines:   Treatment Guidelines:  · Decision Maker  Surrogate  · Treatment Guideline Comments  continue current level of care    MOLST:  · Completed  09-Aug-2022    Location of Discussion:   Time Spent on Advance Care Planning:  I spent 20 (in minutes) on advance care planning services with the patient.  This time is separate and distinct from any other care management services provided on this date.    Location of Discussion:  · Location of discussion  Face to face      Electronic Signatures:  Swetha Angeles)  (Signed 12-Aug-2022 15:37)  	Authored: Goals of Care Conversation, Personal Advance Directives Treatment Guidelines, Location of Discussion      Last Updated: 12-Aug-2022 15:37 by Swetha Angeles)

## 2022-08-13 NOTE — PROGRESS NOTE ADULT - ASSESSMENT
81 year old man with metastatic gallbladder cancer, DM2, HTN, HLD, CAD s/p MI 2018, HFpEF, recent admission for sepsis 2/2 UTI, presents from rehab after being found unresponsive at rehab likely due to aspiration, intubated i/s/o hypoxemic respiratory failure and septic shock, admitted to MICU for further management now extubated and transferred out

## 2022-08-13 NOTE — PROGRESS NOTE ADULT - PROBLEM SELECTOR PLAN 1
Family amenable to symptom directed care and stopping artificial nutrition and removal of NGT   > Start glyco 0.4mg q4h prn   > IV dilaudid 0.2mg q1h prn dyspnea   > on face tent during encounter.

## 2022-08-13 NOTE — PROGRESS NOTE ADULT - PROBLEM SELECTOR PLAN 1
on admission now resolved secondary to aspiration pneumonia   will continue to monitor   off pressors

## 2022-08-13 NOTE — PROGRESS NOTE ADULT - ASSESSMENT
81 year old man with metastatic gallbladder cancer, DM2, HTN, HLD, CAD s/p MI 2018, HFpEF, recent admission for sepsis 2/2 UTI, presents from rehab after being found unresponsive at rehab likely due to aspiration, intubated i/s/o hypoxemic respiratory failure and septic shock, extubated 8/11.    Neuro  #Obtundation  Baseline A&Ox3, currently not withdrawing to noxious stimuli though following some commands  Likely 2/2 septic encephalopathy i/s/o aspiration PNA  Code stroke called on arrival, no evidence of CVA found  - weaned off sedation     Respiratory  #Acute hypoxemic respiratory failure  Likely 2/2 aspiration PNA, as was found unresponsive with food in mouth and rhonchorous breath sounds on exam  Vent was set to 12/500/100/5 when pt was assessed; was not overbreathing  - CXR showed bilateral small pleural effusions   - POCUS with small fluid in bases, giving Lasix 20 IVP x1 now to optimize for possible extubation today 8/11  - extubated 8/11 to aeroslized mask 35%, weaned to 28%    Cardiac  #Shock  Pt with likely septic shock 2/2 aspiration PNA vs. vasoplegic  - s/p 5% albumin 250cc q8h x1 day for fluid resuscitation   - continue weaning off norepinephrine infusion as tolerated to maintain goal MAP>65  - resuscitating with 1L LR maintenance IVF @100cc/h   - starting midodrine 10mg q8h to wean off pressors    #CAD  - c/w ASA 81 daily     #HFpEF  - TTE 7/26 showed EF=63%, moderate aortic stenosis, minimal aortic regurg, concentric LV remodelling, normal LVSF, normal RV function,     /Renal  #NELSON  sCr ~2x his baseline on arrival to ED, likely 2/2 ATN iso sepsis. Improving.  - Villasenor catheter placed in ED, remove 8/9  - maintain MAP >65 as above to ensure adequate renal perfusion  - continue to trend Cr, expect improvement with treatment of underlying condition  - FENa=0.4% consistent with pre-renal etiology    #Urinary retention  - TOV today  - monitor I/O    GI  #Periumbilical ventral hernia  - CT A/P showing moderately large fat containing periumbilical ventral hernia     #Frequent BM  - 3 bowel movements overnight, changed miralax from standing to PRN    Endocrine  #Hx of DM  - monitor on ISS q6h    #Nodular thyroid  - outpatient thyroid US for further workup    ID  #?Aspiration pneumonia  Suspect pt with PNA as stated above, though of note pt is afebrile and WBC  s/p vanc, clindamycin, and cefepime in ED  - c/w Cefepime, adjusting dose based on renal function  - MRSA PCR neg  - f/u blood, urine, and sputum cultures,    Heme/onc  #Right Portal vein thrombosis  #Left Hepatic vein thrombosis  On apixaban 5mg bid at home  - c/w heparin ggt    #Metastatic gallbladder cancer  - CT A/P showed extensive hepatic metastases from likely gallbladder primary  - 8/1/22 Liver metastasis biopsy showed carcinoma    DVT ppx: on Heparin gtt    Ethics  Code status: DNR/DNI, family discussion with plan to not re-intubate as this would not be within GOC. YARELY in chart.  - Palliative following, assessing candidacy for possible transfer to PCU following extubation

## 2022-08-13 NOTE — PROGRESS NOTE ADULT - CONVERSATION DETAILS
discussed with daughter re all options  of note the daughter had clearly stated that as per patient's wishes he did not want any treatment and the family concurred with the patient's decision prince in view of the positive diagnosed of adenocarcinoma on liver biopsy and the extensive disease involvement by cancer noted on imaging    she re-confirms DNR status  I addressed NGT issues with her and she will d/w her family re removal since it is obviously causing him pain and distress in his neck and pharyngeal area which is counter to his wishes of being kept pain free and comfortable    remains DNR    discussed with RCU ACP    30 minutes for advanced care planning discussion separate and in addition to the E&M service provided.
Palliative provider received message from daughter, Shahida, that family was interested in pursuing comfort care. Met with her and other family members at bedside. Discussed starting symptom directed medications, no further blood draws, stopping heparin gtt and d/c artificial nutrition and NGT. Daughter did ask for gentle hydration with IVF. Family amenable to PCU transition.

## 2022-08-13 NOTE — PROGRESS NOTE ADULT - SUBJECTIVE AND OBJECTIVE BOX
Patient is a 81y old  Male who presents with a chief complaint of Hypotension    Per chart: "81 year old man with metastatic gallbladder cancer, DM2, HTN, HLD, CAD s/p MI 2018, HFpEF, recent admission for sepsis 2/2 UTI, presents from rehab after being found unresponsive at rehab likely due to aspiration, intubated i/s/o hypoxemic respiratory failure and septic shock, admitted to MICU for further management, now transferred to RCU    DATE OF SERVICE: 08-13-22 @ 13:00    SUBJECTIVE / OVERNIGHT EVENTS: overnight events noted  now transferred out of ICU  extubated  daughter Shahida at bedside       ROS:  not available         MEDICATIONS  (STANDING):  albuterol/ipratropium for Nebulization 3 milliLiter(s) Nebulizer every 6 hours  aspirin  chewable 81 milliGRAM(s) Oral daily  cefepime   IVPB 2000 milliGRAM(s) IV Intermittent every 12 hours  chlorhexidine 4% Liquid 1 Application(s) Topical <User Schedule>  glucagon  Injectable 1 milliGRAM(s) IntraMuscular once  heparin  Infusion 1200 Unit(s)/Hr (14 mL/Hr) IV Continuous <Continuous>  midodrine 10 milliGRAM(s) Oral every 8 hours    MEDICATIONS  (PRN):  polyethylene glycol 3350 17 Gram(s) Oral daily PRN Constipation        CAPILLARY BLOOD GLUCOSE      POCT Blood Glucose.: 180 mg/dL (13 Aug 2022 06:05)  POCT Blood Glucose.: 151 mg/dL (12 Aug 2022 18:22)    I&O's Summary    12 Aug 2022 07:01  -  13 Aug 2022 07:00  --------------------------------------------------------  IN: 2706.3 mL / OUT: 605 mL / NET: 2101.3 mL        Vital Signs Last 24 Hrs  T(C): 36.1 (13 Aug 2022 04:00), Max: 36.1 (13 Aug 2022 04:00)  T(F): 97 (13 Aug 2022 04:00), Max: 97 (13 Aug 2022 04:00)  HR: 73 (13 Aug 2022 12:00) (62 - 97)  BP: 124/82 (13 Aug 2022 12:00) (101/51 - 138/61)  BP(mean): 88 (13 Aug 2022 06:00) (70 - 97)  RR: 18 (13 Aug 2022 12:00) (8 - 26)  SpO2: 100% (13 Aug 2022 12:00) (93% - 100%)    PHYSICAL EXAM:  GENERAL: NGT in place  HEAD:  Atraumatic, Normocephalic  EYES: EOMI, PERRLA,   NECK: Supple, No JVD  CHEST/LUNG: scattered coarse breath sounds bilateral   HEART: S1 S2; no murmurs appreciated  ABDOMEN: Soft, Nontender, Bowel sounds present  EXTREMITIES:   no edema  NEUROLOGY: obtunded minimally responsive  SKIN: No rashes or lesions    LABS:                        9.1    28.06 )-----------( 149      ( 13 Aug 2022 00:25 )             30.2     08-13    138  |  112<H>  |  29<H>  ----------------------------<  169<H>  3.9   |  14<L>  |  0.94    Ca    8.3<L>      13 Aug 2022 00:25  Phos  3.5     08-13  Mg     2.2     08-13    TPro  5.7<L>  /  Alb  1.6<L>  /  TBili  0.7  /  DBili  x   /  AST  22  /  ALT  11  /  AlkPhos  171<H>  08-13    PT/INR - ( 13 Aug 2022 00:25 )   PT: 19.4 sec;   INR: 1.68 ratio         PTT - ( 13 Aug 2022 09:42 )  PTT:58.3 sec            All consultant(s) notes reviewed and care discussed with other providers        Contact Number, Dr Douglas [phone]

## 2022-08-13 NOTE — CHART NOTE - NSCHARTNOTEFT_GEN_A_CORE
·  Note Type	 Transfer Note  MICU TRANSFER           MICU Transfer Note    Transfer from: MICU    Transfer to: ( x ) Medicine    (  ) Telemetry     (   ) RCU        (    ) Palliative         (   ) Stroke Unit          (   ) __________________    Accepting physican: Dr. Garcia       [facility]    81 year old man with metastatic gallbladder cancer, DM2, HTN, HLD, CAD s/p MI 2018, HFpEF, recent admission for sepsis 2/2 UTI, presents from rehab after being found unresponsive at rehab likely due to aspiration, intubated i/s/o hypoxemic respiratory failure and septic shock, extubated 8/11. A GOC discussion was held and the patient currently holds a DNR/ DNI status. Palliative care was able to offer PCU for transfer however, at this time the patients family has refused. 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     For Followup:  Out patient follow up for Thyroid nodule   follow up with PT/OT as tolerated   ongoing goals of care           Vital Signs Last 24 Hrs  T(C): 36.1 (13 Aug 2022 04:00), Max: 36.1 (13 Aug 2022 04:00)  T(F): 97 (13 Aug 2022 04:00), Max: 97 (13 Aug 2022 04:00)  HR: 70 (13 Aug 2022 04:00) (62 - 97)  BP: 124/57 (13 Aug 2022 04:00) (96/54 - 132/59)  BP(mean): 82 (13 Aug 2022 04:00) (69 - 97)  RR: 16 (13 Aug 2022 04:00) (8 - 26)  SpO2: 100% (13 Aug 2022 04:00) (98% - 100%)    Parameters below as of 13 Aug 2022 00:11  Patient On (Oxygen Delivery Method): face tent      I&O's Summary    11 Aug 2022 07:01  -  12 Aug 2022 07:00  --------------------------------------------------------  IN: 888.4 mL / OUT: 855 mL / NET: 33.4 mL    12 Aug 2022 07:01  -  13 Aug 2022 04:56  --------------------------------------------------------  IN: 2508.3 mL / OUT: 530 mL / NET: 1978.3 mL        MEDICATIONS  (STANDING):  albuterol/ipratropium for Nebulization 3 milliLiter(s) Nebulizer every 6 hours  aspirin  chewable 81 milliGRAM(s) Oral daily  cefepime   IVPB 2000 milliGRAM(s) IV Intermittent every 12 hours  chlorhexidine 4% Liquid 1 Application(s) Topical <User Schedule>  dextrose 5%. 1000 milliLiter(s) (100 mL/Hr) IV Continuous <Continuous>  dextrose 5%. 1000 milliLiter(s) (50 mL/Hr) IV Continuous <Continuous>  dextrose 50% Injectable 25 Gram(s) IV Push once  dextrose 50% Injectable 12.5 Gram(s) IV Push once  dextrose 50% Injectable 25 Gram(s) IV Push once  glucagon  Injectable 1 milliGRAM(s) IntraMuscular once  heparin  Infusion 1200 Unit(s)/Hr (14 mL/Hr) IV Continuous <Continuous>  insulin lispro (ADMELOG) corrective regimen sliding scale   SubCutaneous every 6 hours  lactated ringers. 1000 milliLiter(s) (100 mL/Hr) IV Continuous <Continuous>  midodrine 10 milliGRAM(s) Oral every 8 hours  norepinephrine Infusion 0.6 MICROgram(s)/kG/Min (90 mL/Hr) IV Continuous <Continuous>  sodium chloride 3%  Inhalation 4 milliLiter(s) Inhalation every 12 hours    MEDICATIONS  (PRN):  dextrose Oral Gel 15 Gram(s) Oral once PRN Blood Glucose LESS THAN 70 milliGRAM(s)/deciliter  polyethylene glycol 3350 17 Gram(s) Oral daily PRN Constipation        LABS                                            9.1                   Neurophils% (auto):   84.2   (08-13 @ 00:25):    28.06)-----------(149          Lymphocytes% (auto):  6.9                                           30.2                   Eosinphils% (auto):   1.4      Manual%: Neutrophils x    ; Lymphocytes x    ; Eosinophils x    ; Bands%: x    ; Blasts x                                    138    |  112    |  29                  Calcium: 8.3   / iCa: x      (08-13 @ 00:25)    ----------------------------<  169       Magnesium: 2.2                              3.9     |  14     |  0.94             Phosphorous: 3.5      TPro  5.7    /  Alb  1.6    /  TBili  0.7    /  DBili  x      /  AST  22     /  ALT  11     /  AlkPhos  171    13 Aug 2022 00:25    ( 08-13 @ 00:25 )   PT: 19.4 sec;   INR: 1.68 ratio  aPTT: 55.3 sec

## 2022-08-14 NOTE — PROGRESS NOTE ADULT - NSPROGADDITIONALINFOA_GEN_ALL_CORE
- Counseling on diet, exercise, and medication adherence was done  - Counseling on smoking cessation and alcohol consumption offered when appropriate.  - Pain assessed and judicious use of narcotics when appropriate was discussed.    - Stroke education given when appropriate.  - Importance of fall prevention discussed.   - Differential diagnosis and plan of care discussed with patient and/or family and primary team
I will sign off and defer all care to PCU attending  daughter Shahida padmaja

## 2022-08-14 NOTE — PROGRESS NOTE ADULT - SUBJECTIVE AND OBJECTIVE BOX
Patient is a 81y old  Male who presents with a chief complaint of septic shock (13 Aug 2022 13:00)      DATE OF SERVICE: 08-14-22   SUBJECTIVE / OVERNIGHT EVENTS: overnight events noted      ROS: not available         MEDICATIONS  (STANDING):  cefepime   IVPB 2000 milliGRAM(s) IV Intermittent every 12 hours  chlorhexidine 4% Liquid 1 Application(s) Topical <User Schedule>  sodium chloride 0.9%. 1000 milliLiter(s) (10 mL/Hr) IV Continuous <Continuous>    MEDICATIONS  (PRN):  bisacodyl Suppository 10 milliGRAM(s) Rectal daily PRN Constipation  glycopyrrolate Injectable 0.4 milliGRAM(s) IV Push every 4 hours PRN gurgling breath sounds  HYDROmorphone  Injectable 0.2 milliGRAM(s) IV Push every 1 hour PRN dyspnea  HYDROmorphone  Injectable 0.2 milliGRAM(s) IV Push every 1 hour PRN pain        CAPILLARY BLOOD GLUCOSE        I&O's Summary    13 Aug 2022 07:01  -  14 Aug 2022 07:00  --------------------------------------------------------  IN: 662 mL / OUT: 700 mL / NET: -38 mL    14 Aug 2022 07:01  -  14 Aug 2022 12:40  --------------------------------------------------------  IN: 30 mL / OUT: 0 mL / NET: 30 mL        Vital Signs Last 24 Hrs  T(C): 36.4 (14 Aug 2022 09:48), Max: 37.2 (13 Aug 2022 18:40)  T(F): 97.5 (14 Aug 2022 09:48), Max: 98.9 (13 Aug 2022 18:40)  HR: 68 (14 Aug 2022 09:48) (68 - 84)  BP: 126/58 (14 Aug 2022 09:48) (112/60 - 152/79)  BP(mean): --  RR: 21 (14 Aug 2022 09:48) (17 - 22)  SpO2: 96% (14 Aug 2022 09:48) (96% - 100%)    PHYSICAL EXAM: NGT removed  GENERAL: no apparent distress   NECK: Supple  CHEST/LUNG: scattered coarse breath sounds  HEART: S1 S2; no murmurs  ABDOMEN: Soft, Nontender  EXTREMITIES:   no edema  NEUROLOGY: obtunded unresponsive      LABS:                        9.1    28.06 )-----------( 149      ( 13 Aug 2022 00:25 )             30.2     08-13    138  |  112<H>  |  29<H>  ----------------------------<  169<H>  3.9   |  14<L>  |  0.94    Ca    8.3<L>      13 Aug 2022 00:25  Phos  3.5     08-13  Mg     2.2     08-13    TPro  5.7<L>  /  Alb  1.6<L>  /  TBili  0.7  /  DBili  x   /  AST  22  /  ALT  11  /  AlkPhos  171<H>  08-13    PT/INR - ( 13 Aug 2022 00:25 )   PT: 19.4 sec;   INR: 1.68 ratio         PTT - ( 13 Aug 2022 09:42 )  PTT:58.3 sec            All consultant(s) notes reviewed and care discussed with other providers        Contact Number, Dr Douglas [phone]

## 2022-08-14 NOTE — PROGRESS NOTE ADULT - ASSESSMENT
81 year old man with metastatic gallbladder cancer, DM2, HTN, HLD, CAD s/p MI 2018, HFpEF, recent admission for sepsis 2/2 UTI, presents from rehab after being found unresponsive at rehab likely due to aspiration, intubated i/s/o hypoxemic respiratory failure and septic shock, admitted to MICU for further management now extubated and transferred out, moving to PCU when bed available

## 2022-08-14 NOTE — CHART NOTE - NSCHARTNOTEFT_GEN_A_CORE
Chart reviewed.   Patient required PRNs of IV dilaudid 0.2mg x2.   Continue comfort care while awaiting PCU bed.     Thank you for allowing us to participate in your patient's care. Please page [phone] if you have questions or concerns.     Rebekah Ellsworth D.O.   Palliative Medicine

## 2022-08-15 NOTE — PROGRESS NOTE ADULT - PROBLEM SELECTOR PLAN 1
Family amenable to symptom directed care and artificial nutrition was stopped and NGT was removed   > Given gurgling breath sounds and high PRN usage, glyco changed from PRN to standing q6  > increase dilaudid 0.1 to 0.5 q1 PRN dyspnea and add dilaudid 0.2q6 standing   > on face tent during encounter Family amenable to symptom directed care. Artificial nutrition was stopped and NGT was removed   > Given gurgling breath sounds and high PRN usage, glyco changed from PRN to standing q6h  > increase dilaudid  0.5 q1 PRN dyspnea and add dilaudid 0.2q6 standing   > on face tent during encounter

## 2022-08-15 NOTE — PROGRESS NOTE ADULT - ASSESSMENT
80 Y/O M with metastatic gallbladder cancer, DM2, HTN, HLD, CAD s/p MI 2018, HFpEF, recent admission for sepsis 2/2 UTI, presents from rehab after being found unresponsive at rehab intubated i/s/o hypoxemic respiratory failure and septic shock likely 2/2 aspiration PNA, initially admitted to MICU for further management extubated on 8/11. Transferred to PCU for comfort-guided measures.

## 2022-08-15 NOTE — PROGRESS NOTE ADULT - SUBJECTIVE AND OBJECTIVE BOX
GAP TEAM PALLIATIVE CARE UNIT PROGRESS NOTE:      [  ] Patient on hospice program.    INDICATION FOR PALLIATIVE CARE UNIT SERVICES/Interval HPI:     81 year old man with metastatic gallbladder cancer, DM2, HTN, HLD, CAD s/p MI 2018, HFpEF, recent admission for sepsis 2/2 UTI, presents from rehab after being found unresponsive at rehab likely due to aspiration, intubated i/s/o hypoxemic respiratory failure and septic shock, initially admitted to MICU - extubated on 8/11. Pt made DNR/DNI. Family preferred symptom directed care and stopping artifical nutrition - transferred to PCU.     OVERNIGHT EVENTS: NAEO. Pt seen and examined at bedside. Unable to obtain ROS given mental status     DNR on chart: Yes  Yes      Allergies    Advil (Hives)  penicillin (Hives)    Intolerances    MEDICATIONS  (STANDING):  cefepime   IVPB 2000 milliGRAM(s) IV Intermittent every 12 hours  chlorhexidine 4% Liquid 1 Application(s) Topical <User Schedule>  sodium chloride 0.9%. 1000 milliLiter(s) (10 mL/Hr) IV Continuous <Continuous>    MEDICATIONS  (PRN):  bisacodyl Suppository 10 milliGRAM(s) Rectal daily PRN Constipation  glycopyrrolate Injectable 0.4 milliGRAM(s) IV Push every 4 hours PRN gurgling breath sounds  HYDROmorphone  Injectable 0.2 milliGRAM(s) IV Push every 1 hour PRN dyspnea  HYDROmorphone  Injectable 0.2 milliGRAM(s) IV Push every 1 hour PRN pain    ITEMS UNCHECKED ARE NOT PRESENT    PRESENT SYMPTOMS: [X]Unable to self-report  [ ]PAINADs [ ]RDOS  Source if other than patient:  [ ]Family   [ ]Team     Pain: [ ] yes [ ] no  QOL impact -   Location -                    Aggravating factors -  Quality -Te  Radiation -  Timing-  Severity (0-10 scale):G  Minimal acceptable level (0-10 scale):     PAINAD Score: 0  http://geriatrictoolkit.[place].edu/cog/painad.pdf (Ctrl +  left click to view)    Dyspnea:                           [ ]Mild [ ]Moderate [ ]Severe    RDOS: 0  0 to 2  minimal or no respiratory distress   3  mild distress  4 to 6 moderate distress  >7 severe distress  [email or web address]://[email or web address]/handouts/hen/Respiratory_Distress_Observation_Scale.pdf (Ctrl +  left click to view)     Anxiety:                             [ ]Mild [ ]Moderate [ ]Severe  Fatigue:                             [ ]Mild [ ]Moderate [ ]Severe  Nausea:                             [ ]Mild [ ]Moderate [ ]Severe  Loss of appetite:              [ ]Mild [ ]Moderate [ ]Severe  Constipation:                    [ ]Mild [X ]Moderate [ ]Severe    PCSSQ[Palliative Care Spiritual Screening Question]   Severity (0-10):  Score of 4 or > indicate consideration of Chaplaincy referral.  Chaplaincy Referral: [ ] yes [ ] refused [X] following  		  Other Symptoms:  [ X] Unable to obtain ROS     Palliative Performance Status Version 2:      10  %         http://[email or web address]/files/[email or web address]_performance_scale_[email or web address].pdf  PHYSICAL EXAM:   Vital Signs Last 24 Hrs  T(C): 36.6 (15 Aug 2022 07:00), Max: 36.6 (15 Aug 2022 07:00)  T(F): 97.8 (15 Aug 2022 07:00), Max: 97.8 (15 Aug 2022 07:00)  HR: 79 (15 Aug 2022 07:00) (68 - 79)  BP: 105/65 (15 Aug 2022 07:00) (105/65 - 126/58)  BP(mean): --  RR: 18 (15 Aug 2022 07:00) (18 - 21)  SpO2: 96% (15 Aug 2022 07:00) (96% - 96%)    Parameters below as of 15 Aug 2022 07:00  Patient On (Oxygen Delivery Method): face tent     I&O's Summary    14 Aug 2022 07:01  -  15 Aug 2022 07:00  --------------------------------------------------------  IN: 30 mL / OUT: 200 mL / NET: -170 mL      GENERAL: [ X] Cachexia  [ ]Alert  [ ]Oriented x   [ ]Lethargic  [X ]Unarousable  [ ]Verbal  [ ]Non-Verbal  Behavioral:   [ ] Anxiety  [ ] Delirium [ ] Agitation [ ] Other  HEENT:  [ ]Normal   [ ]Dry mouth   [ ]ET Tube/Trach  [ ]Oral lesions [ ] Face Tent   PULMONARY:   [ ]Clear [ ]Tachypnea  [ ]Audible excessive secretions   [ ]Rhonchi        [ ]Right [ ]Left [ ]Bilateral  [ ]Crackles        [ ]Right [ ]Left [ ]Bilateral  [ ]Wheezing     [ ]Right [ ]Left [ ]Bilateral  [ ]Diminished BS [ ]Right [ ]Left [ ]Bilateral    CARDIOVASCULAR:    [ ]Regular [ ]Irregular [ ]Tachy  [ ]Mickey [ ]Murmur [ ]Other  GASTROINTESTINAL:  [ ]Soft  [ ]Distended   [ ]+BS  [ ]Non tender [ ]Tender  [ ]Other [ ]PEG [ ]OGT/ NGT   Last BM:    GENITOURINARY:  [ ]Normal [ ] Incontinent   [ ]Oliguria/Anuria   [ ]Villasenor  MUSCULOSKELETAL:   [ ]Normal   [ ]Weakness  [ ]Bed/Wheelchair bound [ ]Edema  NEUROLOGIC:   [ ]No focal deficits  [ ] Cognitive impairment  [ ] Dysphagia [ ]Dysarthria [ ] Paresis [ ]Other   SKIN:   [ ]Normal  [ ]Rash  [ ]Other  [ ]Pressure ulcer(s)  [ ]y [ ]n  Present on admission      CRITICAL CARE:  [ ] Shock Present  [ ]Septic [ ]Cardiogenic [ ]Neurologic [ ]Hypovolemic  [ ]  Vasopressors [ ]  Inotropes   [ ] Respiratory failure present [ ] Mechanical Ventilation [ ] Non-invasive ventilatory support [ ] High-Flow  [ ] Acute  [ ] Chronic [ ] Hypoxic  [ ] Hypercarbic [ ] Other  [ ] Other organ failure     LABS:      PTT - ( 13 Aug 2022 09:42 )  PTT:58.3 sec      RADIOLOGY & ADDITIONAL STUDIES:    PROTEIN CALORIE MALNUTRITION: [ ] mild [ ] moderate [ ] severe  [ ] underweight [ ] morbid obesity    https://www.andeal.org/vault/2440/web/files/ONC/Table_Clinical%20Characteristics%20to%20Document%20Malnutrition-White%20JV%20et%20al%202012.pdf    Height (cm): 165.1 (08-08-22 @ 20:16), 165.1 (08-01-22 @ 15:36)  Weight (kg): 75.3 (08-08-22 @ 20:29), 73.1 (08-01-22 @ 15:36)  BMI (kg/m2): 27.6 (08-08-22 @ 20:29), 26.8 (08-08-22 @ 20:16), 26.8 (08-01-22 @ 15:36)    [ ] PPSV2 < or = 30% [ ] significant weight loss [ ] poor nutritional intake [ ] anasarca   Artificial Nutrition [ ]     Other REFERRALS:    [ ] Hospice  [ ]Child Life  [ ]Social Work  [ ]Case management [ ]Holistic Therapy [ ] Physical Therapy [ ] Dietary   Goals of Care Document: GARTH Angeles (08-12-22 @ 15:29)  Goals of Care Conversation:   Participants:  · Participants  Family  · Spouse  present  · Child(merlin)  Shahida    Advance Directives:  · Caregiver:  information could not be obtained    Conversation Discussion:  · Conversation  Prognosis; Treatment Options  · Conversation Details  Met with patient and family at bedside. Patient confused and unable to participate meaningfully in conversation.  conversation held in [place] which is a shared language of this provider.    I reviewed with patients spouse and daughter about current clinical situation. We discussed how he has been extubated, but now with delirium and still on pressors with goal to titrate them off. Family with much concern over mental status.     I reviewed options regarding care, including ongoing medical management in ICU and eventually medical floor. I also discussed transitioning focus of care and consideration for transfer to PCU. I explained that this level of care would be more in line with symptom directed, and care tailored to ensuring patient was comfortable at end of life.    Family appreciative of information and will consider choices. assured ongoing availability of our team. they have this providers contact information. MICU team updated.    Personal Advance Directives Treatment Guidelines:   Treatment Guidelines:  · Decision Maker  Surrogate  · Treatment Guideline Comments  continue current level of care    MOLST:  · Completed  09-Aug-2022    Location of Discussion:   Time Spent on Advance Care Planning:  I spent 20 (in minutes) on advance care planning services with the patient.  This time is separate and distinct from any other care management services provided on this date.    Location of Discussion:  · Location of discussion  Face to face      Electronic Signatures:  Swetha Angeles)  (Signed 12-Aug-2022 15:37)  	Authored: Goals of Care Conversation, Personal Advance Directives Treatment Guidelines, Location of Discussion      Last Updated: 12-Aug-2022 15:37 by Swetha Angeles)     GAP TEAM PALLIATIVE CARE UNIT PROGRESS NOTE:      [  ] Patient on hospice program.    INDICATION FOR PALLIATIVE CARE UNIT SERVICES/Interval HPI:     81 year old man with metastatic gallbladder cancer, DM2, HTN, HLD, CAD s/p MI 2018, HFpEF, recent admission for sepsis 2/2 UTI, presents from rehab after being found unresponsive at rehab likely due to aspiration, intubated i/s/o hypoxemic respiratory failure and septic shock, initially admitted to MICU - extubated on 8/11. Pt made DNR/DNI. Family preferred symptom directed care and stopping artifical nutrition - transferred to PCU.     OVERNIGHT EVENTS: Used 0.2 dilaudid 4 times for dyspnea and 0.2 dilaudid once for pain in addition to glycopyrolate 0.4 twice. Pt seen and examined at bedside. Unable to obtain ROS given mental status     DNR on chart: Yes  Yes      Allergies    Advil (Hives)  penicillin (Hives)    Intolerances    MEDICATIONS  (STANDING):  cefepime   IVPB 2000 milliGRAM(s) IV Intermittent every 12 hours  chlorhexidine 4% Liquid 1 Application(s) Topical <User Schedule>  sodium chloride 0.9%. 1000 milliLiter(s) (10 mL/Hr) IV Continuous <Continuous>    MEDICATIONS  (PRN):  bisacodyl Suppository 10 milliGRAM(s) Rectal daily PRN Constipation  glycopyrrolate Injectable 0.4 milliGRAM(s) IV Push every 4 hours PRN gurgling breath sounds  HYDROmorphone  Injectable 0.2 milliGRAM(s) IV Push every 1 hour PRN dyspnea  HYDROmorphone  Injectable 0.2 milliGRAM(s) IV Push every 1 hour PRN pain    ITEMS UNCHECKED ARE NOT PRESENT    PRESENT SYMPTOMS: [X]Unable to self-report  [ ]PAINADs [ ]RDOS  Source if other than patient:  [ ]Family   [ ]Team     Pain: [ ] yes [ ] no  QOL impact -   Location -                    Aggravating factors -  Quality -Te  Radiation -  Timing-  Severity (0-10 scale):G  Minimal acceptable level (0-10 scale):     PAINAD Score: 0  http://geriatrictoolkit.[place].[facility]/cog/painad.pdf (Ctrl +  left click to view)    Dyspnea:                           [ ]Mild [ ]Moderate [ ]Severe    RDOS: 0  0 to 2  minimal or no respiratory distress   3  mild distress  4 to 6 moderate distress  >7 severe distress  https://[email or web address]/handouts/hen/Respiratory_Distress_Observation_Scale.pdf (Ctrl +  left click to view)     Anxiety:                             [ ]Mild [ ]Moderate [ ]Severe  Fatigue:                             [ ]Mild [ ]Moderate [ ]Severe  Nausea:                             [ ]Mild [ ]Moderate [ ]Severe  Loss of appetite:              [ ]Mild [ ]Moderate [ ]Severe  Constipation:                    [ ]Mild [X ]Moderate [ ]Severe    PCSSQ[Palliative Care Spiritual Screening Question]   Severity (0-10):  Score of 4 or > indicate consideration of Chaplaincy referral.  Chaplaincy Referral: [ ] yes [ ] refused [X] following  will ask chaplaincy for anointment today   		  Other Symptoms:  [ X] Unable to obtain ROS     Palliative Performance Status Version 2:      10  %         [facility]://[email or web address]/files/news/palliative_performance_scale_[email or web address].pdf  PHYSICAL EXAM:   Vital Signs Last 24 Hrs  T(C): 36.6 (15 Aug 2022 07:00), Max: 36.6 (15 Aug 2022 07:00)  T(F): 97.8 (15 Aug 2022 07:00), Max: 97.8 (15 Aug 2022 07:00)  HR: 79 (15 Aug 2022 07:00) (68 - 79)  BP: 105/65 (15 Aug 2022 07:00) (105/65 - 126/58)  BP(mean): --  RR: 18 (15 Aug 2022 07:00) (18 - 21)  SpO2: 96% (15 Aug 2022 07:00) (96% - 96%)    Parameters below as of 15 Aug 2022 07:00  Patient On (Oxygen Delivery Method): face tent     I&O's Summary    14 Aug 2022 07:01  -  15 Aug 2022 07:00  --------------------------------------------------------  IN: 30 mL / OUT: 200 mL / NET: -170 mL      GENERAL: [ X] Cachexia  [ ]Alert  [ ]Oriented x   [ ]Lethargic  [X ]Unarousable  [ ]Verbal  [ ]Non-Verbal  Behavioral:   [ ] Anxiety  [ ] Delirium [ ] Agitation [ ] Other  HEENT:  [ ]Normal   [ ]Dry mouth   [ ]ET Tube/Trach  [ ]Oral lesions [X ] Face Tent   PULMONARY:   [X ]Clear [ ]Tachypnea  [X ]Audible excessive secretions   [ ]Rhonchi        [ ]Right [ ]Left [ ]Bilateral  [ ]Crackles        [ ]Right [ ]Left [ ]Bilateral  [ ]Wheezing     [ ]Right [ ]Left [ ]Bilateral  [ ]Diminished BS [ ]Right [ ]Left [ ]Bilateral    CARDIOVASCULAR:    [X ]Regular [ ]Irregular [ ]Tachy  [ ]Mickey [ ]Murmur [ ]Other  GASTROINTESTINAL:  [X ]Soft  [ ]Distended   [X ]+BS  [X ]Non tender [ ]Tender  [ ]Other [ ]PEG [ ]OGT/ NGT   Last BM: 8/12    GENITOURINARY:  [ ]Normal [ ] Incontinent   [ ]Oliguria/Anuria   [ ]Villasenor  MUSCULOSKELETAL:   [ ]Normal   [ ]Weakness  [X ]Bed/Wheelchair bound [ ]Edema  NEUROLOGIC:   [ ]No focal deficits  [ ] Cognitive impairment  [ ] Dysphagia [ ]Dysarthria [ ] Paresis [ ]Other   SKIN:   [X ]Normal  [ ]Rash  [ ]Other  [ ]Pressure ulcer(s)  [ ]y [ ]n  Present on admission      CRITICAL CARE:  [ ] Shock Present  [ ]Septic [ ]Cardiogenic [ ]Neurologic [ ]Hypovolemic  [ ]  Vasopressors [ ]  Inotropes   [X ] Respiratory failure present [ ] Mechanical Ventilation [ ] Non-invasive ventilatory support [ ] High-Flow  [ ] Acute  [ ] Chronic [ ] Hypoxic  [ ] Hypercarbic [ ] Other  [ ] Other organ failure     LABS:      PTT - ( 13 Aug 2022 09:42 )  PTT:58.3 sec      RADIOLOGY & ADDITIONAL STUDIES:    PROTEIN CALORIE MALNUTRITION: [ ] mild [ ] moderate [ ] severe  [ ] underweight [ ] morbid obesity    https://www.andeal.org/vault/2440/web/files/ONC/Table_Clinical%20Characteristics%20to%20Document%20Malnutrition-White%20JV%20et%20al%202012.pdf    Height (cm): 165.1 (08-08-22 @ 20:16), 165.1 (08-01-22 @ 15:36)  Weight (kg): 75.3 (08-08-22 @ 20:29), 73.1 (08-01-22 @ 15:36)  BMI (kg/m2): 27.6 (08-08-22 @ 20:29), 26.8 (08-08-22 @ 20:16), 26.8 (08-01-22 @ 15:36)    [ ] PPSV2 < or = 30% [ ] significant weight loss [ ] poor nutritional intake [ ] anasarca   Artificial Nutrition [ ]     Other REFERRALS:    [ ] Hospice  [ ]Child Life  [ ]Social Work  [ ]Case management [ ]Holistic Therapy [ ] Physical Therapy [ ] Dietary   Goals of Care Document: GARTH Angeles (08-12-22 @ 15:29)  Goals of Care Conversation: Below as documented on 8/13 palliative care attending note   Participants:  · Participants  Family  · Spouse  present  · Child(merlin)  Shahida    Advance Directives:  · Caregiver:  information could not be obtained    Conversation Discussion:  · Conversation  Prognosis; Treatment Options  · Conversation Details  Met with patient and family at bedside. Patient confused and unable to participate meaningfully in conversation.  conversation held in [place] which is a shared language of this provider.    I reviewed with patients spouse and daughter about current clinical situation. We discussed how he has been extubated, but now with delirium and still on pressors with goal to titrate them off. Family with much concern over mental status.     I reviewed options regarding care, including ongoing medical management in ICU and eventually medical floor. I also discussed transitioning focus of care and consideration for transfer to PCU. I explained that this level of care would be more in line with symptom directed, and care tailored to ensuring patient was comfortable at end of life.    Family appreciative of information and will consider choices. assured ongoing availability of our team. they have this providers contact information. MICU team updated.    Personal Advance Directives Treatment Guidelines:   Treatment Guidelines:  · Decision Maker  Surrogate  · Treatment Guideline Comments  continue current level of care    MOLST:  · Completed  09-Aug-2022    Location of Discussion:   Time Spent on Advance Care Planning:  I spent 20 (in minutes) on advance care planning services with the patient.  This time is separate and distinct from any other care management services provided on this date.    Location of Discussion:  · Location of discussion  Face to face      Electronic Signatures:  Swetha Angeles)  (Signed 12-Aug-2022 15:37)  	Authored: Goals of Care Conversation, Personal Advance Directives Treatment Guidelines, Location of Discussion      Last Updated: 12-Aug-2022 15:37 by Swetha Angeles)     GAP TEAM PALLIATIVE CARE UNIT PROGRESS NOTE:      [  ] Patient on hospice program.    INDICATION FOR PALLIATIVE CARE UNIT SERVICES/Interval HPI:     81 year old man with metastatic gallbladder cancer, DM2, HTN, HLD, CAD s/p MI 2018, HFpEF, recent admission for sepsis 2/2 UTI, presents from rehab after being found unresponsive at rehab likely due to aspiration, intubated i/s/o hypoxemic respiratory failure and septic shock, initially admitted to MICU - extubated on 8/11. Pt made DNR/DNI. Family preferred symptom directed care and stopping artifical nutrition - transferred to PCU.     OVERNIGHT EVENTS: Used 0.2 dilaudid 4 times for dyspnea and 0.2 dilaudid once for pain in addition to glycopyrolate 0.4 twice. Pt seen and examined at bedside. Unable to obtain ROS given mental status     DNR on chart: Yes  Yes      Allergies    Advil (Hives)  penicillin (Hives)    Intolerances    MEDICATIONS  (STANDING):  cefepime   IVPB 2000 milliGRAM(s) IV Intermittent every 12 hours  chlorhexidine 4% Liquid 1 Application(s) Topical <User Schedule>  sodium chloride 0.9%. 1000 milliLiter(s) (10 mL/Hr) IV Continuous <Continuous>    MEDICATIONS  (PRN):  bisacodyl Suppository 10 milliGRAM(s) Rectal daily PRN Constipation  glycopyrrolate Injectable 0.4 milliGRAM(s) IV Push every 4 hours PRN gurgling breath sounds  HYDROmorphone  Injectable 0.2 milliGRAM(s) IV Push every 1 hour PRN dyspnea  HYDROmorphone  Injectable 0.2 milliGRAM(s) IV Push every 1 hour PRN pain    ITEMS UNCHECKED ARE NOT PRESENT    PRESENT SYMPTOMS: [X]Unable to self-report  [X ]PAINADs [X ]RDOS  Source if other than patient:  [ ]Family   [ ]Team     Pain: [ ] yes [ ] no  QOL impact -   Location -                    Aggravating factors -  Quality -Te  Radiation -  Timing-  Severity (0-10 scale):G  Minimal acceptable level (0-10 scale):     PAINAD Score: 0  http://geriatrictoolkit.[place].[facility]/cog/painad.pdf (Ctrl +  left click to view)    Dyspnea:                           [ ]Mild [ ]Moderate [ ]Severe    RDOS: 0  0 to 2  minimal or no respiratory distress   3  mild distress  4 to 6 moderate distress  >7 severe distress  [email or web address]://[email or web address]/handouts/hen/Respiratory_Distress_Observation_Scale.pdf (Ctrl +  left click to view)     Anxiety:                             [ ]Mild [ ]Moderate [ ]Severe  Fatigue:                             [ ]Mild [ ]Moderate [ ]Severe  Nausea:                             [ ]Mild [ ]Moderate [ ]Severe  Loss of appetite:              [ ]Mild [ ]Moderate [ ]Severe  Constipation:                    [ ]Mild [X ]Moderate [ ]Severe    PCSSQ[Palliative Care Spiritual Screening Question]   Severity (0-10):  Score of 4 or > indicate consideration of Chaplaincy referral.  Chaplaincy Referral: [ ] yes [ ] refused [X] following  will ask chaplaincy for anointment today   		  Other Symptoms:  [ X] Unable to obtain ROS     Palliative Performance Status Version 2:      10  %         http://[facility].org/files/news/palliative_performance_scale_[email or web address].pdf  PHYSICAL EXAM:   Vital Signs Last 24 Hrs  T(C): 36.6 (15 Aug 2022 07:00), Max: 36.6 (15 Aug 2022 07:00)  T(F): 97.8 (15 Aug 2022 07:00), Max: 97.8 (15 Aug 2022 07:00)  HR: 79 (15 Aug 2022 07:00) (68 - 79)  BP: 105/65 (15 Aug 2022 07:00) (105/65 - 126/58)  BP(mean): --  RR: 18 (15 Aug 2022 07:00) (18 - 21)  SpO2: 96% (15 Aug 2022 07:00) (96% - 96%)    Parameters below as of 15 Aug 2022 07:00  Patient On (Oxygen Delivery Method): face tent     I&O's Summary    14 Aug 2022 07:01  -  15 Aug 2022 07:00  --------------------------------------------------------  IN: 30 mL / OUT: 200 mL / NET: -170 mL      GENERAL: [ X] Cachexia  [ ]Alert  [ ]Oriented x   [ ]Lethargic  [X ]Unarousable  [ ]Verbal  [ ]Non-Verbal  Behavioral:   [ ] Anxiety  [ ] Delirium [ ] Agitation [ ] Other  HEENT:  [ ]Normal   [ ]Dry mouth   [ ]ET Tube/Trach  [ ]Oral lesions [X ] Face Tent   PULMONARY:   [X ]Clear [ ]Tachypnea  [X ]Audible excessive secretions   [ ]Rhonchi        [ ]Right [ ]Left [ ]Bilateral  [ ]Crackles        [ ]Right [ ]Left [ ]Bilateral  [ ]Wheezing     [ ]Right [ ]Left [ ]Bilateral  [ ]Diminished BS [ ]Right [ ]Left [ ]Bilateral    CARDIOVASCULAR:    [X ]Regular [ ]Irregular [ ]Tachy  [ ]Mickey [ ]Murmur [ ]Other  GASTROINTESTINAL:  [X ]Soft  [ ]Distended   [X ]+BS  [X ]Non tender [ ]Tender  [ ]Other [ ]PEG [ ]OGT/ NGT   Last BM: 8/12    GENITOURINARY:  [ ]Normal [ ] Incontinent   [ ]Oliguria/Anuria   [ ]Villasenor  MUSCULOSKELETAL:   [ ]Normal   [ ]Weakness  [X ]Bed/Wheelchair bound [ ]Edema  NEUROLOGIC:   [ ]No focal deficits  [ ] Cognitive impairment  [ ] Dysphagia [ ]Dysarthria [ ] Paresis [ ]Other   SKIN:   [X ]Normal  [ ]Rash  [ ]Other  [ ]Pressure ulcer(s)  [ ]y [ ]n  Present on admission      CRITICAL CARE:  [ ] Shock Present  [ ]Septic [ ]Cardiogenic [ ]Neurologic [ ]Hypovolemic  [ ]  Vasopressors [ ]  Inotropes   [X ] Respiratory failure present [ ] Mechanical Ventilation [ ] Non-invasive ventilatory support [ ] High-Flow  [ ] Acute  [ ] Chronic [ ] Hypoxic  [ ] Hypercarbic [ ] Other  [ ] Other organ failure     LABS:      PTT - ( 13 Aug 2022 09:42 )  PTT:58.3 sec      RADIOLOGY & ADDITIONAL STUDIES:    PROTEIN CALORIE MALNUTRITION: [ ] mild [ ] moderate [X ] severe  [ ] underweight [ ] morbid obesity    https://www.andeal.org/vault/2440/web/files/ONC/Table_Clinical%20Characteristics%20to%20Document%20Malnutrition-White%20JV%20et%20al%202012.pdf    Height (cm): 165.1 (08-08-22 @ 20:16), 165.1 (08-01-22 @ 15:36)  Weight (kg): 75.3 (08-08-22 @ 20:29), 73.1 (08-01-22 @ 15:36)  BMI (kg/m2): 27.6 (08-08-22 @ 20:29), 26.8 (08-08-22 @ 20:16), 26.8 (08-01-22 @ 15:36)    [X ] PPSV2 < or = 30% [ ] significant weight loss [X ] poor nutritional intake [ ] anasarca   Artificial Nutrition [ ]     Other REFERRALS:    [ ] Hospice  [ ]Child Life  [ ]Social Work  [ ]Case management [ ]Holistic Therapy [ ] Physical Therapy [ ] Dietary   Goals of Care Document: GARTH Angeles (08-12-22 @ 15:29)  Goals of Care Conversation: Below as documented on 8/13 palliative care attending note   Participants:  · Participants  Family  · Spouse  present  · Child(merlin)  Shahida    Advance Directives:  · Caregiver:  information could not be obtained    Conversation Discussion:  · Conversation  Prognosis; Treatment Options  · Conversation Details  Met with patient and family at bedside. Patient confused and unable to participate meaningfully in conversation.  conversation held in [place] which is a shared language of this provider.    I reviewed with patients spouse and daughter about current clinical situation. We discussed how he has been extubated, but now with delirium and still on pressors with goal to titrate them off. Family with much concern over mental status.     I reviewed options regarding care, including ongoing medical management in ICU and eventually medical floor. I also discussed transitioning focus of care and consideration for transfer to PCU. I explained that this level of care would be more in line with symptom directed, and care tailored to ensuring patient was comfortable at end of life.    Family appreciative of information and will consider choices. assured ongoing availability of our team. they have this providers contact information. MICU team updated.    Personal Advance Directives Treatment Guidelines:   Treatment Guidelines:  · Decision Maker  Surrogate  · Treatment Guideline Comments  continue current level of care    MOLST:  · Completed  09-Aug-2022    Location of Discussion:   Time Spent on Advance Care Planning:  I spent 20 (in minutes) on advance care planning services with the patient.  This time is separate and distinct from any other care management services provided on this date.    Location of Discussion:  · Location of discussion  Face to face      Electronic Signatures:  Swetha Angeles)  (Signed 12-Aug-2022 15:37)  	Authored: Goals of Care Conversation, Personal Advance Directives Treatment Guidelines, Location of Discussion      Last Updated: 12-Aug-2022 15:37 by Swetha Angeles)     GAP TEAM PALLIATIVE CARE UNIT PROGRESS NOTE:      [  ] Patient on hospice program.    INDICATION FOR PALLIATIVE CARE UNIT SERVICES/Interval HPI:     81 year old man with metastatic gallbladder cancer, DM2, HTN, HLD, CAD s/p MI 2018, HFpEF, recent admission for sepsis 2/2 UTI, presents from rehab after being found unresponsive likely due to aspiration, intubated i/s/o hypoxemic respiratory failure and septic shock, initially admitted to MICU - extubated on 8/11. Pt made DNR/DNI. Family preferred symptom directed care and stopping artifical nutrition - transferred to PCU for management of symptoms at the end of life.  .     OVERNIGHT EVENTS: Used 0.2 mg IV PRN dilaudid 4 times for dyspnea and 0.2 mg IV PRN dilaudid once for pain in addition to glycopyrrolate 0.4 mg IV x 2. Pt seen and examined at bedside. Unable to obtain ROS given mental status     DNR on chart: Yes  Yes      Allergies    Advil (Hives)  penicillin (Hives)    Intolerances    MEDICATIONS  (STANDING):  cefepime   IVPB 2000 milliGRAM(s) IV Intermittent every 12 hours  chlorhexidine 4% Liquid 1 Application(s) Topical <User Schedule>  sodium chloride 0.9%. 1000 milliLiter(s) (10 mL/Hr) IV Continuous <Continuous>    MEDICATIONS  (PRN):  bisacodyl Suppository 10 milliGRAM(s) Rectal daily PRN Constipation  glycopyrrolate Injectable 0.4 milliGRAM(s) IV Push every 4 hours PRN gurgling breath sounds  HYDROmorphone  Injectable 0.2 milliGRAM(s) IV Push every 1 hour PRN dyspnea  HYDROmorphone  Injectable 0.2 milliGRAM(s) IV Push every 1 hour PRN pain    ITEMS UNCHECKED ARE NOT PRESENT    PRESENT SYMPTOMS: [X]Unable to self-report  [X ]PAINADs [X ]RDOS  Source if other than patient:  [ ]Family   [ ]Team     Pain: [ ] yes [x ] no  QOL impact -   Location -                    Aggravating factors -  Quality -Te  Radiation -  Timing-  Severity (0-10 scale):G  Minimal acceptable level (0-10 scale):     PAINAD Score: 0  [email or web address] (Ctrl +  left click to view)    Dyspnea:                           [ ]Mild [ ]Moderate [ ]Severe    RDOS: 0  0 to 2  minimal or no respiratory distress   3  mild distress  4 to 6 moderate distress  >7 severe distress  [email or web address]://[email or web address]/hen/Respiratory_Distress_Observation_Scale.pdf (Ctrl +  left click to view)     Anxiety:                             [ ]Mild [ ]Moderate [ ]Severe  Fatigue:                             [ ]Mild [ ]Moderate [ ]Severe  Nausea:                             [ ]Mild [ ]Moderate [ ]Severe  Loss of appetite:              [ ]Mild [ ]Moderate [ ]Severe  Constipation:                    [ ]Mild [X ]Moderate [ ]Severe    PCSSQ[Palliative Care Spiritual Screening Question]   Severity (0-10):  Score of 4 or > indicate consideration of Chaplaincy referral.  Chaplaincy Referral: [ ] yes [ ] refused [X] following  will ask chaplaincy for anointment today   		  Other Symptoms:  [ X] Unable to obtain ROS     Palliative Performance Status Version 2:      10  %         [email or web address]/[email or web address]_performance_[email or web address]_[email or web address]  PHYSICAL EXAM:   Vital Signs Last 24 Hrs  T(C): 36.6 (15 Aug 2022 07:00), Max: 36.6 (15 Aug 2022 07:00)  T(F): 97.8 (15 Aug 2022 07:00), Max: 97.8 (15 Aug 2022 07:00)  HR: 79 (15 Aug 2022 07:00) (68 - 79)  BP: 105/65 (15 Aug 2022 07:00) (105/65 - 126/58)  BP(mean): --  RR: 18 (15 Aug 2022 07:00) (18 - 21)  SpO2: 96% (15 Aug 2022 07:00) (96% - 96%)    Parameters below as of 15 Aug 2022 07:00  Patient On (Oxygen Delivery Method): face tent     I&O's Summary    14 Aug 2022 07:01  -  15 Aug 2022 07:00  --------------------------------------------------------  IN: 30 mL / OUT: 200 mL / NET: -170 mL      GENERAL: [ X] Cachexia  [ ]Alert  [ ]Oriented x   [ ]Lethargic  [X ]Unarousable  [ ]Verbal  [x ]Non-Verbal  Behavioral:   [ ] Anxiety  [ ] Delirium [ ] Agitation [ ] Other  HEENT:  [ x]Normal   [ ]Dry mouth   [ ]ET Tube/Trach  [ ]Oral lesions [X ] Face Tent   PULMONARY:   [X ]Clear [ ]Tachypnea  [X ]Audible excessive secretions   [ ]Rhonchi        [ ]Right [ ]Left [ ]Bilateral  [ ]Crackles        [ ]Right [ ]Left [ ]Bilateral  [ ]Wheezing     [ ]Right [ ]Left [ ]Bilateral  [ ]Diminished BS [ ]Right [ ]Left [ ]Bilateral    CARDIOVASCULAR:    [X ]Regular [ ]Irregular [ ]Tachy  [ ]Mickey [ ]Murmur [ ]Other  GASTROINTESTINAL:  [X ]Soft  [x ]Distended   [X ]+BS  [X ]Non tender [ ]Tender  [ ]Other [ ]PEG [ ]OGT/ NGT   Last BM: 8/12  fecal incontinence  GENITOURINARY:  [ ]Normal [x ] Incontinent   [ ]Oliguria/Anuria   [ x]Villasenor  MUSCULOSKELETAL:   [ ]Normal   [ ]Weakness  [X ]Bed/Wheelchair bound [ ]Edema  NEUROLOGIC:   [ ]No focal deficits  [ x] Cognitive impairment  [ ] Dysphagia [ ]Dysarthria [ ] Paresis [ ]Other   SKIN:   [X ]Normal  [ ]Rash  [ ]Other  [ ]Pressure ulcer(s)  [ ]y [ ]n  Present on admission      CRITICAL CARE:  [ ] Shock Present  [ ]Septic [ ]Cardiogenic [ ]Neurologic [ ]Hypovolemic  [ ]  Vasopressors [ ]  Inotropes   [X ] Respiratory failure present [ ] Mechanical Ventilation [ ] Non-invasive ventilatory support [ ] High-Flow  [ ] Acute  [ ] Chronic [ ] Hypoxic  [ ] Hypercarbic [ ] Other  [ ] Other organ failure     LABS:  None new.       RADIOLOGY & ADDITIONAL STUDIES: None new.     PROTEIN CALORIE MALNUTRITION: [ ] mild [ ] moderate [X ] severe  [ ] underweight [ ] morbid obesity    https://www.andeal.org/vault/5750/web/files/ONC/Table_Clinical%20Characteristics%20to%20Document%20Malnutrition-White%20JV%20et%20al%202012.pdf    Height (cm): 165.1 (08-08-22 @ 20:16), 165.1 (08-01-22 @ 15:36)  Weight (kg): 75.3 (08-08-22 @ 20:29), 73.1 (08-01-22 @ 15:36)  BMI (kg/m2): 27.6 (08-08-22 @ 20:29), 26.8 (08-08-22 @ 20:16), 26.8 (08-01-22 @ 15:36)    [X ] PPSV2 < or = 30% [ ] significant weight loss [X ] poor nutritional intake [ ] anasarca   Artificial Nutrition [ ]     Other REFERRALS:    [ ] Hospice  [ ]Child Life  [ ]Social Work  [ ]Case management [ ]Holistic Therapy [ ] Physical Therapy [ ] Dietary   Goals of Care Document: GARTH Angeles (08-12-22 @ 15:29)  Goals of Care Conversation: Below as documented on 8/13 palliative care attending note   Participants:  · Participants  Family  · Spouse  present  · Child(merlin)  Shahida    Advance Directives:  · Caregiver:  information could not be obtained    Conversation Discussion:  · Conversation  Prognosis; Treatment Options  · Conversation Details  Met with patient and family at bedside. Patient confused and unable to participate meaningfully in conversation.  conversation held in Guatemalan which is a shared language of this provider.    I reviewed with patients spouse and daughter about current clinical situation. We discussed how he has been extubated, but now with delirium and still on pressors with goal to titrate them off. Family with much concern over mental status.     I reviewed options regarding care, including ongoing medical management in ICU and eventually medical floor. I also discussed transitioning focus of care and consideration for transfer to PCU. I explained that this level of care would be more in line with symptom directed, and care tailored to ensuring patient was comfortable at end of life.    Family appreciative of information and will consider choices. assured ongoing availability of our team. they have this providers contact information. MICU team updated.    Personal Advance Directives Treatment Guidelines:   Treatment Guidelines:  · Decision Maker  Surrogate  · Treatment Guideline Comments  continue current level of care    MOLST:  · Completed  09-Aug-2022    Location of Discussion:   Time Spent on Advance Care Planning:  I spent 20 (in minutes) on advance care planning services with the patient.  This time is separate and distinct from any other care management services provided on this date.    Location of Discussion:  · Location of discussion  Face to face      Electronic Signatures:  Swetha Angeles)  (Signed 12-Aug-2022 15:37)  	Authored: Goals of Care Conversation, Personal Advance Directives Treatment Guidelines, Location of Discussion      Last Updated: 12-Aug-2022 15:37 by Swetha Angeles)

## 2022-08-15 NOTE — PROGRESS NOTE ADULT - PROBLEM SELECTOR PLAN 2
increase dilaudid 0.1 to 0.5 q1 PRN pain and add dilaudid 0.2q6 standing  currently looks comfortable   will monitor with PAINAD score increased dilaudid  to 0.5mg IV q1h PRN pain and add dilaudid 0.2mg IV q6h standing  with good control of symptoms  will monitor with PAINAD score  bowel regimen while receiving opioids.

## 2022-08-16 NOTE — PROGRESS NOTE ADULT - SUBJECTIVE AND OBJECTIVE BOX
GAP TEAM PALLIATIVE CARE UNIT PROGRESS NOTE:      [  ] Patient on hospice program.    INDICATION FOR PALLIATIVE CARE UNIT SERVICES/Interval HPI:    81 year old man with metastatic gallbladder cancer, DM2, HTN, HLD, CAD s/p MI 2018, HFpEF, recent admission for sepsis 2/2 UTI, presents from rehab after being found unresponsive likely due to aspiration, intubated i/s/o hypoxemic respiratory failure and septic shock, initially admitted to MICU - extubated on 8/11. Pt made DNR/DNI. Family preferred symptom directed care and stopping artifical nutrition - transferred to PCU for management of symptoms at the end of life.     OVERNIGHT EVENTS: Used dilaudid 0.5 once for dyspnea. Pt seen and examined at bedside. Unable to obtain ROS with mental status    DNR on chart: Yes  Yes      Allergies    Advil (Hives)  penicillin (Hives)    Intolerances    MEDICATIONS  (STANDING):  cefepime   IVPB 2000 milliGRAM(s) IV Intermittent every 12 hours  glycopyrrolate Injectable 0.4 milliGRAM(s) IV Push every 6 hours  HYDROmorphone  Injectable 0.2 milliGRAM(s) IV Push every 6 hours  scopolamine 1 mG/72 Hr(s) Patch 1 Patch Transdermal every 72 hours  sodium chloride 0.9%. 1000 milliLiter(s) (10 mL/Hr) IV Continuous <Continuous>    MEDICATIONS  (PRN):  acetaminophen  Suppository .. 650 milliGRAM(s) Rectal every 6 hours PRN Temp greater or equal to 38C (100.4F)  bisacodyl Suppository 10 milliGRAM(s) Rectal daily PRN Constipation  HYDROmorphone  Injectable 0.5 milliGRAM(s) IV Push every 1 hour PRN Dyspnea  HYDROmorphone  Injectable 0.5 milliGRAM(s) IV Push every 1 hour PRN Pain  midazolam Injectable 0.5 milliGRAM(s) IV Push every 1 hour PRN agitation    ITEMS UNCHECKED ARE NOT PRESENT    PRESENT SYMPTOMS: [X ]Unable to self-report  [X ]PAINADs [ ]RDOS  Source if other than patient:  [ ]Family   [ ]Team     Pain: [ ] yes [ ] no  QOL impact -   Location -                    Aggravating factors -  Quality -Te  Radiation -  Timing-  Severity (0-10 scale):G  Minimal acceptable level (0-10 scale):     PAINAD Score: 0  [email or web address] (Ctrl +  left click to view)    Dyspnea:                           [ ]Mild [ ]Moderate [ ]Severe    RDOS: 0  0 to 2  minimal or no respiratory distress   3  mild distress  4 to 6 moderate distress  >7 severe distress  [email or web address]://[email or web address]/hen/Respiratory_Distress_Observation_Scale.pdf (Ctrl +  left click to view)     Anxiety:                             [ ]Mild [ ]Moderate [ ]Severe  Fatigue:                             [ ]Mild [ ]Moderate [ ]Severe  Nausea:                             [ ]Mild [ ]Moderate [ ]Severe  Loss of appetite:              [ ]Mild [ ]Moderate [ ]Severe  Constipation:                    [ ]Mild [X ]Moderate [ ]Severe    PCSSQ[Palliative Care Spiritual Screening Question]   Severity (0-10):  Score of 4 or > indicate consideration of Chaplaincy referral.  Chaplaincy Referral: [ ] yes [ ] refused [ ] following  		  Other Symptoms:  [ ]All other review of systems negative     Palliative Performance Status Version 2:    10     %         http://[email or web address]/files/[email or web address]_performance_scale_[email or web address].pdf  PHYSICAL EXAM:   Vital Signs Last 24 Hrs  T(C): 36.6 (16 Aug 2022 09:32), Max: 36.6 (16 Aug 2022 09:32)  T(F): 97.9 (16 Aug 2022 09:32), Max: 97.9 (16 Aug 2022 09:32)  HR: 86 (16 Aug 2022 09:32) (86 - 86)  BP: 92/57 (16 Aug 2022 09:32) (92/57 - 92/57)  BP(mean): --  RR: 18 (16 Aug 2022 09:32) (18 - 18)  SpO2: 97% (16 Aug 2022 09:32) (97% - 97%)    Parameters below as of 16 Aug 2022 09:32  Patient On (Oxygen Delivery Method): face tent     I&O's Summary    15 Aug 2022 07:01  -  16 Aug 2022 07:00  --------------------------------------------------------  IN: 0 mL / OUT: 200 mL / NET: -200 mL      GENERAL: [ X] Cachexia  [ ]Alert  [ ]Oriented x   [ ]Lethargic  [X ]Unarousable  [ ]Verbal  [x ]Non-Verbal  Behavioral:   [ ] Anxiety  [ ] Delirium [ ] Agitation [ ] Other  HEENT:  [ x]Normal   [ ]Dry mouth   [ ]ET Tube/Trach  [ ]Oral lesions [X ] Face Tent   PULMONARY:   [ ]Clear [ ]Tachypnea  [X ]Audible excessive secretions   [X ]Rhonchi        [ ]Right [ ]Left [X ]Bilateral  [ ]Crackles        [ ]Right [ ]Left [ ]Bilateral  [ ]Wheezing     [ ]Right [ ]Left [ ]Bilateral  [ ]Diminished BS [ ]Right [ ]Left [ ]Bilateral    CARDIOVASCULAR:    [X ]Regular [ ]Irregular [ ]Tachy  [ ]Mickey [ ]Murmur [ ]Other  GASTROINTESTINAL:  [X ]Soft  [x ]Distended   [X ]+BS  [X ]Non tender [ ]Tender  [ ]Other [ ]PEG [ ]OGT/ NGT   Last BM: 8/12  fecal incontinence  GENITOURINARY:  [ ]Normal [x ] Incontinent   [ ]Oliguria/Anuria   [ x]Villasenor  MUSCULOSKELETAL:   [ ]Normal   [ ]Weakness  [X ]Bed/Wheelchair bound [ ]Edema  NEUROLOGIC:   [ ]No focal deficits  [ x] Cognitive impairment  [ ] Dysphagia [ ]Dysarthria [ ] Paresis [ ]Other   SKIN:   [X ]Normal  [ ]Rash  [ ]Other  [ ]Pressure ulcer(s)  [ ]y [ ]n  Present on admission      CRITICAL CARE:  [ ] Shock Present  [ ]Septic [ ]Cardiogenic [ ]Neurologic [ ]Hypovolemic  [ ]  Vasopressors [ ]  Inotropes   [X ] Respiratory failure present on face tent [ ] Mechanical Ventilation [ ] Non-invasive ventilatory support [ ] High-Flow  [ ] Acute  [ ] Chronic [ ] Hypoxic  [ ] Hypercarbic [ ] Other  [ ] Other organ failure     LABS:            RADIOLOGY & ADDITIONAL STUDIES:    PROTEIN CALORIE MALNUTRITION: [ ] mild [ ] moderate [ ] severe  [ ] underweight [ ] morbid obesity    https://www.andeal.org/vault/4820/web/files/ONC/Table_Clinical%20Characteristics%20to%20Document%20Malnutrition-White%20JV%20et%20al%202012.pdf    Height (cm): 165.1 (08-08-22 @ 20:16), 165.1 (08-01-22 @ 15:36)  Weight (kg): 75.3 (08-08-22 @ 20:29), 73.1 (08-01-22 @ 15:36)  BMI (kg/m2): 27.6 (08-08-22 @ 20:29), 26.8 (08-08-22 @ 20:16), 26.8 (08-01-22 @ 15:36)    [ X] PPSV2 < or = 30% [ ] significant weight loss [X ] poor nutritional intake [ ] anasarca   Artificial Nutrition [ ]     Other REFERRALS:    [ ] Hospice  [ ]Child Life  [ ]Social Work  [ ]Case management [ ]Holistic Therapy [ ] Physical Therapy [ ] Dietary   Goals of Care Document: GARTH Angeles (08-12-22 @ 15:29)  Goals of Care Conversation:   Participants:  · Participants  Family  · Spouse  present  · Child(merlin)  Shahida    Advance Directives:  · Caregiver:  information could not be obtained    Conversation Discussion:  · Conversation  Prognosis; Treatment Options  · Conversation Details  Met with patient and family at bedside. Patient confused and unable to participate meaningfully in conversation.  conversation held in Croatian which is a shared language of this provider.    I reviewed with patients spouse and daughter about current clinical situation. We discussed how he has been extubated, but now with delirium and still on pressors with goal to titrate them off. Family with much concern over mental status.     I reviewed options regarding care, including ongoing medical management in ICU and eventually medical floor. I also discussed transitioning focus of care and consideration for transfer to PCU. I explained that this level of care would be more in line with symptom directed, and care tailored to ensuring patient was comfortable at end of life.    Family appreciative of information and will consider choices. assured ongoing availability of our team. they have this providers contact information. MICU team updated.    Personal Advance Directives Treatment Guidelines:   Treatment Guidelines:  · Decision Maker  Surrogate  · Treatment Guideline Comments  continue current level of care    MOLST:  · Completed  09-Aug-2022    Location of Discussion:   Time Spent on Advance Care Planning:  I spent 20 (in minutes) on advance care planning services with the patient.  This time is separate and distinct from any other care management services provided on this date.    Location of Discussion:  · Location of discussion  Face to face      Electronic Signatures:  Swetha Angeles)  (Signed 12-Aug-2022 15:37)  	Authored: Goals of Care Conversation, Personal Advance Directives Treatment Guidelines, Location of Discussion      Last Updated: 12-Aug-2022 15:37 by Swetha Angeles)

## 2022-08-16 NOTE — PROGRESS NOTE ADULT - ASSESSMENT
82 Y/O M with metastatic gallbladder cancer, DM2, HTN, HLD, CAD s/p MI 2018, HFpEF, recent admission for sepsis 2/2 UTI, presents from rehab after being found unresponsive at rehab intubated i/s/o hypoxemic respiratory failure and septic shock likely 2/2 aspiration PNA, initially admitted to MICU for further management extubated on 8/11. Transferred to PCU for comfort-guided measures.

## 2022-08-16 NOTE — PROGRESS NOTE ADULT - PROBLEM SELECTOR PLAN 2
c/w dilaudid  to 0.5mg IV q1h PRN pain and dilaudid 0.2mg IV q6h standing  with good control of symptoms  will monitor with PAINAD score  bowel regimen while receiving opioids.

## 2022-08-16 NOTE — CHART NOTE - NSCHARTNOTESELECT_GEN_ALL_CORE
POCUS Resident/Event Note
POCUS Resident/Event Note
Transfer Note
MICU TRANSFER/Transfer Note
Nutrition Services
POCUS Resident/Event Note
POCUS/Event Note
POCUS/Event Note
Palliative Note/Event Note
Palliative/Event Note

## 2022-08-16 NOTE — PROGRESS NOTE ADULT - PROBLEM SELECTOR PLAN 1
Family amenable to symptom directed care. Artificial nutrition was stopped and NGT was removed   > c/w glyco 0.4 standing q6h. added scopolamine patch given upper respiratory noises   > c/w dilaudid  0.5 q1 PRN dyspnea and dilaudid 0.2q6 standing   > on face tent during encounter

## 2022-08-17 NOTE — PROGRESS NOTE ADULT - PROBLEM SELECTOR PLAN 2
> c/w dilaudid  to 0.5mg IV q1h PRN pain   >dilaudid 0.2mg IV q6h standing with good control of symptoms  > will monitor with PAINAD score  > bowel regimen while receiving opioids  >last BM: 8/12

## 2022-08-17 NOTE — PROGRESS NOTE ADULT - SUBJECTIVE AND OBJECTIVE BOX
GAP TEAM PALLIATIVE CARE UNIT PROGRESS NOTE:      [  ] Patient on hospice program.    INDICATION FOR PALLIATIVE CARE UNIT SERVICES/Interval HPI:    81 year old man with metastatic gallbladder cancer, DM2, HTN, HLD, CAD s/p MI 2018, HFpEF, recent admission for sepsis 2/2 UTI, presents from rehab after being found unresponsive likely due to aspiration, intubated i/s/o hypoxemic respiratory failure and septic shock, initially admitted to MICU - extubated on 8/11. Pt made DNR/DNI. Family preferred symptom directed care and stopping artifical nutrition - transferred to PCU for management of symptoms at the end of life.     OVERNIGHT EVENTS: Used dilaudid 0.5 once for dyspnea. Pt seen and examined at bedside. Unable to obtain ROS given mental status     DNR on chart: Yes  Yes      Allergies    Advil (Hives)  penicillin (Hives)    Intolerances    MEDICATIONS  (STANDING):  BACItracin   Ointment 1 Application(s) Topical two times a day  cefepime   IVPB 2000 milliGRAM(s) IV Intermittent every 12 hours  glycopyrrolate Injectable 0.4 milliGRAM(s) IV Push every 6 hours  HYDROmorphone  Injectable 0.2 milliGRAM(s) IV Push every 6 hours  scopolamine 1 mG/72 Hr(s) Patch 1 Patch Transdermal every 72 hours  sodium chloride 0.9%. 1000 milliLiter(s) (10 mL/Hr) IV Continuous <Continuous>    MEDICATIONS  (PRN):  acetaminophen  Suppository .. 650 milliGRAM(s) Rectal every 6 hours PRN Temp greater or equal to 38C (100.4F)  bisacodyl Suppository 10 milliGRAM(s) Rectal daily PRN Constipation  HYDROmorphone  Injectable 0.5 milliGRAM(s) IV Push every 1 hour PRN Dyspnea  HYDROmorphone  Injectable 0.5 milliGRAM(s) IV Push every 1 hour PRN Pain  midazolam Injectable 0.5 milliGRAM(s) IV Push every 1 hour PRN agitation    ITEMS UNCHECKED ARE NOT PRESENT    PRESENT SYMPTOMS: [X]Unable to self-report  [ X]PAINADs [ X]RDOS  Source if other than patient:  [ ]Family   [ ]Team     Pain: [ ] yes [ ] no  QOL impact -   Location -                    Aggravating factors -  Quality -Te  Radiation -  Timing-  Severity (0-10 scale):G  Minimal acceptable level (0-10 scale):     PAINAD Score: 0 over past 24 hours and on my exam   [email or web address] (Ctrl +  left click to view)    Dyspnea:                           [ ]Mild [ ]Moderate [ ]Severe    RDOS: 0  0 to 2  minimal or no respiratory distress   3  mild distress  4 to 6 moderate distress  >7 severe distress  [email or web address] (Ctrl +  left click to view)     Anxiety:                             [ ]Mild [ ]Moderate [ ]Severe  Fatigue:                             [ ]Mild [ ]Moderate [ ]Severe  Nausea:                             [ ]Mild [ ]Moderate [ ]Severe  Loss of appetite:              [ ]Mild [ ]Moderate [ ]Severe  Constipation:                    [ ]Mild [ ]Moderate [X ]Severe    PCSSQ[Palliative Care Spiritual Screening Question]   Severity (0-10):  Score of 4 or > indicate consideration of Chaplaincy referral.  Chaplaincy Referral: [ ] yes [ ] refused [X ] following  		  Other Symptoms:  [ X]Unable to obtain ROS given mental status    Palliative Performance Status Version 2:   10      %         http://npcrc.org/files/news/palliative_performance_scale_[email or web address].pdf  PHYSICAL EXAM:   Vital Signs Last 24 Hrs  T(C): 37.3 (17 Aug 2022 08:00), Max: 37.3 (17 Aug 2022 08:00)  T(F): 99.2 (17 Aug 2022 08:00), Max: 99.2 (17 Aug 2022 08:00)  HR: 89 (17 Aug 2022 08:00) (86 - 89)  BP: 94/61 (17 Aug 2022 08:00) (92/57 - 94/61)  BP(mean): --  RR: 20 (17 Aug 2022 08:00) (18 - 20)  SpO2: 96% (17 Aug 2022 08:00) (96% - 97%)    Parameters below as of 17 Aug 2022 08:00  Patient On (Oxygen Delivery Method): oxygen tent     I&O's Summary    16 Aug 2022 07:01  -  17 Aug 2022 07:00  --------------------------------------------------------  IN: 0 mL / OUT: 100 mL / NET: -100 mL      GENERAL: [ X] Cachexia  [ ]Alert  [ ]Oriented x   [ ]Lethargic  [X ]Unarousable  [ ]Verbal  [x ]Non-Verbal  Behavioral:   [ ] Anxiety  [ ] Delirium [ ] Agitation [ ] Other  HEENT:  [ x]Normal   [ ]Dry mouth   [ ]ET Tube/Trach  [ ]Oral lesions [X ] Face Tent   PULMONARY:   [ ]Clear [ ]Tachypnea  [X ]Audible excessive secretions   [X ]Rhonchi        [ ]Right [ ]Left [X ]Bilateral  [ ]Crackles        [ ]Right [ ]Left [ ]Bilateral  [ ]Wheezing     [ ]Right [ ]Left [ ]Bilateral  [ ]Diminished BS [ ]Right [ ]Left [ ]Bilateral    CARDIOVASCULAR:    [X ]Regular [ ]Irregular [ ]Tachy  [ ]Mickey [ ]Murmur [ ]Other  GASTROINTESTINAL:  [X ]Soft  [x ]Distended   [X ]+BS  [X ]Non tender [ ]Tender  [ ]Other [ ]PEG [ ]OGT/ NGT   Last BM: 8/12  fecal incontinence  GENITOURINARY:  [ ]Normal [x ] Incontinent   [ ]Oliguria/Anuria   [ x]Villasenor  MUSCULOSKELETAL:   [ ]Normal   [ ]Weakness  [X ]Bed/Wheelchair bound [ X]Edema [X] Weeping in B/L UE and LE   NEUROLOGIC:   [ ]No focal deficits  [ x] Cognitive impairment  [ ] Dysphagia [ ]Dysarthria [ ] Paresis [ ]Other   SKIN:   [X ]Normal  [ ]Rash  [ ]Other  [ ]Pressure ulcer(s)  [ ]y [ ]n  Present on admission      CRITICAL CARE:  [ ] Shock Present  [ ]Septic [ ]Cardiogenic [ ]Neurologic [ ]Hypovolemic  [ ]  Vasopressors [ ]  Inotropes   [X ] Respiratory failure present: On face tent  [ ] Mechanical Ventilation [ ] Non-invasive ventilatory support [ ] High-Flow  [ ] Acute  [ ] Chronic [ ] Hypoxic  [ ] Hypercarbic [ ] Other  [ ] Other organ failure     LABS:            RADIOLOGY & ADDITIONAL STUDIES:    PROTEIN CALORIE MALNUTRITION: [ ] mild [ ] moderate [ ] severe  [ ] underweight [ ] morbid obesity    https://www.andeal.org/vault/4658/web/files/ONC/Table_Clinical%20Characteristics%20to%20Document%20Malnutrition-White%20JV%20et%20al%202012.pdf    Height (cm): 165.1 (08-08-22 @ 20:16), 165.1 (08-01-22 @ 15:36)  Weight (kg): 75.3 (08-08-22 @ 20:29), 73.1 (08-01-22 @ 15:36)  BMI (kg/m2): 27.6 (08-08-22 @ 20:29), 26.8 (08-08-22 @ 20:16), 26.8 (08-01-22 @ 15:36)    [X ] PPSV2 < or = 30% [ ] significant weight loss [X ] poor nutritional intake [ ] anasarca   Artificial Nutrition [ ]     Other REFERRALS:    [ ] Hospice  [ ]Child Life  [ ]Social Work  [ ]Case management [ ]Holistic Therapy [ ] Physical Therapy [ ] Dietary   Goals of Care Document: GARTH Angeles (08-12-22 @ 15:29)  Goals of Care Conversation:   Participants:  · Participants  Family  · Spouse  present  · Child(merlin)  Shahida    Advance Directives:  · Caregiver:  information could not be obtained    Conversation Discussion:  · Conversation  Prognosis; Treatment Options  · Conversation Details  Met with patient and family at bedside. Patient confused and unable to participate meaningfully in conversation.  conversation held in French which is a shared language of this provider.    I reviewed with patients spouse and daughter about current clinical situation. We discussed how he has been extubated, but now with delirium and still on pressors with goal to titrate them off. Family with much concern over mental status.     I reviewed options regarding care, including ongoing medical management in ICU and eventually medical floor. I also discussed transitioning focus of care and consideration for transfer to PCU. I explained that this level of care would be more in line with symptom directed, and care tailored to ensuring patient was comfortable at end of life.    Family appreciative of information and will consider choices. assured ongoing availability of our team. they have this providers contact information. MICU team updated.    Personal Advance Directives Treatment Guidelines:   Treatment Guidelines:  · Decision Maker  Surrogate  · Treatment Guideline Comments  continue current level of care    MOLST:  · Completed  09-Aug-2022    Location of Discussion:   Time Spent on Advance Care Planning:  I spent 20 (in minutes) on advance care planning services with the patient.  This time is separate and distinct from any other care management services provided on this date.    Location of Discussion:  · Location of discussion  Face to face      Electronic Signatures:  Swetha Angeles)  (Signed 12-Aug-2022 15:37)  	Authored: Goals of Care Conversation, Personal Advance Directives Treatment Guidelines, Location of Discussion      Last Updated: 12-Aug-2022 15:37 by Swetha Angeles)     GAP TEAM PALLIATIVE CARE UNIT PROGRESS NOTE:      [  ] Patient on hospice program.    INDICATION FOR PALLIATIVE CARE UNIT SERVICES/Interval HPI:    81 year old man with metastatic gallbladder cancer, DM2, HTN, HLD, CAD s/p MI 2018, HFpEF, recent admission for sepsis 2/2 UTI, presents from rehab after being found unresponsive likely due to aspiration, intubated i/s/o hypoxemic respiratory failure and septic shock, initially admitted to MICU - extubated on 8/11. Pt made DNR/DNI. Family preferred symptom directed care and stopping artifical nutrition - transferred to PCU for management of symptoms at the end of life.     OVERNIGHT EVENTS: Used dilaudid 0.5 once for dyspnea. Pt seen and examined at bedside. Unable to obtain ROS given mental status     DNR on chart: Yes  Yes      Allergies    Advil (Hives)  penicillin (Hives)    Intolerances    MEDICATIONS  (STANDING):  BACItracin   Ointment 1 Application(s) Topical two times a day  cefepime   IVPB 2000 milliGRAM(s) IV Intermittent every 12 hours  glycopyrrolate Injectable 0.4 milliGRAM(s) IV Push every 6 hours  HYDROmorphone  Injectable 0.2 milliGRAM(s) IV Push every 6 hours  scopolamine 1 mG/72 Hr(s) Patch 1 Patch Transdermal every 72 hours  sodium chloride 0.9%. 1000 milliLiter(s) (10 mL/Hr) IV Continuous <Continuous>    MEDICATIONS  (PRN):  acetaminophen  Suppository .. 650 milliGRAM(s) Rectal every 6 hours PRN Temp greater or equal to 38C (100.4F)  bisacodyl Suppository 10 milliGRAM(s) Rectal daily PRN Constipation  HYDROmorphone  Injectable 0.5 milliGRAM(s) IV Push every 1 hour PRN Dyspnea  HYDROmorphone  Injectable 0.5 milliGRAM(s) IV Push every 1 hour PRN Pain  midazolam Injectable 0.5 milliGRAM(s) IV Push every 1 hour PRN agitation    ITEMS UNCHECKED ARE NOT PRESENT    PRESENT SYMPTOMS: [X]Unable to self-report  [ X]PAINADs [ X]RDOS  Source if other than patient:  [ ]Family   [x ]Team     Pain: [ ] yes [x ] no  QOL impact -   Location -                    Aggravating factors -  Quality -Te  Radiation -  Timing-  Severity (0-10 scale):G  Minimal acceptable level (0-10 scale):     PAINAD Score: 0 over past 24 hours and on my exam   [email or web address] (Ctrl +  left click to view)    Dyspnea:                           [ ]Mild [ ]Moderate [ ]Severe    RDOS: 0  0 to 2  minimal or no respiratory distress   3  mild distress  4 to 6 moderate distress  >7 severe distress  [email or web address]_Distress_[email or web address] (Ctrl +  left click to view)     Anxiety:                             [ ]Mild [ ]Moderate [ ]Severe  Fatigue:                             [ ]Mild [ ]Moderate [ ]Severe  Nausea:                             [ ]Mild [ ]Moderate [ ]Severe  Loss of appetite:              [ ]Mild [ ]Moderate [ ]Severe  Constipation:                    [ ]Mild [ ]Moderate [X ]Severe    PCSSQ[Palliative Care Spiritual Screening Question]   Severity (0-10):  Score of 4 or > indicate consideration of Chaplaincy referral.  Chaplaincy Referral: [ ] yes [ ] refused [X ] following  		  Other Symptoms:  [ X]Unable to obtain ROS given mental status    Palliative Performance Status Version 2:   10      %         http://npcrc.org/files/news/palliative_performance_scale_[email or web address].pdf  PHYSICAL EXAM:   Vital Signs Last 24 Hrs  T(C): 37.3 (17 Aug 2022 08:00), Max: 37.3 (17 Aug 2022 08:00)  T(F): 99.2 (17 Aug 2022 08:00), Max: 99.2 (17 Aug 2022 08:00)  HR: 89 (17 Aug 2022 08:00) (86 - 89)  BP: 94/61 (17 Aug 2022 08:00) (92/57 - 94/61)  BP(mean): --  RR: 20 (17 Aug 2022 08:00) (18 - 20)  SpO2: 96% (17 Aug 2022 08:00) (96% - 97%)    Parameters below as of 17 Aug 2022 08:00  Patient On (Oxygen Delivery Method): oxygen tent     I&O's Summary    16 Aug 2022 07:01  -  17 Aug 2022 07:00  --------------------------------------------------------  IN: 0 mL / OUT: 100 mL / NET: -100 mL      GENERAL: [ X] Cachexia  [ ]Alert  [ ]Oriented x   [ ]Lethargic  [X ]Unarousable  [ ]Verbal  [x ]Non-Verbal  Behavioral:   [ ] Anxiety  [ ] Delirium [ ] Agitation [ ] Other  HEENT:  [ x]Normal   [ ]Dry mouth   [ ]ET Tube/Trach  [ ]Oral lesions [X ] Face Tent   PULMONARY:   [ ]Clear [ ]Tachypnea  [X ]Audible excessive secretions   [X ]Rhonchi        [ ]Right [ ]Left [X ]Bilateral  [ ]Crackles        [ ]Right [ ]Left [ ]Bilateral  [ ]Wheezing     [ ]Right [ ]Left [ ]Bilateral  [ ]Diminished BS [ ]Right [ ]Left [ ]Bilateral    CARDIOVASCULAR:    [X ]Regular [ ]Irregular [ ]Tachy  [ ]Mickey [ ]Murmur [ ]Other  GASTROINTESTINAL:  [X ]Soft  [x ]Distended   [X ]+BS  [X ]Non tender [ ]Tender  [ ]Other [ ]PEG [ ]OGT/ NGT   Last BM: 8/12  fecal incontinence  GENITOURINARY:  [ ]Normal [x ] Incontinent   [ ]Oliguria/Anuria   [ x]Villasenor  MUSCULOSKELETAL:   [ ]Normal   [ ]Weakness  [X ]Bed/Wheelchair bound [ X]Edema [X] Weeping in B/L UE and LE   NEUROLOGIC:   [ ]No focal deficits  [ x] Cognitive impairment  [ ] Dysphagia [ ]Dysarthria [ ] Paresis [ ]Other   SKIN:   [X ]Normal  [ ]Rash  [ ]Other  [ ]Pressure ulcer(s)  [ ]y [ ]n  Present on admission      CRITICAL CARE:  [ ] Shock Present  [ ]Septic [ ]Cardiogenic [ ]Neurologic [ ]Hypovolemic  [ ]  Vasopressors [ ]  Inotropes   [X ] Respiratory failure present: On face tent  [ ] Mechanical Ventilation [ ] Non-invasive ventilatory support [ ] High-Flow  [ ] Acute  [ ] Chronic [ ] Hypoxic  [ ] Hypercarbic [ ] Other  [ ] Other organ failure     LABS:  None new.     RADIOLOGY & ADDITIONAL STUDIES: None new.     PROTEIN CALORIE MALNUTRITION: [ ] mild [ ] moderate [ ] severe  [ ] underweight [ ] morbid obesity    https://www.andeal.org/vault/5969/web/files/ONC/Table_Clinical%20Characteristics%20to%20Document%20Malnutrition-White%20JV%20et%20al%202012.pdf    Height (cm): 165.1 (08-08-22 @ 20:16), 165.1 (08-01-22 @ 15:36)  Weight (kg): 75.3 (08-08-22 @ 20:29), 73.1 (08-01-22 @ 15:36)  BMI (kg/m2): 27.6 (08-08-22 @ 20:29), 26.8 (08-08-22 @ 20:16), 26.8 (08-01-22 @ 15:36)    [X ] PPSV2 < or = 30% [ ] significant weight loss [X ] poor nutritional intake [ ] anasarca   Artificial Nutrition [ ]     Other REFERRALS:    [ x] Hospice  [ ]Child Life  [ x]Social Work  [ x]Case management [ ]Holistic Therapy [ ] Physical Therapy [ ] Dietary   Goals of Care Document: GARTH Angeles (08-12-22 @ 15:29)  Goals of Care Conversation:   Participants:  · Participants  Family  · Spouse  present  · Child(merlin)  Shahida    Advance Directives:  · Caregiver:  information could not be obtained    Conversation Discussion:  · Conversation  Prognosis; Treatment Options  · Conversation Details  Met with patient and family at bedside. Patient confused and unable to participate meaningfully in conversation.  conversation held in Montenegrin which is a shared language of this provider.    I reviewed with patients spouse and daughter about current clinical situation. We discussed how he has been extubated, but now with delirium and still on pressors with goal to titrate them off. Family with much concern over mental status.     I reviewed options regarding care, including ongoing medical management in ICU and eventually medical floor. I also discussed transitioning focus of care and consideration for transfer to PCU. I explained that this level of care would be more in line with symptom directed, and care tailored to ensuring patient was comfortable at end of life.    Family appreciative of information and will consider choices. assured ongoing availability of our team. they have this providers contact information. MICU team updated.    Personal Advance Directives Treatment Guidelines:   Treatment Guidelines:  · Decision Maker  Surrogate  · Treatment Guideline Comments  continue current level of care    MOLST:  · Completed  09-Aug-2022    Location of Discussion:   Time Spent on Advance Care Planning:  I spent 20 (in minutes) on advance care planning services with the patient.  This time is separate and distinct from any other care management services provided on this date.    Location of Discussion:  · Location of discussion  Face to face      Electronic Signatures:  Swetha Angeles)  (Signed 12-Aug-2022 15:37)  	Authored: Goals of Care Conversation, Personal Advance Directives Treatment Guidelines, Location of Discussion      Last Updated: 12-Aug-2022 15:37 by Swetha Angeles)

## 2022-08-17 NOTE — PROGRESS NOTE ADULT - PROBLEM SELECTOR PLAN 1
Family amenable to symptom directed care. Artificial nutrition was stopped and NGT was removed   > c/w glyco 0.4 standing q6h  > c/w scopolamine patch q 72   > c/w dilaudid  0.5 q1 PRN dyspnea and dilaudid 0.2q6 standing   > on face tent

## 2022-08-18 NOTE — PROGRESS NOTE ADULT - PROBLEM SELECTOR PLAN 6
likely prognosis end stage
likely secondary to infarcts noted on CT brain  neuro follow up appreciated   on IV heparin
DNR/DNI, MOLST in chart.   Transition to PCU for EOL care to focus on symptoms. d/c NGT and artificial nutrition
DNR/DNI, MOLST in chart.   Continue care in PCU. Family  at bedside. Support provided.
- Family not pursuing further cancer interventions

## 2022-08-18 NOTE — PROGRESS NOTE ADULT - PROBLEM SELECTOR PROBLEM 1
Gurgling breath sounds
Septic shock
Gurgling breath sounds
Gurgling breath sounds
Functional quadriplegia
Septic shock
Gurgling breath sounds
Gurgling breath sounds

## 2022-08-18 NOTE — PROGRESS NOTE ADULT - SUBJECTIVE AND OBJECTIVE BOX
GAP TEAM PALLIATIVE CARE UNIT PROGRESS NOTE:      [  ] Patient on hospice program.    INDICATION FOR PALLIATIVE CARE UNIT SERVICES/Interval HPI: symptom management in the setting of a 80y/o M with metastatic gallbladder cancer presenting from rehab, pt found unresponsive  s/p intubation in the field for AHRF also found to be in septic shock likely 2/2 aspiration even    OVERNIGHT EVENTS:    DNR on chart: Yes  Yes      Allergies    Advil (Hives)  penicillin (Hives)    Intolerances    MEDICATIONS  (STANDING):  BACItracin   Ointment 1 Application(s) Topical two times a day  glycopyrrolate Injectable 0.4 milliGRAM(s) IV Push every 6 hours  HYDROmorphone  Injectable 0.2 milliGRAM(s) IV Push every 6 hours  scopolamine 1 mG/72 Hr(s) Patch 1 Patch Transdermal every 72 hours  sodium chloride 0.9%. 1000 milliLiter(s) (10 mL/Hr) IV Continuous <Continuous>    MEDICATIONS  (PRN):  acetaminophen  Suppository .. 650 milliGRAM(s) Rectal every 6 hours PRN Temp greater or equal to 38C (100.4F)  bisacodyl Suppository 10 milliGRAM(s) Rectal daily PRN Constipation  HYDROmorphone  Injectable 0.5 milliGRAM(s) IV Push every 1 hour PRN Dyspnea  HYDROmorphone  Injectable 0.5 milliGRAM(s) IV Push every 1 hour PRN Pain  midazolam Injectable 0.5 milliGRAM(s) IV Push every 1 hour PRN agitation    ITEMS UNCHECKED ARE NOT PRESENT    PRESENT SYMPTOMS: [ ]Unable to self-report  [ ]PAINADs [ ]RDOS  Source if other than patient:  [ ]Family   [ ]Team     Pain: [ ] yes [ ] no  QOL impact -   Location -                    Aggravating factors -  Quality -  Radiation -  Timing-  Severity (0-10 scale):  Minimal acceptable level (0-10 scale):     PAINAD Score:  http://geriatrictoolkit.[employer].edu/cog/painad.pdf (Ctrl +  left click to view)    Dyspnea:                           [ ]Mild [ ]Moderate [ ]Severe    RDOS:  0 to 2  minimal or no respiratory distress   3  mild distress  4 to 6 moderate distress  >7 severe distress  [email or web address]_Distress_Observation_Scale.pdf (Ctrl +  left click to view)     Anxiety:                             [ ]Mild [ ]Moderate [ ]Severe  Fatigue:                             [ ]Mild [ ]Moderate [ ]Severe  Nausea:                             [ ]Mild [ ]Moderate [ ]Severe  Loss of appetite:              [ ]Mild [ ]Moderate [ ]Severe  Constipation:                    [ ]Mild [ ]Moderate [ ]Severe  		  Other Symptoms:  [ ]All other review of systems negative     Palliative Performance Status Version 2:         %         [email or web address]  PHYSICAL EXAM:   Vital Signs Last 24 Hrs  T(C): 36.6 (18 Aug 2022 07:00), Max: 36.6 (18 Aug 2022 07:00)  T(F): 97.9 (18 Aug 2022 07:00), Max: 97.9 (18 Aug 2022 07:00)  HR: 86 (18 Aug 2022 07:00) (86 - 86)  BP: 101/66 (18 Aug 2022 07:00) (101/66 - 101/66)  BP(mean): --  RR: 20 (18 Aug 2022 07:00) (20 - 20)  SpO2: 98% (18 Aug 2022 07:00) (98% - 98%)    Parameters below as of 18 Aug 2022 07:00  Patient On (Oxygen Delivery Method): face tent     I&O's Summary    17 Aug 2022 07:01  -  18 Aug 2022 07:00  --------------------------------------------------------  IN: 0 mL / OUT: 100 mL / NET: -100 mL      GENERAL: [ ] Cachexia  [ ]Alert  [ ]Oriented x   [ ]Lethargic  [ ]Unarousable  [ ]Verbal  [ ]Non-Verbal  Behavioral:   [ ] Anxiety  [ ] Delirium [ ] Agitation [ ] Other  HEENT:  [ ]Normal   [ ]Dry mouth   [ ]ET Tube/Trach  [ ]Oral lesions  PULMONARY:   [ ]Clear [ ]Tachypnea  [ ]Audible excessive secretions   [ ]Rhonchi        [ ]Right [ ]Left [ ]Bilateral  [ ]Crackles        [ ]Right [ ]Left [ ]Bilateral  [ ]Wheezing     [ ]Right [ ]Left [ ]Bilateral  [ ]Diminished BS [ ]Right [ ]Left [ ]Bilateral    CARDIOVASCULAR:    [ ]Regular [ ]Irregular [ ]Tachy  [ ]Mickey [ ]Murmur [ ]Other  GASTROINTESTINAL:  [ ]Soft  [ ]Distended   [ ]+BS  [ ]Non tender [ ]Tender  [ ]Other [ ]PEG [ ]OGT/ NGT   Last BM:    GENITOURINARY:  [ ]Normal [ ] Incontinent   [ ]Oliguria/Anuria   [ ]Villasenor  MUSCULOSKELETAL:   [ ]Normal   [ ]Weakness  [ ]Bed/Wheelchair bound [ ]Edema  NEUROLOGIC:   [ ]No focal deficits  [ ] Cognitive impairment  [ ] Dysphagia [ ]Dysarthria [ ] Paresis [ ]Other   SKIN:   [ ]Normal  [ ]Rash  [ ]Other  [ ]Pressure ulcer(s)  [ ]y [ ]n  Present on admission      CRITICAL CARE:  [ ] Shock Present  [ ]Septic [ ]Cardiogenic [ ]Neurologic [ ]Hypovolemic  [ ]  Vasopressors [ ]  Inotropes   [ ] Respiratory failure present [ ] Mechanical Ventilation [ ] Non-invasive ventilatory support [ ] High-Flow  [ ] Acute  [ ] Chronic [ ] Hypoxic  [ ] Hypercarbic [ ] Other  [ ] Other organ failure     LABS:            RADIOLOGY & ADDITIONAL STUDIES:    PROTEIN CALORIE MALNUTRITION: [ ] mild [ ] moderate [ ] severe  [ ] underweight [ ] morbid obesity    https://www.andeal.org/vault/2440/web/files/ONC/Table_Clinical%20Characteristics%20to%20Document%20Malnutrition-White%20JV%20et%20al%054771.pdf    Height (cm): 165.1 (08-08-22 @ 20:16), 165.1 (08-01-22 @ 15:36)  Weight (kg): 75.3 (08-08-22 @ 20:29), 73.1 (08-01-22 @ 15:36)  BMI (kg/m2): 27.6 (08-08-22 @ 20:29), 26.8 (08-08-22 @ 20:16), 26.8 (08-01-22 @ 15:36)    [ ] PPSV2 < or = 30% [ ] significant weight loss [ ] poor nutritional intake [ ] anasarca   Artificial Nutrition [ ]     REFERRALS:   [ ]Chaplaincy  [ ] Hospice  [ ]Child Life  [ ]Social Work  [ ]Case management [ ]Holistic Therapy [ ] Physical Therapy [ ] Dietary   Goals of Care Document: GARTH Angeles (08-12-22 @ 15:29)  Goals of Care Conversation:   Participants:  · Participants  Family  · Spouse  present  · Child(merlin)  Shahida    Advance Directives:  · Caregiver:  information could not be obtained    Conversation Discussion:  · Conversation  Prognosis; Treatment Options  · Conversation Details  Met with patient and family at bedside. Patient confused and unable to participate meaningfully in conversation.  conversation held in Occitan which is a shared language of this provider.    I reviewed with patients spouse and daughter about current clinical situation. We discussed how he has been extubated, but now with delirium and still on pressors with goal to titrate them off. Family with much concern over mental status.     I reviewed options regarding care, including ongoing medical management in ICU and eventually medical floor. I also discussed transitioning focus of care and consideration for transfer to PCU. I explained that this level of care would be more in line with symptom directed, and care tailored to ensuring patient was comfortable at end of life.    Family appreciative of information and will consider choices. assured ongoing availability of our team. they have this providers contact information. MICU team updated.    Personal Advance Directives Treatment Guidelines:   Treatment Guidelines:  · Decision Maker  Surrogate  · Treatment Guideline Comments  continue current level of care    MOLST:  · Completed  09-Aug-2022    Location of Discussion:   Time Spent on Advance Care Planning:  I spent 20 (in minutes) on advance care planning services with the patient.  This time is separate and distinct from any other care management services provided on this date.    Location of Discussion:  · Location of discussion  Face to face      Electronic Signatures:  Swetha Angeles)  (Signed 12-Aug-2022 15:37)  	Authored: Goals of Care Conversation, Personal Advance Directives Treatment Guidelines, Location of Discussion      Last Updated: 12-Aug-2022 15:37 by Swetha Angeles)      GAP TEAM PALLIATIVE CARE UNIT PROGRESS NOTE:      [  ] Patient on hospice program.    INDICATION FOR PALLIATIVE CARE UNIT SERVICES/Interval HPI: symptom management in the setting of a 80y/o M with metastatic gallbladder cancer presenting from rehab, pt found unresponsive  s/p intubation in the field for AHRF also found to be in septic shock likely 2/2 aspiration event. Now extubated.    OVERNIGHT EVENTS: Chart reviewed. The patient is seen and examined at the bedside. He did not require any PRN medications within a 24hr period 8am-8am. Patient's daughter Fara at the bedside.  utilized. Interpreters name: Ansley. ID# [id number].        DNR on chart: Yes  Yes      Allergies    Advil (Hives)  penicillin (Hives)    Intolerances    MEDICATIONS  (STANDING):  BACItracin   Ointment 1 Application(s) Topical two times a day  glycopyrrolate Injectable 0.4 milliGRAM(s) IV Push every 6 hours  HYDROmorphone  Injectable 0.2 milliGRAM(s) IV Push every 6 hours  scopolamine 1 mG/72 Hr(s) Patch 1 Patch Transdermal every 72 hours  sodium chloride 0.9%. 1000 milliLiter(s) (10 mL/Hr) IV Continuous <Continuous>    MEDICATIONS  (PRN):  acetaminophen  Suppository .. 650 milliGRAM(s) Rectal every 6 hours PRN Temp greater or equal to 38C (100.4F)  bisacodyl Suppository 10 milliGRAM(s) Rectal daily PRN Constipation  HYDROmorphone  Injectable 0.5 milliGRAM(s) IV Push every 1 hour PRN Dyspnea  HYDROmorphone  Injectable 0.5 milliGRAM(s) IV Push every 1 hour PRN Pain  midazolam Injectable 0.5 milliGRAM(s) IV Push every 1 hour PRN agitation    ITEMS UNCHECKED ARE NOT PRESENT    PRESENT SYMPTOMS: [X ]Unable to self-report  [ X]PAINADs [ X]RDOS  Source if other than patient:  [ ]Family   [ X]Team     Pain: [ X] yes [ ] no  QOL impact -   Location -                    Aggravating factors -  Quality -  Radiation -  Timing-  Severity (0-10 scale):  Minimal acceptable level (0-10 scale):     PAINAD Score: a score of 1X2 within a 24hr period 8am-8am  http://geriatrictoolkit.[employer].edu/cog/painad.pdf (Ctrl +  left click to view)    Dyspnea:                           [ ]Mild [ ]Moderate [ ]Severe    RDOS: 2  0 to 2  minimal or no respiratory distress   3  mild distress  4 to 6 moderate distress  >7 severe distress  [email or web address]_Distress_Observation_Scale.pdf (Ctrl +  left click to view)     Anxiety:                             [ ]Mild [ ]Moderate [ ]Severe  Fatigue:                             [ ]Mild [ ]Moderate [ ]Severe  Nausea:                             [ ]Mild [ ]Moderate [ ]Severe  Loss of appetite:              [ ]Mild [ ]Moderate [ ]Severe  Constipation:                    [ ]Mild [ ]Moderate [ ]Severe  		  Other Symptoms:  [ ]All other review of systems negative- unable to assess    Palliative Performance Status Version 2:  10 %         [email or web address]_performance_scale_[email or web address]  PHYSICAL EXAM:   Vital Signs Last 24 Hrs  T(C): 36.6 (18 Aug 2022 07:00), Max: 36.6 (18 Aug 2022 07:00)  T(F): 97.9 (18 Aug 2022 07:00), Max: 97.9 (18 Aug 2022 07:00)  HR: 86 (18 Aug 2022 07:00) (86 - 86)  BP: 101/66 (18 Aug 2022 07:00) (101/66 - 101/66)  BP(mean): --  RR: 20 (18 Aug 2022 07:00) (20 - 20)  SpO2: 98% (18 Aug 2022 07:00) (98% - 98%)    Parameters below as of 18 Aug 2022 07:00  Patient On (Oxygen Delivery Method): face tent     I&O's Summary    17 Aug 2022 07:01  -  18 Aug 2022 07:00  --------------------------------------------------------  IN: 0 mL / OUT: 100 mL / NET: -100 mL      GENERAL: [ ] Cachexia  [ ]Alert  [ ]Oriented x   [ ]Lethargic  [X ]Unarousable  [ ]Verbal  [X ]Non-Verbal  Behavioral:   [ ] Anxiety  [ ] Delirium [ ] Agitation [ ] Other  HEENT:  [ ]Normal   [ X]Dry mouth   [ ]ET Tube/Trach  [ ]Oral lesions  PULMONARY:   [ ]Clear [ ]Tachypnea  [X ]Audible excessive secretions   [ ]Rhonchi        [ ]Right [ ]Left [ ]Bilateral  [ ]Crackles        [ ]Right [ ]Left [ ]Bilateral  [ ]Wheezing     [ ]Right [ ]Left [ ]Bilateral  [ ]Diminished BS [ ]Right [ ]Left [ ]Bilateral    CARDIOVASCULAR:    [X ]Regular [ ]Irregular [ ]Tachy  [ ]Mickey [ ]Murmur [ ]Other  GASTROINTESTINAL:  [X ]Soft  [ ]Distended   [ X]+BS  [X ]Non tender [ ]Tender  [ ]Other [ ]PEG [ ]OGT/ NGT   Last BM:  8/12/22  GENITOURINARY:  [ ]Normal [ X] Incontinent   [ ]Oliguria/Anuria   [ X]Villasenor  MUSCULOSKELETAL:   [ ]Normal   [ X]Weakness  [ X]Bed/Wheelchair bound [ ]Edema  NEUROLOGIC:   [ ]No focal deficits  [ X] Cognitive impairment  [ ] Dysphagia [ ]Dysarthria [ ] Paresis [ ]Other   SKIN: Ecchymosis   [ ]Normal  [ ]Rash  [ ]Other  [ ]Pressure ulcer(s)  [ ]y [ ]n  Present on admission      CRITICAL CARE:  [ ] Shock Present  [ ]Septic [ ]Cardiogenic [ ]Neurologic [ ]Hypovolemic  [ ]  Vasopressors [ ]  Inotropes   [ X] Respiratory failure present [ ] Mechanical Ventilation [ ] Non-invasive ventilatory support [ ] High-Flow  [ ] Acute  [ ] Chronic [ ] Hypoxic  [ ] Hypercarbic [ ] Other  [ ] Other organ failure     LABS: Reviewed    RADIOLOGY & ADDITIONAL STUDIES: Reviewed    PROTEIN CALORIE MALNUTRITION: [ ] mild [ ] moderate [ ] severe  [ ] underweight [ ] morbid obesity    https://www.andeal.org/vault/2440/web/files/ONC/Table_Clinical%20Characteristics%20to%20Document%20Malnutrition-White%20JV%20et%20al%202012.pdf    Height (cm): 165.1 (08-08-22 @ 20:16), 165.1 (08-01-22 @ 15:36)  Weight (kg): 75.3 (08-08-22 @ 20:29), 73.1 (08-01-22 @ 15:36)  BMI (kg/m2): 27.6 (08-08-22 @ 20:29), 26.8 (08-08-22 @ 20:16), 26.8 (08-01-22 @ 15:36)    [ X] PPSV2 < or = 30% [ ] significant weight loss [ ] poor nutritional intake [ ] anasarca   Artificial Nutrition [ ]     REFERRALS:   [ ]Chaplaincy  [ ] Hospice  [ ]Child Life  [ X]Social Work  [ ]Case management [ ]Holistic Therapy [ ] Physical Therapy [ ] Dietary   Goals of Care Document: GARTH Angeles (08-12-22 @ 15:29)  Goals of Care Conversation:   Participants:  · Participants  Family  · Spouse  present  · Child(merlin)  Shahida    Advance Directives:  · Caregiver:  information could not be obtained    Conversation Discussion:  · Conversation  Prognosis; Treatment Options  · Conversation Details  Met with patient and family at bedside. Patient confused and unable to participate meaningfully in conversation.  conversation held in Dominican which is a shared language of this provider.    I reviewed with patients spouse and daughter about current clinical situation. We discussed how he has been extubated, but now with delirium and still on pressors with goal to titrate them off. Family with much concern over mental status.     I reviewed options regarding care, including ongoing medical management in ICU and eventually medical floor. I also discussed transitioning focus of care and consideration for transfer to PCU. I explained that this level of care would be more in line with symptom directed, and care tailored to ensuring patient was comfortable at end of life.    Family appreciative of information and will consider choices. assured ongoing availability of our team. they have this providers contact information. MICU team updated.    Personal Advance Directives Treatment Guidelines:   Treatment Guidelines:  · Decision Maker  Surrogate  · Treatment Guideline Comments  continue current level of care    MOLST:  · Completed  09-Aug-2022    Location of Discussion:   Time Spent on Advance Care Planning:  I spent 20 (in minutes) on advance care planning services with the patient.  This time is separate and distinct from any other care management services provided on this date.    Location of Discussion:  · Location of discussion  Face to face      Electronic Signatures:  Swetha Angeles)  (Signed 12-Aug-2022 15:37)  	Authored: Goals of Care Conversation, Personal Advance Directives Treatment Guidelines, Location of Discussion      Last Updated: 12-Aug-2022 15:37 by Swetha Angeles)

## 2022-08-18 NOTE — PROGRESS NOTE ADULT - PROBLEM SELECTOR PLAN 7
Thank you for allowing us to participate in your patient's care. We will continue to follow with you.    Rebekah Ellsworth D.O.   Palliative Medicine
- Spoke with the patient's daughter Fara at the bedside with the . Interpreters name: Ansley. ID# [id number]. Updates provided. Educated as to what to expect.  Questions answered.  Emotional support provided.
Continue care in PCU. Family at bedside. Support provided.

## 2022-08-18 NOTE — PROGRESS NOTE ADULT - PROBLEM SELECTOR PLAN 1
- Continue with glyco 0.4 standing q6h  - Continue with scopolamine patch q 72   - Turn and position

## 2022-08-18 NOTE — PROGRESS NOTE ADULT - PROBLEM SELECTOR PLAN 5
will continue to follow pending clinical course  please page if acute issues. [phone].
- PPSV 10%  - artificial nutrition was stopped and NGT was removed on 8/13   - reposition patient routinely  - supportive care
s/p extubation on 8/11 in MICU   currently on face tent, breathing comfortably   monitor secretions   c/w glyco and add scopolamine
s/p extubation on 8/11 in MICU   currently on face tent, breathing comfortably   monitor secretions   c/w glyco   c/w scopolamine  cefepime 2 g q 12 x 7 days; last day of antibiotics: 8/17
s/p extubation on 8/11 in MICU   currently on face tent, breathing comfortably   monitor secretions   c/w glyco
supportive care
supportive care
s/p extubation on 8/11 in MICU   family amenable to transition to PCU with comfort care

## 2022-08-18 NOTE — PROGRESS NOTE ADULT - PROBLEM SELECTOR PLAN 4
Family not pursuing further cancer interventions
GOC discussion as above: remains DNR and does not want re-intubation     MOLST form completed and placed in chart  DNR/DNI order placed
- Last recoded BM on 8/12/22. Abdomen is soft, +BS  - Dulcolax 10mg suppository daily PRN
metastatic   no treatment or oncology as per daughter at bedside
metastatic   no treatment or oncology as per daughter at bedside
Family not pursuing further cancer interventions

## 2022-08-18 NOTE — PROGRESS NOTE ADULT - PROBLEM SELECTOR PROBLEM 2
Pneumonia, aspiration
Cholangiocarcinoma
Pneumonia, aspiration
Acute pain
Dyspnea
Acute pain

## 2022-08-18 NOTE — PROGRESS NOTE ADULT - PROVIDER SPECIALTY LIST ADULT
Palliative Care
MICU
MICU
Palliative Care
Palliative Care
MICU
Neurology
Internal Medicine
Neurology
Neurology
Palliative Care
Neurology
Palliative Care
Internal Medicine
Palliative Care

## 2022-08-18 NOTE — PROGRESS NOTE ADULT - PROBLEM SELECTOR PROBLEM 5
Sepsis with acute hypoxic respiratory failure
Sepsis with acute hypoxic respiratory failure
Functional quadriplegia
Functional quadriplegia
Palliative care encounter
Functional quadriplegia
Sepsis with acute hypoxic respiratory failure
Sepsis with acute hypoxic respiratory failure

## 2022-08-18 NOTE — PROGRESS NOTE ADULT - PROBLEM SELECTOR PLAN 3
PPSV 10%  artificial nutrition was stopped and NGT was removed on 8/13   reposition patient routinely  supportive care
- Dilaudid 0.2mg IV q1hr PRN ordered for moderate pain  - Continue with Dilaudid 0.5mg IV q1h PRN severe pain   - Continue with Dilaudid 0.2mg IV q6h ATC    - Bowel regimen while receiving opioids
on admission likely secondary to sepsis  related ATN  now at baseline
PPSV 10%  stop artificial nutrition and remove NGT (8/13)   Please assist with ADLs  reposition patient routinely
PPSV 10%  artificial nutrition was stopped and NGT was removed on 8/13   reposition patient routinely  supportive care
antibiotics per ICU team   wean off ventilatory support per ICU team  pressors per ICU team  family wants to continue these interventions for now, but would not want reintubation if medically extubated.   will evaluate for PCU after extubation
PPSV 10%  artificial nutrition was stopped and NGT was removed on 8/13   reposition patient routinely  supportive care
on admission likely secondary to sepsis  related ATN  now at baseline

## 2022-08-18 NOTE — PROGRESS NOTE ADULT - PROBLEM SELECTOR PROBLEM 3
Functional quadriplegia
Acute pain
NELSON (acute kidney injury)
Functional quadriplegia
Sepsis with acute hypoxic respiratory failure
Functional quadriplegia
NELSON (acute kidney injury)
Functional quadriplegia

## 2022-08-18 NOTE — PROGRESS NOTE ADULT - PROBLEM SELECTOR PLAN 2
- On face tent  - Continue with Dilaudid 0.5mg IV q1h PRN pain   - Continue with Dilaudid 0.2mg IV q6h ATC  - Bowel regimen while receiving opioids - d/c face tent today 02 stat 98%. 5L NC ordered  - Continue with Dilaudid 0.5mg IV q1h PRN pain   - Continue with Dilaudid 0.2mg IV q6h ATC  - Bowel regimen while receiving opioids

## 2022-08-18 NOTE — PROGRESS NOTE ADULT - ATTENDING COMMENTS
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
81 year old man with metastatic gallbladder cancer, DM2, HTN, HLD, CAD s/p MI 2018, HFpEF, recent admission for sepsis 2/2 UTI, presented from rehab after being found unresponsive.  Intubated i/s/o hypoxemic respiratory failure and septic shock, treated for aspiration PNA.  Extubated on 8/11. Pt made DNR/DNI. Given his poor performance status, and metastatic disease, family has shifted goal to management of symptoms at the end of life.  Sx are dyspnea, pain, secretions.  Plan as above.  In the setting of parenteral controlled substance administration, clinical monitoring required for side effects such as respiratory depression, constipation and opioid induced neurotoxicity.  This patient is at [ ]high [x] medium [ ] low risk due to   respiratory failure and end stage disease.      [ ] The patient is receiving IV and has required  escalation for uncontrolled symptoms.  [ ] To taper and monitor for emergence of symptoms.  [ x] Symptoms controlled with present regimen.     Hospice transition to be addressed if clinical condition permits.   I do not expect this patient to survive to transition as patient is actively dying.  Patient assessment and plan discussed on interdisciplinary team rounds today.     Chaplaincy is actively involved.
81 year old man with metastatic gallbladder cancer, DM2, HTN, HLD, CAD s/p MI 2018, HFpEF, recent admission for sepsis 2/2 UTI, presented from rehab after being found unresponsive.  Intubated i/s/o hypoxemic respiratory failure and septic shock, treated for aspiration PNA.  Extubated on 8/11. Pt made DNR/DNI. Given his poor performance status, and metastatic disease, family has shifted goal to management of symptoms at the end of life.  Sx are dyspnea, pain, secretions.  Plan as above.  In the setting of parenteral controlled substance administration, clinical monitoring required for side effects such as respiratory depression, constipation and opioid induced neurotoxicity.  This patient is at [ x]high [ ] medium [ ] low risk due to   respiratory failure and end stage disease.      [ x] The patient is receiving IV and has required  escalation for uncontrolled symptoms.  [ ] To taper and monitor for emergence of symptoms.  [ ] Symptoms controlled with present regimen.     Hospice transition to be addressed if clinical condition permits.   I do not expect this patient to survive to transition.  Patient assessment and plan discussed on interdisciplinary team rounds today.     Chaplaincy is actively involved.

## 2022-08-18 NOTE — PROGRESS NOTE ADULT - PROBLEM SELECTOR PROBLEM 4
Advanced care planning/counseling discussion
Cholangiocarcinoma
Constipation
Cholangiocarcinoma

## 2022-08-18 NOTE — PROGRESS NOTE ADULT - PROBLEM SELECTOR PROBLEM 7
Palliative care encounter

## 2022-08-19 NOTE — DISCHARGE NOTE FOR THE EXPIRED PATIENT - HOSPITAL COURSE
80 Y/O M with metastatic gallbladder cancer, DM2, HTN, HLD, CAD s/p MI 2018, HFpEF, recent admission for sepsis 2/2 UTI, presents from rehab after being found unresponsive at rehab intubated i/s/o hypoxemic respiratory failure and septic shock likely 2/2 aspiration PNA, initially admitted to MICU for further management extubated on . Transferred to PCU for comfort-guided measures. Patient  on 22.

## 2022-08-19 NOTE — PROVIDER CONTACT NOTE (OTHER) - ASSESSMENT
No Response to external stimuli  No Spontaneous respirations  No Apical Heart Rate  Negative papillary response to light

## 2023-03-02 NOTE — ED ADULT NURSE NOTE - BREATHING
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 He has LPR. I suggested and discussed in detail a strict reflux diet and gave the patient a handout.\par I am recommending Nexium 20 mg 30-40 minutes before breakfast on an empty stomach.\par I am recommending Pepcid 40mg  before bedtime.\par He will use prednisone taper if needed. I discussed the risks of taking steroid medication including the risk of, osteoporosis and bone, fracture, GI problems, cataracts and mood changes. The patient indicated to me that he understands the risks.\par \par \par \par RTC 4 weeks.
dyspnea upon exertion

## 2023-05-04 NOTE — ED ADULT NURSE NOTE - TEMPLATE
[FreeTextEntry1] : I was present for the above evaluation and agree with the history, physical examination, assessment and plan as above.  Two episodes of AF with RVR noted, but last was not symptomatic. Will likely ultimately benefit from rhythm control with AF ablation to avoid progressive AF in the future. For now will continue anticoagulation and ILR monitoring, and reevaluate once withdrawal symptoms more controlled.
Respiratory

## 2023-06-14 NOTE — DIETITIAN INITIAL EVALUATION ADULT - PERTINENT MEDS FT
MEDICATIONS  (STANDING):  aspirin  chewable 81 milliGRAM(s) Oral daily  cefepime   IVPB 1000 milliGRAM(s) IV Intermittent every 24 hours  chlorhexidine 0.12% Liquid 15 milliLiter(s) Oral Mucosa every 12 hours  chlorhexidine 4% Liquid 1 Application(s) Topical <User Schedule>  dexMEDEtomidine Infusion 0.5 MICROgram(s)/kG/Hr (9.41 mL/Hr) IV Continuous <Continuous>  dextrose 5%. 1000 milliLiter(s) (100 mL/Hr) IV Continuous <Continuous>  dextrose 5%. 1000 milliLiter(s) (50 mL/Hr) IV Continuous <Continuous>  dextrose 50% Injectable 25 Gram(s) IV Push once  dextrose 50% Injectable 12.5 Gram(s) IV Push once  dextrose 50% Injectable 25 Gram(s) IV Push once  glucagon  Injectable 1 milliGRAM(s) IntraMuscular once  heparin  Infusion 1200 Unit(s)/Hr (12 mL/Hr) IV Continuous <Continuous>  insulin lispro (ADMELOG) corrective regimen sliding scale   SubCutaneous every 6 hours  norepinephrine Infusion 0.6 MICROgram(s)/kG/Min (90 mL/Hr) IV Continuous <Continuous>  polyethylene glycol 3350 17 Gram(s) Oral two times a day    MEDICATIONS  (PRN):  dextrose Oral Gel 15 Gram(s) Oral once PRN Blood Glucose LESS THAN 70 milliGRAM(s)/deciliter   Albendazole Pregnancy And Lactation Text: This medication is Pregnancy Category C and it isn't known if it is safe during pregnancy. It is also excreted in breast milk.

## 2023-09-11 NOTE — DIETITIAN INITIAL EVALUATION ADULT - ENTER TO (G/KG)
PATIENT INSTRUCTIONS    Treatment:    Brace Instructions:   Brace dispensed in the office: Wrist Brace   Wear brace when in danger of falling or being bumped   *Braces are non-returnable, non-refundable.    Follow-Up:  Call or return to the clinic as needed if these symptoms worsen fail to improve as anticipated, or if new symptoms develop.      Please note: 24 hour notice for cancellation of appointment is required.    You may receive a survey in the mail, or via the e-mail address that you have provided.  We would appreciate if you could fill out the survey and provide us with any feedback on your experience regarding your visit today. Thank you for allowing us to provide you with your health care needs.     Do not hesitate to call if you are experiencing severe pain, worsening or change in your pain, have symptoms of infection (fever, warmth, redness, increased drainage), or have any other problem that concerns you ~ [phone] (or [phone] after hours).    Please remember when requesting refills on pain medication that the request should be made by Thursday at the latest. [facility] is open Monday-Friday, 8am-5pm, and closed on the weekends.  No narcotic refills will be filled after hours.    Additional Educational Resources:  For additional resources regarding your symptoms, diagnosis, or further health information, please visit the Health Resources section on [email or web address] or the Online Health Resources section in [email or web address].    
1

## 2023-11-22 NOTE — PROGRESS NOTE ADULT - PROBLEM SELECTOR PROBLEM 6
Advanced care planning/counseling discussion
Cholangiocarcinoma
Advanced care planning/counseling discussion
Obtundation
Obtundation
Advanced care planning/counseling discussion
Advanced care planning/counseling discussion
No

## 2024-01-17 NOTE — PROGRESS NOTE ADULT - PROBLEM SELECTOR PLAN 4
ACC RN plan reviewed at time of encounter. Agree with plan as outlined.    Renee Wilks, [facility]     Aic 6.9, will maintain diabetic diet,   FS coverage with sliding scale  dietary education to minimize further risk with CAD

## 2024-04-08 NOTE — PATIENT PROFILE ADULT - DO YOU FEEL UNSAFE AT HOME, WORK, OR SCHOOL?
PAST SURGICAL HISTORY:  H/O foot surgery RIGHT    H/O hernia repair     History of surgery RIGHT POPLITEAL -PEDAL ARTERY BYPASS    
no

## 2024-10-04 NOTE — DISCHARGE NOTE FOR THE EXPIRED PATIENT - NS EXPIRED ORGANCONFIRMRES
Spoke with mom and she will reschedule in the next few months. She wanted Dr mattson know also she is seeing a therapist and that is also going well.    Yes Vital Signs Last 24 Hrs  T(C): 36.6 (10-04-24 @ 06:04), Max: 36.6 (10-04-24 @ 06:04)  T(F): 97.8 (10-04-24 @ 06:04), Max: 97.8 (10-04-24 @ 06:04)  HR: --  BP: --  BP(mean): --  RR: 17 (10-04-24 @ 06:04) (17 - 17)  SpO2: 96% (10-04-24 @ 06:04) (96% - 96%)    Orthostatic VS  10-04-24 @ 06:04  Lying BP: --/-- HR: --  Sitting BP: 120/67 HR: 67  Standing BP: 108/71 HR: 77  Site: --  Mode: --  Orthostatic VS  10-03-24 @ 09:09  Lying BP: --/-- HR: --  Sitting BP: 110/58 HR: 58  Standing BP: 99/57 HR: 69  Site: --  Mode: --

## 2025-04-04 NOTE — PROGRESS NOTE ADULT - PROBLEM/PLAN-2
DISPLAY PLAN FREE TEXT
99
DISPLAY PLAN FREE TEXT

## 2025-05-12 NOTE — BH CONSULTATION LIAISON ASSESSMENT NOTE - NICOTINE
Please let her know no acute findings and just some diverticulosis. Continue with metamucil only without the dulcolax to see if that helps the stool firm. No other changes at this time.    Yes None known